# Patient Record
Sex: MALE | Race: BLACK OR AFRICAN AMERICAN | NOT HISPANIC OR LATINO | Employment: OTHER
[De-identification: names, ages, dates, MRNs, and addresses within clinical notes are randomized per-mention and may not be internally consistent; named-entity substitution may affect disease eponyms.]

---

## 2017-01-10 ENCOUNTER — RECORDS - HEALTHEAST (OUTPATIENT)
Dept: ADMINISTRATIVE | Facility: OTHER | Age: 52
End: 2017-01-10

## 2018-07-24 ENCOUNTER — HOSPITAL ENCOUNTER (EMERGENCY)
Facility: CLINIC | Age: 53
Discharge: PSYCHIATRIC OR SUBSTANCE USE FACILITY | End: 2018-07-25
Attending: EMERGENCY MEDICINE | Admitting: EMERGENCY MEDICINE
Payer: COMMERCIAL

## 2018-07-24 DIAGNOSIS — R73.9 HYPERGLYCEMIA: ICD-10-CM

## 2018-07-24 DIAGNOSIS — F20.0 CHRONIC PARANOID SCHIZOPHRENIA (H): ICD-10-CM

## 2018-07-24 DIAGNOSIS — F29 PSYCHOSIS, UNSPECIFIED PSYCHOSIS TYPE (H): ICD-10-CM

## 2018-07-24 LAB
ALBUMIN UR-MCNC: NEGATIVE MG/DL
AMPHETAMINES UR QL SCN: NEGATIVE
ANION GAP SERPL CALCULATED.3IONS-SCNC: 6 MMOL/L (ref 3–14)
APPEARANCE UR: CLEAR
BARBITURATES UR QL: NEGATIVE
BENZODIAZ UR QL: NEGATIVE
BILIRUB UR QL STRIP: NEGATIVE
BUN SERPL-MCNC: 13 MG/DL (ref 7–30)
CALCIUM SERPL-MCNC: 8.5 MG/DL (ref 8.5–10.1)
CANNABINOIDS UR QL SCN: NEGATIVE
CHLORIDE SERPL-SCNC: 102 MMOL/L (ref 94–109)
CO2 SERPL-SCNC: 29 MMOL/L (ref 20–32)
COCAINE UR QL: NEGATIVE
COLOR UR AUTO: ABNORMAL
CREAT SERPL-MCNC: 0.76 MG/DL (ref 0.66–1.25)
GFR SERPL CREATININE-BSD FRML MDRD: >90 ML/MIN/1.7M2
GLUCOSE BLDC GLUCOMTR-MCNC: 268 MG/DL (ref 70–99)
GLUCOSE BLDC GLUCOMTR-MCNC: 273 MG/DL (ref 70–99)
GLUCOSE BLDC GLUCOMTR-MCNC: 481 MG/DL (ref 70–99)
GLUCOSE SERPL-MCNC: 323 MG/DL (ref 70–99)
GLUCOSE UR STRIP-MCNC: >499 MG/DL
HGB UR QL STRIP: NEGATIVE
KETONES BLD-SCNC: 0.1 MMOL/L (ref 0–0.6)
KETONES UR STRIP-MCNC: NEGATIVE MG/DL
LEUKOCYTE ESTERASE UR QL STRIP: NEGATIVE
NITRATE UR QL: NEGATIVE
OPIATES UR QL SCN: NEGATIVE
PCP UR QL SCN: NEGATIVE
PH UR STRIP: 5 PH (ref 5–7)
POTASSIUM SERPL-SCNC: 4.1 MMOL/L (ref 3.4–5.3)
SODIUM SERPL-SCNC: 137 MMOL/L (ref 133–144)
SOURCE: ABNORMAL
SP GR UR STRIP: 1.02 (ref 1–1.03)
UROBILINOGEN UR STRIP-MCNC: 0 MG/DL (ref 0–2)

## 2018-07-24 PROCEDURE — 25000128 H RX IP 250 OP 636: Performed by: EMERGENCY MEDICINE

## 2018-07-24 PROCEDURE — 81003 URINALYSIS AUTO W/O SCOPE: CPT | Performed by: EMERGENCY MEDICINE

## 2018-07-24 PROCEDURE — 25000132 ZZH RX MED GY IP 250 OP 250 PS 637: Performed by: EMERGENCY MEDICINE

## 2018-07-24 PROCEDURE — 82010 KETONE BODYS QUAN: CPT | Performed by: EMERGENCY MEDICINE

## 2018-07-24 PROCEDURE — 00000146 ZZHCL STATISTIC GLUCOSE BY METER IP

## 2018-07-24 PROCEDURE — 90791 PSYCH DIAGNOSTIC EVALUATION: CPT

## 2018-07-24 PROCEDURE — 80048 BASIC METABOLIC PNL TOTAL CA: CPT | Performed by: EMERGENCY MEDICINE

## 2018-07-24 PROCEDURE — 96360 HYDRATION IV INFUSION INIT: CPT

## 2018-07-24 PROCEDURE — 93005 ELECTROCARDIOGRAM TRACING: CPT

## 2018-07-24 PROCEDURE — 80307 DRUG TEST PRSMV CHEM ANLYZR: CPT | Performed by: EMERGENCY MEDICINE

## 2018-07-24 PROCEDURE — 99285 EMERGENCY DEPT VISIT HI MDM: CPT | Mod: 25

## 2018-07-24 PROCEDURE — 96361 HYDRATE IV INFUSION ADD-ON: CPT

## 2018-07-24 RX ORDER — GLIPIZIDE 10 MG/1
10 TABLET ORAL ONCE
Status: COMPLETED | OUTPATIENT
Start: 2018-07-24 | End: 2018-07-24

## 2018-07-24 RX ORDER — OLANZAPINE 5 MG/1
10 TABLET, ORALLY DISINTEGRATING ORAL ONCE
Status: COMPLETED | OUTPATIENT
Start: 2018-07-24 | End: 2018-07-24

## 2018-07-24 RX ORDER — SODIUM CHLORIDE 9 MG/ML
1000 INJECTION, SOLUTION INTRAVENOUS CONTINUOUS
Status: DISCONTINUED | OUTPATIENT
Start: 2018-07-24 | End: 2018-07-25 | Stop reason: HOSPADM

## 2018-07-24 RX ADMIN — SODIUM CHLORIDE, PRESERVATIVE FREE 1000 ML: 5 INJECTION INTRAVENOUS at 19:47

## 2018-07-24 RX ADMIN — SODIUM CHLORIDE, PRESERVATIVE FREE 1000 ML: 5 INJECTION INTRAVENOUS at 18:45

## 2018-07-24 RX ADMIN — METFORMIN HYDROCHLORIDE 1000 MG: 500 TABLET ORAL at 21:26

## 2018-07-24 RX ADMIN — GLIPIZIDE 10 MG: 10 TABLET ORAL at 21:26

## 2018-07-24 RX ADMIN — OLANZAPINE 10 MG: 5 TABLET, ORALLY DISINTEGRATING ORAL at 16:38

## 2018-07-24 NOTE — ED NOTES
"Patient has been cooperative with this RN to this point. Patient was agreeable to having IV placed and having IVF. This RN attempted to place IV and patient became extremely agitated, stating that he was going to strike this RN and calling this RN a \"bitch.\". Security at bedside during attempt. This RN removed IV catheter and left room.    "

## 2018-07-24 NOTE — ED NOTES
RN entered room to discuss need for blood work to provide better care. Pt absolutely refused, began yelling profanities at RN and security.

## 2018-07-24 NOTE — ED NOTES
"New RN introduced self to pt and informed pt he would be transferring rooms. Pt agreeable. Pt speaking random thoughts about \"his Zoroastrian\" and \"$1,500 a month\". Security present, pt transferred rooms without incident. RN will attempt IV and blood work.   "

## 2018-07-24 NOTE — ED PROVIDER NOTES
"  History     Chief Complaint:  Altered mental status    HPI   Benja Duong is a 53 year old male with a history of chronic homelessness, paranoid schizophrenia, chemical dependency and abuse (cocaine, cannabinoids, methamphetamines), latent TB, and diabetes who presents to the emergency department today for evaluation for a mental health concerns and manic behavior. The patient reports he was recently at [facility] and [facility] [place] unknown reasons but was admitted to both at some point. The patient is unsure what is wrong with him and continues to swear on my assessment. He continues to tell stories about various people he wants to fight and he is \"medicated on thorazine\". He notes he might not be taking his thorazine. He has been fighting people for 3-4 days. He claims that he is from [place] and visiting a friend here. He also is telling stories about detention and the money he has to pay for parole. History is very difficulty to gather and remains very agitated. He admits to crack, alcohol, and methamphetamine use, but not for the last four days.     Per chart review, the patient was recently seen at [facility] on 7/11 for similar mental health concerns. At that time, he had similar nonsensical comments and did eventually admit to cocaine use. he was not holdable at that time and was discharged in the morning.     Allergies:  Oxycodone-Acetaminophen  Alprazolam     Medications:    OLANzapine (ZYPREXA) 10 mg/2 mL injection    aluminum-magnesium hydroxide-simethicone (MAALOX ADVANCED)    chlorproMAZINE (THORAZINE) 100 mg tablet    cholecalciferol (VITAMIN D) 1,000 unit tablet    glipiZIDE (GLUCOTROL) 10 mg tablet    metFORMIN (GLUCOPHAGE) 1,000 mg tablet    metoprolol tartrate (LOPRESSOR) 25 mg tablet    Non-compliant    Past Medical History:    TB lung, latent  Diabetes  Paranoid schizophrenia  Cocaine abuse  Drug-induced mood disorder  Cannabis " abuse  Hyperlipidemia  GERD  Hypertension    Past Surgical History:    I & D PERITONSILLAR ABSCESS  TONSILLECTOMY, PRIMARY OR SECONDARY; AGE 11 OR OVER  HX WISDOM TEETH EXTRACTION     Family History:    Unknown. Patient is unable to report.     Social History:  The patient was alone.  Smoking Status: Unknown  Smokeless Tobacco: Unknown  Alcohol Use: Unknown    Review of Systems   Unable to perform ROS: Mental status change     Physical Exam     Patient Vitals for the past 24 hrs:   BP Pulse Resp SpO2   07/24/18 1725 - - - 98 %   07/24/18 1724 141/84 - - 98 %   07/24/18 1552 (!) 164/101 115 20 98 %         Physical Exam  General: pacing around the room  Head: No obvious trauma to head.  Ears, Nose, Throat:  External ears normal.  Nose normal.    Eyes:  Conjunctivae clear.  Pupils are equal, round, and reactive.   Neck: Normal range of motion.  Neck supple.   CV: Regular rate and rhythm.  No murmurs.      Respiratory: Effort normal and breath sounds normal.  No wheezing or crackles.   Gastrointestinal: Soft.  No distension. There is no tenderness.    Neuro: Alert. Moving all extremities appropriately.  Normal speech.  Normal gait.    Skin: Skin is warm and dry.  No rash noted.   Psych: Erratic and aggressive mood.  Patient is rambling.  He appears to be responding to external stimuli in the emergency room.  It appears that he is having both auditory and visual hallucinations.  Disorganized thought process.  Aggressive stance at times.    Emergency Department Course   Laboratory:  Laboratory findings were communicated with the patient who voiced understanding of the findings.  Drug abuse screen 77 urine: Negative  Glucose by meter: 481 (H)  BMP: glc 323  Ketone Beta-Hydroxybutyrate Quantitative: 0.1 WNL    Interventions:  1638 Zyprexa 10 mg PO    Medications   0.9% sodium chloride BOLUS (not administered)     Followed by   sodium chloride 0.9% infusion (not administered)   OLANZapine zydis (zyPREXA) ODT tab 10 mg (10  "mg Oral Given 18 1638)        Emergency Department Course:  Nursing notes and vitals reviewed.  1539: I performed an exam of the patient as documented above.   1600: I spoke with Juana regarding patient's presentation, findings, and plan of care.    Findings and plan explained to the Patient.   Patient signed out to my partner pending lab assessment and transfer.  Impression & Plan    Medical Decision Makin-year-old male with history of psychosis, chronic schizophrenia, homelessness, drug abuse who presents with altered mental status.  Patient mildly tachycardic initially but otherwise unremarkable.  Patient is rambling and erratic.  He is requesting his Thorazine.  He has been committed previously to [facility].  He reports that he is noncompliant with his medication.  Intravenous chart he has also had a history of extensive drug abuse.  He tends to be psychotic and clear over time.  He reports that he has not used drugs in the last 4 days as he has \"lost his friends\".  His urine drug screen is negative.  At this point most concerned for decompensated mental health.  Deck  saw and evaluated patient and felt the patient would require admission.  He is speaking about suicidal ideation.  He is speaking about making a mental health parker out of human flesh.  He does not appear to be able to care for himself or others.  It is difficult to ascertain how long he has been off his meds for today suspect for quite some time.  Patient will require psychiatric admission based on initial assessment.  At this point less suspicious for drug-induced and more suspicious that this is decompensated mental health.  Patient voluntarily took 10 of oral Zyprexa.  He was sleeping comfortably after this point.    Patient is a diabetic per chart review.  He is supposed to be in metformin and glipizide.  His glucose was elevated at 481.  Based on hyperglycemia did obtain a BMP and ketones to evaluate " for possibility of DKA.  IV fluids were initiated to drive down sugar.  Suspect the patient is noncompliant with his medications.  BMP shows mildly elevated sugar at 323 but no evidence of acidosis with a normal bicarb and anion gap.  Ketones are negative.  There is no suggestion of DKA.  His only isolated hyperglycemia.  Continue IV fluids and will recheck after fluids.  Patient is currently medically clear.  Plan for psychiatric admission.  Dec was made aware and will call central intake.  Patient improved after 10 of oral Zyprexa.  He is sleeping comfortably.  No further behavioral outbursts.  Patient signed out in stable but improved condition.  Signed out to my partner pending psychiatric placement.    Diagnosis:    ICD-10-CM    1. Hyperglycemia R73.9    2. Psychosis, unspecified psychosis type F29    3. Chronic paranoid schizophrenia (H) F20.0        Disposition:  Signed out pending psychiatric bed placement, anticipate transfer to psychiatric admission    Scribe Disclosure:  Chicho MCCAULEY, am serving as a scribe at 4:10 PM on 7/24/2018 to document services personally performed by Sherita Wilson MD based on my observations and the provider's statements to me.     7/24/2018   [facility] EMERGENCY DEPARTMENT       Sherita Wilson MD  07/24/18 1849       Sherita Wilson MD  07/24/18 1922       Sherita Wilson MD  07/24/18 1922

## 2018-07-25 ENCOUNTER — HOSPITAL ENCOUNTER (INPATIENT)
Facility: CLINIC | Age: 53
LOS: 2 days | Discharge: LEFT BEFORE DISCHARGE | DRG: 885 | End: 2018-07-27
Attending: PSYCHIATRY & NEUROLOGY | Admitting: PSYCHIATRY & NEUROLOGY
Payer: COMMERCIAL

## 2018-07-25 VITALS
OXYGEN SATURATION: 99 % | RESPIRATION RATE: 20 BRPM | TEMPERATURE: 97.2 F | HEART RATE: 84 BPM | SYSTOLIC BLOOD PRESSURE: 169 MMHG | DIASTOLIC BLOOD PRESSURE: 92 MMHG

## 2018-07-25 PROBLEM — F29 PSYCHOSIS (H): Status: ACTIVE | Noted: 2018-07-25

## 2018-07-25 LAB — GLUCOSE BLDC GLUCOMTR-MCNC: 181 MG/DL (ref 70–99)

## 2018-07-25 PROCEDURE — 00000146 ZZHCL STATISTIC GLUCOSE BY METER IP

## 2018-07-25 PROCEDURE — 25000132 ZZH RX MED GY IP 250 OP 250 PS 637: Performed by: EMERGENCY MEDICINE

## 2018-07-25 PROCEDURE — 25000132 ZZH RX MED GY IP 250 OP 250 PS 637: Performed by: CLINICAL NURSE SPECIALIST

## 2018-07-25 PROCEDURE — 12400007 ZZH R&B MH INTERMEDIATE UMMC

## 2018-07-25 RX ORDER — OLANZAPINE 5 MG/1
5 TABLET, ORALLY DISINTEGRATING ORAL ONCE
Status: COMPLETED | OUTPATIENT
Start: 2018-07-25 | End: 2018-07-25

## 2018-07-25 RX ORDER — BISACODYL 10 MG
10 SUPPOSITORY, RECTAL RECTAL DAILY PRN
Status: DISCONTINUED | OUTPATIENT
Start: 2018-07-25 | End: 2018-07-27 | Stop reason: HOSPADM

## 2018-07-25 RX ORDER — OLANZAPINE 5 MG/1
5-10 TABLET ORAL 3 TIMES DAILY PRN
Status: DISCONTINUED | OUTPATIENT
Start: 2018-07-25 | End: 2018-07-27 | Stop reason: HOSPADM

## 2018-07-25 RX ORDER — OLANZAPINE 10 MG/1
10 TABLET, ORALLY DISINTEGRATING ORAL ONCE
Status: COMPLETED | OUTPATIENT
Start: 2018-07-25 | End: 2018-07-25

## 2018-07-25 RX ORDER — TRAZODONE HYDROCHLORIDE 50 MG/1
50 TABLET, FILM COATED ORAL
Status: DISCONTINUED | OUTPATIENT
Start: 2018-07-25 | End: 2018-07-27 | Stop reason: HOSPADM

## 2018-07-25 RX ORDER — HYDRALAZINE HYDROCHLORIDE 10 MG/1
10 TABLET, FILM COATED ORAL 4 TIMES DAILY
Status: DISCONTINUED | OUTPATIENT
Start: 2018-07-25 | End: 2018-07-25 | Stop reason: HOSPADM

## 2018-07-25 RX ORDER — OLANZAPINE 10 MG/2ML
5-10 INJECTION, POWDER, FOR SOLUTION INTRAMUSCULAR 3 TIMES DAILY PRN
Status: DISCONTINUED | OUTPATIENT
Start: 2018-07-25 | End: 2018-07-27 | Stop reason: HOSPADM

## 2018-07-25 RX ORDER — ALUMINA, MAGNESIA, AND SIMETHICONE 2400; 2400; 240 MG/30ML; MG/30ML; MG/30ML
30 SUSPENSION ORAL EVERY 4 HOURS PRN
Status: DISCONTINUED | OUTPATIENT
Start: 2018-07-25 | End: 2018-07-27 | Stop reason: HOSPADM

## 2018-07-25 RX ORDER — HYDROXYZINE HYDROCHLORIDE 25 MG/1
25-50 TABLET, FILM COATED ORAL EVERY 4 HOURS PRN
Status: DISCONTINUED | OUTPATIENT
Start: 2018-07-25 | End: 2018-07-27 | Stop reason: HOSPADM

## 2018-07-25 RX ADMIN — HYDRALAZINE HYDROCHLORIDE 10 MG: 10 TABLET, FILM COATED ORAL at 15:17

## 2018-07-25 RX ADMIN — OLANZAPINE 5 MG: 5 TABLET, ORALLY DISINTEGRATING ORAL at 14:04

## 2018-07-25 RX ADMIN — OLANZAPINE 10 MG: 10 TABLET, ORALLY DISINTEGRATING ORAL at 21:45

## 2018-07-25 RX ADMIN — METOPROLOL TARTRATE 12.5 MG: 25 TABLET ORAL at 12:39

## 2018-07-25 ASSESSMENT — ACTIVITIES OF DAILY LIVING (ADL)
DRESS: INDEPENDENT
ORAL_HYGIENE: INDEPENDENT
LAUNDRY: WITH SUPERVISION
GROOMING: INDEPENDENT

## 2018-07-25 NOTE — ED NOTES
Clinical provided to  Central Intake, patient placed on a wait list, with beds anticipated being available sometime tomorrow. JERICA

## 2018-07-25 NOTE — IP AVS SNAPSHOT
MRN:[id number]                      After Visit Summary   7/25/2018    Benja Duong    MRN: [id number]           Thank you!     Thank you for choosing [facility] for your care. Our goal is always to provide you with excellent care.        Patient Information     Date Of Birth          1965        Designated Caregiver       Most Recent Value    Caregiver    Will someone help with your care after discharge? yes    Name of designated caregiver Uncle Crawford    Phone number of caregiver [phone] (Office )    Caregiver address pt unsure      About your hospital stay     You were admitted on:  July 25, 2018 You last received care in the:  [facility] 32NR    You were discharged on:  July 27, 2018       Who to Call     For medical emergencies, please call 911.  For non-urgent questions about your medical care, please call your primary care provider or clinic, None          Attending Provider     Provider Specialty    Paul Andrews MD Psychiatry       Primary Care Provider Fax #    Physician No Ref-Primary [phone]      Further instructions from your care team        Behavioral Discharge Planning and Instructions      Summary:  You were admitted on 7/25/2018  due to Psychotic Symptomology.  You were treated by Dr. Paul Andrews MD and discharged on 07/27/18 from [facility]       Summary:  While you were hospitalized you met with a  to discuss discharge planning.  You signed a release in order to be referred for case management services.  You decided to discharge yourself against medical advice (AMA).    Therefore, no follow up appointments have been made for you.       Principal Diagnosis: Schizophrenia, disorganized    ACT TEAM  You are encouraged to follow up with any one of the following organizations in order to obtain an ACT Team to help you manage your chronic physical and mental health conditions:    Mental Health Resources [phone]  [employer].  "[phone]  [facility] [phone]  [facility] Care [phone]  [facility] [phone]    ACTIVE WARRANT  You are advised that you have an open warrant in [facility] that should be addressed immediately.  You can contact the [facility]  At [phone]    Major Treatments, Procedures and Findings:  You were provided with: a psychiatric assessment, assessed for medical stability, medication evaluation and/or management, group therapy, milieu management and medical interventions    Symptoms to Report: feeling more aggressive, increased confusion, losing more sleep, mood getting worse or thoughts of suicide    Early warning signs can include: increased depression or anxiety sleep disturbances increased thoughts or behaviors of suicide or self-harm  increased unusual thinking, such as paranoia or hearing voices    Safety and Wellness:  Take all medicines as directed.  Make no changes unless your doctor suggests them.      Follow treatment recommendations.  Refrain from alcohol and non-prescribed drugs.  If there is a concern for safety, call 911.    Resources:   Crisis Intervention: 888.238.3749 or 127-502-5576 (TTY: 958.898.5977).  Call anytime for help.  National [place] on Mental Illness (www.[place].maki.org): 953.389.9971 or 879-819-4945.  Alcoholics Anonymous (www.alcoholics-anonymous.org): Check your phone book for your local chapter.  Suicide Awareness Voices of Education (SAVE) (www.save.org): 111-033-GWPT (5014)  National Suicide Prevention Line (www.mentalhealthmn.org): 359-663-AJJI (4910)  Mental Health Consumer/Survivor Network of [place] (www.mhcsn.net): 153.469.8562 or 010-661-9031  Mental Health Association of [place] (www.mentalhealth.org): 833.918.6341 or 537-001-8801  Self- Management and Recovery Training., SMART-- Toll free: 633.399.8600  www.[employer]  [facility] Crisis (COPE) Response - Adult 124 822-1420  Text 4 Life: txt \"LIFE\" to 31910 for " "immediate support and crisis intervention  Crisis text line: Text \"[place]\" to 515949. Free, confidential, .  Crisis Intervention: 243.177.7269 or 324-120-7765. Call anytime for help.     The treatment team has appreciated the opportunity to work with you.     Benja,  please take care and make your recovery a daily recovery.   If you have any questions or concerns our unit number is [phone]          Pending Results     No orders found from 2018 to 2018.            Statement of Approval     Ordered          18 1504  I have reviewed and agree with all the recommendations and orders detailed in this document.  EFFECTIVE NOW     Approved and electronically signed by:  Paul Andrews MD             Admission Information     Date & Time Provider Department Dept. Phone    2018 Paul Andrews MD  32NR [phone]      Your Vitals Were     Blood Pressure Pulse Temperature Respirations Height Weight    158/88 84 97.8  F (36.6  C) (Oral) 17 1.702 m (5' 7\") 65.6 kg (144 lb 9.6 oz)    BMI (Body Mass Index)                   22.65 kg/m2           MyChart Information     [employer] lets you send messages to your doctor, view your test results, renew your prescriptions, schedule appointments and more. To sign up, go to [email or web address] . Click on \"Log in\" on the left side of the screen, which will take you to the Welcome page. Then click on \"Sign up Now\" on the right side of the page.     You will be asked to enter the access code listed below, as well as some personal information. Please follow the directions to create your username and password.     Your access code is: [email or web address]  Expires: 10/23/2018  2:06 AM     Your access code will  in 90 days. If you need help or a new code, please call your [facility] clinic or [phone].        Care EveryWhere ID     This is your Care EveryWhere ID. This could be used by other organizations to access your [facility] medical " records  [id number]        Equal Access to Services     MIRA TRAVIS : Hadii sam alicia rubybailey Soaixaali, waaxda luqadaha, qaybta kayulianaromeo rocha, rajani shankslatishabrenda desai. So [facility] [phone].    ATENCIÓN: Si habla español, tiene a robertson disposición servicios gratuitos de asistencia lingüística. Llame al [phone].    We comply with applicable federal civil rights laws and [place] laws. We do not discriminate on the basis of race, color, national origin, age, disability, sex, sexual orientation, or gender identity.               Review of your medicines      START taking        Dose / Directions    haloperidol 5 MG tablet   Commonly known as:  HALDOL        Dose:  5 mg   Take 1 tablet (5 mg) by mouth 2 times daily   Quantity:  60 tablet   Refills:  1       hydrALAZINE 25 MG tablet   Commonly known as:  APRESOLINE        Dose:  12.5 mg   Take 0.5 tablets (12.5 mg) by mouth every 6 hours as needed (HTN; SBP > 160)   Quantity:  30 tablet   Refills:  1       lisinopril 5 MG tablet   Commonly known as:  PRINIVIL/ZESTRIL        Dose:  5 mg   Start taking on:  7/28/2018   Take 1 tablet (5 mg) by mouth daily   Quantity:  30 tablet   Refills:  1         CONTINUE these medicines which have NOT CHANGED        Dose / Directions    * chlorproMAZINE 100 MG tablet   Commonly known as:  THORAZINE        Dose:  100 mg   Take 100 mg by mouth every morning And take 200 mg at bedtime.   Refills:  0       * chlorproMAZINE 50 MG tablet   Commonly known as:  THORAZINE        Dose:  50 mg   Take 50 mg by mouth 2 times daily as needed for other (psychosis)   Refills:  0       cholecalciferol 1000 units Tabs   Commonly known as:  VITAMIN D-1000 MAX ST        Dose:  1000 Units   Take 1,000 Units by mouth daily   Quantity:  30 tablet   Refills:  1       metFORMIN 1000 MG tablet   Commonly known as:  GLUCOPHAGE        Dose:  1000 mg   Take 1 tablet (1,000 mg) by mouth 2 times daily (with meals)   Quantity:  60 tablet    Refills:  1       * Notice:  This list has 2 medication(s) that are the same as other medications prescribed for you. Read the directions carefully, and ask your doctor or other care provider to review them with you.      STOP taking     glipiZIDE 10 MG tablet   Commonly known as:  GLUCOTROL           metoprolol tartrate 25 MG tablet   Commonly known as:  LOPRESSOR                Where to get your medicines      These medications were sent to [facility] [place], [place] - [address]  [address] [facility] [address], [facility] [address]     Phone:  [phone]     cholecalciferol 1000 units Tabs    haloperidol 5 MG tablet    hydrALAZINE 25 MG tablet    lisinopril 5 MG tablet    metFORMIN 1000 MG tablet                Protect others around you: Learn how to safely use, store and throw away your medicines at www.disposemymeds.org.             Medication List: This is a list of all your medications and when to take them. Check marks below indicate your daily home schedule. Keep this list as a reference.      Medications           Morning Afternoon Evening Bedtime As Needed    * chlorproMAZINE 100 MG tablet   Commonly known as:  THORAZINE   Take 100 mg by mouth every morning And take 200 mg at bedtime.                                * chlorproMAZINE 50 MG tablet   Commonly known as:  THORAZINE   Take 50 mg by mouth 2 times daily as needed for other (psychosis)                                cholecalciferol 1000 units Tabs   Commonly known as:  VITAMIN D-1000 MAX ST   Take 1,000 Units by mouth daily                                haloperidol 5 MG tablet   Commonly known as:  HALDOL   Take 1 tablet (5 mg) by mouth 2 times daily   Last time this was given:  5 mg on 7/27/2018  8:55 AM                                hydrALAZINE 25 MG tablet   Commonly known as:  APRESOLINE   Take 0.5 tablets (12.5 mg) by mouth every 6 hours as needed (HTN; SBP > 160)                                lisinopril 5 MG tablet    Commonly known as:  PRINIVIL/ZESTRIL   Take 1 tablet (5 mg) by mouth daily   Start taking on:  7/28/2018   Last time this was given:  5 mg on 7/27/2018  8:55 AM                                metFORMIN 1000 MG tablet   Commonly known as:  GLUCOPHAGE   Take 1 tablet (1,000 mg) by mouth 2 times daily (with meals)   Last time this was given:  1,000 mg on 7/27/2018  8:55 AM                                * Notice:  This list has 2 medication(s) that are the same as other medications prescribed for you. Read the directions carefully, and ask your doctor or other care provider to review them with you.

## 2018-07-25 NOTE — ED PROVIDER NOTES
Evaluated patient. Resting comfortable.  Wants something to eat.   Had mild increase in agitation and was given oral Zyprexa, tolerated well and calm at this time.  Agree with still needing admission to inpatient psychiatry.       Elias Goldman MD  07/25/18 0579

## 2018-07-25 NOTE — ED NOTES
Attempted to call nurse to nurse report to [facility] and was told they are unable to take report at this time and will call us back.

## 2018-07-25 NOTE — ED NOTES
Woke pt up for Vital signs and blood sugar. Pt still with fleeting thoughts, unable to hold steady conversation d/t changing topics rapidly. Cooperative at this time. Security continuing to monitor.

## 2018-07-25 NOTE — IP AVS SNAPSHOT
[address]    [address]    [facility] [address]    Phone:  [phone]                                       After Visit Summary   7/25/2018    Benja Duong    MRN: [id number]           After Visit Summary Signature Page     I have received my discharge instructions, and my questions have been answered. I have discussed any challenges I see with this plan with the nurse or doctor.    ..........................................................................................................................................  Patient/Patient Representative Signature      ..........................................................................................................................................  Patient Representative Print Name and Relationship to Patient    ..................................................               ................................................  Date                                            Time    ..........................................................................................................................................  Reviewed by Signature/Title    ...................................................              ..............................................  Date                                                            Time

## 2018-07-26 LAB
BASOPHILS # BLD AUTO: 0 10E9/L (ref 0–0.2)
BASOPHILS NFR BLD AUTO: 0.4 %
CHOLEST SERPL-MCNC: 157 MG/DL
DIFFERENTIAL METHOD BLD: NORMAL
EOSINOPHIL # BLD AUTO: 0.2 10E9/L (ref 0–0.7)
EOSINOPHIL NFR BLD AUTO: 3.1 %
ERYTHROCYTE [DISTWIDTH] IN BLOOD BY AUTOMATED COUNT: 12 % (ref 10–15)
GLUCOSE BLDC GLUCOMTR-MCNC: 343 MG/DL (ref 70–99)
HBA1C MFR BLD: 10.9 % (ref 0–5.6)
HCT VFR BLD AUTO: 42.4 % (ref 40–53)
HDLC SERPL-MCNC: 36 MG/DL
HGB BLD-MCNC: 13.9 G/DL (ref 13.3–17.7)
IMM GRANULOCYTES # BLD: 0 10E9/L (ref 0–0.4)
IMM GRANULOCYTES NFR BLD: 0.3 %
LDLC SERPL CALC-MCNC: 60 MG/DL
LYMPHOCYTES # BLD AUTO: 2.7 10E9/L (ref 0.8–5.3)
LYMPHOCYTES NFR BLD AUTO: 34.4 %
MCH RBC QN AUTO: 29.1 PG (ref 26.5–33)
MCHC RBC AUTO-ENTMCNC: 32.8 G/DL (ref 31.5–36.5)
MCV RBC AUTO: 89 FL (ref 78–100)
MONOCYTES # BLD AUTO: 0.3 10E9/L (ref 0–1.3)
MONOCYTES NFR BLD AUTO: 4.2 %
NEUTROPHILS # BLD AUTO: 4.5 10E9/L (ref 1.6–8.3)
NEUTROPHILS NFR BLD AUTO: 57.6 %
NONHDLC SERPL-MCNC: 121 MG/DL
NRBC # BLD AUTO: 0 10*3/UL
NRBC BLD AUTO-RTO: 0 /100
PLATELET # BLD AUTO: 211 10E9/L (ref 150–450)
RBC # BLD AUTO: 4.77 10E12/L (ref 4.4–5.9)
TRIGL SERPL-MCNC: 305 MG/DL
TSH SERPL DL<=0.005 MIU/L-ACNC: 0.6 MU/L (ref 0.4–4)
WBC # BLD AUTO: 7.8 10E9/L (ref 4–11)

## 2018-07-26 PROCEDURE — 83036 HEMOGLOBIN GLYCOSYLATED A1C: CPT | Performed by: CLINICAL NURSE SPECIALIST

## 2018-07-26 PROCEDURE — 25000132 ZZH RX MED GY IP 250 OP 250 PS 637: Performed by: CLINICAL NURSE SPECIALIST

## 2018-07-26 PROCEDURE — 80061 LIPID PANEL: CPT | Performed by: CLINICAL NURSE SPECIALIST

## 2018-07-26 PROCEDURE — G0177 OPPS/PHP; TRAIN & EDUC SERV: HCPCS

## 2018-07-26 PROCEDURE — 25000132 ZZH RX MED GY IP 250 OP 250 PS 637: Performed by: NURSE PRACTITIONER

## 2018-07-26 PROCEDURE — 12400007 ZZH R&B MH INTERMEDIATE UMMC

## 2018-07-26 PROCEDURE — 99207 ZZC CONSULT E&M CHANGED TO SUBSEQUENT LEVEL: CPT | Performed by: NURSE PRACTITIONER

## 2018-07-26 PROCEDURE — 99221 1ST HOSP IP/OBS SF/LOW 40: CPT | Mod: AI | Performed by: PSYCHIATRY & NEUROLOGY

## 2018-07-26 PROCEDURE — 99232 SBSQ HOSP IP/OBS MODERATE 35: CPT | Performed by: NURSE PRACTITIONER

## 2018-07-26 PROCEDURE — 85025 COMPLETE CBC W/AUTO DIFF WBC: CPT | Performed by: CLINICAL NURSE SPECIALIST

## 2018-07-26 PROCEDURE — 84443 ASSAY THYROID STIM HORMONE: CPT | Performed by: CLINICAL NURSE SPECIALIST

## 2018-07-26 PROCEDURE — 36415 COLL VENOUS BLD VENIPUNCTURE: CPT | Performed by: CLINICAL NURSE SPECIALIST

## 2018-07-26 PROCEDURE — 00000146 ZZHCL STATISTIC GLUCOSE BY METER IP

## 2018-07-26 PROCEDURE — 25000132 ZZH RX MED GY IP 250 OP 250 PS 637: Performed by: PSYCHIATRY & NEUROLOGY

## 2018-07-26 RX ORDER — HALOPERIDOL 5 MG/1
5 TABLET ORAL 2 TIMES DAILY
Status: DISCONTINUED | OUTPATIENT
Start: 2018-07-26 | End: 2018-07-27 | Stop reason: HOSPADM

## 2018-07-26 RX ORDER — NICOTINE POLACRILEX 4 MG
15-30 LOZENGE BUCCAL
Status: DISCONTINUED | OUTPATIENT
Start: 2018-07-26 | End: 2018-07-27 | Stop reason: HOSPADM

## 2018-07-26 RX ORDER — GLIPIZIDE 10 MG/1
15 TABLET ORAL
Status: ON HOLD | COMMUNITY
Start: 2018-07-11 | End: 2018-07-27

## 2018-07-26 RX ORDER — LISINOPRIL 2.5 MG/1
5 TABLET ORAL DAILY
Status: DISCONTINUED | OUTPATIENT
Start: 2018-07-27 | End: 2018-07-27 | Stop reason: HOSPADM

## 2018-07-26 RX ORDER — CHLORPROMAZINE HYDROCHLORIDE 100 MG/1
100 TABLET, FILM COATED ORAL EVERY MORNING
Status: ON HOLD | COMMUNITY
Start: 2018-07-11 | End: 2020-07-21

## 2018-07-26 RX ORDER — METOPROLOL TARTRATE 25 MG/1
12.5 TABLET, FILM COATED ORAL 2 TIMES DAILY
Status: ON HOLD | COMMUNITY
Start: 2018-07-11 | End: 2018-07-27

## 2018-07-26 RX ORDER — DEXTROSE MONOHYDRATE 25 G/50ML
25-50 INJECTION, SOLUTION INTRAVENOUS
Status: DISCONTINUED | OUTPATIENT
Start: 2018-07-26 | End: 2018-07-27 | Stop reason: HOSPADM

## 2018-07-26 RX ORDER — CHLORPROMAZINE HYDROCHLORIDE 50 MG/1
50 TABLET, FILM COATED ORAL 2 TIMES DAILY PRN
Status: ON HOLD | COMMUNITY
Start: 2018-07-11 | End: 2020-07-21

## 2018-07-26 RX ADMIN — NICOTINE POLACRILEX 4 MG: 2 GUM, CHEWING ORAL at 18:53

## 2018-07-26 RX ADMIN — HALOPERIDOL 5 MG: 5 TABLET ORAL at 22:07

## 2018-07-26 RX ADMIN — ALUMINUM HYDROXIDE, MAGNESIUM HYDROXIDE, AND DIMETHICONE 30 ML: 400; 400; 40 SUSPENSION ORAL at 09:42

## 2018-07-26 RX ADMIN — METFORMIN HYDROCHLORIDE 1000 MG: 500 TABLET, FILM COATED ORAL at 18:53

## 2018-07-26 RX ADMIN — ALUMINUM HYDROXIDE, MAGNESIUM HYDROXIDE, AND DIMETHICONE 30 ML: 400; 400; 40 SUSPENSION ORAL at 17:16

## 2018-07-26 ASSESSMENT — ACTIVITIES OF DAILY LIVING (ADL)
DRESS: INDEPENDENT
DRESS: INDEPENDENT
ORAL_HYGIENE: INDEPENDENT
HYGIENE/GROOMING: INDEPENDENT
GROOMING: INDEPENDENT
LAUNDRY: UNABLE TO COMPLETE
ORAL_HYGIENE: INDEPENDENT

## 2018-07-26 NOTE — PROGRESS NOTES
07/25/18 1916   Patient Belongings   Did you bring any home meds/supplements to the hospital?  No   Patient Belongings cell phone/electronics;clothing;shoes;watch;wallet;keys   Disposition of Belongings Locker;Sent to security per site process   Belongings Search Yes   Clothing Search Yes   Second Staff Elias Salmeron   In locker 6:  Phone , phone with cracked screen, misc. Cards, ID card, clothing, shoes, punctured CO2 cannister, two lighters, leather jacket, car key fob without key, watch, hat, sunglasses, toiletries, two keys, wallet  Security:  [place] (8982), [place] (5236), EBT (2844), 3 MASTERCARDS (5738), (6633), (0820)  A               Admission:  I am responsible for any personal items that are not sent to the safe or pharmacy.  [facility] is not responsible for loss, theft or damage of any property in my possession.    Signature:  _________________________________ Date: _______  Time: _____                                              Staff Signature:  ____________________________ Date: ________  Time: _____      2nd Staff person, if patient is unable/unwilling to sign:    Signature: ________________________________ Date: ________  Time: _____     Discharge:  [facility] has returned all of my personal belongings:    Signature: _________________________________ Date: ________  Time: _____                                          Staff Signature:  ____________________________ Date: ________  Time: _____

## 2018-07-26 NOTE — H&P
"Admitted:     07/25/2018      The patient was seen for 70 minutes on 07/26/2018.  Greater than 50% of time was spent on counseling and coordination of care, and presence of support in community.      CHIEF COMPLAINT AND REASON FOR ADMISSION:  The patient is a 53-year-old -American male who apparently is homeless and presented to the emergency room stating that \"I need Thorazine.\"  The patient presented as highly disheveled in self-care, mildly paranoid and unable to provide clarity into his current state or history.  Reported having thoughts of suicide and psychosis.  He also reports that he has not had any medication for 2 years and requested voluntary admission to inpatient mental health.  Reported that he suffers from constant delusions both auditory and visual hallucinations, felt unsafe.  The patient was extremely loose, very difficult to understand.  [facility]  he said that he \" needed to get some skin meat (implying from human or animal)  in order to build a pink parker where people could come to join him to [facility] and [facility] for mental health care.  This would also let me get rid of myself, so I can hear violins play all day long inside a paddy wagon.\" During visit with myself patient stated that today he was x-rayed by some people but he still needed some vilma \" x-ray my vision.\"  He would not explain why he needed his vision to be x-rayed.  Made a number of statements showing his fears of disorganization.  He correctly said that he was [place], but followed that he was in [place], [place] but correctly today's date.  He said that he suffered from paranoid schizophrenia from when I asked if he was homeless said that he was.  When I asked him if he had a  he told me that he would get one, but it would cause him $1500 and because he did not have money at this point in time,  this is why he did not have a .  Reported " abuse of cannabis, crack cocaine, combined with self-reported history above.  Said that he was taking Thorazine.  When asked about the exact dose,  said that he was taking 100 mg every other day.      PAST PSYCHIATRIC HISTORY:  As above, patient reported being previously prescribed Thorazine with last dose given 2 years ago.  However, in other reports found that he visited the emergency room recently and got Thorazine there. Of note, the patient admitted hearing voices with themes of suicide and daily thoughts of suicide.  He was quite restless during the interview.  I was paged and had to make a quick phone call.  He was unable to stay in the room, while I was doing all the calls, stood up and left and came back then stood up and left again, was constantly talking, appearing to respond to internal stimuli.  He apparent apparently also suffers from diabetes mellitus.  Blood sugar on 07/24 was 323.  Interestingly, his urine drug screen from 7/24 emergency room was negative for all screened substances.      PAST MEDICAL HISTORY:  The patient again does not seem to be a reliable historian to provide coherent symptoms and coherent medical history, but does appear to be suffering from diabetes mellitus.  REPORTED BEING ALLERGIC TO ALPRAZOLAM AND PERCOCET.   For medications, he reports taking chlorpromazine 100 mg every other day.  Review of his medications list from emergency room also showed that he was prescribed Zyprexa 10 mg/2  mL injection, cholecalciferol, vitamin D 1000 unit tablet, glipizide 10 mg, metformin 1000 mg, Lopressor 25 mg.  History of poor compliance with the above-mentioned medications,  In addition, past medical history also shows the patient suffered from tuberculosis of lung and latent hyperlipidemia, GERD, hypertension.      PAST SURGICAL HISTORY:  Significant for a peritonsillar abscess, tonsillectomy and history of wisdom teeth extraction.      FAMILY AND SOCIAL HISTORY:  The patient is unable  to report.      SOCIAL HISTORY:  Says that he is alone.      For physical examination and 12-point review of systems please refer to Dr. Sherita Wilson note from 07/24/2018.  I reviewed his note and agree with it.   VITAL SIGNS:  Temperature 97.8, blood pressure 143/97, heart rate 98.      MENTAL STATUS:  The patient is -American male looking slightly above his stated age of 53.  He has jeans says that he put on his hospital pants and wearing hospital sweatshirt.  Speech is fast, pressured at times slurred and difficult to understand.  He exhibits above-mentioned psychomotor agitation, voices delusional ideas, confused, needs frequent redirections, talks to self and apparently exhibiting auditory, visual hallucinations.  Contracts for safety in the hospital, but at the same time already asks for discharge.  He agrees to stay in the hospital voluntarily though.  His ability to focus, concentrate are clearly impaired.  He is oriented to self and time but not to place.  Insight and judgment both poor.      IMPRESSION:  Historical diagnosis of chronic paranoid schizophrenia, acute exacerbation.  Apparently, stimulant use disorder, unclear if it is active.      PLAN:  The patient was admitted on a voluntary basis; however, given the fact of his severe psychosis and degree of his disorganization, I feel it is appropriate to consider 72-hour hold and commitment if he decides to leave before significant improvement in his symptoms is achieved.  His agreement was started on Haldol 5 mg 2 times a day.  We will also use neuroleptics p.r.n. for severe agitation.  He will meet with clinical treatment coordinator and will be referred for case management.  It could very well be used, the patient's symptoms are quite chronic and he might need supportive housing such as group home or at least IR for mental health stabilization.  He will be seen by Internal Medicine to address his diabetic problems.         EDELMIRA  MD EVELYN             D: 2018   T: 2018   MT: EDENILSON      Name:     JEANNE TOBIAS   MRN:      [id number]        Account:      [id number]   :      1965        Admitted:     2018                   Document: [id number]

## 2018-07-26 NOTE — CONSULTS
"Brief Internal Medicine Note, 7/26/18:    IM consulted for hx of Type 2 DM.     Benja Duong is a 53 year old male with past medical history significant for HTN, Type 2 DM, latent TB, polysubstance abuse, schizophrenia, and disorganized behaviors admitted to station 32 for narayan.  Of note, he presented to the ED at [facility] ([facility] in early July and mid June with similar symptoms of delusions and disorganized behavior.  Benja at first appear to answer questions appropriately for me, but then answers became nonsensical.  He denies having any history of diabetes, taking any diabetes medication, or ever having used insulin.  When asked about history of HTN, he reports \"I take risperidal\".  He is in general a poor historian due to his current decompensated psychiatric illness.    # Hx Type 2 DM - Ha1c 10.9% this admission.  Per chart review, pt was maintained on Metformin 1000mg BID and Glipizide 15mg with breakfast and 10mg before dinner.    - Will start Metformin 1000mg BID   - Will hold off on resuming Glipizide for now due to risk for hypoglycemia and uncertain access to food (pt is chronically homeless)  [facility]   - Hypoglycemia protocol   - Monitor BG ac/hs     # HTN - Appears to have been on Metoprolol 25mg PO BID at one point but denies taking it at present.  May also have been on Lisinopril 5mg PO daily.  Renal fxn wnl.    - Will start Lisinopril 5mg PO daily w/ hold parameters in am   - Hydralazine PRN for SBP > 160   - Monitor BPs  - Will defer Metoprolol d/t hx of cocaine abuse     Physical Exam:   General: Alert and oriented x 3. Well nourished, well developed.  No acute distress.    HEENT: Normocephalic, atraumatic. Anicteric sclera. Mucous membranes moist.   CV: RRR. S1, S2. No murmurs appreciated.   Respiratory: Effort normal on room air. Lungs CTAB with no wheezing, rales, or rhonchi.   GI: Abdomen soft and non distended, bowel sounds present x all 4 quadrants. No " tenderness, rebound, or guarding.   Neuro: No focal deficits. Follows commands.  Strength 5/5 in upper and lower extremities.   MSKL: No joint swelling or tenderness. Moves all extremities.   Extremities: No gross deformities. No peripheral edema. Intact bilateral pedal pulses.   Skin: Grossly warm, dry, and intact. No jaundice. No rashes.     Medicine will follow along for DM management and monitoring BPs.  Thank you for this consult.       Milagro Luu, [facility]  Hospitalist Service   Pager: [phone]

## 2018-07-26 NOTE — PROGRESS NOTES
Pt reports mood as 'fair'. Pt did not attend groups today, is withdrawn from peers but visible in the milieu. Pt is confused, disorientated, and doesn't track during conversations. Denies SI/SIB.     07/26/18 1300   Behavioral Health   Hallucinations auditory   Thinking confused;distractable;delusional   Orientation person: oriented;place: oriented;date, disoriented;time, disoriented   Memory confabulation   Insight poor   Judgement impaired   Eye Contact at examiner   Affect blunted, flat   Mood mood is calm   Physical Appearance/Attire disheveled   Hygiene neglected grooming - unclean body, hair, teeth   Suicidality other (see comments)  (denies)   1. Wish to be Dead No   2. Non-Specific Active Suicidal Thoughts  No   Self Injury other (see comment)  (denies)   Activity withdrawn   Speech tangential;rambling   Psychomotor / Gait balanced;steady   Psycho Education   Type of Intervention 1:1 intervention   Response participates, initiates socially appropriate   Hours 0.5   Treatment Detail check in   Activities of Daily Living   Hygiene/Grooming independent   Oral Hygiene independent   Dress independent   Laundry unable to complete   Room Organization independent

## 2018-07-26 NOTE — PROGRESS NOTES
"Initial Psychosocial Assessment    I have reviewed the chart, met with the patient, and developed Care Plan.  Information for assessment was obtained from: Patient and Medical Chart    Pt participates in assessment but does not appear to be a reliable historian.  Pt reports he sought hospitalization for comfort.  Reports he has been staying at homeless shelters and that housing is a concern for him.  He deflects other psychosocial history questions with nonsensical answers.    Presenting Problem:  Admitted voluntarily on 7/25/18 to [facility] for psychotic symptoms (delusions, AH/VH) and suicidal ideation in the context of not taking medications for 2 years    Patient voluntarily admits for sx relief stating, \"I need Thorazine\"- he visited the ED at [facility] earlier in the month and was administered this medication, notes indicate suspected ingestion of some kind of stimulant precipitated ED visit and was a contributing factor in presentation    History of Mental Health and Chemical Dependency:  Dx hx of paranoid schizophrenia and polysubstance use.  Multiple past ED visits and hospitalizations at [facility] for both acute and medical sx ([facility], [facility] and this facility).   Reports ongoing sx of delusions and deficits of self care.      Substance use hx includes cannibis, crack, cocaine, meth, ETOH.  Utox at admit negative.    Family Description (Constellation, Family Psychiatric History):  Reports growing up in [place] and moved here in 1986.  Reports having about 14 siblings.  Raised by mom and dad.  Reports they are still living and remain living in [place].  Mom and dad are still together. Reports he is still  (she lives in nursing in [place]).  Reports he has 4 children.  All are big.  Declines to discuss hx, preferring to discuss discharge planning     Significant Life Events (Illness, Abuse, Trauma, Death):  Hx of  Latent TB, Type II Diabetes, hypertension.      Living Situation:  Patient is homeless " "(Chronic)-[facility]    Educational Background:  Graduated from high school-     Occupational History:  Works when he wants to    Financial Status:  Income: Social security  Has a rep payee named Jelly  Insurance: [facility]    Legal Issues:  Admitted voluntarily    Ethnic/Cultural Issues:  53 year old Black or -American male, single    Spiritual Orientation:  Not designated     Service History:  \"[employer]    Social Functioning (organization, interests):  Low functioning    Current Treatment Providers are:  None    Social Service Assessment/Plan:  Patient will have psychiatric assessment and medication management by the psychiatrist. Medications will be reviewed and adjusted per MD as indicated. The treatment team will continue to assess and stabilize the patient's mental health symptoms with the use of medications and therapeutic programming. Hospital staff will provide a safe environment and a therapeutic milieu. Staff will continue to assess patient as needed. Patient will participate in unit groups and activities. Patient will receive individual and group support on the unit.     CTC will do individual inpatient treatment planning and after care planning. CTC will discuss options for increasing community supports with the patient. CTC will coordinate with outpatient providers and will place referrals to ensure appropriate follow up care is in place.        "

## 2018-07-26 NOTE — PLAN OF CARE
Problem: Patient Care Overview  Goal: Team Discussion  Team Plan:   BEHAVIORAL TEAM DISCUSSION    Continued Stay Criteria/Rationale: Patient admitted voluntarily due to Psychosis    Plan: Psychiatric Assessment. Medication Management. Therapeutic Mileu. Individual and group support.  Participants: Paul Andrews MD, Antonia Lugo Rn and Germaine SUMMERS  Summary/Recommendation: The plan is to assess the patient for mental health and medication needs.  The patient will be prescribed medications to treat the identified symptoms.  Upon discharge the patient will be referred to services as appropriate based on the assessment.  Medical/Physical: Per internal med consult  Progress: Initial assessment  Precautions:   Behavioral Orders   Procedures     Assault precautions     Code 1 - Restrict to Unit     Routine Programming     As clinically indicated     Status 15     Every 15 minutes.

## 2018-07-26 NOTE — PROGRESS NOTES
Pt was given Maalox *1 this shift, for c/o stomach discomfort. He did not have any scheduled meds; no other prns requested.

## 2018-07-26 NOTE — PROGRESS NOTES
"Admission:     Admitted to [facility] voluntarily for increasing psychotic symptoms including hallucinations, delusions, and paranoia. Reported to have been off medications for 2 years. Pt denies hx diabetes, records indicate type 2. Medical consult placed. Pt denies hx of CD treatment or substance abuse. Chart indicates hx of substance abuse. Pt does then say he smokes crack, but refuses to provide details because \"I'm no snitch. I know next you start asking for names\". (See chart review for further details and hx)    Presents as paranoid. Appears responding to internal stimuli and endorses auditory hallucinations. Does cooperate completing admission, but is a poor historian. Oriented to room and unit. Encouraged to notify staff of needs, questions, and concerns. Pt verbalizes understanding.   "

## 2018-07-26 NOTE — PLAN OF CARE
Problem: Patient Care Overview  Goal: Individualization & Mutuality  Personal Plan of Care    Reasons you are in the Hospital  [facility]  [facility].  3.  4.    Goals for Discharge   1.  Want everything that he brought in to be returned to him  2.  3.

## 2018-07-26 NOTE — PROGRESS NOTES
RE: Dispo/referral for Case Mgmt  Writer met with Pt who signed VERO for [facility] Diversion and Recovery Team (DART) Program.  Writer left message for Racheal Umaña [phone] (DART Supervisor) to determine fax number and to make referral.  The DART Team has intensive case management, referral for substance use disorder tx Housing support, nursing support    Update: Racheal called back and said that she would send referral forms via email.  Writer faxed VERO and is awaiting referral forms

## 2018-07-27 VITALS
DIASTOLIC BLOOD PRESSURE: 88 MMHG | HEART RATE: 84 BPM | HEIGHT: 67 IN | WEIGHT: 144.6 LBS | TEMPERATURE: 97.8 F | RESPIRATION RATE: 17 BRPM | SYSTOLIC BLOOD PRESSURE: 158 MMHG | BODY MASS INDEX: 22.7 KG/M2

## 2018-07-27 LAB
GLUCOSE BLDC GLUCOMTR-MCNC: 232 MG/DL (ref 70–99)
GLUCOSE BLDC GLUCOMTR-MCNC: 236 MG/DL (ref 70–99)
GLUCOSE BLDC GLUCOMTR-MCNC: 262 MG/DL (ref 70–99)

## 2018-07-27 PROCEDURE — 99238 HOSP IP/OBS DSCHRG MGMT 30/<: CPT | Performed by: PSYCHIATRY & NEUROLOGY

## 2018-07-27 PROCEDURE — 25000132 ZZH RX MED GY IP 250 OP 250 PS 637: Performed by: PSYCHIATRY & NEUROLOGY

## 2018-07-27 PROCEDURE — 25000132 ZZH RX MED GY IP 250 OP 250 PS 637: Performed by: CLINICAL NURSE SPECIALIST

## 2018-07-27 PROCEDURE — 00000146 ZZHCL STATISTIC GLUCOSE BY METER IP

## 2018-07-27 PROCEDURE — 25000132 ZZH RX MED GY IP 250 OP 250 PS 637: Performed by: NURSE PRACTITIONER

## 2018-07-27 PROCEDURE — 25000131 ZZH RX MED GY IP 250 OP 636 PS 637: Performed by: NURSE PRACTITIONER

## 2018-07-27 RX ORDER — LISINOPRIL 5 MG/1
5 TABLET ORAL DAILY
Qty: 30 TABLET | Refills: 1 | Status: ON HOLD | OUTPATIENT
Start: 2018-07-28 | End: 2020-07-21

## 2018-07-27 RX ORDER — HYDRALAZINE HYDROCHLORIDE 25 MG/1
12.5 TABLET, FILM COATED ORAL EVERY 6 HOURS PRN
Qty: 30 TABLET | Refills: 1 | Status: ON HOLD | OUTPATIENT
Start: 2018-07-27 | End: 2020-10-04

## 2018-07-27 RX ORDER — HALOPERIDOL 5 MG/1
5 TABLET ORAL 2 TIMES DAILY
Qty: 60 TABLET | Refills: 1 | Status: ON HOLD | OUTPATIENT
Start: 2018-07-27 | End: 2020-07-21

## 2018-07-27 RX ADMIN — LISINOPRIL 5 MG: 2.5 TABLET ORAL at 08:55

## 2018-07-27 RX ADMIN — NICOTINE POLACRILEX 4 MG: 2 GUM, CHEWING ORAL at 10:47

## 2018-07-27 RX ADMIN — HALOPERIDOL 5 MG: 5 TABLET ORAL at 08:55

## 2018-07-27 RX ADMIN — NICOTINE POLACRILEX 4 MG: 2 GUM, CHEWING ORAL at 01:09

## 2018-07-27 RX ADMIN — INSULIN ASPART 3 UNITS: 100 INJECTION, SOLUTION INTRAVENOUS; SUBCUTANEOUS at 12:42

## 2018-07-27 RX ADMIN — INSULIN ASPART 2 UNITS: 100 INJECTION, SOLUTION INTRAVENOUS; SUBCUTANEOUS at 08:56

## 2018-07-27 RX ADMIN — METFORMIN HYDROCHLORIDE 1000 MG: 500 TABLET, FILM COATED ORAL at 08:55

## 2018-07-27 ASSESSMENT — ACTIVITIES OF DAILY LIVING (ADL)
LAUNDRY: UNABLE TO COMPLETE
ORAL_HYGIENE: INDEPENDENT
HYGIENE/GROOMING: INDEPENDENT
ORAL_HYGIENE: INDEPENDENT
GROOMING: INDEPENDENT
DRESS: INDEPENDENT
DRESS: INDEPENDENT

## 2018-07-27 NOTE — DISCHARGE INSTRUCTIONS
Behavioral Discharge Planning and Instructions      Summary:  You were admitted on 7/25/2018  due to Psychotic Symptomology.  You were treated by Dr. Paul Andrews MD and discharged on 07/27/18 from [facility] to Home       Summary:  While you were hospitalized you met with a  to discuss discharge planning.  You signed a release in order to be referred for case management services.  You decided to discharge yourself against medical advice (AMA).    Therefore, no follow up appointments have been made for you.       Principal Diagnosis: Schizophrenia, disorganized    ACT TEAM  You are encouraged to follow up with any one of the following organizations in order to obtain an ACT Team to help you manage your chronic physical and mental health conditions:    [facility] [phone]  [employer]. [phone]  [facility] [phone]  [facility] [phone]  [facility] [phone]    ACTIVE WARRANT  You are advised that you have an open warrant in [facility] that should be addressed immediately.  You can contact the [facility]  At [phone]    Major Treatments, Procedures and Findings:  You were provided with: a psychiatric assessment, assessed for medical stability, medication evaluation and/or management, group therapy, milieu management and medical interventions    Symptoms to Report: feeling more aggressive, increased confusion, losing more sleep, mood getting worse or thoughts of suicide    Early warning signs can include: increased depression or anxiety sleep disturbances increased thoughts or behaviors of suicide or self-harm  increased unusual thinking, such as paranoia or hearing voices    Safety and Wellness:  Take all medicines as directed.  Make no changes unless your doctor suggests them.      Follow treatment recommendations.  Refrain from alcohol and non-prescribed drugs.  If there is a concern for safety, call  "911.    Resources:   Crisis Intervention: 660.193.8067 or 907-735-8643 (TTY: 775.193.3304).  Call anytime for help.  National [place] on Mental Illness (www.[place].maki.org): 306.236.5158 or 979-377-0814.  Alcoholics Anonymous (www.alcoholics-anonymous.org): Check your phone book for your local chapter.  Suicide Awareness Voices of Education (SAVE) (www.save.org): 950-863-IRUP (1740)  National Suicide Prevention Line (www.mentalhealthmn.org): 618-733-AQNB (1337)  Mental Health Consumer/Survivor Network of [place] (www.mhcsn.net): 734.707.6893 or 558-580-5571  Mental Health Association of [place] (www.mentalhealth.org): 945.877.5497 or 513-833-3507  Self- Management and Recovery Training., [employer]-- Toll free: 878.361.9681  www.PunchTab.Inhance Media  [facility] Crisis (COPE) Response - Adult 375 020-4881  Text 4 Life: txt \"LIFE\" to 46560 for immediate support and crisis intervention  Crisis text line: Text \"[place]\" to 388047. Free, confidential, 24/7.  Crisis Intervention: 451.464.6115 or 810-561-0611. Call anytime for help.     The treatment team has appreciated the opportunity to work with you.     Benja,  please take care and make your recovery a daily recovery.   If you have any questions or concerns our unit number is [phone]        "

## 2018-07-27 NOTE — PLAN OF CARE
Problem: Social/Occupational/Functional Impairment (Psychotic Signs/Symptoms) (Adult)  Goal: Improved Social/Occupational/Functional Skills (Psychotic Signs/Symptoms)  Attended 1 or 2 OT groups offered. Initiated self awareness/social skills group. Required step by step verbal directions with reorientation. When responding to questions answers were disorganized and tangential. Needed reorientation each turn. Disheveled appearance with poor hygiene.  Will be given a self assessment form. OT staff will explain there value of OT, including them in their tx plan and offer options for meeting their needs and identifying goals.

## 2018-07-27 NOTE — PLAN OF CARE
Problem: Cognitive Impairment (Psychotic Signs/Symptoms) (Adult)  Goal: Improved Thought Clarity/Organization (Psychotic Signs/Symptoms)  Patient is requesting to discharge from the unit despite encouragement to stay from MD and other staff. Pt signed 12 intent to leave. MD was notified and AMA discharge order is in place. Pt will discharge after receiving medications from pharmacy.

## 2018-07-27 NOTE — PROGRESS NOTES
Re: Dispo/Case Management Referral  Documents from [facility] Program for referral not received.    Writer spoke with [facility] re: [facility] representatives and left messages at [phone] and [phone] re: referral.  In addition, writer sent email to [facility] referral coordinator William Blas ([email or web address].), Oneida Umaña ([email or web address].) and Saeid Cates ([email or web address] in an effort to obtain referral documents.      Update 3:00pm  Writer spent extensive amount of time on the phone with Jaclyn Quiñones [phone]/Fax [phone]  Patient has a hx of commitment with [place] which  2015 as well as a current warrant in [facility].  During the conversation patient indicated that he wished to sign a 12 hour intent to leave.  [facility] will old off on the mental health referral unless we hear more from them.      Writer will list ACT Teams in AVS

## 2018-07-27 NOTE — PROGRESS NOTES
"Re: writer met with patient at his request.  He is requesting to leave.  Writer paged attending who met with the pt.  He now wants to stay.      Pt has repeatedly asked for and been granted audience today with this writer (3x).  On each occurrence, his speech is not linear and does not make sense (\"A mastercard, work, people and uncles, etc.\") .  Writer has told him that he should make himself as comfortable as he can and to attend groups when he is awake so he can talk with peers and other staff.      Writer reported that I or another  will meet with him on Monday due to time constraints and to allow for attending to other cases.    "

## 2018-07-27 NOTE — PLAN OF CARE
Problem: Cognitive Impairment (Psychotic Signs/Symptoms) (Adult)  Goal: Improved Thought Clarity/Organization (Psychotic Signs/Symptoms)  Outcome: No Change  Patient had a brief episode of agitation early in the shift when told staff could not comply with his request to get everything he had in security sent back to the unit so he could be sure all of it was safe.  Writer then gave him copies of his valuables list and the valuables envelope completed by staff, and patient calmed quickly.  Patient was observed talking to himself frequently and appeared to be responding to internal stimuli, although he denied that and all other physical and mental health problems.  Associations continue to be loose.  Patient was aware his 1800 BG of 343 was elevated.  No aggressive behavior noted and no behavioral redirection required this evening.

## 2018-07-27 NOTE — PLAN OF CARE
Problem: Cognitive Impairment (Psychotic Signs/Symptoms) (Adult)  Goal: Improved Thought Clarity/Organization (Psychotic Signs/Symptoms)  Outcome: Adequate for Discharge Date Met: 07/27/18  Patient requested discharge this afternoon and was discharged to home at 1725 with supply of take home medications and all belongings he had brought with him on admission.  He denied suicidal ideation, auditory hallucinations and all other physical and mental illness.  He states he can safely manage his diabetes and medication regimen outside the hospital.  Discharge instructions were reviewed with patient and he indicated all questions had been answered to his satisfaction.

## 2018-07-27 NOTE — PROGRESS NOTES
Pt spent most of the shift sitting in the lounge. Pt did not attend any groups. Pt reports his mood as 'negotiable', denies SI/SIB. When asked if he was having any hallucinations, pt responded 'I have it all', although pt does not appear to be responding to internal stimuli. Rambling and tangential at times.       07/27/18 1025   Behavioral Health   Hallucinations auditory   Thinking poor concentration;distractable   Orientation person: oriented;place: oriented   Memory baseline memory   Insight poor   Judgement impaired   Eye Contact at examiner   Affect full range affect   Mood mood is calm   Physical Appearance/Attire disheveled   Hygiene neglected grooming - unclean body, hair, teeth   Suicidality other (see comments)  (denies)   1. Wish to be Dead No   2. Non-Specific Active Suicidal Thoughts  No   Self Injury other (see comment)  (denies)   Activity withdrawn   Speech tangential;rambling   Psychomotor / Gait balanced;steady   Psycho Education   Type of Intervention 1:1 intervention   Response participates with encouragement   Hours 0.5   Treatment Detail check in   Activities of Daily Living   Hygiene/Grooming independent   Oral Hygiene independent   Dress independent   Laundry unable to complete   Room Organization independent

## 2018-07-28 NOTE — DISCHARGE SUMMARY
"Admit Date:     07/25/2018   Discharge Date:     07/27/2018      The patient was hospitalized under the care of Dr. Andrews between 07/25/2018 and discharged on 7/27/2018.  The patient is being discharged against medical advice.      CHIEF COMPLAINT AND REASON FOR ADMISSION:  The patient is a 53-year-old -American male, homeless, presented to the emergency room stating that \"I need Thorazine.\"  The patient presented as highly disheveled, paranoid, unable to provide clarity into his current state or history.  He reported having thoughts of suicide, psychosis.  He reports that he has not had any medications for 2 years and requested voluntary admission to inpatient mental health.  He reported that he suffers from constant delusions, both auditory and visual hallucinations, and felt unsafe.  He was making very bizarre statements, stating, for example, that he was x-rayed by some people, but still needed some vilma \"to x-ray my vision.\"  He would not explain why he needed his vision to be x-rayed.  He also made statements that he \"needed to get some skin meat (implying from human or animal) in order to build a pink parker where people could come to join him to [facility] for mental health care.\"  The patient implied that he was at [place], but believed that he was in [place], [place], not [facility].  Apparently, the patient recently went to the emergency room and was given some supply of Thorazine.  When I asked him about exact dose, he said that he was taking 100 mg every other day.  The patient admitted hearing voices with themes of suicide, daily thoughts of suicide, was quite restless and made delusional disorganized statements.  His blood sugar on 07/24/2018 was 323.  Urine drug screen from emergency room on 07/24/2018 was negative for all screened substances.      DISCHARGE DIAGNOSES:  Schizophrenia, disorganized versus chronic paranoid, and history of stimulant " use disorder, unclear if it is active.      LABORATORY WORK:  Hemoglobin A1c was elevated at 10.9.  Fasting lipid panel showed decreased high density cholesterol 36, triglycerides 305.  TSH was within normal limits.  The patient's blood glucose was fluctuating between the highest at 343 and lowest 232.  CBC with differential was within normal limits.      CONSULTATIONS:  The patient was seen by Internal Medicine for help with management of his diabetes.  The patient made quite contradictory statements about his diabetes and diabetic treatment.  He denied having any history of diabetes, taking diabetes medications or ever having used insulin.  In general, appeared to be according to Internal Medicine a poor historian, and was recommended to start metformin 1000 mg twice a day and hold off on resuming glipizide due to risk for hyperglycemia, do hypoglycemia protocol, start lisinopril for hypertension and use hydralazine p.r.n. for systolic blood pressure over 160, and defer metoprolol due to history of cocaine abuse.  I appreciate Internal Medicine's help with this patient.        HOSPITAL COURSE:  The patient, as mentioned before, presented as very disorganized, loose, making delusional incoherent statements.  He agreed to stay in the hospital because he would like to be started on Haldol.  However, on the next day after meeting with him, he asked to be discharged.  He could not explain what was the reason for his request.  I met with him and reminded him that he agreed to stay in the hospital because of hearing voices and also passive suicidal thoughts and also because we promised to help him in finding for him a  and potentially could get him into some temporary housing.  The patient agreed to stay in the hospital; however, only a couple of hours after meeting with him, I was informed by RN that the patient again demanded to be discharged.  He denied presence of suicidal thoughts at the moment of  discharge.  He denied auditory hallucinations.  Personally, I do not see that he is holdable and I will discharge him against medical advice.  This facility's clinical treatment coordinator tried to communicate with [facility] regarding [facility] program representatives for getting patient a ; however, during this period of time, the patient indicated that he still wished to sign 12-hour intent to leave required to clinical treatment coordinator as an outpatient, [facility] from durable hold off on the mental health referral unless we hear more from them.  The patient again on the day of discharge denied presence of suicidal or homicidal thoughts and appeared to be slightly more organized in his presentation and making slightly more sense while talking about his plans to leave.  No followup appointments were scheduled for him.  He was discharged and given the following medications:  Haloperidol 5 mg 2 times a day, hydralazine 12.5 mg every 6 hours if needed for systolic blood pressure higher than 160, lisinopril 5 mg daily, chlorpromazine 100 mg every morning and 200 mg at bedtime, chlorpromazine 50 mg 2 times a day as needed for psychosis, cholecalciferol 1000 units daily, metformin 1000 mg 2 times a day.         EDELMIRA RIVAS MD             D: 2018   T: 2018   MT: JOYCE      Name:     JEANNE TOBIAS   MRN:      [id number]        Account:        [id number]   :      1965           Admit Date:     2018                                  Discharge Date: 2018      Document: [id number]

## 2018-11-29 ENCOUNTER — HOSPITAL ENCOUNTER (EMERGENCY)
Facility: CLINIC | Age: 53
Discharge: HOME | End: 2018-11-29
Attending: EMERGENCY MEDICINE | Admitting: EMERGENCY MEDICINE
Payer: COMMERCIAL

## 2018-11-29 VITALS
TEMPERATURE: 98.2 F | DIASTOLIC BLOOD PRESSURE: 114 MMHG | BODY MASS INDEX: 29.29 KG/M2 | SYSTOLIC BLOOD PRESSURE: 163 MMHG | OXYGEN SATURATION: 100 % | RESPIRATION RATE: 16 BRPM | WEIGHT: 187 LBS

## 2018-11-29 DIAGNOSIS — R73.9 HYPERGLYCEMIA: ICD-10-CM

## 2018-11-29 LAB
ANION GAP SERPL CALCULATED.3IONS-SCNC: 8 MMOL/L (ref 3–14)
BUN SERPL-MCNC: 6 MG/DL (ref 7–30)
CALCIUM SERPL-MCNC: 8.4 MG/DL (ref 8.5–10.1)
CHLORIDE SERPL-SCNC: 102 MMOL/L (ref 94–109)
CO2 SERPL-SCNC: 28 MMOL/L (ref 20–32)
CREAT SERPL-MCNC: 0.5 MG/DL (ref 0.66–1.25)
GFR SERPL CREATININE-BSD FRML MDRD: >90 ML/MIN/1.7M2
GLUCOSE BLDC GLUCOMTR-MCNC: 330 MG/DL (ref 70–99)
GLUCOSE SERPL-MCNC: 385 MG/DL (ref 70–99)
POTASSIUM SERPL-SCNC: 3.7 MMOL/L (ref 3.4–5.3)
SODIUM SERPL-SCNC: 138 MMOL/L (ref 133–144)

## 2018-11-29 PROCEDURE — 80048 BASIC METABOLIC PNL TOTAL CA: CPT | Performed by: EMERGENCY MEDICINE

## 2018-11-29 PROCEDURE — 00000146 ZZHCL STATISTIC GLUCOSE BY METER IP

## 2018-11-29 PROCEDURE — 99283 EMERGENCY DEPT VISIT LOW MDM: CPT | Mod: 25

## 2018-11-29 PROCEDURE — 36415 COLL VENOUS BLD VENIPUNCTURE: CPT | Performed by: EMERGENCY MEDICINE

## 2018-11-29 PROCEDURE — 96360 HYDRATION IV INFUSION INIT: CPT

## 2018-11-29 PROCEDURE — 25000128 H RX IP 250 OP 636: Performed by: EMERGENCY MEDICINE

## 2018-11-29 RX ADMIN — SODIUM CHLORIDE 1000 ML: 9 INJECTION, SOLUTION INTRAVENOUS at 17:45

## 2018-11-29 ASSESSMENT — ENCOUNTER SYMPTOMS
MYALGIAS: 1
ARTHRALGIAS: 1
BACK PAIN: 1

## 2018-11-29 NOTE — ED AVS SNAPSHOT
[facility] Emergency Department    [address]    [facility] [address]    Phone:  [phone]    Fax:  [phone]                                       Benja Duong   MRN: [id number]    Department:  [facility] Emergency Department   Date of Visit:  11/29/2018           After Visit Summary Signature Page     I have received my discharge instructions, and my questions have been answered. I have discussed any challenges I see with this plan with the nurse or doctor.    ..........................................................................................................................................  Patient/Patient Representative Signature      ..........................................................................................................................................  Patient Representative Print Name and Relationship to Patient    ..................................................               ................................................  Date                                   Time    ..........................................................................................................................................  Reviewed by Signature/Title    ...................................................              ..............................................  Date                                               Time          22EPIC Rev 08/18

## 2018-11-29 NOTE — ED AVS SNAPSHOT
[facility] Emergency Department    [address]    [facility] [address]    Phone:  [phone]    Fax:  [phone]                                       Benja Duong   MRN: [id number]    Department:  [facility] Emergency Department   Date of Visit:  11/29/2018           Patient Information     Date Of Birth          1965        Your diagnoses for this visit were:     Hyperglycemia        You were seen by Reagan Carter MD.      Follow-up Information     Follow up with [facility] In 3 days.    Specialties:  Sports Medicine, Pain Management, Obstetrics & Gynecology, Pediatrics, Internal Medicine, Nephrology    Contact information:    [address] [place] [address]  [facility] [address] [phone]        Discharge Instructions         High Blood Sugar (Hyperglycemia)     When you have hyperglycemia, drink plenty of water or other sugar-free, caffeine-free liquids.   Too much glucose (sugar) in your blood is called hyperglycemia or high blood sugar. High blood sugar can lead to a dangerous condition called ketoacidosis. In severe cases, it can lead to dehydration and coma.  Possible causes of hyperglycemia    Inadequate treatment plan for diabetes     Being sick    Being under stress    Taking certain medicines, such as steroids    Eating too much food, especially carbohydrates    Being less active than usual    Not taking enough diabetes medicine  Symptoms of hyperglycemia  Hyperglycemia may not cause symptoms. If you do have symptoms, they may include:    Thirst    Frequent need to urinate    Feeling tired    Nausea and vomiting    Itchy, dry skin    Blurry vision    Fast breathing and breath that smells fruity     Weakness    Dizziness    Wounds or skin infections that don t heal    Unexplained weight loss if hyperglycemia lasts for more than a few days   What you should do  Make sure you do the following:    Check your  blood sugar.    Drink plenty of sugar-free, caffeine-free liquids such as water. Don t drink fruit juice.    Check your blood sugar again every 4 hours. If you take insulin or diabetes medicines, follow your sick-day plan for taking medicine. Call your healthcare provider if you are not able to eat.    Check your blood or urine for ketones as directed.    Call your healthcare provider if your blood sugar and ketones do not return to your target range.  Preventing high blood sugar  To help keep your blood sugar from getting too high:    Control stress.    When you're ill, follow your sick-day plan.     Follow your meal plan. Eat only the amount of food on your meal plan    Follow your exercise plan.    Take your insulin or diabetes medicines as directed by your healthcare team. Also test your blood sugar as directed. If the plan is not working for you, discuss it with your healthcare provider.  Other things to do    Carry a medical ID card, a compact USB drive, or wear a medical alert bracelet or necklace. It should say that you have diabetes. It should also say what to do in case you pass out or go into a coma.    Make sure family, friends, and coworkers know the signs of high blood sugar. Tell them what to do if your blood sugar gets very high and you can t help yourself.    Talk to your healthcare team about other things you can do to prevent high blood sugar.   Special note: Drink plenty of sugar-free and caffeine-free liquids when you feel symptoms of hyperglycemia. Call your healthcare provider if you keep having episodes of hyperglycemia.   Date Last Reviewed: 5/1/2016 2000-2018 [employer] [address] [place] [address], [place] [address]. All rights reserved. This information is not intended as a substitute for professional medical care. Always follow your healthcare professional's instructions.          24 Hour Appointment Hotline       To make an appointment at any [facility], call  [phone] ([phone]). If you don't have a family doctor or clinic, we will help you find one. [facility] are conveniently located to serve the needs of you and your family.             Review of your medicines      Our records show that you are taking the medicines listed below. If these are incorrect, please call your family doctor or clinic.        Dose / Directions Last dose taken    chlorproMAZINE 50 MG tablet   Commonly known as:  THORAZINE   Dose:  50 mg        Take 50 mg by mouth 2 times daily as needed for other (psychosis)   Refills:  0        cholecalciferol 1000 units Tabs   Commonly known as:  VITAMIN D-1000 MAX ST   Dose:  1000 Units   Quantity:  30 tablet        Take 1,000 Units by mouth daily   Refills:  1        haloperidol 5 MG tablet   Commonly known as:  HALDOL   Dose:  5 mg   Quantity:  60 tablet        Take 1 tablet (5 mg) by mouth 2 times daily   Refills:  1        hydrALAZINE 25 MG tablet   Commonly known as:  APRESOLINE   Dose:  12.5 mg   Quantity:  30 tablet        Take 0.5 tablets (12.5 mg) by mouth every 6 hours as needed (HTN; SBP > 160)   Refills:  1        lisinopril 5 MG tablet   Commonly known as:  PRINIVIL/ZESTRIL   Dose:  5 mg   Quantity:  30 tablet        Take 1 tablet (5 mg) by mouth daily   Refills:  1        metFORMIN 1000 MG tablet   Commonly known as:  GLUCOPHAGE   Dose:  1000 mg   Quantity:  60 tablet        Take 1 tablet (1,000 mg) by mouth 2 times daily (with meals)   Refills:  1                Procedures and tests performed during your visit     Basic metabolic panel (BMP)    Glucose by meter      Orders Needing Specimen Collection     None      Pending Results     No orders found from 11/27/2018 to 11/30/2018.            Pending Culture Results     No orders found from 11/27/2018 to 11/30/2018.            Pending Results Instructions     If you had any lab results that were not finalized at the time of your Discharge, you can call the ED Lab Result RN at  [phone]. You will be contacted by this team for any positive Lab results or changes in treatment. The nurses are available 7 days a week from 10A to 6:30P.  You can leave a message 24 hours per day and they will return your call.        Test Results From Your Hospital Stay        11/29/2018  6:48 PM      Component Results     Component Value Ref Range & Units Status    Sodium 138 133 - 144 mmol/L Final    Potassium 3.7 3.4 - 5.3 mmol/L Final    Chloride 102 94 - 109 mmol/L Final    Carbon Dioxide 28 20 - 32 mmol/L Final    Anion Gap 8 3 - 14 mmol/L Final    Glucose 385 (H) 70 - 99 mg/dL Final    Urea Nitrogen 6 (L) 7 - 30 mg/dL Final    Creatinine 0.50 (L) 0.66 - 1.25 mg/dL Final    GFR Estimate >90 >60 mL/min/1.7m2 Final    Non  GFR Calc    GFR Estimate If Black >90 >60 mL/min/1.7m2 Final    African American GFR Calc    Calcium 8.4 (L) 8.5 - 10.1 mg/dL Final         11/29/2018  8:04 PM      Component Results     Component Value Ref Range & Units Status    Glucose 330 (H) 70 - 99 mg/dL Final                Clinical Quality Measure: Blood Pressure Screening     Your blood pressure was checked while you were in the emergency department today. The last reading we obtained was  BP: (!) 163/114 . Please read the guidelines below about what these numbers mean and what you should do about them.  If your systolic blood pressure (the top number) is less than 120 and your diastolic blood pressure (the bottom number) is less than 80, then your blood pressure is normal. There is nothing more that you need to do about it.  If your systolic blood pressure (the top number) is 120-139 or your diastolic blood pressure (the bottom number) is 80-89, your blood pressure may be higher than it should be. You should have your blood pressure rechecked within a year by a primary care provider.  If your systolic blood pressure (the top number) is 140 or greater or your diastolic blood pressure (the bottom number) is 90  "or greater, you may have high blood pressure. High blood pressure is treatable, but if left untreated over time it can put you at risk for heart attack, stroke, or kidney failure. You should have your blood pressure rechecked by a primary care provider within the next 4 weeks.  If your provider in the emergency department today gave you specific instructions to follow-up with your doctor or provider even sooner than that, you should follow that instruction and not wait for up to 4 weeks for your follow-up visit.        Thank you for choosing [facility]       Thank you for choosing [facility] for your care. Our goal is always to provide you with excellent care. Hearing back from our patients is one way we can continue to improve our services. Please take a few minutes to complete the written survey that you may receive in the mail after you visit with us. Thank you!        [facility] Information     [employer] lets you send messages to your doctor, view your test results, renew your prescriptions, schedule appointments and more. To sign up, go to [email or web address] . Click on \"Log in\" on the left side of the screen, which will take you to the Welcome page. Then click on \"Sign up Now\" on the right side of the page.     You will be asked to enter the access code listed below, as well as some personal information. Please follow the directions to create your username and password.     Your access code is: [email or web address]  Expires: 2019  8:47 PM     Your access code will  in 90 days. If you need help or a new code, please call your [facility] clinic or [phone].        Care EveryWhere ID     This is your Care EveryWhere ID. This could be used by other organizations to access your [facility] medical records  [id number]        Equal Access to Services     MIRA ROBLES : hernandez Cuenca qaybta kaalmada adeegyada, waxay idiin hayaan adeeg kharash la'aan ah. So samanta " [phone].    ATENCIÓN: Si habla español, tiene a robertson disposición servicios gratuitos de asistencia lingüística. Llame al [phone].    We comply with applicable federal civil rights laws and [place] laws. We do not discriminate on the basis of race, color, national origin, age, disability, sex, sexual orientation, or gender identity.            After Visit Summary       This is your record. Keep this with you and show to your community pharmacist(s) and doctor(s) at your next visit.

## 2018-11-29 NOTE — ED NOTES
Bed: ED11  Expected date: 11/29/18  Expected time: 4:29 PM  Means of arrival: Ambulance  Comments:  ISAAC
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
## 2018-11-30 NOTE — DISCHARGE INSTRUCTIONS
High Blood Sugar (Hyperglycemia)     When you have hyperglycemia, drink plenty of water or other sugar-free, caffeine-free liquids.   Too much glucose (sugar) in your blood is called hyperglycemia or high blood sugar. High blood sugar can lead to a dangerous condition called ketoacidosis. In severe cases, it can lead to dehydration and coma.  Possible causes of hyperglycemia    Inadequate treatment plan for diabetes     Being sick    Being under stress    Taking certain medicines, such as steroids    Eating too much food, especially carbohydrates    Being less active than usual    Not taking enough diabetes medicine  Symptoms of hyperglycemia  Hyperglycemia may not cause symptoms. If you do have symptoms, they may include:    Thirst    Frequent need to urinate    Feeling tired    Nausea and vomiting    Itchy, dry skin    Blurry vision    Fast breathing and breath that smells fruity     Weakness    Dizziness    Wounds or skin infections that don t heal    Unexplained weight loss if hyperglycemia lasts for more than a few days   What you should do  Make sure you do the following:    Check your blood sugar.    Drink plenty of sugar-free, caffeine-free liquids such as water. Don t drink fruit juice.    Check your blood sugar again every 4 hours. If you take insulin or diabetes medicines, follow your sick-day plan for taking medicine. Call your healthcare provider if you are not able to eat.    Check your blood or urine for ketones as directed.    Call your healthcare provider if your blood sugar and ketones do not return to your target range.  Preventing high blood sugar  To help keep your blood sugar from getting too high:    Control stress.    When you're ill, follow your sick-day plan.     Follow your meal plan. Eat only the amount of food on your meal plan    Follow your exercise plan.    Take your insulin or diabetes medicines as directed by your healthcare team. Also test your blood sugar as directed. If the  plan is not working for you, discuss it with your healthcare provider.  Other things to do    Carry a medical ID card, a compact USB drive, or wear a medical alert bracelet or necklace. It should say that you have diabetes. It should also say what to do in case you pass out or go into a coma.    Make sure family, friends, and coworkers know the signs of high blood sugar. Tell them what to do if your blood sugar gets very high and you can t help yourself.    Talk to your healthcare team about other things you can do to prevent high blood sugar.   Special note: Drink plenty of sugar-free and caffeine-free liquids when you feel symptoms of hyperglycemia. Call your healthcare provider if you keep having episodes of hyperglycemia.   Date Last Reviewed: 5/1/2016 2000-2018 [employer]. [address] [place] [address], [place] [address]. All rights reserved. This information is not intended as a substitute for professional medical care. Always follow your healthcare professional's instructions.

## 2018-11-30 NOTE — ED NOTES
Pt resting Comfortably on bed.  Alert & Oriented to person,day,date. However, pt will verbally ramble on in nonsensical manner.   Pt C/O hurting all over. No specific complaints.  See follow assessment.

## 2018-11-30 NOTE — ED NOTES
"Got BGC on pt and he asked me for food, told pt I would ask the DR.   stated no food yet, when I told pt he became angry and started cussing at me telling me I am \"Fucked up\" and \"You don't know what the fuck you are talking about.\"    "

## 2019-02-03 ENCOUNTER — RECORDS - HEALTHEAST (OUTPATIENT)
Dept: ADMINISTRATIVE | Facility: OTHER | Age: 54
End: 2019-02-03

## 2019-03-28 ENCOUNTER — HOSPITAL ENCOUNTER (EMERGENCY)
Facility: CLINIC | Age: 54
Discharge: HOME | End: 2019-03-29
Attending: EMERGENCY MEDICINE | Admitting: EMERGENCY MEDICINE
Payer: COMMERCIAL

## 2019-03-28 VITALS
HEART RATE: 105 BPM | OXYGEN SATURATION: 100 % | SYSTOLIC BLOOD PRESSURE: 162 MMHG | DIASTOLIC BLOOD PRESSURE: 113 MMHG | TEMPERATURE: 97.3 F | RESPIRATION RATE: 18 BRPM

## 2019-03-28 DIAGNOSIS — R10.9 ACUTE ABDOMINAL PAIN: ICD-10-CM

## 2019-03-28 DIAGNOSIS — R73.9 HYPERGLYCEMIA: ICD-10-CM

## 2019-03-28 PROCEDURE — 96375 TX/PRO/DX INJ NEW DRUG ADDON: CPT

## 2019-03-28 PROCEDURE — 96374 THER/PROPH/DIAG INJ IV PUSH: CPT

## 2019-03-28 PROCEDURE — 99284 EMERGENCY DEPT VISIT MOD MDM: CPT | Mod: 25

## 2019-03-29 LAB
ALBUMIN SERPL-MCNC: 3.4 G/DL (ref 3.4–5)
ALBUMIN UR-MCNC: NEGATIVE MG/DL
ALP SERPL-CCNC: 102 U/L (ref 40–150)
ALT SERPL W P-5'-P-CCNC: 20 U/L (ref 0–70)
ANION GAP SERPL CALCULATED.3IONS-SCNC: 4 MMOL/L (ref 3–14)
APPEARANCE UR: CLEAR
AST SERPL W P-5'-P-CCNC: 10 U/L (ref 0–45)
BASOPHILS # BLD AUTO: 0 10E9/L (ref 0–0.2)
BASOPHILS NFR BLD AUTO: 0.4 %
BILIRUB SERPL-MCNC: 0.4 MG/DL (ref 0.2–1.3)
BILIRUB UR QL STRIP: NEGATIVE
BUN SERPL-MCNC: 10 MG/DL (ref 7–30)
CALCIUM SERPL-MCNC: 9.4 MG/DL (ref 8.5–10.1)
CHLORIDE SERPL-SCNC: 95 MMOL/L (ref 94–109)
CO2 SERPL-SCNC: 29 MMOL/L (ref 20–32)
COLOR UR AUTO: ABNORMAL
CREAT SERPL-MCNC: 0.62 MG/DL (ref 0.66–1.25)
DIFFERENTIAL METHOD BLD: ABNORMAL
EOSINOPHIL # BLD AUTO: 0.1 10E9/L (ref 0–0.7)
EOSINOPHIL NFR BLD AUTO: 0.7 %
ERYTHROCYTE [DISTWIDTH] IN BLOOD BY AUTOMATED COUNT: 11.8 % (ref 10–15)
GFR SERPL CREATININE-BSD FRML MDRD: >90 ML/MIN/{1.73_M2}
GLUCOSE SERPL-MCNC: 481 MG/DL (ref 70–99)
GLUCOSE UR STRIP-MCNC: >1000 MG/DL
HCT VFR BLD AUTO: 38.6 % (ref 40–53)
HGB BLD-MCNC: 13 G/DL (ref 13.3–17.7)
HGB UR QL STRIP: NEGATIVE
IMM GRANULOCYTES # BLD: 0 10E9/L (ref 0–0.4)
IMM GRANULOCYTES NFR BLD: 0.3 %
KETONES UR STRIP-MCNC: NEGATIVE MG/DL
LEUKOCYTE ESTERASE UR QL STRIP: NEGATIVE
LIPASE SERPL-CCNC: 806 U/L (ref 73–393)
LYMPHOCYTES # BLD AUTO: 2.3 10E9/L (ref 0.8–5.3)
LYMPHOCYTES NFR BLD AUTO: 20.6 %
MCH RBC QN AUTO: 29 PG (ref 26.5–33)
MCHC RBC AUTO-ENTMCNC: 33.7 G/DL (ref 31.5–36.5)
MCV RBC AUTO: 86 FL (ref 78–100)
MONOCYTES # BLD AUTO: 0.7 10E9/L (ref 0–1.3)
MONOCYTES NFR BLD AUTO: 6.1 %
NEUTROPHILS # BLD AUTO: 8 10E9/L (ref 1.6–8.3)
NEUTROPHILS NFR BLD AUTO: 71.9 %
NITRATE UR QL: NEGATIVE
NRBC # BLD AUTO: 0 10*3/UL
NRBC BLD AUTO-RTO: 0 /100
PH UR STRIP: 6.5 PH (ref 5–7)
PLATELET # BLD AUTO: 231 10E9/L (ref 150–450)
POTASSIUM SERPL-SCNC: 3.9 MMOL/L (ref 3.4–5.3)
PROT SERPL-MCNC: 7.3 G/DL (ref 6.8–8.8)
RBC # BLD AUTO: 4.49 10E12/L (ref 4.4–5.9)
RBC #/AREA URNS AUTO: 9 /HPF (ref 0–2)
SODIUM SERPL-SCNC: 128 MMOL/L (ref 133–144)
SOURCE: ABNORMAL
SP GR UR STRIP: 1.02 (ref 1–1.03)
SPERM #/AREA URNS HPF: PRESENT /HPF
UROBILINOGEN UR STRIP-MCNC: NORMAL MG/DL (ref 0–2)
WBC # BLD AUTO: 11.1 10E9/L (ref 4–11)
WBC #/AREA URNS AUTO: 3 /HPF (ref 0–5)

## 2019-03-29 PROCEDURE — 80053 COMPREHEN METABOLIC PANEL: CPT | Performed by: EMERGENCY MEDICINE

## 2019-03-29 PROCEDURE — 83690 ASSAY OF LIPASE: CPT | Performed by: EMERGENCY MEDICINE

## 2019-03-29 PROCEDURE — 81001 URINALYSIS AUTO W/SCOPE: CPT | Performed by: EMERGENCY MEDICINE

## 2019-03-29 PROCEDURE — 25000125 ZZHC RX 250

## 2019-03-29 PROCEDURE — 85025 COMPLETE CBC W/AUTO DIFF WBC: CPT | Performed by: EMERGENCY MEDICINE

## 2019-03-29 PROCEDURE — 25000128 H RX IP 250 OP 636: Performed by: EMERGENCY MEDICINE

## 2019-03-29 RX ORDER — SODIUM CHLORIDE 9 MG/ML
1000 INJECTION, SOLUTION INTRAVENOUS CONTINUOUS
Status: DISCONTINUED | OUTPATIENT
Start: 2019-03-29 | End: 2019-03-29 | Stop reason: HOSPADM

## 2019-03-29 RX ORDER — ONDANSETRON 2 MG/ML
INJECTION INTRAMUSCULAR; INTRAVENOUS
Status: DISCONTINUED
Start: 2019-03-29 | End: 2019-03-29 | Stop reason: HOSPADM

## 2019-03-29 RX ORDER — ONDANSETRON 2 MG/ML
4 INJECTION INTRAMUSCULAR; INTRAVENOUS EVERY 30 MIN PRN
Status: DISCONTINUED | OUTPATIENT
Start: 2019-03-29 | End: 2019-03-29 | Stop reason: HOSPADM

## 2019-03-29 RX ADMIN — SODIUM CHLORIDE 1000 ML: 9 INJECTION, SOLUTION INTRAVENOUS at 01:09

## 2019-03-29 RX ADMIN — FAMOTIDINE 20 MG: 10 INJECTION, SOLUTION INTRAVENOUS at 01:10

## 2019-03-29 RX ADMIN — ONDANSETRON 4 MG: 2 INJECTION INTRAMUSCULAR; INTRAVENOUS at 01:09

## 2019-03-29 ASSESSMENT — ENCOUNTER SYMPTOMS
CONSTIPATION: 0
ABDOMINAL PAIN: 1
VOMITING: 1
DYSURIA: 0
DIARRHEA: 0
NAUSEA: 1
FEVER: 0

## 2019-03-29 NOTE — ED NOTES
"This technician attempted to get a blood sugar on the Patient per RN request. This technician entered the Patients room and set the supplies on the cart against the wall. This technician asked the Patient if he would like to move up in the bed, as the Patients feet were hanging off of the bed. The Patient did not respond, so this technician asked again in case the Patient had not heard her. This technician informed the Patient that she was going to get a blood sugar, which involved a finger stick. The Patient then stated, \"you all do not give a fuck about us. Why are you back there giving me a bad impression of what you are doing. You nurses making up whatever stories you want.\" This technician informed the Patient that she was not trying upset the Patient, she just wanted to check the blood sugar. The Patient began to raise his voice to the technician, stating \"just do the damn thing you came in here to do.\" This technician began to move towards the door, as the Patient became more aggressive. The Patient yelled expletives to this technician, and then began to get off of the bed and come towards the technician. At this point, this technician exited the room and shut the door. RN notified.   "

## 2019-03-29 NOTE — ED NOTES
"Writer went into patients room to perform an ekg and patient stated \" Man get the fuck away from me\" Writer stated the dr ordered this teast. Patient then responded \" Man if there aint no money involved get the fuck out of here\" writer asked if he was refusing the treatment and patient responded \" yes I am get the fuck outta here . I just want to lay here without you bothering the fuck out of here\" writer then said if he was refusing cares he was free to leave anytime. Patient then stated \" fuck you man if you aint giving me any money I'm outta here\" .. rn was notified and security was asked to escort the patient out.   "

## 2019-03-29 NOTE — ED NOTES
Pt returned from US.  Pt was rude to staff, yelling, swearing at tech.  Tech felt unsafe doing US despite security present.  Pt return pt to room, yelling at staff in ed, refuses to let staff help.  Pt want to leave.  Refuses care.  When taking iv out, pt yelling don't touch me.  Get me out of here.  Iv removed and security escorted pt out.

## 2019-03-29 NOTE — ED PROVIDER NOTES
History     Chief Complaint:  Abdominal Pain    History limited by: mental health disorder.      Benja Duong is a 53 year old male with a history of diabetes, psychosis, HTN who presents to the emergency department for evaluation of abdominal pain. Of note, the patient is frequently seen in the ED, last seen on 3/5. The patient reports he has experienced 4 days of diffuse, intermittent abdominal pain. He states he developed vomiting today, prompting him to present to the ED. The patient denies any dysuria, diarrhea, constipation, fever, or chest pain. He denies any current abdominal pain.    Allergies:  Alprazolam  Oxycodone-Acetaminophen     Medications:    Thorazine  Haldol  Apresoline  Lisinopril  Metformin     Past Medical History:    Psychosis  Depression  Diabetes  HTN    Past Surgical History:    The patient does not have any pertinent past surgical history.    Family History:    No past pertinent family history.    Social History:  Presents alone.  Positive for alcohol use.   Positive for cocaine use.  Marital Status:  Single [1]     ROS limited by mental health disorder.  Review of Systems   Constitutional: Negative for fever.   Cardiovascular: Negative for chest pain.   Gastrointestinal: Positive for abdominal pain, nausea and vomiting. Negative for constipation and diarrhea.   Genitourinary: Negative for dysuria.   All other systems reviewed and are negative.        Physical Exam     Patient Vitals for the past 24 hrs:   BP Temp Temp src Pulse Heart Rate Resp SpO2   03/28/19 2351 (!) 162/113 97.3  F (36.3  C) Oral 105 105 18 100 %     Physical Exam  Constitutional:  Oriented to person, place, and time. No apparent distress.  HENT:   Head:    Normocephalic.   Mouth/Throat:   Oropharynx is clear and moist.   Eyes:    EOM are normal. Pupils are equal, round, and reactive to light.   Neck:    Neck supple.   Cardiovascular:  Normal rate, regular rhythm and normal heart sounds.      Exam reveals no  gallop and no friction rub.       No murmur heard.  Pulmonary/Chest:  Effort normal and breath sounds normal.      No respiratory distress. No wheezes. No rales.      No reproducible chest wall pain.  Abdominal:   Soft. No distension. No tenderness. No rebound and no guarding.   Musculoskeletal:  Normal range of motion.   Neurological:   Alert and oriented to person, place, and time.           Moves all 4 extremities spontaneously    Psych:   Tangential, schizophrenia at baseline, nondecompensated.  Skin:    No rash noted. No pallor.     Emergency Department Course     Laboratory:  UA with micro: Glucose >1000, RBC 9, Sperm Present, o/w negative    CBC: WBC: 11.1 (H), HGB: 13.0 (L), PLT: 231  CMP: Glucose 481 (H), Na 128 (L), Creatinine 0.62 (H), o/w WNL     Lipase: 806 (H)    Interventions:  0109 NS 1L IV BOLUS   Zofran 4 mg IV  0110 Pepcid 20 mg IV    Emergency Department Course:  Nursing notes and vitals reviewed. 0049 I performed an exam of the patient as documented above.     IV inserted. Medicine administered as documented above. Blood drawn. This was sent to the lab for further testing, results above.    0125 The patient at this time is refusing medical care and is requesting to leave the ED.    0130 Patient eloped from the ED.    Impression & Plan      Medical Decision Making:  Benja Duong is a 53 year old male with a history of schizophrenia that came in complaining of intermittent abdominal pain, ongoing for the past 4 days, currently resolved. He currently denies any abdominal pain. He had an episode of vomiting prior to arrival. On exam, he has an otherwise benign abdomen. Differential includes gastritis, pancreatitis, biliary colic, gastroenteritis, or other causes. With an otherwise benign abdomen and resolved symptoms, I would highly doubt surgical process, and I do not feel that CT imaging was indicated. Initial plan was blood work, EKGs, and screening for potential cardiac disease and US of  the gallbladder. Labs were initially started, patient refuses EKG, refused US, was verbally abusive to the the staff here, and eventually refused all care. While patient does have baseline schizophrenia, he denied any visual or auditory hallucinations, denies suicidal ideation, and after his initial screening, I did not feel that he was in any immediate harm, nor did I have any appropriate reason to place him on a hold. Therefore, he was allowed to abscond prior to completing his workup. He was invited to return at any point. I note that, within his laboratory work, his lipase is elevated, which might be pointing towards possible mild pancreatitis, as well as elevated glucose with no signs of DKA.    Diagnosis:    ICD-10-CM   1. Acute abdominal pain R10.9   2. Hyperglycemia R73.9       Disposition:  Patient eloped from the ED.    ILeander, am serving as a scribe on 3/29/2019 at 12:14 AM to personally document services performed by Tahir Vazquez MD based on my observations and the provider's statements to me.     Leander Barrientos  3/28/2019   [facility] EMERGENCY DEPARTMENT       Tahir Vazquez MD  03/29/19 0316

## 2020-07-21 ENCOUNTER — APPOINTMENT (OUTPATIENT)
Dept: CT IMAGING | Facility: CLINIC | Age: 55
DRG: 641 | End: 2020-07-21
Attending: EMERGENCY MEDICINE
Payer: COMMERCIAL

## 2020-07-21 ENCOUNTER — HOSPITAL ENCOUNTER (INPATIENT)
Facility: CLINIC | Age: 55
LOS: 2 days | Discharge: HOME | DRG: 641 | End: 2020-07-23
Attending: EMERGENCY MEDICINE | Admitting: INTERNAL MEDICINE
Payer: COMMERCIAL

## 2020-07-21 ENCOUNTER — APPOINTMENT (OUTPATIENT)
Dept: GENERAL RADIOLOGY | Facility: CLINIC | Age: 55
DRG: 641 | End: 2020-07-21
Attending: EMERGENCY MEDICINE
Payer: COMMERCIAL

## 2020-07-21 DIAGNOSIS — R10.9 RIGHT SIDED ABDOMINAL PAIN: ICD-10-CM

## 2020-07-21 DIAGNOSIS — R07.9 CHEST PAIN, UNSPECIFIED TYPE: ICD-10-CM

## 2020-07-21 DIAGNOSIS — R06.00 DYSPNEA, UNSPECIFIED TYPE: ICD-10-CM

## 2020-07-21 DIAGNOSIS — R33.9 URINARY RETENTION WITH INCOMPLETE BLADDER EMPTYING: ICD-10-CM

## 2020-07-21 DIAGNOSIS — R11.2 NON-INTRACTABLE VOMITING WITH NAUSEA, UNSPECIFIED VOMITING TYPE: ICD-10-CM

## 2020-07-21 DIAGNOSIS — E87.1 HYPONATREMIA: ICD-10-CM

## 2020-07-21 LAB
ALBUMIN SERPL-MCNC: 4 G/DL (ref 3.4–5)
ALBUMIN UR-MCNC: 10 MG/DL
ALP SERPL-CCNC: 69 U/L (ref 40–150)
ALT SERPL W P-5'-P-CCNC: 21 U/L (ref 0–70)
ANION GAP SERPL CALCULATED.3IONS-SCNC: 7 MMOL/L (ref 3–14)
APPEARANCE UR: CLEAR
AST SERPL W P-5'-P-CCNC: 14 U/L (ref 0–45)
BASOPHILS # BLD AUTO: 0 10E9/L (ref 0–0.2)
BASOPHILS NFR BLD AUTO: 0.2 %
BILIRUB SERPL-MCNC: 0.5 MG/DL (ref 0.2–1.3)
BILIRUB UR QL STRIP: NEGATIVE
BUN SERPL-MCNC: 13 MG/DL (ref 7–30)
CALCIUM SERPL-MCNC: 9.6 MG/DL (ref 8.5–10.1)
CHLORIDE SERPL-SCNC: 79 MMOL/L (ref 94–109)
CO2 SERPL-SCNC: 32 MMOL/L (ref 20–32)
COLOR UR AUTO: ABNORMAL
CREAT SERPL-MCNC: 0.76 MG/DL (ref 0.66–1.25)
D DIMER PPP FEU-MCNC: 1 UG/ML FEU (ref 0–0.5)
DIFFERENTIAL METHOD BLD: ABNORMAL
EOSINOPHIL # BLD AUTO: 0.1 10E9/L (ref 0–0.7)
EOSINOPHIL NFR BLD AUTO: 0.4 %
ERYTHROCYTE [DISTWIDTH] IN BLOOD BY AUTOMATED COUNT: 11.7 % (ref 10–15)
GFR SERPL CREATININE-BSD FRML MDRD: >90 ML/MIN/{1.73_M2}
GLUCOSE BLDC GLUCOMTR-MCNC: 173 MG/DL (ref 70–99)
GLUCOSE SERPL-MCNC: 252 MG/DL (ref 70–99)
GLUCOSE UR STRIP-MCNC: >1000 MG/DL
HBA1C MFR BLD: 6.5 % (ref 0–5.6)
HCT VFR BLD AUTO: 35.8 % (ref 40–53)
HGB BLD-MCNC: 12.4 G/DL (ref 13.3–17.7)
HGB UR QL STRIP: NEGATIVE
IMM GRANULOCYTES # BLD: 0.1 10E9/L (ref 0–0.4)
IMM GRANULOCYTES NFR BLD: 0.4 %
KETONES UR STRIP-MCNC: ABNORMAL MG/DL
LABORATORY COMMENT REPORT: NORMAL
LACTATE BLD-SCNC: 1 MMOL/L (ref 0.7–2)
LACTATE BLD-SCNC: 2 MMOL/L (ref 0.7–2)
LEUKOCYTE ESTERASE UR QL STRIP: NEGATIVE
LIPASE SERPL-CCNC: 66 U/L (ref 73–393)
LYMPHOCYTES # BLD AUTO: 2.8 10E9/L (ref 0.8–5.3)
LYMPHOCYTES NFR BLD AUTO: 16.4 %
MCH RBC QN AUTO: 29.8 PG (ref 26.5–33)
MCHC RBC AUTO-ENTMCNC: 34.6 G/DL (ref 31.5–36.5)
MCV RBC AUTO: 86 FL (ref 78–100)
MONOCYTES # BLD AUTO: 1.3 10E9/L (ref 0–1.3)
MONOCYTES NFR BLD AUTO: 8 %
MUCOUS THREADS #/AREA URNS LPF: PRESENT /LPF
NEUTROPHILS # BLD AUTO: 12.5 10E9/L (ref 1.6–8.3)
NEUTROPHILS NFR BLD AUTO: 74.6 %
NITRATE UR QL: NEGATIVE
NRBC # BLD AUTO: 0 10*3/UL
NRBC BLD AUTO-RTO: 0 /100
NT-PROBNP SERPL-MCNC: 39 PG/ML (ref 0–900)
PH UR STRIP: 6.5 PH (ref 5–7)
PLATELET # BLD AUTO: 300 10E9/L (ref 150–450)
POTASSIUM SERPL-SCNC: 3.7 MMOL/L (ref 3.4–5.3)
PROT SERPL-MCNC: 7.7 G/DL (ref 6.8–8.8)
RBC # BLD AUTO: 4.16 10E12/L (ref 4.4–5.9)
RBC #/AREA URNS AUTO: 1 /HPF (ref 0–2)
SARS-COV-2 RNA SPEC QL NAA+PROBE: NEGATIVE
SARS-COV-2 RNA SPEC QL NAA+PROBE: NORMAL
SODIUM SERPL-SCNC: 118 MMOL/L (ref 133–144)
SODIUM SERPL-SCNC: 124 MMOL/L (ref 133–144)
SOURCE: ABNORMAL
SP GR UR STRIP: 1.02 (ref 1–1.03)
SPECIMEN SOURCE: NORMAL
SPECIMEN SOURCE: NORMAL
SQUAMOUS #/AREA URNS AUTO: <1 /HPF (ref 0–1)
TROPONIN I SERPL-MCNC: <0.015 UG/L (ref 0–0.04)
TROPONIN I SERPL-MCNC: <0.015 UG/L (ref 0–0.04)
TSH SERPL DL<=0.005 MIU/L-ACNC: 1.04 MU/L (ref 0.4–4)
UROBILINOGEN UR STRIP-MCNC: NORMAL MG/DL (ref 0–2)
WBC # BLD AUTO: 16.7 10E9/L (ref 4–11)
WBC #/AREA URNS AUTO: <1 /HPF (ref 0–5)

## 2020-07-21 PROCEDURE — 71045 X-RAY EXAM CHEST 1 VIEW: CPT

## 2020-07-21 PROCEDURE — 83605 ASSAY OF LACTIC ACID: CPT | Performed by: EMERGENCY MEDICINE

## 2020-07-21 PROCEDURE — 12000000 ZZH R&B MED SURG/OB

## 2020-07-21 PROCEDURE — 85025 COMPLETE CBC W/AUTO DIFF WBC: CPT | Performed by: EMERGENCY MEDICINE

## 2020-07-21 PROCEDURE — 00000146 ZZHCL STATISTIC GLUCOSE BY METER IP

## 2020-07-21 PROCEDURE — 25000132 ZZH RX MED GY IP 250 OP 250 PS 637: Performed by: INTERNAL MEDICINE

## 2020-07-21 PROCEDURE — 71275 CT ANGIOGRAPHY CHEST: CPT

## 2020-07-21 PROCEDURE — 25000128 H RX IP 250 OP 636: Performed by: EMERGENCY MEDICINE

## 2020-07-21 PROCEDURE — 99285 EMERGENCY DEPT VISIT HI MDM: CPT | Mod: 25

## 2020-07-21 PROCEDURE — 96375 TX/PRO/DX INJ NEW DRUG ADDON: CPT

## 2020-07-21 PROCEDURE — 25800030 ZZH RX IP 258 OP 636: Performed by: INTERNAL MEDICINE

## 2020-07-21 PROCEDURE — 85379 FIBRIN DEGRADATION QUANT: CPT | Performed by: EMERGENCY MEDICINE

## 2020-07-21 PROCEDURE — U0003 INFECTIOUS AGENT DETECTION BY NUCLEIC ACID (DNA OR RNA); SEVERE ACUTE RESPIRATORY SYNDROME CORONAVIRUS 2 (SARS-COV-2) (CORONAVIRUS DISEASE [COVID-19]), AMPLIFIED PROBE TECHNIQUE, MAKING USE OF HIGH THROUGHPUT TECHNOLOGIES AS DESCRIBED BY CMS-2020-01-R: HCPCS | Performed by: EMERGENCY MEDICINE

## 2020-07-21 PROCEDURE — 84484 ASSAY OF TROPONIN QUANT: CPT | Performed by: INTERNAL MEDICINE

## 2020-07-21 PROCEDURE — 83605 ASSAY OF LACTIC ACID: CPT | Performed by: INTERNAL MEDICINE

## 2020-07-21 PROCEDURE — 96361 HYDRATE IV INFUSION ADD-ON: CPT

## 2020-07-21 PROCEDURE — 93005 ELECTROCARDIOGRAM TRACING: CPT

## 2020-07-21 PROCEDURE — 84443 ASSAY THYROID STIM HORMONE: CPT | Performed by: EMERGENCY MEDICINE

## 2020-07-21 PROCEDURE — 83690 ASSAY OF LIPASE: CPT | Performed by: EMERGENCY MEDICINE

## 2020-07-21 PROCEDURE — 83880 ASSAY OF NATRIURETIC PEPTIDE: CPT | Performed by: EMERGENCY MEDICINE

## 2020-07-21 PROCEDURE — C9803 HOPD COVID-19 SPEC COLLECT: HCPCS

## 2020-07-21 PROCEDURE — 84484 ASSAY OF TROPONIN QUANT: CPT | Performed by: EMERGENCY MEDICINE

## 2020-07-21 PROCEDURE — 80053 COMPREHEN METABOLIC PANEL: CPT | Performed by: EMERGENCY MEDICINE

## 2020-07-21 PROCEDURE — 99223 1ST HOSP IP/OBS HIGH 75: CPT | Mod: AI | Performed by: INTERNAL MEDICINE

## 2020-07-21 PROCEDURE — 84295 ASSAY OF SERUM SODIUM: CPT | Performed by: INTERNAL MEDICINE

## 2020-07-21 PROCEDURE — 36415 COLL VENOUS BLD VENIPUNCTURE: CPT | Performed by: INTERNAL MEDICINE

## 2020-07-21 PROCEDURE — 25800030 ZZH RX IP 258 OP 636: Performed by: EMERGENCY MEDICINE

## 2020-07-21 PROCEDURE — 96374 THER/PROPH/DIAG INJ IV PUSH: CPT

## 2020-07-21 PROCEDURE — 81001 URINALYSIS AUTO W/SCOPE: CPT | Performed by: EMERGENCY MEDICINE

## 2020-07-21 PROCEDURE — 83036 HEMOGLOBIN GLYCOSYLATED A1C: CPT | Performed by: INTERNAL MEDICINE

## 2020-07-21 RX ORDER — LIDOCAINE 40 MG/G
CREAM TOPICAL
Status: DISCONTINUED | OUTPATIENT
Start: 2020-07-21 | End: 2020-07-23 | Stop reason: HOSPADM

## 2020-07-21 RX ORDER — SODIUM CHLORIDE 9 MG/ML
INJECTION, SOLUTION INTRAVENOUS CONTINUOUS
Status: DISCONTINUED | OUTPATIENT
Start: 2020-07-21 | End: 2020-07-21

## 2020-07-21 RX ORDER — HYDROCHLOROTHIAZIDE 25 MG/1
25 TABLET ORAL DAILY
Status: ON HOLD | COMMUNITY
End: 2020-07-23

## 2020-07-21 RX ORDER — PANTOPRAZOLE SODIUM 40 MG/1
40 TABLET, DELAYED RELEASE ORAL
Status: DISCONTINUED | OUTPATIENT
Start: 2020-07-21 | End: 2020-07-23 | Stop reason: HOSPADM

## 2020-07-21 RX ORDER — DEXTROSE MONOHYDRATE 25 G/50ML
25-50 INJECTION, SOLUTION INTRAVENOUS
Status: DISCONTINUED | OUTPATIENT
Start: 2020-07-21 | End: 2020-07-23 | Stop reason: HOSPADM

## 2020-07-21 RX ORDER — PANTOPRAZOLE SODIUM 40 MG/1
40 TABLET, DELAYED RELEASE ORAL DAILY
Status: ON HOLD | COMMUNITY
End: 2020-07-23

## 2020-07-21 RX ORDER — DIPHENHYDRAMINE HCL 50 MG
50 CAPSULE ORAL AT BEDTIME
COMMUNITY

## 2020-07-21 RX ORDER — NALOXONE HYDROCHLORIDE 0.4 MG/ML
.1-.4 INJECTION, SOLUTION INTRAMUSCULAR; INTRAVENOUS; SUBCUTANEOUS
Status: DISCONTINUED | OUTPATIENT
Start: 2020-07-21 | End: 2020-07-23 | Stop reason: HOSPADM

## 2020-07-21 RX ORDER — ONDANSETRON 2 MG/ML
4 INJECTION INTRAMUSCULAR; INTRAVENOUS ONCE
Status: COMPLETED | OUTPATIENT
Start: 2020-07-21 | End: 2020-07-21

## 2020-07-21 RX ORDER — ACETAMINOPHEN 325 MG/1
650 TABLET ORAL EVERY 4 HOURS PRN
Status: DISCONTINUED | OUTPATIENT
Start: 2020-07-21 | End: 2020-07-23 | Stop reason: HOSPADM

## 2020-07-21 RX ORDER — NICOTINE POLACRILEX 4 MG
15-30 LOZENGE BUCCAL
Status: DISCONTINUED | OUTPATIENT
Start: 2020-07-21 | End: 2020-07-23 | Stop reason: HOSPADM

## 2020-07-21 RX ORDER — IOPAMIDOL 755 MG/ML
500 INJECTION, SOLUTION INTRAVASCULAR ONCE
Status: COMPLETED | OUTPATIENT
Start: 2020-07-21 | End: 2020-07-21

## 2020-07-21 RX ORDER — ACETAMINOPHEN 650 MG/1
650 SUPPOSITORY RECTAL EVERY 4 HOURS PRN
Status: DISCONTINUED | OUTPATIENT
Start: 2020-07-21 | End: 2020-07-23 | Stop reason: HOSPADM

## 2020-07-21 RX ORDER — MORPHINE SULFATE 4 MG/ML
4 INJECTION, SOLUTION INTRAMUSCULAR; INTRAVENOUS
Status: COMPLETED | OUTPATIENT
Start: 2020-07-21 | End: 2020-07-21

## 2020-07-21 RX ORDER — ATORVASTATIN CALCIUM 20 MG/1
20 TABLET, FILM COATED ORAL AT BEDTIME
COMMUNITY

## 2020-07-21 RX ORDER — RISPERIDONE 50 MG/2 ML
50 KIT INTRAMUSCULAR
COMMUNITY
End: 2024-03-17

## 2020-07-21 RX ORDER — LISINOPRIL 40 MG/1
40 TABLET ORAL DAILY
COMMUNITY

## 2020-07-21 RX ORDER — LIDOCAINE HYDROCHLORIDE 20 MG/ML
20 JELLY TOPICAL
Status: DISCONTINUED | OUTPATIENT
Start: 2020-07-21 | End: 2020-07-21

## 2020-07-21 RX ORDER — NITROGLYCERIN 0.4 MG/1
0.4 TABLET SUBLINGUAL EVERY 5 MIN PRN
Status: DISCONTINUED | OUTPATIENT
Start: 2020-07-21 | End: 2020-07-23 | Stop reason: HOSPADM

## 2020-07-21 RX ORDER — INSULIN GLARGINE 100 [IU]/ML
18 INJECTION, SOLUTION SUBCUTANEOUS AT BEDTIME
COMMUNITY
End: 2021-12-27

## 2020-07-21 RX ORDER — PROCHLORPERAZINE MALEATE 5 MG
10 TABLET ORAL EVERY 6 HOURS PRN
Status: DISCONTINUED | OUTPATIENT
Start: 2020-07-21 | End: 2020-07-23 | Stop reason: HOSPADM

## 2020-07-21 RX ORDER — PROCHLORPERAZINE 25 MG
25 SUPPOSITORY, RECTAL RECTAL EVERY 12 HOURS PRN
Status: DISCONTINUED | OUTPATIENT
Start: 2020-07-21 | End: 2020-07-23 | Stop reason: HOSPADM

## 2020-07-21 RX ADMIN — SODIUM CHLORIDE 75 ML: 9 INJECTION, SOLUTION INTRAVENOUS at 17:08

## 2020-07-21 RX ADMIN — SODIUM CHLORIDE 1000 ML: 9 INJECTION, SOLUTION INTRAVENOUS at 15:14

## 2020-07-21 RX ADMIN — MORPHINE SULFATE 4 MG: 4 INJECTION INTRAVENOUS at 15:17

## 2020-07-21 RX ADMIN — SODIUM CHLORIDE: 9 INJECTION, SOLUTION INTRAVENOUS at 21:55

## 2020-07-21 RX ADMIN — ONDANSETRON 4 MG: 2 INJECTION INTRAMUSCULAR; INTRAVENOUS at 15:17

## 2020-07-21 RX ADMIN — IOPAMIDOL 71 ML: 755 INJECTION, SOLUTION INTRAVENOUS at 17:08

## 2020-07-21 RX ADMIN — PANTOPRAZOLE SODIUM 40 MG: 40 TABLET, DELAYED RELEASE ORAL at 21:54

## 2020-07-21 ASSESSMENT — MIFFLIN-ST. JEOR
SCORE: 1480.82
SCORE: 1478.55
SCORE: 1423.67

## 2020-07-21 ASSESSMENT — ACTIVITIES OF DAILY LIVING (ADL)
RETIRED_EATING: 2-->ASSISTIVE PERSON
FALL_HISTORY_WITHIN_LAST_SIX_MONTHS: NO
COGNITION: 0 - NO COGNITION ISSUES REPORTED
SWALLOWING: 0-->SWALLOWS FOODS/LIQUIDS WITHOUT DIFFICULTY
TRANSFERRING: 2-->ASSISTIVE PERSON
RETIRED_COMMUNICATION: 0-->UNDERSTANDS/COMMUNICATES WITHOUT DIFFICULTY
DRESS: 2-->ASSISTIVE PERSON
TOILETING: 2-->ASSISTIVE PERSON
BATHING: 2-->ASSISTIVE PERSON
AMBULATION: 2-->ASSISTIVE PERSON

## 2020-07-21 ASSESSMENT — ENCOUNTER SYMPTOMS
CONSTIPATION: 1
ABDOMINAL PAIN: 1
SHORTNESS OF BREATH: 0
SORE THROAT: 0
VOMITING: 1
NAUSEA: 1
COUGH: 1
FEVER: 0
HEADACHES: 1
CHILLS: 0

## 2020-07-21 NOTE — ED TRIAGE NOTES
"Pt has abdominal pain and \"hernia infection\" for past 2 days.  He has nausea and vomiting, no diarrhea.  He is also having cough with shortness of breath.  "

## 2020-07-21 NOTE — ED NOTES
Dr. Salas notified of post void residual 569ml. Order for mckeon catheter received. MD spoke with pt and pt wanting to try to void again. Will re-assess after pt attempt.

## 2020-07-21 NOTE — ED NOTES
MD updated that pt was able to void 300 ml more, but has 327ml left in bladder. MD order to continue with mckeon catheter. Pt updated on plan and agreeable.

## 2020-07-21 NOTE — ED NOTES
This RN called Sedrick RN at pt's group home, who stated that approx 1-2 months ago pt complained of severe sudden onset of SOB. Pt also had diarrhea, a cough and was vomiting at that time. For 2 days, pt has not been eating well and is nauseated. Sedrick reported that pt had a BM this morning that wasn't hard. Sedrick also stated that pt is inconsistent with his story so it is difficult getting a good idea of what is going on.

## 2020-07-21 NOTE — ED PROVIDER NOTES
"  History     Chief Complaint:  Abdominal pain, vomiting, chest pain    HPI   Benja Duong is a 55 year old male with a history of depression, psychosis, type 2 diabetes, hypertension, schizophrenia, and hyperlipidemia, among other, who presents with multiple concerns including abdominal pain associated nausea and vomiting for 4 days as well as chest pain for the last 2 days.  He notes \"my hernia is making me vomit\".  He notes his \"hernia\" is more painful than normal and localizes it to near the umbilicus.  He notes he has not had a bowel movement in 4 days.  He denies fever or chills.  He notes he is also had a mild headache and feels short of breath.  He has a cough but that has been intermittent for 1 month, worse over the past 4 days.  He denies hemoptysis.  He notes he has been urinating less than normal but urinated 2 hours ago and it was normal.  He notes that he was tested for COVID 2 months ago at [facility] and was negative.  He notes contacts with people with COVID but that was when he was tested at [place].  He denies sore throat or congestion.  He currently denies shortness of breath.    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 No pertinent family history.     Social History:  The patient was not accompanied to the ED.  Smoking Status: Current every day smoker (0.5 packs/day)  Smokeless Tobacco: Never used  Alcohol Use: Yes  Drug Use: Yes ('crack' cocaine)  Marital Status:  Single    Review of Systems   Constitutional: Negative for chills and fever.   HENT: Negative for congestion and sore throat.    Respiratory: Positive for cough. Negative for shortness of breath.    Cardiovascular: Positive for chest pain.   Gastrointestinal: Positive for abdominal pain, constipation, nausea and vomiting.   Genitourinary: Positive for decreased urine volume.   Neurological: Positive for headaches.   All other systems reviewed and are negative.    Physical Exam     Patient Vitals for the past 24 hrs:   BP Temp Temp src Pulse Heart Rate Resp SpO2 Height Weight   07/21/20 1545 (!) 161/98 -- -- 115 116 11 100 % -- --   07/21/20 1530 (!) 151/91 -- -- 121 123 (!) 0 100 % -- --   07/21/20 1515 (!) 168/99 -- -- 116 118 -- 100 % -- --   07/21/20 1500 (!) 164/98 -- -- 121 -- -- -- -- --   07/21/20 1245 111/77 98.2  F (36.8  C) Temporal -- 128 18 99 % 1.702 m (5' 7\") 63.5 kg (140 lb)     Physical Exam  General: Adult male sitting upright  Eyes:  Conjunctive within normal limits. No scleral icterus.  ENT: Moist mucous membranes, oropharynx clear. Missing dentition, poor remaining dentition.  CV: Normal S1S2, no murmur, rub or gallop. Tachycardic, regular.  Resp: Clear to auscultation bilaterally, no wheezes, rales or rhonchi. Normal respiratory effort.  GI: Abdomen is soft and nondistended. Tender in the right abdomen, most prominent in the RLQ. No palpable masses. No rebound or guarding.  MSK: No edema. Nontender. Normal active " range of motion.  Skin: Warm and dry. No rashes or lesions or ecchymoses on visible skin.  Neuro: Alert and oriented. Responds appropriately to all questions and commands. No focal findings appreciated. Normal muscle tone.  Psych: Blunted affect.    Emergency Department Course   ECG (14:54:05):  Rate 123 bpm. CT interval 134. QRS duration 94. QT/QTc 320/458. P-R-T axes 58 38 41. Sinus tachycardia, Otherwise normal ECG.  Interpreted by Nadege Salas MD.    Imaging:  Radiology findings were communicated with the patient who voiced understanding of the findings.    XR Chest Port 1 View  IMPRESSION: Heart size is normal. No pleural effusion, pneumothorax,  or abnormal area of consolidation.  As read by Radiology.    CT Chest (PE) Abdomen Pelvis w Contrast  IMPRESSION:  1.  No pulmonary embolism.  2.  There is what appears to be moderate wall thickening involving the esophagus most suggestive of inflammatory esophagitis.  3.  11 x 7 mm indeterminate pulmonary nodule right upper lobe. This may be inflammatory and suggest a short-term follow-up exam in 3 months to reevaluate.  4.   Cholelithiasis versus slight biliary sludge. No active inflammation.  5.  Large capacity bladder raises question of bladder outlet obstruction. Consider determining post void residual.  As read by Radiology.    Laboratory:  Laboratory findings were communicated with the patient who voiced understanding of the findings.    Lipase: 66(L)    CBC: WBC 16.7(H), HGB 12.4(L), o/w WNL ()    CMP: Sodium 118(LL), Chloride 79(L), Glucose 252(H), o/w WNL (Creatinine 0.76)    Lactic acid 1458: 2.0    Troponin I 1458: <0.015    Nt probnp inpatient: 39    D dimer: 1.0(H)    UA with micro: Glucose >1000, Ketones Urine trace, Protein Albumin 10, Mucous Urine present, o/w negative    Symptomatic Covid-19 PCR: Pending     Interventions:  1514 NS 1L IV Bolus  1517 Zofran 4 mg IV  1517 Morphine 4 mg IV  1708 NS 1L IV Bolus    Emergency Department  Course:  Past medical records, nursing notes, and vitals reviewed.  1433: I performed an exam of the patient and obtained history, as documented above.     EKG was obtained and reviewed in the emergency department.    IV inserted and blood drawn.    Urinalysis and culture obtained and sent for evaluation.    The patient was sent for a chest XR and CT while in the emergency department, results above.     1810: I rechecked the patient. I reviewed the results with the Patient and answered all related questions prior to admission.     Findings and plan explained to the Patient who consents to admission. Discussed the patient with Dr. Huber, who will admit the patient to a med/surg bed for further monitoring, evaluation, and treatment.  Impression & Plan   Medical Decision Making:  Benja Duong is a 56 y/o male with multiple medical problems who presents emergency department with concerns for nausea, vomiting and abdominal pain.  He is also noting chest pain and shortness of breath.  Multiple etiologies were considered including small bowel obstruction, appendicitis, cholecystitis/biliary colic, pyelonephritis.  Less pain shortness of breath could have many etiologies including pulmonary embolism, pneumothorax, pleural effusion, CHF, ACS, COVID-19/pneumonia/infection.  After thorough evaluation here in the emergency department the patient is noted to be hyponatremic likely secondary to his GI symptoms.  There is no other worrisome acute pathology is found after comprehensive evaluation.  The cause of his chest pain is not clear but could be gastrointestinal in etiology with an inflamed esophagus found on CT scan.  He is also noted to have a large bladder on CT scan.  He was unable to fully empty even after 2 attempts but was refusing a Carranza catheter.  Bladder outlet obstruction may be contributing to his symptoms.  He has no evidence of acute UTI. Incidental findings found on CT scan were discussed with the  patient and will need further follow-up with primary care doctor.   Discussed the plan for admission to control symptoms and continue with IV fluid resuscitation.  He felt comfortable this plan.  All questions answered prior to admission    Covid-19  Benja Duong was evaluated during a global COVID-19 pandemic, which necessitated consideration that the patient might be at risk for infection with the SARS-CoV-2 virus that causes COVID-19.   Applicable protocols for evaluation were followed during the patient's care.   COVID-19 was considered as part of the patient's evaluation. The plan for testing is: a test was obtained during this visit. A test 2 months ago was negative.    Diagnosis:    ICD-10-CM   1. Hyponatremia  E87.1   2. Non-intractable vomiting with nausea, unspecified vomiting type  R11.2   3. Right sided abdominal pain  R10.9   4. Chest pain, unspecified type  R07.9   5. Dyspnea, unspecified type  R06.00     Disposition:  Admitted to med/surg.    Maikel Lagunas  7/21/2020   [facility] EMERGENCY DEPARTMENT    Scribe Disclosure:  I, Maikel Lagunas, am serving as a scribe at 2:37 PM on 7/21/2020 to document services personally performed by Nadege Salas MD based on my observations and the provider's statements to me.          Nadege Salas MD  07/21/20 3077

## 2020-07-21 NOTE — ED NOTES
"[facility]  ED Nurse Handoff Report    Benja Duong is a 55 year old male   ED Chief complaint: Nausea & Vomiting; Headache; Shortness of Breath; and Abdominal Pain  . ED Diagnosis:   Final diagnoses:   Hyponatremia   Non-intractable vomiting with nausea, unspecified vomiting type   Right sided abdominal pain   Chest pain, unspecified type   Dyspnea, unspecified type   Urinary retention with incomplete bladder emptying     Allergies:   Allergies   Allergen Reactions     Alprazolam      Other reaction(s): *Unknown States \"I break out\"     Oxycodone-Acetaminophen      Other reaction(s): *Unknown, States \"I break out\"       Code Status: Full Code  Activity level - Baseline/Home:  Assist X 1. Activity Level - Current:   Assist X 1. Lift room needed: No. Bariatric: No   Needed: No   Isolation: Yes. Infection: COVID pending.     Vital Signs:   Vitals:    07/21/20 1600 07/21/20 1615 07/21/20 1630 07/21/20 1645   BP: (!) 171/100 (!) 182/104 (!) 185/113 (!) 178/109   Pulse: 115 115 116 116   Resp: 10 13 12    Temp:       TempSrc:       SpO2: 100% 100% 99% 100%   Weight:       Height:           Cardiac Rhythm:  ,      Pain level: 0-10 Pain Scale: 7  Patient confused: No. Patient Falls Risk: Yes.   Elimination Status: Has voided   Patient Report - Initial Complaint: Abdominal and chest pain. Focused Assessment: A&O x4, ABCs intact. Pt is blind. Pt originally complained of abdominal pain, but then stated that he has bad chest pain for the past month, intermittently. Pt story is very inconsistent. Pt states he hasn't been eating well for the past 2 days, and has been nauseous. I spoke with the RN from patient's group home who confirmed that patient story is inconsistent. However, group home RN Sedrick stated that a month ago pt had sudden onset of SOB with nausea and diarrhea, was tested for covid which was negative, but has had intermittent problems since then. Pt was stating that it's his hernia that " is causing problems, but ER MD is unable to find a hernia. Bowel sounds x4 quadrants, last BM this AM per group home RN. Pt is assist of 1 with ambulation.   Tests Performed: lab, imaging. Abnormal Results:   Labs Ordered and Resulted from Time of ED Arrival Up to the Time of Departure from the ED   CBC WITH PLATELETS DIFFERENTIAL - Abnormal; Notable for the following components:       Result Value    WBC 16.7 (*)     RBC Count 4.16 (*)     Hemoglobin 12.4 (*)     Hematocrit 35.8 (*)     Absolute Neutrophil 12.5 (*)     All other components within normal limits   COMPREHENSIVE METABOLIC PANEL - Abnormal; Notable for the following components:    Sodium 118 (*)     Chloride 79 (*)     Glucose 252 (*)     All other components within normal limits   LIPASE - Abnormal; Notable for the following components:    Lipase 66 (*)     All other components within normal limits   ROUTINE UA WITH MICROSCOPIC - Abnormal; Notable for the following components:    Glucose Urine >1000 (*)     Ketones Urine Trace (*)     Protein Albumin Urine 10 (*)     Mucous Urine Present (*)     All other components within normal limits   D DIMER QUANTITATIVE - Abnormal; Notable for the following components:    D Dimer 1.0 (*)     All other components within normal limits   LACTIC ACID WHOLE BLOOD   COVID-19 VIRUS (CORONAVIRUS) BY PCR   NT PROBNP INPATIENT   TROPONIN I   PERIPHERAL IV CATHETER   CARDIAC CONTINUOUS MONITORING   BLADDER SCAN   IP ACUTE INDWELLING URINARY CATHETER (BLACKMON)     CT Chest (PE) Abdomen Pelvis w Contrast   Final Result   IMPRESSION:   1.  No pulmonary embolism.   2.  There is what appears to be moderate wall thickening involving the esophagus most suggestive of inflammatory esophagitis.   3.  11 x 7 mm indeterminate pulmonary nodule right upper lobe. This may be inflammatory and suggest a short-term follow-up exam in 3 months to reevaluate.   4.   Cholelithiasis versus slight biliary sludge. No active inflammation.   5.  Large  capacity bladder raises question of bladder outlet obstruction. Consider determining post void residual.      XR Chest Port 1 View   Final Result   IMPRESSION: Heart size is normal. No pleural effusion, pneumothorax,   or abnormal area of consolidation.      KEVIN ZAMORA MD        .   Treatments provided: See MAR  Family Comments: None  OBS brochure/video discussed/provided to patient:  N/A  ED Medications:   Medications   0.9% sodium chloride BOLUS (1,000 mLs Intravenous New Bag 7/21/20 1514)     Followed by   sodium chloride 0.9% infusion (has no administration in time range)   lidocaine (XYLOCAINE) 2 % external gel 20 mL (has no administration in time range)   ondansetron (ZOFRAN) injection 4 mg (4 mg Intravenous Given 7/21/20 1517)   morphine (PF) injection 4 mg (4 mg Intravenous Given 7/21/20 1517)   0.9% sodium chloride BOLUS (75 mLs Intravenous New Bag 7/21/20 1708)   iopamidol (ISOVUE-370) solution 500 mL (71 mLs Intravenous Given 7/21/20 1708)     Drips infusing:  No  For the majority of the shift, the patient's behavior Green. Interventions performed were None.    Sepsis treatment initiated: No       ED Nurse Name/Phone Number: Manjit Gray RN,   6:28 PM  RECEIVING UNIT ED HANDOFF REVIEW    Above ED Nurse Handoff Report was reviewed: Yes  Reviewed by: Kristan Edmond RN on July 21, 2020 at 8:47 PM

## 2020-07-22 VITALS
WEIGHT: 152.6 LBS | OXYGEN SATURATION: 99 % | TEMPERATURE: 98 F | SYSTOLIC BLOOD PRESSURE: 117 MMHG | DIASTOLIC BLOOD PRESSURE: 55 MMHG | HEART RATE: 121 BPM | HEIGHT: 67 IN | RESPIRATION RATE: 16 BRPM | BODY MASS INDEX: 23.95 KG/M2

## 2020-07-22 LAB
ANION GAP SERPL CALCULATED.3IONS-SCNC: 8 MMOL/L (ref 3–14)
BUN SERPL-MCNC: 9 MG/DL (ref 7–30)
CALCIUM SERPL-MCNC: 8.9 MG/DL (ref 8.5–10.1)
CHLORIDE SERPL-SCNC: 88 MMOL/L (ref 94–109)
CO2 SERPL-SCNC: 29 MMOL/L (ref 20–32)
CREAT SERPL-MCNC: 0.63 MG/DL (ref 0.66–1.25)
ERYTHROCYTE [DISTWIDTH] IN BLOOD BY AUTOMATED COUNT: 11.6 % (ref 10–15)
GFR SERPL CREATININE-BSD FRML MDRD: >90 ML/MIN/{1.73_M2}
GLUCOSE BLDC GLUCOMTR-MCNC: 173 MG/DL (ref 70–99)
GLUCOSE BLDC GLUCOMTR-MCNC: 177 MG/DL (ref 70–99)
GLUCOSE BLDC GLUCOMTR-MCNC: 178 MG/DL (ref 70–99)
GLUCOSE BLDC GLUCOMTR-MCNC: 186 MG/DL (ref 70–99)
GLUCOSE BLDC GLUCOMTR-MCNC: 187 MG/DL (ref 70–99)
GLUCOSE SERPL-MCNC: 183 MG/DL (ref 70–99)
HCT VFR BLD AUTO: 31.3 % (ref 40–53)
HGB BLD-MCNC: 10.8 G/DL (ref 13.3–17.7)
INTERPRETATION ECG - MUSE: NORMAL
MAGNESIUM SERPL-MCNC: 1.8 MG/DL (ref 1.6–2.3)
MCH RBC QN AUTO: 29.6 PG (ref 26.5–33)
MCHC RBC AUTO-ENTMCNC: 34.5 G/DL (ref 31.5–36.5)
MCV RBC AUTO: 86 FL (ref 78–100)
PLATELET # BLD AUTO: 259 10E9/L (ref 150–450)
POTASSIUM SERPL-SCNC: 3.3 MMOL/L (ref 3.4–5.3)
POTASSIUM SERPL-SCNC: 3.3 MMOL/L (ref 3.4–5.3)
RBC # BLD AUTO: 3.65 10E12/L (ref 4.4–5.9)
SODIUM SERPL-SCNC: 125 MMOL/L (ref 133–144)
SODIUM SERPL-SCNC: 126 MMOL/L (ref 133–144)
TROPONIN I SERPL-MCNC: <0.015 UG/L (ref 0–0.04)
WBC # BLD AUTO: 13.9 10E9/L (ref 4–11)

## 2020-07-22 PROCEDURE — 84132 ASSAY OF SERUM POTASSIUM: CPT | Performed by: INTERNAL MEDICINE

## 2020-07-22 PROCEDURE — 80048 BASIC METABOLIC PNL TOTAL CA: CPT | Performed by: INTERNAL MEDICINE

## 2020-07-22 PROCEDURE — 99233 SBSQ HOSP IP/OBS HIGH 50: CPT | Performed by: INTERNAL MEDICINE

## 2020-07-22 PROCEDURE — 12000000 ZZH R&B MED SURG/OB

## 2020-07-22 PROCEDURE — 25000131 ZZH RX MED GY IP 250 OP 636 PS 637: Performed by: INTERNAL MEDICINE

## 2020-07-22 PROCEDURE — 00000146 ZZHCL STATISTIC GLUCOSE BY METER IP

## 2020-07-22 PROCEDURE — 25000132 ZZH RX MED GY IP 250 OP 250 PS 637: Performed by: INTERNAL MEDICINE

## 2020-07-22 PROCEDURE — 84295 ASSAY OF SERUM SODIUM: CPT | Performed by: INTERNAL MEDICINE

## 2020-07-22 PROCEDURE — 83735 ASSAY OF MAGNESIUM: CPT | Performed by: INTERNAL MEDICINE

## 2020-07-22 PROCEDURE — 36415 COLL VENOUS BLD VENIPUNCTURE: CPT | Performed by: INTERNAL MEDICINE

## 2020-07-22 PROCEDURE — 84484 ASSAY OF TROPONIN QUANT: CPT | Performed by: INTERNAL MEDICINE

## 2020-07-22 PROCEDURE — 85027 COMPLETE CBC AUTOMATED: CPT | Performed by: INTERNAL MEDICINE

## 2020-07-22 RX ORDER — POTASSIUM CHLORIDE 1.5 G/1.58G
20-40 POWDER, FOR SOLUTION ORAL
Status: DISCONTINUED | OUTPATIENT
Start: 2020-07-22 | End: 2020-07-23 | Stop reason: HOSPADM

## 2020-07-22 RX ORDER — BISACODYL 5 MG
5 TABLET, DELAYED RELEASE (ENTERIC COATED) ORAL DAILY PRN
Status: DISCONTINUED | OUTPATIENT
Start: 2020-07-22 | End: 2020-07-23 | Stop reason: HOSPADM

## 2020-07-22 RX ORDER — POTASSIUM CHLORIDE 1500 MG/1
20-40 TABLET, EXTENDED RELEASE ORAL
Status: DISCONTINUED | OUTPATIENT
Start: 2020-07-22 | End: 2020-07-23 | Stop reason: HOSPADM

## 2020-07-22 RX ORDER — POTASSIUM CHLORIDE 29.8 MG/ML
20 INJECTION INTRAVENOUS
Status: DISCONTINUED | OUTPATIENT
Start: 2020-07-22 | End: 2020-07-23 | Stop reason: HOSPADM

## 2020-07-22 RX ORDER — POTASSIUM CL/LIDO/0.9 % NACL 10MEQ/0.1L
10 INTRAVENOUS SOLUTION, PIGGYBACK (ML) INTRAVENOUS
Status: DISCONTINUED | OUTPATIENT
Start: 2020-07-22 | End: 2020-07-23 | Stop reason: HOSPADM

## 2020-07-22 RX ORDER — POTASSIUM CHLORIDE 7.45 MG/ML
10 INJECTION INTRAVENOUS
Status: DISCONTINUED | OUTPATIENT
Start: 2020-07-22 | End: 2020-07-23 | Stop reason: HOSPADM

## 2020-07-22 RX ORDER — MAGNESIUM SULFATE HEPTAHYDRATE 40 MG/ML
4 INJECTION, SOLUTION INTRAVENOUS EVERY 4 HOURS PRN
Status: DISCONTINUED | OUTPATIENT
Start: 2020-07-22 | End: 2020-07-23 | Stop reason: HOSPADM

## 2020-07-22 RX ORDER — ATORVASTATIN CALCIUM 20 MG/1
20 TABLET, FILM COATED ORAL AT BEDTIME
Status: DISCONTINUED | OUTPATIENT
Start: 2020-07-22 | End: 2020-07-23 | Stop reason: HOSPADM

## 2020-07-22 RX ORDER — AMOXICILLIN 250 MG
1 CAPSULE ORAL 2 TIMES DAILY
Status: DISCONTINUED | OUTPATIENT
Start: 2020-07-22 | End: 2020-07-23 | Stop reason: HOSPADM

## 2020-07-22 RX ADMIN — ATORVASTATIN CALCIUM 20 MG: 20 TABLET, FILM COATED ORAL at 22:05

## 2020-07-22 RX ADMIN — PANTOPRAZOLE SODIUM 40 MG: 40 TABLET, DELAYED RELEASE ORAL at 09:34

## 2020-07-22 RX ADMIN — POTASSIUM CHLORIDE 40 MEQ: 1.5 POWDER, FOR SOLUTION ORAL at 11:34

## 2020-07-22 RX ADMIN — POTASSIUM CHLORIDE 20 MEQ: 1.5 POWDER, FOR SOLUTION ORAL at 09:33

## 2020-07-22 RX ADMIN — PANTOPRAZOLE SODIUM 40 MG: 40 TABLET, DELAYED RELEASE ORAL at 15:59

## 2020-07-22 RX ADMIN — INSULIN GLARGINE 15 UNITS: 100 INJECTION, SOLUTION SUBCUTANEOUS at 22:06

## 2020-07-22 RX ADMIN — DOCUSATE SODIUM AND SENNOSIDES 1 TABLET: 8.6; 5 TABLET, FILM COATED ORAL at 22:18

## 2020-07-22 ASSESSMENT — ACTIVITIES OF DAILY LIVING (ADL)
ADLS_ACUITY_SCORE: 26
ADLS_ACUITY_SCORE: 27
ADLS_ACUITY_SCORE: 26

## 2020-07-22 NOTE — H&P
[facility]    History and Physical - Hospitalist Service       Date of Admission:  7/21/2020    Assessment & Plan   Benja Duong is a 55 year old male admitted on 7/21/2020. He has a past medical history significant for schizophrenia, diabetes mellitus type 2, depression, hypertension, hyperlipidemia.  He presented to emergency room with nausea and vomiting.  Found to have hyponatremia.    1.  Hyponatremia.  Sodium 118.  Suspect that this is acute due to his recent nausea and vomiting.  His medications also may be contributing.  Start continuous IV fluids.  Monitor sodium level every 4 hours.  Goal sodium correction of 6 mEq per 24-hour period.  Check TSH.    2.  Nausea and vomiting.  He is a poor medical historian.  Does sound like he does have some chronic nausea.  Acute worsening over the past few days.  CT scan of the chest is suggestive of esophagitis.  Start pantoprazole 40 mg twice a day.  Clear liquid diet only initially.  Continuous IV fluids.  Antiemetics as needed.    3.  Diabetes mellitus.  Hold metformin initially.  Start NovoLog sliding scale.  Continuous IV fluids.  Hypertension.    4.  Hold lisinopril initially.  Likely able to restart soon.    5.  Schizophrenia.  Likely restart home medications once verified by pharmacy.    6.  Tobacco use disorder.  I did advise smoking cessation.  Start nicotine gum as needed.    7.  Mild tachycardia.  Suspect dehydration.  Monitor on telemetry.  Gentle IV fluids.  Check TSH.  Check serial troponins.    8.  Mild chest discomfort.  Suspect that this is radiation from his abdominal issues and possible esophagitis.  Monitor on telemetry.  Check serial troponins.  Hold off on aspirin initially unless the troponin becomes abnormal.     Diet:   Clear liquid diet.  DVT Prophylaxis: Pneumatic Compression Devices  Carranza Catheter: in place, indication:    Code Status:   Full code.         Disposition Plan   Expected discharge: 2 - 3 daysDave  KENDRA Huber DO  [facility]    ______________________________________________________________________    Chief Complaint   Nausea and vomiting.    History is obtained from the patient.  He is noted to be a quite poor medical historian.    History of Present Illness   Benja Duong is a 55 year old male who has a past medical history significant for schizophrenia, diabetes mellitus type 2, depression, hypertension, hyperlipidemia.  He presented to emergency room with nausea and vomiting.  He is a poor medical historian.  It does sound like he has chronic nausea.  Does have occasional vomiting.  He has been vomiting more frequently than usual for the past few days.  He cannot remember having any blood in the vomit.  Does not remember having fevers or chills.  Does have an occasional cough.  No shortness of breath.  Is having some stomach discomfort with this.  Discomfort does radiate up into his chest.  Does state that he usually has some abdominal discomfort due to a previous hernia.  Abdominal/chest discomfort is not significantly different than what he will frequently have.  Is feeling better after medications given in the emergency room.  No other acute complaints.    Review of Systems    The 10 point Review of Systems is negative other than noted in the HPI     Past Medical History    I have reviewed this patient's medical history and updated it with pertinent information if needed.   Past Medical History:   Diagnosis Date     Depression      Diabetes (H)      HTN (hypertension)        Past Surgical History   I have reviewed this patient's surgical history and updated it with pertinent information if needed.  No past surgical history on file.    Social History   I have reviewed this patient's social history and updated it with pertinent information if needed.  Smokes half a pack of cigarettes a day.    Does not drink alcohol.    Family History     He cannot remember any family members in the  "immediate family having any coronary artery disease or other serious medical problems.    Prior to Admission Medications   Prior to Admission Medications   Prescriptions Last Dose Informant Patient Reported? Taking?   chlorproMAZINE (THORAZINE) 100 MG tablet   Yes No   Sig: Take 100 mg by mouth every morning And take 200 mg at bedtime.   chlorproMAZINE (THORAZINE) 50 MG tablet   Yes No   Sig: Take 50 mg by mouth 2 times daily as needed for other (psychosis)    cholecalciferol (VITAMIN D-1000 MAX ST) 1000 units TABS   No No   Sig: Take 1,000 Units by mouth daily   haloperidol (HALDOL) 5 MG tablet   No No   Sig: Take 1 tablet (5 mg) by mouth 2 times daily   hydrALAZINE (APRESOLINE) 25 MG tablet   No No   Sig: Take 0.5 tablets (12.5 mg) by mouth every 6 hours as needed (HTN; SBP > 160)   lisinopril (PRINIVIL/ZESTRIL) 5 MG tablet   No No   Sig: Take 1 tablet (5 mg) by mouth daily   metFORMIN (GLUCOPHAGE) 1000 MG tablet   No No   Sig: Take 1 tablet (1,000 mg) by mouth 2 times daily (with meals)      Facility-Administered Medications: None     Allergies   Allergies   Allergen Reactions     Alprazolam      Other reaction(s): *Unknown States \"I break out\"     Oxycodone-Acetaminophen      Other reaction(s): *Unknown, States \"I break out\"       Physical Exam   Vital Signs: Temp: 98.2  F (36.8  C) Temp src: Temporal BP: 131/80 Pulse: 121 Heart Rate: 114 Resp: 12 SpO2: 100 % O2 Device: None (Room air)    Weight: 152 lbs 1.6 oz    Gen:  NAD, A&Ox2 to person and place.  Trouble with time.  Poor medical historian.  Eyes: Pupils round.  Left pupil appears slightly larger than right.  Sclera anicteric.  OP:  MMM, no lesions.  Neck:  Supple.  CV:  Regular, mildly tachycardic, no murmurs.  Lung:  CTA b/l, normal effort.  Ab:  +BS, soft.  Skin:  Warm, dry to touch.  No rash.  Ext:  No pitting edema LE b/l.      Data   Data reviewed today: I reviewed all medications, new labs and imaging results over the last 24 hours. I personally " reviewed the EKG tracing showing Sinus tachycardia.    Recent Labs   Lab 07/21/20  1458   WBC 16.7*   HGB 12.4*   MCV 86      *   POTASSIUM 3.7   CHLORIDE 79*   CO2 32   BUN 13   CR 0.76   ANIONGAP 7   MITCHEL 9.6   *   ALBUMIN 4.0   PROTTOTAL 7.7   BILITOTAL 0.5   ALKPHOS 69   ALT 21   AST 14   LIPASE 66*   TROPI <0.015

## 2020-07-22 NOTE — PLAN OF CARE
Assumed care of patient at 9pm. Pt is A&O to self and situation, ST on tele- HR in the one teens to twenties. Pt is legally blind and requires assistance with ADL's. LS clear/dim bilaterally, abdomen is soft and tender to touch, active BS auscultated. Carranza in place with good U.O. . IV fluids infusing. Will continue to monitor and encourage fluids.

## 2020-07-22 NOTE — PHARMACY-ADMISSION MEDICATION HISTORY
Admission medication history interview status for this patient is complete. See [facility] admission navigator for allergy information, prior to admission medications and immunization status.     Medication history interview done via telephone during Covid-19 pandemic, indicate source(s): Caregiver Clinton @ [phone]  Medication history resources (including written lists, pill bottles, clinic record):None    Changes made to PTA medication list:  Added: Benadryl, lipitor, Protonix, hydrochlorothiazide, Bydureon, Risperdal Injection, Gentle Tears, Lantus  Deleted: haldol  Changed: lisinopril    Actions taken by pharmacist (provider contacted, etc):None     Additional medication history information:None    Medication reconciliation/reorder completed by provider prior to medication history?  n   (Y/N)           Prior to Admission medications    Medication Sig Last Dose Taking? Auth Provider   Artificial Tear Solution (TEARS NATURALE OP) 1 drop by Both Eyelids route 4 times daily Gentle Tears  Yes Unknown, Entered By History   atorvastatin (LIPITOR) 20 MG tablet Take 20 mg by mouth At Bedtime 7/20/2020 at Unknown time Yes Unknown, Entered By History   cholecalciferol (VITAMIN D-1000 MAX ST) 1000 units TABS Take 1,000 Units by mouth daily 7/20/2020 at Unknown time Yes Paul Andrews MD   diphenhydrAMINE (BENADRYL) 50 MG capsule Take 50 mg by mouth At Bedtime 7/20/2020 at Unknown time Yes Unknown, Entered By History   exenatide ER (BYDUREON) 2 MG recon vial kit susp for weekly inj Inject 2 mg Subcutaneous once a week Past Week at Tue Yes Unknown, Entered By History   hydrALAZINE (APRESOLINE) 25 MG tablet Take 0.5 tablets (12.5 mg) by mouth every 6 hours as needed (HTN; SBP > 160)  Yes Paul Andrews MD   hydrochlorothiazide (HYDRODIURIL) 25 MG tablet Take 25 mg by mouth daily 7/20/2020 at Unknown time Yes Unknown, Entered By History   insulin glargine (LANTUS VIAL) 100 UNIT/ML vial Inject 18 Units Subcutaneous  At Bedtime 7/20/2020 at Unknown time Yes Unknown, Entered By History   lisinopril (PRINIVIL/ZESTRIL) 5 MG tablet Take 1 tablet (5 mg) by mouth daily 7/20/2020 at Unknown time Yes Paul Andrews MD   metFORMIN (GLUCOPHAGE) 1000 MG tablet Take 1 tablet (1,000 mg) by mouth 2 times daily (with meals) 7/20/2020 at Unknown time Yes Paul Andrews MD   pantoprazole (PROTONIX) 40 MG EC tablet Take 40 mg by mouth daily 7/20/2020 at Unknown time Yes Unknown, Entered By History   risperiDONE microspheres ER (RISPERDAL CONSTA) 50 MG injection Inject 50 mg into the muscle every 14 days 7/8/2020 Yes Unknown, Entered By History

## 2020-07-22 NOTE — PLAN OF CARE
Patient alert and oriented x4. POD#3 Incision CDI. Advanced to and tolerating regular diet. Denies pain and nausea. Up walking the halls. Plan is to discharge this afternoon.

## 2020-07-22 NOTE — PLAN OF CARE
Patient sleepy for most of shift, disoriented to time, answering writer in short yes/no mumbles, talking clearly on the phone. Potassium 3.3, replaced, recheck this afternoon. Blood sugar checks 177 and 187, no coverage given due to patient refusing to eat. Continuing PO Protonix. Denies pain. Na 125. 2000ml fluids restriction, advanced to regular diet. Last BM 7/17, patient refused bowel meds. Carranza in place for retention. Discharge pending.

## 2020-07-22 NOTE — PROVIDER NOTIFICATION
MD notified of patient refusing potassium replacement. MD order  to recheck potassium level on 7/22/20 AM.

## 2020-07-22 NOTE — PROGRESS NOTES
[facility]    Medicine Progress Note - Hospitalist Service       Date of Admission:  7/21/2020  Assessment & Plan   Benja Duong is a 55 year old male admitted on 7/21/2020. He has a past medical history significant for schizophrenia, diabetes mellitus type 2, depression, hypertension, hyperlipidemia.  He presented to emergency room with nausea and vomiting.  Found to have hyponatremia.    1.  Hyponatremia.  Sodium 118.  Suspect that this is acute due to his recent nausea and vomiting.  His medications also may be contributing.    Monitor sodium level every 4 hours.       2.  Nausea and vomiting.  He is a poor medical historian.  Does sound like he does have some chronic nausea.  Acute worsening over the past few days.  CT scan of the chest is suggestive of esophagitis.  Start pantoprazole 40 mg twice a day.  ADAT.    Antiemetics as needed.    3.  Diabetes mellitus.  Hold metformin initially.  Start NovoLog sliding scale.      4.  HTN. Hold lisinopril initially.  Likely able to restart soon.    5.  Schizophrenia.  On risperal IM.    6.  Tobacco use disorder.  I did advise smoking cessation.  Start nicotine gum as needed.    7.  Mild tachycardia.  Suspect dehydration.  Monitor on telemetry.      Incidental findings.  - CT C/A/P showed moderate wall thickening of esophagus suggestive of inflammatory esophagitis and 11X7 mm indeterminate pulmonary nodule RUL for which f/u is recommended.     Diet: Combination Diet Clear Liquid    DVT Prophylaxis: Pneumatic Compression Devices  Carranza Catheter: in place, indication: Retention  Code Status: Full Code           Disposition Plan   Expected discharge: tomorrow, recommended to prior living arrangement once adequate pain management/ tolerating PO medications and sodium improved..  Entered: Maria C Candelaria MD 07/22/2020, 9:20 AM       The patient's care was discussed with the Patient.    Maria C Candelaria MD  Hospitalist Service  [facility]  Hospital    ______________________________________________________________________    Interval History     No fevers/chills/abdominal pain. No emesis.    Data reviewed today: I reviewed all medications, new labs and imaging results over the last 24 hours. I personally reviewed no images or EKG's today.    Physical Exam   Vital Signs: Temp: 99.1  F (37.3  C) Temp src: Oral BP: 128/72 Pulse: 121 Heart Rate: 122 Resp: 18 SpO2: 99 % O2 Device: None (Room air)    Weight: 152 lbs 9.6 oz    Gen - AAO x 3, appears chronically ill in NAD.  Lungs - CTA B.  Heart - tachycardic, S1+S2 nml, no m/g/r.  Abd - soft, NT, ND, + BS.  Ext - no edema.    Data   Recent Labs   Lab 07/22/20  0706 07/22/20  0219 07/21/20  2246 07/21/20  1458   WBC 13.9*  --   --  16.7*   HGB 10.8*  --   --  12.4*   MCV 86  --   --  86     --   --  300   * 126* 124* 118*   POTASSIUM 3.3*  --   --  3.7   CHLORIDE 88*  --   --  79*   CO2 29  --   --  32   BUN 9  --   --  13   CR 0.63*  --   --  0.76   ANIONGAP 8  --   --  7   MITCHEL 8.9  --   --  9.6   *  --   --  252*   ALBUMIN  --   --   --  4.0   PROTTOTAL  --   --   --  7.7   BILITOTAL  --   --   --  0.5   ALKPHOS  --   --   --  69   ALT  --   --   --  21   AST  --   --   --  14   LIPASE  --   --   --  66*   TROPI  --  <0.015 <0.015 <0.015     Recent Results (from the past 24 hour(s))   XR Chest Port 1 View    Narrative    CHEST ONE VIEW PORTABLE   7/21/2020 3:49 PM     HISTORY: Cough    COMPARISON: None.      Impression    IMPRESSION: Heart size is normal. No pleural effusion, pneumothorax,  or abnormal area of consolidation.    KEVIN ZAMORA MD   CT Chest (PE) Abdomen Pelvis w Contrast    Narrative    EXAM: CT CHEST PE ABDOMEN PELVIS W CONTRAST  LOCATION: [facility]  DATE/TIME: 7/21/2020 4:55 PM    INDICATION: Abdominal pain with nausea and vomiting. Cough and chest pain. Shortness of breath.  COMPARISON: None.  TECHNIQUE: CT chest pulmonary angiogram and routine CT  abdomen pelvis with IV contrast. Arterial phase through the chest and venous phase through the abdomen and pelvis. Multiplanar reformats and MIP reconstructions were performed. Dose reduction   techniques were used.   CONTRAST: 71mL Isovue-370    FINDINGS:  ANGIOGRAM CHEST: Pulmonary arteries are normal caliber and negative for pulmonary emboli. Thoracic aorta is negative for dissection. No CT evidence of right heart strain.     LUNGS AND PLEURA: Dependent atelectasis. 11 x 7 mm nodule right upper lobe with slightly indistinct margins may be an inflammatory nodule though solid nodule and neoplasm can't be excluded. Lungs otherwise clear.  MEDIASTINUM/AXILLAE: There is prominent diffuse esophageal wall thickening from the aortic arch to the GE junction favored to be inflammatory. There is a slightly infiltrative appearance of the mediastinal fat but no focal lymph nodes evident. Air-fluid   level within the esophagus may relate to reflux.    HEPATOBILIARY: Slight layering sludge or tiny gallstones within gallbladder. No inflammatory changes. Liver and bile ducts normal.  PANCREAS: Normal.  SPLEEN: Normal.  ADRENAL GLANDS: Normal.  KIDNEYS/BLADDER: Small benign renal cysts. No stones or hydronephrosis.    BOWEL: No obstruction or inflammatory change. Appendix normal.  Tiny fat-containing ventral wall hernias at the umbilicus and just above the umbilicus.  LYMPH NODES: Normal.  PELVIC ORGANS: Distended bladder with the estimated volume of 650 mL suggests bladder outlet obstruction or neurogenic bladder. There is no bladder wall thickening. Minimal prostatic enlargement.    MUSCULOSKELETAL: Presumed small bone island left iliac. No fractures.      Impression    IMPRESSION:  1.  No pulmonary embolism.  2.  There is what appears to be moderate wall thickening involving the esophagus most suggestive of inflammatory esophagitis.  3.  11 x 7 mm indeterminate pulmonary nodule right upper lobe. This may be inflammatory and  suggest a short-term follow-up exam in 3 months to reevaluate.  4.   Cholelithiasis versus slight biliary sludge. No active inflammation.  5.  Large capacity bladder raises question of bladder outlet obstruction. Consider determining post void residual.     Medications       atorvastatin  20 mg Oral At Bedtime     insulin aspart  1-7 Units Subcutaneous TID AC     insulin aspart  1-5 Units Subcutaneous At Bedtime     insulin glargine  15 Units Subcutaneous At Bedtime     pantoprazole  40 mg Oral BID AC     sodium chloride (PF)  3 mL Intracatheter Q8H     sodium chloride (PF)  3 mL Intracatheter Q8H

## 2020-07-22 NOTE — PROGRESS NOTES
Per chart review, CM verified that patient lives at [facility]. Contacted  ([phone]) spoke with Sedrick RN. She verified that patient receives medication administration, requires encouragement & queing for hygiene cares. There is no restriction to return time but staff will need to contact  to verify they can transport patient back home. He can return on weekend if needed.     Updated Admitting to correct address and telephone number for . Patient needs to contact Admitting to complete admission process. Provided Admitting number to bedside RN to have patient call them with staff assistance.     CM will continue to follow patient until discharge for any additional needs.     Lauren Pack RN, BSN, CPHN, CM  Inpatient Care Coordination  [facility]  [phone]

## 2020-07-22 NOTE — PLAN OF CARE
End of Shift Summary  For vital signs and complete assessments, please see documentation flowsheets.     Pertinent assessments: Patient disoriented to time and place, patient is legally blind. Tachycardia.  Denies SOB, pain and nausea.  Carranza in place for retention, good output.  Sodium recheck was 124, fluids were ordered to be stopped.  Patient was placed on clear liquid diet due to nausea, has asked many times why he can not eat food, but when he attempted to eat on evening shift he     Major Shift Events:  Na 124, fluids ordered to be saline locked.  Next Na level was 126  Treatment Plan: serial Na+ checks, trop and tele monitoring     Discharge Readiness: medically active  Expected Discharge Date: Med active  Discharge Disposition: Group home  Barriers/Criteria for discharge Labs, tele monitoring, IV fluids.

## 2020-07-22 NOTE — PLAN OF CARE
Admitting provider Dr. Huber paged: Patient's COVID came back negative while waiting for bed. Just want to confirm he's OK to come off precautions or do you plan to re-swab?    Received a call from Dr. Huber. Patient can be off isolation.

## 2020-07-23 LAB
ALBUMIN SERPL-MCNC: 3.1 G/DL (ref 3.4–5)
ALP SERPL-CCNC: 60 U/L (ref 40–150)
ALT SERPL W P-5'-P-CCNC: 17 U/L (ref 0–70)
ANION GAP SERPL CALCULATED.3IONS-SCNC: 5 MMOL/L (ref 3–14)
AST SERPL W P-5'-P-CCNC: 9 U/L (ref 0–45)
BASOPHILS # BLD AUTO: 0.1 10E9/L (ref 0–0.2)
BASOPHILS NFR BLD AUTO: 0.6 %
BILIRUB SERPL-MCNC: 0.5 MG/DL (ref 0.2–1.3)
BUN SERPL-MCNC: 9 MG/DL (ref 7–30)
CALCIUM SERPL-MCNC: 8.7 MG/DL (ref 8.5–10.1)
CHLORIDE SERPL-SCNC: 88 MMOL/L (ref 94–109)
CO2 SERPL-SCNC: 33 MMOL/L (ref 20–32)
CREAT SERPL-MCNC: 0.63 MG/DL (ref 0.66–1.25)
DIFFERENTIAL METHOD BLD: ABNORMAL
EOSINOPHIL # BLD AUTO: 0.1 10E9/L (ref 0–0.7)
EOSINOPHIL NFR BLD AUTO: 0.9 %
ERYTHROCYTE [DISTWIDTH] IN BLOOD BY AUTOMATED COUNT: 11.4 % (ref 10–15)
GFR SERPL CREATININE-BSD FRML MDRD: >90 ML/MIN/{1.73_M2}
GLUCOSE BLDC GLUCOMTR-MCNC: 209 MG/DL (ref 70–99)
GLUCOSE BLDC GLUCOMTR-MCNC: 243 MG/DL (ref 70–99)
GLUCOSE SERPL-MCNC: 181 MG/DL (ref 70–99)
HCT VFR BLD AUTO: 31.4 % (ref 40–53)
HGB BLD-MCNC: 10.6 G/DL (ref 13.3–17.7)
IMM GRANULOCYTES # BLD: 0.1 10E9/L (ref 0–0.4)
IMM GRANULOCYTES NFR BLD: 0.4 %
LYMPHOCYTES # BLD AUTO: 2.8 10E9/L (ref 0.8–5.3)
LYMPHOCYTES NFR BLD AUTO: 22.1 %
MCH RBC QN AUTO: 29.5 PG (ref 26.5–33)
MCHC RBC AUTO-ENTMCNC: 33.8 G/DL (ref 31.5–36.5)
MCV RBC AUTO: 88 FL (ref 78–100)
MONOCYTES # BLD AUTO: 1 10E9/L (ref 0–1.3)
MONOCYTES NFR BLD AUTO: 7.9 %
NEUTROPHILS # BLD AUTO: 8.7 10E9/L (ref 1.6–8.3)
NEUTROPHILS NFR BLD AUTO: 68.1 %
NRBC # BLD AUTO: 0 10*3/UL
NRBC BLD AUTO-RTO: 0 /100
PLATELET # BLD AUTO: 283 10E9/L (ref 150–450)
POTASSIUM SERPL-SCNC: 3.2 MMOL/L (ref 3.4–5.3)
PROT SERPL-MCNC: 6.2 G/DL (ref 6.8–8.8)
RBC # BLD AUTO: 3.59 10E12/L (ref 4.4–5.9)
SODIUM SERPL-SCNC: 126 MMOL/L (ref 133–144)
WBC # BLD AUTO: 12.7 10E9/L (ref 4–11)

## 2020-07-23 PROCEDURE — 00000146 ZZHCL STATISTIC GLUCOSE BY METER IP

## 2020-07-23 PROCEDURE — 80053 COMPREHEN METABOLIC PANEL: CPT | Performed by: INTERNAL MEDICINE

## 2020-07-23 PROCEDURE — 99239 HOSP IP/OBS DSCHRG MGMT >30: CPT | Performed by: HOSPITALIST

## 2020-07-23 PROCEDURE — 36415 COLL VENOUS BLD VENIPUNCTURE: CPT | Performed by: INTERNAL MEDICINE

## 2020-07-23 PROCEDURE — 85025 COMPLETE CBC W/AUTO DIFF WBC: CPT | Performed by: INTERNAL MEDICINE

## 2020-07-23 PROCEDURE — 84295 ASSAY OF SERUM SODIUM: CPT | Performed by: INTERNAL MEDICINE

## 2020-07-23 RX ORDER — HYDROCHLOROTHIAZIDE 25 MG/1
25 TABLET ORAL DAILY
COMMUNITY
Start: 2020-08-01 | End: 2022-04-20

## 2020-07-23 RX ORDER — PANTOPRAZOLE SODIUM 40 MG/1
40 TABLET, DELAYED RELEASE ORAL 2 TIMES DAILY
COMMUNITY
Start: 2020-07-23 | End: 2024-03-17

## 2020-07-23 ASSESSMENT — ACTIVITIES OF DAILY LIVING (ADL)
ADLS_ACUITY_SCORE: 27

## 2020-07-23 NOTE — PLAN OF CARE
Patient discharged back to group [facility] this morning. Group home staff providing transport. Writer discussed discharge instructions with patient. He will schedule a follow up appointment with his PCP next week. He will discuss GI consult at that time. Blood sugar check this morning 209, refusing coverage. Potassium 3.2, still okay to discharge, and follow up with PCP, had been refusing potassium replacement. Patient did not have any further questions or concerns at time of discharge. AVS packet was printed and sent with patient. He did not have any new discharge medications that needed to be sent to his pharmacy.

## 2020-07-23 NOTE — PROGRESS NOTES
Discharge Planner   Discharge Plans in progress: Yes. Patient discharging to [facility]. Called and spoke with CARLOS Dubose RN, ph# [phone], regarding discharge plan for today. Facility agreeable to discharge plan. They would like medication changes faxed to [facility] Ph# [phone].     A Group home staff will be here to transport patient around 10:30 am.   Nursing team updated.     Barriers to discharge plan: anticipate none  Follow up plan:CTS will continue to follow patient for discharge.        Entered by: Lori Nevarez 07/23/2020 9:44 AM       Lori Nevarez RN BSN PHN CCM   Inpatient Care Coordination   [facility]   [phone]

## 2020-07-23 NOTE — PLAN OF CARE
End of Shift Summary  For vital signs and complete assessments, please see documentation flowsheets.     Pertinent assessments: Patient alert and oriented, VSS, tachy, sats RA.poor appetite, mckeon in place.  Major Shift Events:  Na 126, K+ 3.3 pt refused to be replaced.    Treatment Plan: serial Na+ checks, trop and tele monitoring     Discharge Readiness: medically active  Expected Discharge Date: 2-3 days  Discharge Disposition: Group home  Barriers/Criteria for discharge Labs

## 2020-07-23 NOTE — PROGRESS NOTES
End of Shift Summary  For vital signs and complete assessments, please see documentation flowsheets.     Pertinent assessments: A&O, tachy, on RA, denies any pain. Pt triggered sepsis, refused to have labs drawn. Pt also removed tele, ST . Pt also requested that mckeon be removed. Paged MD to get order to remove, mckeon now removed, 500mL output. Pt requesting to leave back to group home early this morning. Charge RN will page MD early this morning so pt can discharge.     Major Shift Events: pt requesting to leave and have mckeon removed  Treatment Plan: serial Na+ checks

## 2020-07-23 NOTE — DISCHARGE SUMMARY
[facility]    Discharge Summary  Hospitalist    Date of Admission:  7/21/2020  Date of Discharge:  7/23/2020  Provider:  Benton Whitman MD  Date of Service (when I last saw the patient): 07/23/20    Discharge Diagnoses   1.  Severe hyponatremia likely secondary to nausea and vomiting.  He is also on chronic diuretic therapy.  2.  Nausea and vomiting.  Unclear cause.  3.  Presumptive esophagitis and possible odynophagia.  4.  Mild chest discomfort likely secondary to #3.  5.  Type 2 diabetes mellitus.  6.  Essential hypertension.  7.  Schizophrenia.  8.  Active smoker.    Other medical issues:  Past Medical History:   Diagnosis Date     Depression      Diabetes (H)      HTN (hypertension)        History of Present Illness   Benja Duong is an 55 year old male who presented with nausea and vomiting.  Please see the admission history and physical for full details.    Hospital Course   Benja Duong is a 55 year old male admitted on 7/21/2020. He has a past medical history significant for schizophrenia, diabetes mellitus type 2, depression, hypertension, hyperlipidemia.  He presented to emergency room with nausea and vomiting.  Found to have hyponatremia.    Hospital course has been stable.  After admission his sodium has been slowly corrected with NS, at the moment of this dictation his sodium is 126, still abnormal but significantly improved with respect to admission level of 118. hydrochlorothiazide on hold and BP is normal. A unifying cause behind his symptoms is not totally clear, however he is complaining of difficulty-painful swallowing, and there is documented evidence of esophagitis on his CT of admission.  He is on exenatide weekly, and among side effects nausea, vomiting and dyspepsia are well documented. He will benefit from GI consult and possible esophagoscopy but he is refusing it and he is adamantly asking to go back to his group home. He is   His A1c is 6.5 and despite of his  Abbiejaimiefy I would recommend to hold on Exenatide until he sees his PCP and referral to GI is done. Hold on hydrochlorothiazide until his oral intake is normal and sodium is normalized. He has negative serial troponin, normal TSH and lipase.   His renal function is normal; and leukocytosis on admission is resolving. Afebrile here, no evidence of active infection.     1.  Severe hyponatremia likely secondary to nausea and vomiting.  He is also on chronic diuretic therapy.  2.  Nausea and vomiting.  Unclear cause.  3.  Presumptive esophagitis and possible odynophagia.  4.  Mild chest discomfort likely secondary to #3.  5.  Type 2 diabetes mellitus.  6.  Essential hypertension.  7.  Schizophrenia.  8.  Active smoker.    # Discharge Pain Plan:    - Patient currently has NO PAIN and is not being prescribed pain medications on discharge.      Significant Results and Procedures   See below     Pending Results      Unresulted Labs Ordered in the Past 30 Days of this Admission     No orders found from 6/21/2020 to 7/22/2020.          Code Status   Full Code       Primary Care Physician   Physician No Ref-Primary    GEN:  Alert, oriented x 3, appears comfortable, NAD.  HEENT:  Normocephalic/atraumatic, no scleral icterus, no nasal discharge, mouth moist.  CV:  Regular rate and rhythm, no murmur or JVD.  S1 + S2 noted, no S3 or S4.  LUNGS:  Clear to auscultation bilaterally without rales/rhonchi/wheezing/retractions.  Symmetric chest rise on inhalation noted.  ABD:  Active bowel sounds, soft, non-tender/non-distended.  No rebound/guarding/rigidity.  EXT:  No edema or cyanosis.  No joint synovitis noted.  SKIN:  Dry to touch, no exanthems noted in the visualized areas.     Discharge Disposition   Discharged to home    Consultations This Hospital Stay   None    Time Spent on this Encounter   I, Benton Whitman MD, personally saw the patient today and spent greater than 30 minutes discharging this patient.     Discharge Orders  "  No discharge procedures on file.  Discharge Medications   Current Discharge Medication List      CONTINUE these medications which have CHANGED    Details   exenatide ER (BYDUREON) 2 MG recon vial kit susp for weekly inj Inject 2 mg Subcutaneous once a week DO not administer until he is seen by his PCP.  Qty:        hydrochlorothiazide (HYDRODIURIL) 25 MG tablet Take 1 tablet (25 mg) by mouth daily DO not resume until Na is back to normal or PCP orders  Qty:        pantoprazole (PROTONIX) 40 MG EC tablet Take 1 tablet (40 mg) by mouth 2 times daily         CONTINUE these medications which have NOT CHANGED    Details   Artificial Tear Solution (TEARS NATURALE OP) 1 drop by Both Eyelids route 4 times daily Gentle Tears      atorvastatin (LIPITOR) 20 MG tablet Take 20 mg by mouth At Bedtime      cholecalciferol (VITAMIN D-1000 MAX ST) 1000 units TABS Take 1,000 Units by mouth daily  Qty: 30 tablet, Refills: 1      diphenhydrAMINE (BENADRYL) 50 MG capsule Take 50 mg by mouth At Bedtime      hydrALAZINE (APRESOLINE) 25 MG tablet Take 0.5 tablets (12.5 mg) by mouth every 6 hours as needed (HTN; SBP > 160)  Qty: 30 tablet, Refills: 1      insulin glargine (LANTUS VIAL) 100 UNIT/ML vial Inject 18 Units Subcutaneous At Bedtime      lisinopril (ZESTRIL) 40 MG tablet Take 40 mg by mouth daily      metFORMIN (GLUCOPHAGE) 1000 MG tablet Take 1 tablet (1,000 mg) by mouth 2 times daily (with meals)  Qty: 60 tablet, Refills: 1      risperiDONE microspheres ER (RISPERDAL CONSTA) 50 MG injection Inject 50 mg into the muscle every 14 days           Allergies   Allergies   Allergen Reactions     Alprazolam      Other reaction(s): *Unknown States \"I break out\"     Oxycodone-Acetaminophen      Other reaction(s): *Unknown, States \"I break out\"     Data   Most Recent 3 CBC's:  Recent Labs   Lab Test 07/23/20  0647 07/22/20  0706 07/21/20  1458   WBC 12.7* 13.9* 16.7*   HGB 10.6* 10.8* 12.4*   MCV 88 86 86    259 300      Most " Recent 3 BMP's:  Recent Labs   Lab Test 07/23/20  0647 07/22/20  2159 07/22/20  1800 07/22/20  1413  07/22/20  0706  07/21/20  1458   * 126* 126* 126*   < > 125*   < > 118*   POTASSIUM 3.2*  --   --  3.3*  --  3.3*  --  3.7   CHLORIDE 88*  --   --   --   --  88*  --  79*   CO2 33*  --   --   --   --  29  --  32   BUN 9  --   --   --   --  9  --  13   CR 0.63*  --   --   --   --  0.63*  --  0.76   ANIONGAP 5  --   --   --   --  8  --  7   MITCHEL 8.7  --   --   --   --  8.9  --  9.6   *  --   --   --   --  183*  --  252*    < > = values in this interval not displayed.     Most Recent 2 LFT's:  Recent Labs   Lab Test 07/23/20  0647 07/21/20  1458   AST 9 14   ALT 17 21   ALKPHOS 60 69   BILITOTAL 0.5 0.5     Most Recent INR's and Anticoagulation Dosing History:  Anticoagulation Dose History     There is no flowsheet data to display.        Most Recent 3 Troponin's:  Recent Labs   Lab Test 07/22/20  0219 07/21/20  2246 07/21/20  1458   TROPI <0.015 <0.015 <0.015     Most Recent Cholesterol Panel:  Recent Labs   Lab Test 07/26/18  0751   CHOL 157   LDL 60   HDL 36*   TRIG 305*     Most Recent 6 Bacteria Isolates From Any Culture (See EPIC Reports for Culture Details):No lab results found.  Most Recent TSH, T4 and A1c Labs:  Recent Labs   Lab Test 07/21/20  2246 07/21/20  1458   TSH  --  1.04   A1C 6.5*  --      Results for orders placed or performed during the hospital encounter of 07/21/20   XR Chest Port 1 View    Narrative    CHEST ONE VIEW PORTABLE   7/21/2020 3:49 PM     HISTORY: Cough    COMPARISON: None.      Impression    IMPRESSION: Heart size is normal. No pleural effusion, pneumothorax,  or abnormal area of consolidation.    KEVIN ZAMORA MD   CT Chest (PE) Abdomen Pelvis w Contrast    Narrative    EXAM: CT CHEST PE ABDOMEN PELVIS W CONTRAST  LOCATION: [facility]  DATE/TIME: 7/21/2020 4:55 PM    INDICATION: Abdominal pain with nausea and vomiting. Cough and chest pain. Shortness of  breath.  COMPARISON: None.  TECHNIQUE: CT chest pulmonary angiogram and routine CT abdomen pelvis with IV contrast. Arterial phase through the chest and venous phase through the abdomen and pelvis. Multiplanar reformats and MIP reconstructions were performed. Dose reduction   techniques were used.   CONTRAST: 71mL [id number]    FINDINGS:  ANGIOGRAM CHEST: Pulmonary arteries are normal caliber and negative for pulmonary emboli. Thoracic aorta is negative for dissection. No CT evidence of right heart strain.     LUNGS AND PLEURA: Dependent atelectasis. 11 x 7 mm nodule right upper lobe with slightly indistinct margins may be an inflammatory nodule though solid nodule and neoplasm can't be excluded. Lungs otherwise clear.  MEDIASTINUM/AXILLAE: There is prominent diffuse esophageal wall thickening from the aortic arch to the GE junction favored to be inflammatory. There is a slightly infiltrative appearance of the mediastinal fat but no focal lymph nodes evident. Air-fluid   level within the esophagus may relate to reflux.    HEPATOBILIARY: Slight layering sludge or tiny gallstones within gallbladder. No inflammatory changes. Liver and bile ducts normal.  PANCREAS: Normal.  SPLEEN: Normal.  ADRENAL GLANDS: Normal.  KIDNEYS/BLADDER: Small benign renal cysts. No stones or hydronephrosis.    BOWEL: No obstruction or inflammatory change. Appendix normal.  Tiny fat-containing ventral wall hernias at the umbilicus and just above the umbilicus.  LYMPH NODES: Normal.  PELVIC ORGANS: Distended bladder with the estimated volume of 650 mL suggests bladder outlet obstruction or neurogenic bladder. There is no bladder wall thickening. Minimal prostatic enlargement.    MUSCULOSKELETAL: Presumed small bone island left iliac. No fractures.      Impression    IMPRESSION:  1.  No pulmonary embolism.  2.  There is what appears to be moderate wall thickening involving the esophagus most suggestive of inflammatory esophagitis.  3.  11 x 7  mm indeterminate pulmonary nodule right upper lobe. This may be inflammatory and suggest a short-term follow-up exam in 3 months to reevaluate.  4.   Cholelithiasis versus slight biliary sludge. No active inflammation.  5.  Large capacity bladder raises question of bladder outlet obstruction. Consider determining post void residual.     Disclaimer: This note consists of symbols derived from keyboarding, dictation and/or voice recognition software. As a result, there may be errors in the script that have gone undetected. Please consider this when interpreting information found in this chart.

## 2020-07-23 NOTE — DISCHARGE INSTRUCTIONS
Your hospital follow up appointment has been schedule for you with Dr. Elena at [facility] for 07/31/2020 at 10:05 am. Please bring your hospital discharge instructions, a photo ID, insurance information and any new medications with you to your appointment. Please call the clinic at  if you need to reschedule.

## 2020-07-23 NOTE — PROVIDER NOTIFICATION
Julia COVARRUBIAS in regards to Patient has a mckeon in and wants it out. There is no order for him to have one. Will discharge back to group home today. Wondering if we can take mckeon out?

## 2020-08-06 ENCOUNTER — HOSPITAL ENCOUNTER (OUTPATIENT)
Facility: CLINIC | Age: 55
Setting detail: OBSERVATION
Discharge: NURSING, REHAB OR LONG-TERM CARE | End: 2020-08-07
Attending: EMERGENCY MEDICINE | Admitting: INTERNAL MEDICINE
Payer: COMMERCIAL

## 2020-08-06 DIAGNOSIS — R31.9 URINARY TRACT INFECTION WITH HEMATURIA, SITE UNSPECIFIED: ICD-10-CM

## 2020-08-06 DIAGNOSIS — R19.5 GUAIAC POSITIVE STOOLS: ICD-10-CM

## 2020-08-06 DIAGNOSIS — D64.9 ANEMIA, UNSPECIFIED TYPE: ICD-10-CM

## 2020-08-06 DIAGNOSIS — D50.9 IRON DEFICIENCY ANEMIA, UNSPECIFIED IRON DEFICIENCY ANEMIA TYPE: Primary | ICD-10-CM

## 2020-08-06 DIAGNOSIS — N39.0 URINARY TRACT INFECTION WITH HEMATURIA, SITE UNSPECIFIED: ICD-10-CM

## 2020-08-06 LAB
ABO + RH BLD: NORMAL
ABO + RH BLD: NORMAL
ALBUMIN UR-MCNC: 30 MG/DL
ANION GAP SERPL CALCULATED.3IONS-SCNC: 8 MMOL/L (ref 3–14)
APPEARANCE UR: ABNORMAL
BACTERIA #/AREA URNS HPF: ABNORMAL /HPF
BASOPHILS # BLD AUTO: 0 10E9/L (ref 0–0.2)
BASOPHILS NFR BLD AUTO: 0.3 %
BILIRUB UR QL STRIP: NEGATIVE
BLD GP AB SCN SERPL QL: NORMAL
BLOOD BANK CMNT PATIENT-IMP: NORMAL
BUN SERPL-MCNC: 10 MG/DL (ref 7–30)
CALCIUM SERPL-MCNC: 8.8 MG/DL (ref 8.5–10.1)
CHLORIDE SERPL-SCNC: 95 MMOL/L (ref 94–109)
CO2 SERPL-SCNC: 29 MMOL/L (ref 20–32)
COLOR UR AUTO: YELLOW
CREAT BLD-MCNC: 0.8 MG/DL (ref 0.66–1.25)
CREAT SERPL-MCNC: 0.73 MG/DL (ref 0.66–1.25)
DIFFERENTIAL METHOD BLD: ABNORMAL
EOSINOPHIL # BLD AUTO: 0.2 10E9/L (ref 0–0.7)
EOSINOPHIL NFR BLD AUTO: 1.6 %
ERYTHROCYTE [DISTWIDTH] IN BLOOD BY AUTOMATED COUNT: 11.6 % (ref 10–15)
FOLATE SERPL-MCNC: 18.2 NG/ML
GFR SERPL CREATININE-BSD FRML MDRD: >90 ML/MIN/{1.73_M2}
GFR SERPL CREATININE-BSD FRML MDRD: >90 ML/MIN/{1.73_M2}
GLUCOSE BLDC GLUCOMTR-MCNC: 125 MG/DL (ref 70–99)
GLUCOSE BLDC GLUCOMTR-MCNC: 156 MG/DL (ref 70–99)
GLUCOSE SERPL-MCNC: 133 MG/DL (ref 70–99)
GLUCOSE UR STRIP-MCNC: NEGATIVE MG/DL
HCT VFR BLD AUTO: 24.7 % (ref 40–53)
HEMOCCULT STL QL: POSITIVE
HGB BLD-MCNC: 8.1 G/DL (ref 13.3–17.7)
HGB UR QL STRIP: ABNORMAL
IMM GRANULOCYTES # BLD: 0.1 10E9/L (ref 0–0.4)
IMM GRANULOCYTES NFR BLD: 0.6 %
IRON SATN MFR SERPL: 15 % (ref 15–46)
IRON SERPL-MCNC: 28 UG/DL (ref 35–180)
KETONES UR STRIP-MCNC: NEGATIVE MG/DL
LEUKOCYTE ESTERASE UR QL STRIP: ABNORMAL
LYMPHOCYTES # BLD AUTO: 2.9 10E9/L (ref 0.8–5.3)
LYMPHOCYTES NFR BLD AUTO: 27.5 %
MCH RBC QN AUTO: 29.3 PG (ref 26.5–33)
MCHC RBC AUTO-ENTMCNC: 32.8 G/DL (ref 31.5–36.5)
MCV RBC AUTO: 90 FL (ref 78–100)
MONOCYTES # BLD AUTO: 0.8 10E9/L (ref 0–1.3)
MONOCYTES NFR BLD AUTO: 8.1 %
NEUTROPHILS # BLD AUTO: 6.4 10E9/L (ref 1.6–8.3)
NEUTROPHILS NFR BLD AUTO: 61.9 %
NITRATE UR QL: NEGATIVE
NRBC # BLD AUTO: 0 10*3/UL
NRBC BLD AUTO-RTO: 0 /100
PH UR STRIP: 6 PH (ref 5–7)
PLATELET # BLD AUTO: 407 10E9/L (ref 150–450)
POTASSIUM SERPL-SCNC: 3.3 MMOL/L (ref 3.4–5.3)
RBC # BLD AUTO: 2.76 10E12/L (ref 4.4–5.9)
RBC #/AREA URNS AUTO: 10 /HPF (ref 0–2)
RETICS # AUTO: 24.6 10E9/L (ref 25–95)
RETICS/RBC NFR AUTO: 0.9 % (ref 0.5–2)
SODIUM SERPL-SCNC: 132 MMOL/L (ref 133–144)
SOURCE: ABNORMAL
SP GR UR STRIP: 1.01 (ref 1–1.03)
SPECIMEN EXP DATE BLD: NORMAL
SQUAMOUS #/AREA URNS AUTO: 1 /HPF (ref 0–1)
TIBC SERPL-MCNC: 189 UG/DL (ref 240–430)
UROBILINOGEN UR STRIP-MCNC: NORMAL MG/DL (ref 0–2)
VIT B12 SERPL-MCNC: 576 PG/ML (ref 193–986)
WBC # BLD AUTO: 10.4 10E9/L (ref 4–11)
WBC #/AREA URNS AUTO: >182 /HPF (ref 0–5)
WBC CLUMPS #/AREA URNS HPF: PRESENT /HPF

## 2020-08-06 PROCEDURE — 93005 ELECTROCARDIOGRAM TRACING: CPT

## 2020-08-06 PROCEDURE — 86900 BLOOD TYPING SEROLOGIC ABO: CPT | Performed by: EMERGENCY MEDICINE

## 2020-08-06 PROCEDURE — C9803 HOPD COVID-19 SPEC COLLECT: HCPCS

## 2020-08-06 PROCEDURE — 99220 ZZC INITIAL OBSERVATION CARE,LEVL III: CPT | Performed by: PHYSICIAN ASSISTANT

## 2020-08-06 PROCEDURE — 87086 URINE CULTURE/COLONY COUNT: CPT | Performed by: EMERGENCY MEDICINE

## 2020-08-06 PROCEDURE — 85025 COMPLETE CBC W/AUTO DIFF WBC: CPT | Performed by: EMERGENCY MEDICINE

## 2020-08-06 PROCEDURE — 25000132 ZZH RX MED GY IP 250 OP 250 PS 637: Performed by: PHYSICIAN ASSISTANT

## 2020-08-06 PROCEDURE — 96374 THER/PROPH/DIAG INJ IV PUSH: CPT

## 2020-08-06 PROCEDURE — 83550 IRON BINDING TEST: CPT | Performed by: PHYSICIAN ASSISTANT

## 2020-08-06 PROCEDURE — 25000128 H RX IP 250 OP 636: Performed by: EMERGENCY MEDICINE

## 2020-08-06 PROCEDURE — 86901 BLOOD TYPING SEROLOGIC RH(D): CPT | Performed by: EMERGENCY MEDICINE

## 2020-08-06 PROCEDURE — 82565 ASSAY OF CREATININE: CPT

## 2020-08-06 PROCEDURE — G0378 HOSPITAL OBSERVATION PER HR: HCPCS

## 2020-08-06 PROCEDURE — 81001 URINALYSIS AUTO W/SCOPE: CPT | Performed by: EMERGENCY MEDICINE

## 2020-08-06 PROCEDURE — 80048 BASIC METABOLIC PNL TOTAL CA: CPT | Performed by: EMERGENCY MEDICINE

## 2020-08-06 PROCEDURE — 00000146 ZZHCL STATISTIC GLUCOSE BY METER IP

## 2020-08-06 PROCEDURE — 25800030 ZZH RX IP 258 OP 636: Performed by: PHYSICIAN ASSISTANT

## 2020-08-06 PROCEDURE — 87088 URINE BACTERIA CULTURE: CPT | Performed by: EMERGENCY MEDICINE

## 2020-08-06 PROCEDURE — 82607 VITAMIN B-12: CPT | Performed by: PHYSICIAN ASSISTANT

## 2020-08-06 PROCEDURE — 25000132 ZZH RX MED GY IP 250 OP 250 PS 637: Performed by: EMERGENCY MEDICINE

## 2020-08-06 PROCEDURE — U0003 INFECTIOUS AGENT DETECTION BY NUCLEIC ACID (DNA OR RNA); SEVERE ACUTE RESPIRATORY SYNDROME CORONAVIRUS 2 (SARS-COV-2) (CORONAVIRUS DISEASE [COVID-19]), AMPLIFIED PROBE TECHNIQUE, MAKING USE OF HIGH THROUGHPUT TECHNOLOGIES AS DESCRIBED BY CMS-2020-01-R: HCPCS | Performed by: EMERGENCY MEDICINE

## 2020-08-06 PROCEDURE — 86850 RBC ANTIBODY SCREEN: CPT | Performed by: EMERGENCY MEDICINE

## 2020-08-06 PROCEDURE — 82272 OCCULT BLD FECES 1-3 TESTS: CPT | Performed by: EMERGENCY MEDICINE

## 2020-08-06 PROCEDURE — 82746 ASSAY OF FOLIC ACID SERUM: CPT | Performed by: PHYSICIAN ASSISTANT

## 2020-08-06 PROCEDURE — C9113 INJ PANTOPRAZOLE SODIUM, VIA: HCPCS | Performed by: EMERGENCY MEDICINE

## 2020-08-06 PROCEDURE — 82746 ASSAY OF FOLIC ACID SERUM: CPT | Performed by: EMERGENCY MEDICINE

## 2020-08-06 PROCEDURE — 83540 ASSAY OF IRON: CPT | Performed by: PHYSICIAN ASSISTANT

## 2020-08-06 PROCEDURE — 85045 AUTOMATED RETICULOCYTE COUNT: CPT | Performed by: EMERGENCY MEDICINE

## 2020-08-06 PROCEDURE — 87186 SC STD MICRODIL/AGAR DIL: CPT | Performed by: EMERGENCY MEDICINE

## 2020-08-06 PROCEDURE — 99285 EMERGENCY DEPT VISIT HI MDM: CPT | Mod: 25

## 2020-08-06 RX ORDER — ACETAMINOPHEN 325 MG/1
650 TABLET ORAL EVERY 4 HOURS PRN
Status: DISCONTINUED | OUTPATIENT
Start: 2020-08-06 | End: 2020-08-07 | Stop reason: HOSPADM

## 2020-08-06 RX ORDER — NICOTINE 21 MG/24HR
1 PATCH, TRANSDERMAL 24 HOURS TRANSDERMAL DAILY
Status: DISCONTINUED | OUTPATIENT
Start: 2020-08-06 | End: 2020-08-07 | Stop reason: HOSPADM

## 2020-08-06 RX ORDER — AMOXICILLIN 250 MG
1 CAPSULE ORAL 2 TIMES DAILY PRN
Status: DISCONTINUED | OUTPATIENT
Start: 2020-08-06 | End: 2020-08-07 | Stop reason: HOSPADM

## 2020-08-06 RX ORDER — SODIUM CHLORIDE, SODIUM LACTATE, POTASSIUM CHLORIDE, CALCIUM CHLORIDE 600; 310; 30; 20 MG/100ML; MG/100ML; MG/100ML; MG/100ML
INJECTION, SOLUTION INTRAVENOUS CONTINUOUS
Status: DISCONTINUED | OUTPATIENT
Start: 2020-08-06 | End: 2020-08-07 | Stop reason: HOSPADM

## 2020-08-06 RX ORDER — POTASSIUM CHLORIDE 1500 MG/1
40 TABLET, EXTENDED RELEASE ORAL ONCE
Status: COMPLETED | OUTPATIENT
Start: 2020-08-06 | End: 2020-08-06

## 2020-08-06 RX ORDER — CEPHALEXIN 500 MG/1
500 CAPSULE ORAL EVERY 6 HOURS SCHEDULED
Status: DISCONTINUED | OUTPATIENT
Start: 2020-08-06 | End: 2020-08-07 | Stop reason: HOSPADM

## 2020-08-06 RX ORDER — ACETAMINOPHEN 650 MG/1
650 SUPPOSITORY RECTAL EVERY 4 HOURS PRN
Status: DISCONTINUED | OUTPATIENT
Start: 2020-08-06 | End: 2020-08-07 | Stop reason: HOSPADM

## 2020-08-06 RX ORDER — CEPHALEXIN 500 MG/1
500 CAPSULE ORAL ONCE
Status: COMPLETED | OUTPATIENT
Start: 2020-08-06 | End: 2020-08-06

## 2020-08-06 RX ORDER — CLONIDINE HYDROCHLORIDE 0.1 MG/1
0.1 TABLET ORAL EVERY 4 HOURS PRN
Status: DISCONTINUED | OUTPATIENT
Start: 2020-08-06 | End: 2020-08-07 | Stop reason: HOSPADM

## 2020-08-06 RX ORDER — NALOXONE HYDROCHLORIDE 0.4 MG/ML
.1-.4 INJECTION, SOLUTION INTRAMUSCULAR; INTRAVENOUS; SUBCUTANEOUS
Status: DISCONTINUED | OUTPATIENT
Start: 2020-08-06 | End: 2020-08-07 | Stop reason: HOSPADM

## 2020-08-06 RX ORDER — ONDANSETRON 4 MG/1
4 TABLET, ORALLY DISINTEGRATING ORAL EVERY 6 HOURS PRN
Status: DISCONTINUED | OUTPATIENT
Start: 2020-08-06 | End: 2020-08-07 | Stop reason: HOSPADM

## 2020-08-06 RX ORDER — AMOXICILLIN 250 MG
2 CAPSULE ORAL 2 TIMES DAILY PRN
Status: DISCONTINUED | OUTPATIENT
Start: 2020-08-06 | End: 2020-08-07 | Stop reason: HOSPADM

## 2020-08-06 RX ORDER — LIDOCAINE 40 MG/G
CREAM TOPICAL
Status: DISCONTINUED | OUTPATIENT
Start: 2020-08-06 | End: 2020-08-07

## 2020-08-06 RX ORDER — ONDANSETRON 2 MG/ML
4 INJECTION INTRAMUSCULAR; INTRAVENOUS EVERY 6 HOURS PRN
Status: DISCONTINUED | OUTPATIENT
Start: 2020-08-06 | End: 2020-08-07 | Stop reason: HOSPADM

## 2020-08-06 RX ADMIN — CEPHALEXIN 500 MG: 500 CAPSULE ORAL at 18:23

## 2020-08-06 RX ADMIN — SODIUM CHLORIDE, POTASSIUM CHLORIDE, SODIUM LACTATE AND CALCIUM CHLORIDE: 600; 310; 30; 20 INJECTION, SOLUTION INTRAVENOUS at 18:32

## 2020-08-06 RX ADMIN — CEPHALEXIN 500 MG: 500 CAPSULE ORAL at 14:52

## 2020-08-06 RX ADMIN — PANTOPRAZOLE SODIUM 40 MG: 40 INJECTION, POWDER, FOR SOLUTION INTRAVENOUS at 14:53

## 2020-08-06 RX ADMIN — NICOTINE 1 PATCH: 14 PATCH, EXTENDED RELEASE TRANSDERMAL at 18:23

## 2020-08-06 RX ADMIN — POTASSIUM CHLORIDE 40 MEQ: 1500 TABLET, EXTENDED RELEASE ORAL at 14:52

## 2020-08-06 ASSESSMENT — ENCOUNTER SYMPTOMS
HEMATURIA: 0
DYSURIA: 0
COUGH: 0
DIFFICULTY URINATING: 0
FREQUENCY: 0

## 2020-08-06 ASSESSMENT — MIFFLIN-ST. JEOR: SCORE: 1454.63

## 2020-08-06 NOTE — ED NOTES
".  [facility]  ED Nurse Handoff Report    Benja Duong is a 55 year old male   ED Chief complaint: Abnormal Labs  . ED Diagnosis:   Final diagnoses:   Urinary tract infection with hematuria, site unspecified   Guaiac positive stools   Anemia, unspecified type     Allergies:   Allergies   Allergen Reactions     Alprazolam      Other reaction(s): *Unknown States \"I break out\"     Oxycodone-Acetaminophen      Other reaction(s): *Unknown, States \"I break out\"       Code Status: Full Code  Activity level - Baseline/Home:  Independent. Activity Level - Current:   Stand by Assist. Lift room needed: No. Bariatric: No   Needed: No   Isolation: No. Infection: Not Applicable.     Vital Signs:   Vitals:    08/06/20 1345 08/06/20 1400 08/06/20 1401 08/06/20 1402   BP: (!) 114/94 132/79     Pulse: 105 108     Resp:       Temp:       SpO2:  98% 98% 97%       Cardiac Rhythm:  ,      Pain level: 0-10 Pain Scale: 0  Patient confused: No. Patient Falls Risk: Yes.   Elimination Status: Has voided   Patient Report - Initial Complaint: 55 year old male with a history of diabetes, hypertension, schizophrenia, substance abuse, and sepsis who presents with group home nurse for low hemoglobin levels and evaluation of kidney functions per PCP. Patient presents with .  Patient denies any fever, cough, difficulty breathing, chest pain, black/bloody stools or other symptoms.  Nurse denies recent falls or medication changes. e. Focused Assessment:   Cardiac - Cardiac WDL: WDL  LA        12:20 Gastrointestinal Gastrointestinal - GI WDL: WDL (no complaints) LA     12:20 Respiratory Respiratory - Respiratory WDL: WDL (resp even and equal; no distress noted. Satting upper 90% on RA)          Tests Performed: labs, UA. Abnormal Results: .  Labs Ordered and Resulted from Time of ED Arrival Up to the Time of Departure from the ED   CBC WITH PLATELETS DIFFERENTIAL - Abnormal; Notable for the following " components:       Result Value    RBC Count 2.76 (*)     Hemoglobin 8.1 (*)     Hematocrit 24.7 (*)     All other components within normal limits   BASIC METABOLIC PANEL - Abnormal; Notable for the following components:    Sodium 132 (*)     Potassium 3.3 (*)     Glucose 133 (*)     All other components within normal limits   ROUTINE UA WITH MICROSCOPIC - Abnormal; Notable for the following components:    Blood Urine Small (*)     Protein Albumin Urine 30 (*)     Leukocyte Esterase Urine Large (*)     WBC Urine >182 (*)     RBC Urine 10 (*)     WBC Clumps Present (*)     Bacteria Urine Moderate (*)     All other components within normal limits   OCCULT BLOOD STOOL - Abnormal; Notable for the following components:    Occult Blood Positive (*)     All other components within normal limits   CREATININE POCT   COVID-19 VIRUS (CORONAVIRUS) BY PCR   ISTAT CREATININE NURSING POCT   ABO/RH TYPE AND SCREEN   URINE CULTURE AEROBIC BACTERIAL      Treatments provided: see ED meds below  Family Comments: NA  OBS brochure/video discussed/provided to patient:  Yes  ED Medications:   Medications   potassium chloride ER (KLOR-CON M) CR tablet 40 mEq (40 mEq Oral Given 8/6/20 1452)   cephALEXin (KEFLEX) capsule 500 mg (500 mg Oral Given 8/6/20 1452)   pantoprazole (PROTONIX) 40 mg IV push injection (40 mg Intravenous Given 8/6/20 1453)     Drips infusing:  No  For the majority of the shift, the patient's behavior Green. Interventions performed were NA.    Sepsis treatment initiated: No       ED Nurse Name/Phone Number: Antonia Stringer RN,   3:16 PM    RECEIVING UNIT ED HANDOFF REVIEW    Above ED Nurse Handoff Report was reviewed: Yes  Reviewed by: Meg Curtis RN on August 6, 2020 at 4:23 PM

## 2020-08-06 NOTE — H&P
History and Physical     Benja Duong MRN# [id number]   YOB: 1965 Age: 55 year old      Date of Admission:  8/6/2020    Primary care provider: Cleopatra Andujar          Assessment and Plan:   Benja Duong is a 55 year old male with a PMH significant for recent admission for hyponatremia related to intractable nausea/vomiting, schizophrenia and depression living in a group home, polysubstance abuse in remission, type 2 diabetes, hypertension, hyperlipidemia and GERD who presents with abnormal labs.    Patient was discussed with Dr. Rojas, who was provider in ED. Chart review of ED work up was reviewed as well as chart review of Care Everywhere, previous visits and admissions.     MED REC NOT COMPLETE    #Urinary tract infection  Patient has no urinary complaints but urine shows a large amount of white blood cells greater than 182, 10 red blood cells and a moderate amount of bacteria.  His WBC is normal and he is afebrile but he is tachycardic up to 110.  On review of care everywhere I cannot find any other urine cultures to compare with.  Patient was given Keflex in the emergency room which we will continue for now.  -Follow culture  -Lactated Ringer's at 100 mL/h  -Continue Keflex 500 mg 4 times daily    #Acute on chronic anemia  Patient has complaints of intermittent lightheadedness and dizziness with standing but otherwise denies symptomatic anemia.  His hemoglobin in July was 10.6 and today is 8.1.  His BUN is 10 which goes against upper GI source however he does smoke and has a positive guaiac.  He is blind and does not know if he has melena or hematochezia.  At discharge in July there was concern for possible esophagitis and he was started on Protonix.  -Monitor stool for blood  -Orthostatic blood pressure  -Add iron levels, B12, folate and reticulocyte count  -Patient is agreeable to getting blood if needed  -Recheck hemoglobin in a.m.  -Continue Protonix 40 mg twice  daily  -We will need outpatient follow-up with colonoscopy and EGD    #Type 2 diabetes  Glucose on admission is 133 A1c in July of 6.5.  -BID/HS glucose checks  -Continue Lantus 18 units at bedtime  -Continue metformin    #Hypertension  -Plan to resume hydrochlorothiazide and hydralazine on parameters    #Tobacco use disorder  -Nicotine patch ordered    #Schizophrenia living in group home  -Sees risperidone injection every 14 days    #HLP  -Continue atorvastatin    Social: Lives in [facility].  Cell [phone], office [phone]  Code: Discussed with patient or POA and they have chosen full code  VTE prophylaxis: PCD's  Disposition: Observation                    Chief Complaint:   Abnormal labs         History of Present Illness:   History is limited as patient does not not have any interest in talking with me.  Group home staff is left and we are unable to get a hold of them.    Benja Duong is a 55 year old male who presents with his group home staff with abnormal labs.  Apparently the group home was directed by his primary care doctor to bring him here for low hemoglobin and evaluation of kidney function.  The patient himself feels fine and denies any current symptoms.  Specifically he denies cough, shortness of breath, fever, chills, extremity pain, abdominal pain, back pain, diarrhea, nausea, vomiting, constipation and urinary symptoms.  He is blind so cannot see if he has blood in his stool or urine.  He denies any upper abdominal symptoms and denies current use of illegal drugs and alcohol.  He does continue to smoke.  He does sometimes feel lightheaded or dizzy with standing but this resolves with sitting.  He denies increased fatigue and shortness of breath with exertion.             Past Medical History:     Past Medical History:   Diagnosis Date     Depression      Diabetes (H)      HTN (hypertension)                Past Surgical History:   Tonsillectomy  [place] teeth removal             "Social History:     Social History     Socioeconomic History     Marital status: Single     Spouse name: Not on file     Number of children: Not on file     Years of education: Not on file     Highest education level: Not on file   Occupational History     Not on file   Social Needs     Financial resource strain: Not on file     Food insecurity     Worry: Not on file     Inability: Not on file     Transportation needs     Medical: Not on file     Non-medical: Not on file   Tobacco Use     Smoking status: Unknown If Ever Smoked   Substance and Sexual Activity     Alcohol use: Yes     Comment: \"few beers\"     Drug use: Yes     Types: \"Crack\" cocaine     Sexual activity: Not on file   Lifestyle     Physical activity     Days per week: Not on file     Minutes per session: Not on file     Stress: Not on file   Relationships     Social connections     Talks on phone: Not on file     Gets together: Not on file     Attends Baptism service: Not on file     Active member of club or organization: Not on file     Attends meetings of clubs or organizations: Not on file     Relationship status: Not on file     Intimate partner violence     Fear of current or ex partner: Not on file     Emotionally abused: Not on file     Physically abused: Not on file     Forced sexual activity: Not on file   Other Topics Concern     Not on file   Social History Narrative     Not on file               Family History:   Family history reviewed and is non contributory         Allergies:      Allergies   Allergen Reactions     Alprazolam      Other reaction(s): *Unknown States \"I break out\"     Oxycodone-Acetaminophen      Other reaction(s): *Unknown, States \"I break out\"               Medications:     Prior to Admission medications    Medication Sig Last Dose Taking? Auth Provider   Artificial Tear Solution (TEARS NATURALE OP) 1 drop by Both Eyelids route 4 times daily Gentle Tears   Unknown, Entered By History   atorvastatin (LIPITOR) 20 MG " tablet Take 20 mg by mouth At Bedtime   Unknown, Entered By History   cholecalciferol (VITAMIN D-1000 MAX ST) 1000 units TABS Take 1,000 Units by mouth daily   Paul Andrews MD   diphenhydrAMINE (BENADRYL) 50 MG capsule Take 50 mg by mouth At Bedtime   Unknown, Entered By History   exenatide ER (BYDUREON) 2 MG recon vial kit susp for weekly inj Inject 2 mg Subcutaneous once a week DO not administer until he is seen by his PCP.   Benton Whitman MD   hydrALAZINE (APRESOLINE) 25 MG tablet Take 0.5 tablets (12.5 mg) by mouth every 6 hours as needed (HTN; SBP > 160)   Paul Andrews MD   hydrochlorothiazide (HYDRODIURIL) 25 MG tablet Take 1 tablet (25 mg) by mouth daily DO not resume until Na is back to normal or PCP orders   Benton Whitman MD   insulin glargine (LANTUS VIAL) 100 UNIT/ML vial Inject 18 Units Subcutaneous At Bedtime   Unknown, Entered By History   lisinopril (ZESTRIL) 40 MG tablet Take 40 mg by mouth daily   Unknown, Entered By History   metFORMIN (GLUCOPHAGE) 1000 MG tablet Take 1 tablet (1,000 mg) by mouth 2 times daily (with meals)   Paul Andrews MD   pantoprazole (PROTONIX) 40 MG EC tablet Take 1 tablet (40 mg) by mouth 2 times daily   Benton Whimtan MD   risperiDONE microspheres ER (RISPERDAL CONSTA) 50 MG injection Inject 50 mg into the muscle every 14 days   Unknown, Entered By History              Review of Systems:   A Comprehensive greater than 10 system review of systems was carried out.  Pertinent positives and negatives are noted above.  Otherwise negative for contributory information.            Physical Exam:   Blood pressure 132/79, pulse 108, temperature 98.2  F (36.8  C), resp. rate 20, SpO2 97 %.  Exam:  GENERAL:  Comfortable.  PSYCH: pleasant, oriented, No acute distress.  HEENT:  PERRLA. Normal conjunctiva, normal hearing, nasal mucosa and Oropharynx are normal.  NECK:  Supple, no neck vein distention, adenopathy or bruits, normal thyroid.  HEART:   Normal S1, S2 with no murmur, no pericardial rub, gallops or S3 or S4.  LUNGS:  Clear to auscultation, normal Respiratory effort. No wheezing, rales or ronchi.  ABDOMEN:  Soft, no hepatosplenomegaly, normal bowel sounds. Non-tender, non distended.   EXTREMITIES:  No pedal edema, +2 pulses bilateral and equal.  SKIN:  Dry to touch, No rash, wound or ulcerations.  NEUROLOGIC:  CN 2-12 grossly intact,  sensation is intact with no focal deficits.               Data:     Recent Labs   Lab 08/06/20  1251   WBC 10.4   HGB 8.1*   HCT 24.7*   MCV 90        Recent Labs   Lab 08/06/20  1254 08/06/20  1251   NA  --  132*   POTASSIUM  --  3.3*   CHLORIDE  --  95   CO2  --  29   ANIONGAP  --  8   GLC  --  133*   BUN  --  10   CR  --  0.73   GFRESTIMATED >90 >90   GFRESTBLACK >90 >90   MITCHEL  --  8.8     Recent Labs   Lab 08/06/20  1219   COLOR Yellow   APPEARANCE Cloudy   URINEGLC Negative   URINEBILI Negative   URINEKETONE Negative   SG 1.008   UBLD Small*   URINEPH 6.0   PROTEIN 30*   NITRITE Negative   LEUKEST Large*   RBCU 10*   WBCU >182*         No results found for this or any previous visit (from the past 24 hour(s)).      Marine Bustamante PA-C

## 2020-08-06 NOTE — ED TRIAGE NOTES
Patient brought in by group home staff for abnormal lab results. Staff member does not know what lab is abnormal. ABC intact alert and no distress.

## 2020-08-06 NOTE — PLAN OF CARE
ROOM # 222    Living Situation (if not independent, order SW consult):  Facility name:  : [facility]     Activity level at baseline: Ind  Activity level on admit: SBA      Patient registered to observation; given Patient Bill of Rights; given the opportunity to ask questions about observation status and their plan of care.  Patient has been oriented to the observation room, bathroom and call light is in place.    Discussed discharge goals and expectations with patient/family.

## 2020-08-06 NOTE — ED PROVIDER NOTES
History     Chief Complaint:    Abnormal Labs      The history is provided by the patient and a caregiver.      Benja Duong is a 55 year old male with a history of diabetes, hypertension, schizophrenia, substance abuse, and sepsis who presents with group home nurse for low hemoglobin levels and evaluation of kidney functions per PCP. Patient presents with .  Patient denies any fever, cough, difficulty breathing, chest pain, black/bloody stools or other symptoms.  Nurse denies recent falls or medication changes.     Allergies:  Alprazolam  Oxycodone-Acetaminophen    Medications:    Bydureon  Hydrodiuril  Protonix  Lipitor  Benadryl  Apresoline  Zestril  Glucophage  Risperdal    Past Medical History:    Depression  Diabetes  Hypertension  Psychosis  Hyponatremia  Schizophrenia  Substance abuse  Sepsis    Past Surgical History:    Tonsillectomy  Vitrectomy and Lensectomy    Family History:    No past pertinent family history.    Social History:  The patient was accompanied to the ED by nurse.  Smoking Status: Never Smoker  Smokeless Tobacco: Never Used  Alcohol Use: Positive  Drug Use: Positive   Type: Cocaine    Marital Status:  Single     Review of Systems   Respiratory: Negative for cough.    Genitourinary: Negative for difficulty urinating, dysuria, frequency, hematuria and urgency.   All other systems reviewed and are negative.      Physical Exam     Patient Vitals for the past 24 hrs:   BP Temp Pulse Resp SpO2   08/06/20 1402 -- -- -- -- 97 %   08/06/20 1401 -- -- -- -- 98 %   08/06/20 1400 132/79 -- 108 -- 98 %   08/06/20 1345 (!) 114/94 -- 105 -- --   08/06/20 1153 105/62 98.2  F (36.8  C) 117 20 98 %       Physical Exam  Nursing note and vitals reviewed.  Constitutional: Well nourished. Resting comfortably.   Eyes: Conjunctiva normal.    ENT: Nose normal. Mucous membranes pink and moist.    Neck: Normal range of motion.  CVS: Sinus tachycardia.  Normal heart sounds.  No  murmur.  Pulmonary: Lungs clear to auscultation bilaterally. No wheezes/rales/rhonchi.  GI: Abdomen soft. Nontender, nondistended. No rigidity or guarding.  TRINITY without gross blood/melena. Chaperone RN Antonia TREVINO: No calf tenderness or swelling.  Neuro: Alert. Follows simple commands.  Skin: Skin is warm and dry. No rash noted.   Psychiatric: Blunt affect    Emergency Department Course     ECG:  Indication: Abnormal Labs  Time: 1208  Vent. Rate 114 bpm. IN interval 128. QRS duration 94. QT/QTc 338/465. P-R-T axis 58 51 66. Sinus tachycardia. Otherwise normal ECG Read time: 1208     Laboratory:  Laboratory findings were communicated with the patient and Admitting MD who voiced understanding of the findings.    CBC: WBC: 10.4, HGB: 8.1 (L), PLT: 407    BMP: Glucose 133 (H), Sodium: 132 (L), Potassium: 3.3 (L), o/w WNL (Creatinine: 0.73)    Creatinine POCT: 0.8, GFR: >90    ABO/Rh type and screen: AB, RH: Positive, Antibody: Negative    UA with Microscopic: Blood: Small, Protein Albumin: 30 (A), Leukocyte Esterase: Large (A), WBC: >182 (H), RBC: 10 (H), WBC Clumps: Present (A), Bacteria: Moderate (A) o/w Negative    Stool Occult blood: Positive (A)    Urine Culture Aerobic Bacterial: Pending    COVID-19 Virus (Coronavirus), PCR NP Swab: Pending      Interventions:  1452 Klor-Con 40 mEq Po  1452 Keflex 500 mg PO  1453 Protonix 40 mg IV    Emergency Department Course:  Past medical records, nursing notes, and vitals reviewed.    1203 I performed an exam of the patient as documented above.     EKG obtained in the ED, see results above.     IV was inserted and blood was drawn for laboratory testing, results above.    The patient provided a urine and stool sample here in the emergency department. This was sent for laboratory testing, findings above.     1439 I tried to get a hold of nurse at patient's Group Home x3     1440 I rechecked the patient and discussed the results of his workup thus far.     1456 I consulted with  "Simi Bustamante for Dr. Valdes, Hospitalist, regarding the patient's history and presentation here in the emergency department.    Findings and plan explained to the Patient who consents to admission. Discussed the patient with Dr. Valdes, who will admit the patient to a Observation bed for further monitoring, evaluation, and treatment.    I personally reviewed the laboratory results with the Patient and answered all related questions prior to admission.     Impression & Plan     Covid-19  Benja Duong was evaluated during a global COVID-19 pandemic, which necessitated consideration that the patient might be at risk for infection with the SARS-CoV-2 virus that causes COVID-19.   Applicable protocols for evaluation were followed during the patient's care.   COVID-19 was considered as part of the patient's evaluation. The plan for testing is:  a test was obtained during this visit.    Medical Decision Making:  Patient is a 55-year-old male presenting for reported lab abnormalities.  He is without complaint at bedside though tends to be \"quite\" per care provider.  He has low tachycardia on arrival with labile blood pressure though this improved without intervention.  UA does suggest concerns for UTI today.  Formal culture sent.  He is not septic appearing, clinically doubt pyelonephritis.  He received oral antibiotics during his time in the ED.  Regarding his lab abnormalities, he is noted to be acutely anemic today though no indication for emergent blood transfusion.  Guaiac positive stools.  He was given a dose of empiric Protonix.  Review of records shows recent hospitalizations for hyponatremia as well as esophagitis.  The patient is without complaint of abdominal pain, vomiting and states \"I'm blind so I can't tell if I have blood in my stools.\"  This is likely a chronic process.  I did attempt to contact patient's group home multiple times without success.  It is reasonable to monitor patient in observation for " worsening abdominal pain and repeat H/H as well as continue antibiotics for management of UTI.  He was accepted by hospitalist for admission.    Diagnosis:    ICD-10-CM    1. Urinary tract infection with hematuria, site unspecified  N39.0     R31.9    2. Guaiac positive stools  R19.5    3. Anemia, unspecified type  D64.9        Disposition:  Admitted to Dr. Valdes.    Scribe Disclosure:  I, Rocky Khan, am serving as a scribe at 11:57 AM on 8/6/2020 to document services personally performed by Teagan Rojas DO based on my observations and the provider's statements to me.        Teagan Rojas DO  08/06/20 1544

## 2020-08-07 VITALS
OXYGEN SATURATION: 99 % | HEART RATE: 102 BPM | SYSTOLIC BLOOD PRESSURE: 134 MMHG | TEMPERATURE: 97.4 F | BODY MASS INDEX: 23.04 KG/M2 | HEIGHT: 67 IN | WEIGHT: 146.8 LBS | DIASTOLIC BLOOD PRESSURE: 74 MMHG | RESPIRATION RATE: 18 BRPM

## 2020-08-07 LAB
ANION GAP SERPL CALCULATED.3IONS-SCNC: 3 MMOL/L (ref 3–14)
BASOPHILS # BLD AUTO: 0 10E9/L (ref 0–0.2)
BASOPHILS NFR BLD AUTO: 0.4 %
BUN SERPL-MCNC: 8 MG/DL (ref 7–30)
CALCIUM SERPL-MCNC: 8.9 MG/DL (ref 8.5–10.1)
CHLORIDE SERPL-SCNC: 97 MMOL/L (ref 94–109)
CO2 SERPL-SCNC: 32 MMOL/L (ref 20–32)
CREAT SERPL-MCNC: 0.76 MG/DL (ref 0.66–1.25)
DIFFERENTIAL METHOD BLD: ABNORMAL
EOSINOPHIL # BLD AUTO: 0.2 10E9/L (ref 0–0.7)
EOSINOPHIL NFR BLD AUTO: 1.6 %
ERYTHROCYTE [DISTWIDTH] IN BLOOD BY AUTOMATED COUNT: 11.6 % (ref 10–15)
GFR SERPL CREATININE-BSD FRML MDRD: >90 ML/MIN/{1.73_M2}
GLUCOSE SERPL-MCNC: 170 MG/DL (ref 70–99)
HCT VFR BLD AUTO: 24.9 % (ref 40–53)
HGB BLD-MCNC: 8.4 G/DL (ref 13.3–17.7)
IMM GRANULOCYTES # BLD: 0 10E9/L (ref 0–0.4)
IMM GRANULOCYTES NFR BLD: 0.4 %
INTERPRETATION ECG - MUSE: NORMAL
LYMPHOCYTES # BLD AUTO: 2.6 10E9/L (ref 0.8–5.3)
LYMPHOCYTES NFR BLD AUTO: 25.9 %
MCH RBC QN AUTO: 30.2 PG (ref 26.5–33)
MCHC RBC AUTO-ENTMCNC: 33.7 G/DL (ref 31.5–36.5)
MCV RBC AUTO: 90 FL (ref 78–100)
MONOCYTES # BLD AUTO: 0.9 10E9/L (ref 0–1.3)
MONOCYTES NFR BLD AUTO: 8.7 %
NEUTROPHILS # BLD AUTO: 6.4 10E9/L (ref 1.6–8.3)
NEUTROPHILS NFR BLD AUTO: 63 %
NRBC # BLD AUTO: 0 10*3/UL
NRBC BLD AUTO-RTO: 0 /100
PLATELET # BLD AUTO: 461 10E9/L (ref 150–450)
POTASSIUM SERPL-SCNC: 4.1 MMOL/L (ref 3.4–5.3)
RBC # BLD AUTO: 2.78 10E12/L (ref 4.4–5.9)
SARS-COV-2 RNA SPEC QL NAA+PROBE: NOT DETECTED
SODIUM SERPL-SCNC: 132 MMOL/L (ref 133–144)
SPECIMEN SOURCE: NORMAL
WBC # BLD AUTO: 10.1 10E9/L (ref 4–11)

## 2020-08-07 PROCEDURE — G0378 HOSPITAL OBSERVATION PER HR: HCPCS

## 2020-08-07 PROCEDURE — 25800030 ZZH RX IP 258 OP 636: Performed by: PHYSICIAN ASSISTANT

## 2020-08-07 PROCEDURE — 25000132 ZZH RX MED GY IP 250 OP 250 PS 637: Performed by: INTERNAL MEDICINE

## 2020-08-07 PROCEDURE — 99217 ZZC OBSERVATION CARE DISCHARGE: CPT | Performed by: PHYSICIAN ASSISTANT

## 2020-08-07 PROCEDURE — 36415 COLL VENOUS BLD VENIPUNCTURE: CPT | Performed by: PHYSICIAN ASSISTANT

## 2020-08-07 PROCEDURE — 25000132 ZZH RX MED GY IP 250 OP 250 PS 637: Performed by: PHYSICIAN ASSISTANT

## 2020-08-07 PROCEDURE — 85025 COMPLETE CBC W/AUTO DIFF WBC: CPT | Performed by: PHYSICIAN ASSISTANT

## 2020-08-07 PROCEDURE — 80048 BASIC METABOLIC PNL TOTAL CA: CPT | Performed by: PHYSICIAN ASSISTANT

## 2020-08-07 RX ORDER — FERROUS SULFATE 325(65) MG
325 TABLET ORAL DAILY
Qty: 30 TABLET | Refills: 1 | Status: SHIPPED | OUTPATIENT
Start: 2020-08-08 | End: 2024-03-17

## 2020-08-07 RX ORDER — CEPHALEXIN 500 MG/1
500 CAPSULE ORAL 3 TIMES DAILY
Qty: 21 CAPSULE | Refills: 0 | Status: SHIPPED | OUTPATIENT
Start: 2020-08-07 | End: 2020-08-14

## 2020-08-07 RX ORDER — FERROUS SULFATE 325(65) MG
325 TABLET ORAL DAILY
Status: DISCONTINUED | OUTPATIENT
Start: 2020-08-07 | End: 2020-08-07 | Stop reason: HOSPADM

## 2020-08-07 RX ADMIN — FERROUS SULFATE TAB 325 MG (65 MG ELEMENTAL FE) 325 MG: 325 (65 FE) TAB at 09:27

## 2020-08-07 RX ADMIN — CEPHALEXIN 500 MG: 500 CAPSULE ORAL at 00:03

## 2020-08-07 RX ADMIN — ACETAMINOPHEN 650 MG: 325 TABLET, FILM COATED ORAL at 04:51

## 2020-08-07 RX ADMIN — CLONIDINE HYDROCHLORIDE 0.1 MG: 0.1 TABLET ORAL at 00:03

## 2020-08-07 RX ADMIN — SODIUM CHLORIDE, POTASSIUM CHLORIDE, SODIUM LACTATE AND CALCIUM CHLORIDE: 600; 310; 30; 20 INJECTION, SOLUTION INTRAVENOUS at 04:11

## 2020-08-07 RX ADMIN — CEPHALEXIN 500 MG: 500 CAPSULE ORAL at 06:03

## 2020-08-07 RX ADMIN — CEPHALEXIN 500 MG: 500 CAPSULE ORAL at 11:40

## 2020-08-07 NOTE — PLAN OF CARE
PRIMARY DIAGNOSIS: Abnormal Labs/ UTI   OUTPATIENT/OBSERVATION GOALS TO BE MET BEFORE DISCHARGE:  1. Pain Status: Pain free.     2. Return to near baseline physical activity: Yes     3. Cleared for discharge by consultants (if involved): No     Discharge Planner Nurse   Safe discharge environment identified: Yes  Barriers to discharge: Yes       Entered by: Meg Curtis 08/06/2020 7:30 PM     Please review provider order for any additional goals.   Nurse to notify provider when observation goals have been met and patient is ready for discharge.     VSS, on RA, afebrile. Up SBA. A&O x4. More cooperative this evening. Mod carb diet, fair appetite. Nicotine patch in place on left upper arm. Plan- IV fluids, recheck labs in am, oral keflex for uti, care coordinator consult, social work following for discharge planning.

## 2020-08-07 NOTE — PLAN OF CARE
PRIMARY DIAGNOSIS: Abnormal Labs/ UTI   OUTPATIENT/OBSERVATION GOALS TO BE MET BEFORE DISCHARGE:  1. Pain Status: Pain free.     2. Return to near baseline physical activity: Yes     3. Cleared for discharge by consultants (if involved): No     Discharge Planner Nurse   Safe discharge environment identified: Yes  Barriers to discharge: Yes     Please review provider order for any additional goals.   Nurse to notify provider when observation goals have been met and patient is ready for discharge.     Patient alert and oriented, on RA. Receiving Keflex po for UTI. Patient room smelled of smoke at shift change, cigarettes in pt lockbox in room. Pateient SBP in 200s, provider notified, PRN Clonidine given. Will continue to monitor. CC consulted, SW following.

## 2020-08-07 NOTE — PLAN OF CARE
PRIMARY DIAGNOSIS: Anemia    OUTPATIENT/OBSERVATION GOALS TO BE MET BEFORE DISCHARGE  1. Orthostatic performed: No    2. Stable Hgb Yes.   Recent Labs   Lab Test 08/07/20  0531 08/06/20  1251 07/23/20  0647   HGB 8.4* 8.1* 10.6*       3. Resolved or declined bleeding episodes: Yes Last episode: Unknown    4. Appropriate testing complete: Yes    5. Cleared for discharge by consultants (if involved): N/A    6. Safe discharge environment identified: Yes    Discharge Planner Nurse   Safe discharge environment identified: Yes  Barriers to discharge: No       Entered by: Sri Szymanski 08/07/2020 10:39 AM     Please review provider order for any additional goals.   Nurse to notify provider when observation goals have been met and patient is ready for discharge.    VSS except htn. C/O right back pain, declined intervention. Tolerated diet. Voiding. Plan for discharge today.

## 2020-08-07 NOTE — DISCHARGE SUMMARY
[facility]  Discharge Summary        Benja Duong MRN# [id number]   YOB: 1965 Age: 55 year old     Date of Admission:  8/6/2020  Date of Discharge:  8/7/2020  Admitting Physician:  Gillian Valdes MD  Discharge Physician: Maile Bragg PA-C  Discharging Service: Hospitalist     Primary Provider: Cleopatra Andujar  Primary Care Physician Phone Number: None         Discharge Diagnoses/Problem Oriented Hospital Course (Providers):    Benja Duong was admitted on 8/6/2020 by Gillian Valdes MD and I would refer you to their history and physical.  The following problems were addressed during his hospitalization:    1. UTI  2. Hyponatremia--improved  3. Iron deficiency anemia--iron started.   4. Positive occult but no wolf bleeding--C-scope and GI appt scheduled   4. Know hx of esophagitis -- already on PPI BID         Code Status:      Full Code        Brief Hospital Stay Summary Sent Home With Patient in AVS:        Reason for your hospital stay      You were admitted for abnormal labs (low Na, K and abnormal UA). You   electrolytes were replaced and is now normal. You were found to have a   likely UTI and have been started on oral abx. You will continue for 7   days. We will call you if needed to make sure your abx is sensitive to the   bacteria. In addition, your Hemoglobin is drifting lower with positive   occult without wolf bleeding. You were found to have iron deficiency and   have been started on iron and you will be scheduled for outpt colonoscopy   with [facility]. Make appt with PCP in 1 week foor repeat BMP and Hgb.         Benja Duong is a 55 year old male who presents with his group home staff with abnormal labs.  Apparently the group home was directed by his primary care doctor to bring him here for low hemoglobin and evaluation of kidney function.  The patient himself feels fine and denies any current symptoms.  Specifically he denies cough, shortness of  breath, fever, chills, extremity pain, abdominal pain, back pain, diarrhea, nausea, vomiting, constipation and urinary symptoms.  He is blind so cannot see if he has blood in his stool or urine.  He denies any upper abdominal symptoms and denies current use of illegal drugs and alcohol.  He does continue to smoke.  He does sometimes feel lightheaded or dizzy with standing but this resolves with sitting.  He denies increased fatigue and shortness of breath with exertion.     PT was admitted to the OBS unit, his hgb was followed and remains stable. But you were iron deficient and started on iron. You will need an outpt C-scope. IN addition,  He was treated with UTI and will be on total of 7 days. Follow up [facility] PCP in 1 week.       Important Results:      Recent Labs   Lab 08/07/20  0531 08/06/20  1251   WBC 10.1 10.4   HGB 8.4* 8.1*   HCT 24.9* 24.7*   MCV 90 90   * 407     Recent Labs   Lab 08/07/20  0531 08/06/20  1254 08/06/20  1251   *  --  132*   POTASSIUM 4.1  --  3.3*   CHLORIDE 97  --  95   CO2 32  --  29   ANIONGAP 3  --  8   *  --  133*   BUN 8  --  10   CR 0.76  --  0.73   GFRESTIMATED >90 >90 >90   GFRESTBLACK >90 >90 >90   MITCHEL 8.9  --  8.8     Recent Labs   Lab 08/06/20  1219   CULT PENDING              Pending Results:        Unresulted Labs Ordered in the Past 30 Days of this Admission     Date and Time Order Name Status Description    8/6/2020 1442 Asymptomatic COVID-19 Virus (Coronavirus) by PCR In process     8/6/2020 1308 Urine Culture Aerobic Bacterial Preliminary             Discharge Instructions and Follow-Up:      Follow-up Appointments     Follow-up and recommended labs and tests       Follow up with primary care provider, Cleopatra Andujar, within 7 days   for hospital follow- up.  The following labs/tests are recommended: CBC,   BMP in 1 week.               Discharge Disposition:      Discharged to home         Discharge Medications:        Current Discharge Medication  List      START taking these medications    Details   cephALEXin (KEFLEX) 500 MG capsule Take 1 capsule (500 mg) by mouth 3 times daily for 7 days  Qty: 21 capsule, Refills: 0    Associated Diagnoses: Urinary tract infection with hematuria, site unspecified      ferrous sulfate (FEROSUL) 325 (65 Fe) MG tablet Take 1 tablet (325 mg) by mouth daily  Qty: 30 tablet, Refills: 1    Associated Diagnoses: Iron deficiency anemia, unspecified iron deficiency anemia type         CONTINUE these medications which have NOT CHANGED    Details   Artificial Tear Solution (TEARS NATURALE OP) 1 drop by Both Eyelids route 4 times daily Gentle Tears      atorvastatin (LIPITOR) 20 MG tablet Take 20 mg by mouth At Bedtime      cholecalciferol (VITAMIN D-1000 MAX ST) 1000 units TABS Take 1,000 Units by mouth daily  Qty: 30 tablet, Refills: 1      diphenhydrAMINE (BENADRYL) 50 MG capsule Take 50 mg by mouth At Bedtime      exenatide ER (BYDUREON) 2 MG recon vial kit susp for weekly inj Inject 2 mg Subcutaneous once a week DO not administer until he is seen by his PCP.  Qty:        hydrALAZINE (APRESOLINE) 25 MG tablet Take 0.5 tablets (12.5 mg) by mouth every 6 hours as needed (HTN; SBP > 160)  Qty: 30 tablet, Refills: 1      hydrochlorothiazide (HYDRODIURIL) 25 MG tablet Take 1 tablet (25 mg) by mouth daily DO not resume until Na is back to normal or PCP orders  Qty:        insulin glargine (LANTUS VIAL) 100 UNIT/ML vial Inject 18 Units Subcutaneous At Bedtime      lisinopril (ZESTRIL) 40 MG tablet Take 40 mg by mouth daily      metFORMIN (GLUCOPHAGE) 1000 MG tablet Take 1 tablet (1,000 mg) by mouth 2 times daily (with meals)  Qty: 60 tablet, Refills: 1      pantoprazole (PROTONIX) 40 MG EC tablet Take 1 tablet (40 mg) by mouth 2 times daily      risperiDONE microspheres ER (RISPERDAL CONSTA) 50 MG injection Inject 50 mg into the muscle every 14 days               Allergies:         Allergies   Allergen Reactions     Alprazolam      Other  "reaction(s): *Unknown States \"I break out\"     Oxycodone-Acetaminophen      Other reaction(s): *Unknown, States \"I break out\"           Consultations This Hospital Stay:      No consultations were requested during this admission         Condition and Physical on Discharge:      Discharge condition: Stable   Vitals: Blood pressure (!) 171/86, pulse 102, temperature 97.2  F (36.2  C), temperature source Oral, resp. rate 18, height 1.702 m (5' 7.01\"), weight 66.6 kg (146 lb 12.8 oz), SpO2 98 %.  146 lbs 12.8 oz      GENERAL:  Comfortable.  PSYCH: pleasant, oriented, No acute distress.  HEENT:  PERRLA. Normal conjunctiva, normal hearing, nasal mucosa and Oropharynx are normal.  NECK:  Supple, no neck vein distention, adenopathy or bruits, normal thyroid.  HEART:  Normal S1, S2 with no murmur, no pericardial rub, gallops or S3 or S4.  LUNGS:  Clear to auscultation, normal Respiratory effort. No wheezing, rales or ronchi.  ABDOMEN:  Soft, no hepatosplenomegaly, normal bowel sounds. Non-tender, non distended.   EXTREMITIES:  No pedal edema, +2 pulses bilateral and equal.  SKIN:  Dry to touch, No rash, wound or ulcerations.  NEUROLOGIC:  CN 2-12 intact, BL 5/5 symmetric upper and lower extremity strength, sensation is intact with no focal deficits.         Discharge Time:      <30 mins         Image Results From This Hospital Stay (For Non-EPIC Providers):        Results for orders placed or performed during the hospital encounter of 07/21/20   XR Chest Port 1 View    Narrative    CHEST ONE VIEW PORTABLE   7/21/2020 3:49 PM     HISTORY: Cough    COMPARISON: None.      Impression    IMPRESSION: Heart size is normal. No pleural effusion, pneumothorax,  or abnormal area of consolidation.    KEVIN ZAMORA MD   CT Chest (PE) Abdomen Pelvis w Contrast    Narrative    EXAM: CT CHEST PE ABDOMEN PELVIS W CONTRAST  LOCATION: [facility]  DATE/TIME: 7/21/2020 4:55 PM    INDICATION: Abdominal pain with nausea and " vomiting. Cough and chest pain. Shortness of breath.  COMPARISON: None.  TECHNIQUE: CT chest pulmonary angiogram and routine CT abdomen pelvis with IV contrast. Arterial phase through the chest and venous phase through the abdomen and pelvis. Multiplanar reformats and MIP reconstructions were performed. Dose reduction   techniques were used.   CONTRAST: 71mL Isovue-370    FINDINGS:  ANGIOGRAM CHEST: Pulmonary arteries are normal caliber and negative for pulmonary emboli. Thoracic aorta is negative for dissection. No CT evidence of right heart strain.     LUNGS AND PLEURA: Dependent atelectasis. 11 x 7 mm nodule right upper lobe with slightly indistinct margins may be an inflammatory nodule though solid nodule and neoplasm can't be excluded. Lungs otherwise clear.  MEDIASTINUM/AXILLAE: There is prominent diffuse esophageal wall thickening from the aortic arch to the GE junction favored to be inflammatory. There is a slightly infiltrative appearance of the mediastinal fat but no focal lymph nodes evident. Air-fluid   level within the esophagus may relate to reflux.    HEPATOBILIARY: Slight layering sludge or tiny gallstones within gallbladder. No inflammatory changes. Liver and bile ducts normal.  PANCREAS: Normal.  SPLEEN: Normal.  ADRENAL GLANDS: Normal.  KIDNEYS/BLADDER: Small benign renal cysts. No stones or hydronephrosis.    BOWEL: No obstruction or inflammatory change. Appendix normal.  Tiny fat-containing ventral wall hernias at the umbilicus and just above the umbilicus.  LYMPH NODES: Normal.  PELVIC ORGANS: Distended bladder with the estimated volume of 650 mL suggests bladder outlet obstruction or neurogenic bladder. There is no bladder wall thickening. Minimal prostatic enlargement.    MUSCULOSKELETAL: Presumed small bone island left iliac. No fractures.      Impression    IMPRESSION:  1.  No pulmonary embolism.  2.  There is what appears to be moderate wall thickening involving the esophagus most  suggestive of inflammatory esophagitis.  3.  11 x 7 mm indeterminate pulmonary nodule right upper lobe. This may be inflammatory and suggest a short-term follow-up exam in 3 months to reevaluate.  4.   Cholelithiasis versus slight biliary sludge. No active inflammation.  5.  Large capacity bladder raises question of bladder outlet obstruction. Consider determining post void residual.

## 2020-08-07 NOTE — DISCHARGE INSTRUCTIONS
We have scheduled you a Colonoscopy on August 21st at 1:15pm.     This will be completed at:   [facility]  [address]  [phone]Clinic) / [address] (Endoscopy Center)  [place] [id number]    Please call them at [phone] if you are unable to make this appointment.

## 2020-08-07 NOTE — PLAN OF CARE
PRIMARY DIAGNOSIS: Abnormal Labs/ UTI   OUTPATIENT/OBSERVATION GOALS TO BE MET BEFORE DISCHARGE:  1. Pain Status: Pain free.    2. Return to near baseline physical activity: Yes    3. Cleared for discharge by consultants (if involved): No    Discharge Planner Nurse   Safe discharge environment identified: Yes  Barriers to discharge: Yes       Entered by: Meg Curtis 08/06/2020 7:30 PM     Please review provider order for any additional goals.   Nurse to notify provider when observation goals have been met and patient is ready for discharge.    VSS, on RA, afebrile. Up SBA. A&O x4. Agitated, uncooperative with admission questions and plan of care. Refused orthostatic blood pressures- PA notified. Plan- IV fluids, recheck labs in am, oral keflex for uti, care coordinator consult, social work following for discharge planning.     PA aware of potassium level of 3.3 and does not need pts potassium replaced at this time.

## 2020-08-07 NOTE — PROGRESS NOTES
Patient's After Visit Summary was reviewed with patient and/or group home.   Patient verbalized understanding of After Visit Summary, recommended follow up and was given an opportunity to ask questions.   Discharge medications sent home with patient/family: YES, keflex and iron  Discharged with group home staff.

## 2020-08-07 NOTE — PLAN OF CARE
PRIMARY DIAGNOSIS: Abnormal Labs/ UTI   OUTPATIENT/OBSERVATION GOALS TO BE MET BEFORE DISCHARGE:  1. Pain Status: Pain free.     2. Return to near baseline physical activity: Yes     3. Cleared for discharge by consultants (if involved): No     Discharge Planner Nurse   Safe discharge environment identified: Yes  Barriers to discharge: Yes     Please review provider order for any additional goals.   Nurse to notify provider when observation goals have been met and patient is ready for discharge.     Patient alert and oriented, on RA. Receiving Keflex po for UTI. Patient cigarettes in pt lockbox in room with lighter. Received po Tylenol for 8/10 headache. Will continue to monitor. CC consulted, SW following.

## 2020-08-07 NOTE — PROGRESS NOTES
When rounding on pt with next shift nurse, patients room smelt like smoke. Patient gave writer his pack of cigarettes earlier in the shift. Without knowing, pt still had two cigarettes in his pant pocket. Patients pockets checked and no other cigarettes found, his pack of cigarettes are now in locked cabinet in patients room.     Nicotine patch on left arm removed.

## 2020-08-08 LAB
BACTERIA SPEC CULT: ABNORMAL
BACTERIA SPEC CULT: ABNORMAL
Lab: ABNORMAL
SPECIMEN SOURCE: ABNORMAL

## 2020-10-01 ENCOUNTER — HOSPITAL ENCOUNTER (INPATIENT)
Facility: CLINIC | Age: 55
LOS: 3 days | Discharge: NURSING, REHAB OR LONG-TERM CARE | DRG: 872 | End: 2020-10-04
Attending: EMERGENCY MEDICINE | Admitting: HOSPITALIST
Payer: COMMERCIAL

## 2020-10-01 ENCOUNTER — APPOINTMENT (OUTPATIENT)
Dept: GENERAL RADIOLOGY | Facility: CLINIC | Age: 55
DRG: 872 | End: 2020-10-01
Attending: EMERGENCY MEDICINE
Payer: COMMERCIAL

## 2020-10-01 DIAGNOSIS — R78.81 BACTEREMIA: Primary | ICD-10-CM

## 2020-10-01 DIAGNOSIS — A41.9 SEPSIS, DUE TO UNSPECIFIED ORGANISM, UNSPECIFIED WHETHER ACUTE ORGAN DYSFUNCTION PRESENT (H): ICD-10-CM

## 2020-10-01 DIAGNOSIS — R73.9 HYPERGLYCEMIA: ICD-10-CM

## 2020-10-01 DIAGNOSIS — N39.0 URINARY TRACT INFECTION WITHOUT HEMATURIA, SITE UNSPECIFIED: ICD-10-CM

## 2020-10-01 DIAGNOSIS — I10 ESSENTIAL HYPERTENSION: ICD-10-CM

## 2020-10-01 LAB
ALBUMIN UR-MCNC: NEGATIVE MG/DL
ANION GAP SERPL CALCULATED.3IONS-SCNC: 7 MMOL/L (ref 3–14)
APPEARANCE UR: CLEAR
BACTERIA #/AREA URNS HPF: ABNORMAL /HPF
BASOPHILS # BLD AUTO: 0 10E9/L (ref 0–0.2)
BASOPHILS NFR BLD AUTO: 0.2 %
BILIRUB UR QL STRIP: NEGATIVE
BUN SERPL-MCNC: 12 MG/DL (ref 7–30)
CALCIUM SERPL-MCNC: 10 MG/DL (ref 8.5–10.1)
CHLORIDE SERPL-SCNC: 91 MMOL/L (ref 94–109)
CO2 SERPL-SCNC: 29 MMOL/L (ref 20–32)
COLOR UR AUTO: ABNORMAL
CREAT SERPL-MCNC: 0.98 MG/DL (ref 0.66–1.25)
CRP SERPL-MCNC: 280 MG/L (ref 0–8)
DIFFERENTIAL METHOD BLD: ABNORMAL
EOSINOPHIL # BLD AUTO: 0 10E9/L (ref 0–0.7)
EOSINOPHIL NFR BLD AUTO: 0.1 %
ERYTHROCYTE [DISTWIDTH] IN BLOOD BY AUTOMATED COUNT: 12.2 % (ref 10–15)
ERYTHROCYTE [SEDIMENTATION RATE] IN BLOOD BY WESTERGREN METHOD: 114 MM/H (ref 0–20)
GFR SERPL CREATININE-BSD FRML MDRD: 86 ML/MIN/{1.73_M2}
GLUCOSE BLDC GLUCOMTR-MCNC: 490 MG/DL (ref 70–99)
GLUCOSE BLDC GLUCOMTR-MCNC: >600 MG/DL (ref 70–99)
GLUCOSE SERPL-MCNC: 671 MG/DL (ref 70–99)
GLUCOSE UR STRIP-MCNC: >1000 MG/DL
HCT VFR BLD AUTO: 35.6 % (ref 40–53)
HGB BLD-MCNC: 11.5 G/DL (ref 13.3–17.7)
HGB UR QL STRIP: ABNORMAL
IMM GRANULOCYTES # BLD: 0.1 10E9/L (ref 0–0.4)
IMM GRANULOCYTES NFR BLD: 0.5 %
KETONES UR STRIP-MCNC: NEGATIVE MG/DL
LACTATE BLD-SCNC: 2 MMOL/L (ref 0.7–2)
LEUKOCYTE ESTERASE UR QL STRIP: ABNORMAL
LYMPHOCYTES # BLD AUTO: 1.6 10E9/L (ref 0.8–5.3)
LYMPHOCYTES NFR BLD AUTO: 10.5 %
MCH RBC QN AUTO: 29.1 PG (ref 26.5–33)
MCHC RBC AUTO-ENTMCNC: 32.3 G/DL (ref 31.5–36.5)
MCV RBC AUTO: 90 FL (ref 78–100)
MONOCYTES # BLD AUTO: 1.1 10E9/L (ref 0–1.3)
MONOCYTES NFR BLD AUTO: 7.3 %
NEUTROPHILS # BLD AUTO: 12.2 10E9/L (ref 1.6–8.3)
NEUTROPHILS NFR BLD AUTO: 81.4 %
NITRATE UR QL: NEGATIVE
NRBC # BLD AUTO: 0 10*3/UL
NRBC BLD AUTO-RTO: 0 /100
PH UR STRIP: 6 PH (ref 5–7)
PLATELET # BLD AUTO: 286 10E9/L (ref 150–450)
POTASSIUM SERPL-SCNC: 4.2 MMOL/L (ref 3.4–5.3)
RBC # BLD AUTO: 3.95 10E12/L (ref 4.4–5.9)
RBC #/AREA URNS AUTO: 4 /HPF (ref 0–2)
SODIUM SERPL-SCNC: 127 MMOL/L (ref 133–144)
SOURCE: ABNORMAL
SP GR UR STRIP: 1.02 (ref 1–1.03)
SQUAMOUS #/AREA URNS AUTO: <1 /HPF (ref 0–1)
TROPONIN I SERPL-MCNC: <0.015 UG/L (ref 0–0.04)
TSH SERPL DL<=0.005 MIU/L-ACNC: 0.92 MU/L (ref 0.4–4)
UROBILINOGEN UR STRIP-MCNC: NORMAL MG/DL (ref 0–2)
WBC # BLD AUTO: 15 10E9/L (ref 4–11)
WBC #/AREA URNS AUTO: 39 /HPF (ref 0–5)

## 2020-10-01 PROCEDURE — 93005 ELECTROCARDIOGRAM TRACING: CPT

## 2020-10-01 PROCEDURE — 258N000003 HC RX IP 258 OP 636: Performed by: PHYSICIAN ASSISTANT

## 2020-10-01 PROCEDURE — 99285 EMERGENCY DEPT VISIT HI MDM: CPT | Mod: 25

## 2020-10-01 PROCEDURE — 84145 PROCALCITONIN (PCT): CPT | Performed by: HOSPITALIST

## 2020-10-01 PROCEDURE — 83605 ASSAY OF LACTIC ACID: CPT | Performed by: EMERGENCY MEDICINE

## 2020-10-01 PROCEDURE — 96361 HYDRATE IV INFUSION ADD-ON: CPT

## 2020-10-01 PROCEDURE — 71045 X-RAY EXAM CHEST 1 VIEW: CPT

## 2020-10-01 PROCEDURE — 87800 DETECT AGNT MULT DNA DIREC: CPT | Performed by: EMERGENCY MEDICINE

## 2020-10-01 PROCEDURE — 81001 URINALYSIS AUTO W/SCOPE: CPT | Performed by: EMERGENCY MEDICINE

## 2020-10-01 PROCEDURE — 250N000012 HC RX MED GY IP 250 OP 636 PS 637: Performed by: PHYSICIAN ASSISTANT

## 2020-10-01 PROCEDURE — 120N000001 HC R&B MED SURG/OB

## 2020-10-01 PROCEDURE — 250N000013 HC RX MED GY IP 250 OP 250 PS 637: Performed by: PHYSICIAN ASSISTANT

## 2020-10-01 PROCEDURE — 36415 COLL VENOUS BLD VENIPUNCTURE: CPT

## 2020-10-01 PROCEDURE — 86140 C-REACTIVE PROTEIN: CPT | Performed by: EMERGENCY MEDICINE

## 2020-10-01 PROCEDURE — 84484 ASSAY OF TROPONIN QUANT: CPT | Performed by: EMERGENCY MEDICINE

## 2020-10-01 PROCEDURE — 87077 CULTURE AEROBIC IDENTIFY: CPT | Performed by: EMERGENCY MEDICINE

## 2020-10-01 PROCEDURE — 99223 1ST HOSP IP/OBS HIGH 75: CPT | Mod: AI | Performed by: PHYSICIAN ASSISTANT

## 2020-10-01 PROCEDURE — 87040 BLOOD CULTURE FOR BACTERIA: CPT | Performed by: EMERGENCY MEDICINE

## 2020-10-01 PROCEDURE — 87086 URINE CULTURE/COLONY COUNT: CPT | Performed by: EMERGENCY MEDICINE

## 2020-10-01 PROCEDURE — 85652 RBC SED RATE AUTOMATED: CPT | Performed by: EMERGENCY MEDICINE

## 2020-10-01 PROCEDURE — 258N000003 HC RX IP 258 OP 636: Performed by: EMERGENCY MEDICINE

## 2020-10-01 PROCEDURE — U0003 INFECTIOUS AGENT DETECTION BY NUCLEIC ACID (DNA OR RNA); SEVERE ACUTE RESPIRATORY SYNDROME CORONAVIRUS 2 (SARS-COV-2) (CORONAVIRUS DISEASE [COVID-19]), AMPLIFIED PROBE TECHNIQUE, MAKING USE OF HIGH THROUGHPUT TECHNOLOGIES AS DESCRIBED BY CMS-2020-01-R: HCPCS | Performed by: EMERGENCY MEDICINE

## 2020-10-01 PROCEDURE — 85025 COMPLETE CBC W/AUTO DIFF WBC: CPT | Performed by: EMERGENCY MEDICINE

## 2020-10-01 PROCEDURE — 96375 TX/PRO/DX INJ NEW DRUG ADDON: CPT

## 2020-10-01 PROCEDURE — 250N000011 HC RX IP 250 OP 636: Performed by: EMERGENCY MEDICINE

## 2020-10-01 PROCEDURE — 999N001017 HC STATISTIC GLUCOSE BY METER IP

## 2020-10-01 PROCEDURE — 80048 BASIC METABOLIC PNL TOTAL CA: CPT | Performed by: EMERGENCY MEDICINE

## 2020-10-01 PROCEDURE — C9803 HOPD COVID-19 SPEC COLLECT: HCPCS

## 2020-10-01 PROCEDURE — 84443 ASSAY THYROID STIM HORMONE: CPT | Performed by: EMERGENCY MEDICINE

## 2020-10-01 PROCEDURE — 96365 THER/PROPH/DIAG IV INF INIT: CPT

## 2020-10-01 PROCEDURE — 87186 SC STD MICRODIL/AGAR DIL: CPT | Performed by: EMERGENCY MEDICINE

## 2020-10-01 PROCEDURE — 96372 THER/PROPH/DIAG INJ SC/IM: CPT | Performed by: PHYSICIAN ASSISTANT

## 2020-10-01 PROCEDURE — 87088 URINE BACTERIA CULTURE: CPT | Performed by: EMERGENCY MEDICINE

## 2020-10-01 RX ORDER — ONDANSETRON 2 MG/ML
4 INJECTION INTRAMUSCULAR; INTRAVENOUS EVERY 6 HOURS PRN
Status: DISCONTINUED | OUTPATIENT
Start: 2020-10-01 | End: 2020-10-04 | Stop reason: HOSPADM

## 2020-10-01 RX ORDER — AMOXICILLIN 250 MG
2 CAPSULE ORAL 2 TIMES DAILY PRN
Status: DISCONTINUED | OUTPATIENT
Start: 2020-10-01 | End: 2020-10-04 | Stop reason: HOSPADM

## 2020-10-01 RX ORDER — LISINOPRIL 40 MG/1
40 TABLET ORAL DAILY
Status: DISCONTINUED | OUTPATIENT
Start: 2020-10-02 | End: 2020-10-04 | Stop reason: HOSPADM

## 2020-10-01 RX ORDER — NICOTINE POLACRILEX 4 MG
15-30 LOZENGE BUCCAL
Status: DISCONTINUED | OUTPATIENT
Start: 2020-10-01 | End: 2020-10-04 | Stop reason: HOSPADM

## 2020-10-01 RX ORDER — SODIUM CHLORIDE 9 MG/ML
INJECTION, SOLUTION INTRAVENOUS CONTINUOUS
Status: DISCONTINUED | OUTPATIENT
Start: 2020-10-01 | End: 2020-10-01

## 2020-10-01 RX ORDER — POLYETHYLENE GLYCOL 3350 17 G/17G
17 POWDER, FOR SOLUTION ORAL DAILY PRN
Status: DISCONTINUED | OUTPATIENT
Start: 2020-10-01 | End: 2020-10-04 | Stop reason: HOSPADM

## 2020-10-01 RX ORDER — SODIUM CHLORIDE 9 MG/ML
INJECTION, SOLUTION INTRAVENOUS CONTINUOUS
Status: DISCONTINUED | OUTPATIENT
Start: 2020-10-01 | End: 2020-10-04 | Stop reason: HOSPADM

## 2020-10-01 RX ORDER — AMOXICILLIN 250 MG
1 CAPSULE ORAL 2 TIMES DAILY PRN
Status: DISCONTINUED | OUTPATIENT
Start: 2020-10-01 | End: 2020-10-04 | Stop reason: HOSPADM

## 2020-10-01 RX ORDER — FERROUS SULFATE 325(65) MG
325 TABLET ORAL DAILY
Status: DISCONTINUED | OUTPATIENT
Start: 2020-10-02 | End: 2020-10-04 | Stop reason: HOSPADM

## 2020-10-01 RX ORDER — LABETALOL 20 MG/4 ML (5 MG/ML) INTRAVENOUS SYRINGE
20 ONCE
Status: COMPLETED | OUTPATIENT
Start: 2020-10-01 | End: 2020-10-01

## 2020-10-01 RX ORDER — CEFTRIAXONE 2 G/1
2 INJECTION, POWDER, FOR SOLUTION INTRAMUSCULAR; INTRAVENOUS EVERY 24 HOURS
Status: DISCONTINUED | OUTPATIENT
Start: 2020-10-02 | End: 2020-10-04 | Stop reason: HOSPADM

## 2020-10-01 RX ORDER — CEFTRIAXONE 2 G/1
2 INJECTION, POWDER, FOR SOLUTION INTRAMUSCULAR; INTRAVENOUS ONCE
Status: COMPLETED | OUTPATIENT
Start: 2020-10-01 | End: 2020-10-01

## 2020-10-01 RX ORDER — ONDANSETRON 4 MG/1
4 TABLET, ORALLY DISINTEGRATING ORAL EVERY 6 HOURS PRN
Status: DISCONTINUED | OUTPATIENT
Start: 2020-10-01 | End: 2020-10-04 | Stop reason: HOSPADM

## 2020-10-01 RX ORDER — ATORVASTATIN CALCIUM 20 MG/1
20 TABLET, FILM COATED ORAL AT BEDTIME
Status: DISCONTINUED | OUTPATIENT
Start: 2020-10-01 | End: 2020-10-04 | Stop reason: HOSPADM

## 2020-10-01 RX ORDER — DEXTROSE MONOHYDRATE 25 G/50ML
25-50 INJECTION, SOLUTION INTRAVENOUS
Status: DISCONTINUED | OUTPATIENT
Start: 2020-10-01 | End: 2020-10-04 | Stop reason: HOSPADM

## 2020-10-01 RX ORDER — PANTOPRAZOLE SODIUM 40 MG/1
40 TABLET, DELAYED RELEASE ORAL 2 TIMES DAILY
Status: DISCONTINUED | OUTPATIENT
Start: 2020-10-01 | End: 2020-10-04 | Stop reason: HOSPADM

## 2020-10-01 RX ORDER — ACETAMINOPHEN 325 MG/1
650 TABLET ORAL EVERY 4 HOURS PRN
Status: DISCONTINUED | OUTPATIENT
Start: 2020-10-01 | End: 2020-10-04 | Stop reason: HOSPADM

## 2020-10-01 RX ORDER — DIPHENHYDRAMINE HCL 25 MG
50 CAPSULE ORAL
Status: DISCONTINUED | OUTPATIENT
Start: 2020-10-01 | End: 2020-10-04 | Stop reason: HOSPADM

## 2020-10-01 RX ORDER — NALOXONE HYDROCHLORIDE 0.4 MG/ML
.1-.4 INJECTION, SOLUTION INTRAMUSCULAR; INTRAVENOUS; SUBCUTANEOUS
Status: DISCONTINUED | OUTPATIENT
Start: 2020-10-01 | End: 2020-10-04 | Stop reason: HOSPADM

## 2020-10-01 RX ORDER — LIDOCAINE 40 MG/G
CREAM TOPICAL
Status: DISCONTINUED | OUTPATIENT
Start: 2020-10-01 | End: 2020-10-04 | Stop reason: HOSPADM

## 2020-10-01 RX ADMIN — PANTOPRAZOLE SODIUM 40 MG: 40 TABLET, DELAYED RELEASE ORAL at 23:59

## 2020-10-01 RX ADMIN — INSULIN GLARGINE 18 UNITS: 100 INJECTION, SOLUTION SUBCUTANEOUS at 23:59

## 2020-10-01 RX ADMIN — SODIUM CHLORIDE, POTASSIUM CHLORIDE, SODIUM LACTATE AND CALCIUM CHLORIDE 1000 ML: 600; 310; 30; 20 INJECTION, SOLUTION INTRAVENOUS at 20:18

## 2020-10-01 RX ADMIN — SODIUM CHLORIDE, POTASSIUM CHLORIDE, SODIUM LACTATE AND CALCIUM CHLORIDE 1000 ML: 600; 310; 30; 20 INJECTION, SOLUTION INTRAVENOUS at 21:23

## 2020-10-01 RX ADMIN — SODIUM CHLORIDE: 9 INJECTION, SOLUTION INTRAVENOUS at 23:59

## 2020-10-01 RX ADMIN — LABETALOL 20 MG/4 ML (5 MG/ML) INTRAVENOUS SYRINGE 20 MG: at 20:18

## 2020-10-01 RX ADMIN — SODIUM CHLORIDE 1000 ML: 9 INJECTION, SOLUTION INTRAVENOUS at 18:35

## 2020-10-01 RX ADMIN — CEFTRIAXONE 2 G: 2 INJECTION, POWDER, FOR SOLUTION INTRAMUSCULAR; INTRAVENOUS at 21:20

## 2020-10-01 RX ADMIN — ATORVASTATIN CALCIUM 20 MG: 20 TABLET, FILM COATED ORAL at 23:59

## 2020-10-01 ASSESSMENT — MIFFLIN-ST. JEOR: SCORE: 1563.14

## 2020-10-01 NOTE — ED PROVIDER NOTES
"  History     Chief Complaint:  Hyperglycemia       HPI   Benja Duong is a 55-year-old male with past medical history significant for psychosis, anemia who presents to the emergency department from a group home after noting that his blood glucose levels for the last 3 to 4 days have been elevated at home.  Patient reportedly has had glucose levels greater than the 400s at times reading \"HI\" on the glucometer.  Patient reports that he has been increasingly thirsty and has been urinating more as well.  He denies chest pain, shortness of breath, abdominal pain, nausea, vomiting, headaches, fevers, chills, new cough.  He reports that he has a chronic cough that is had for months to years stating that he continues to smoke.     Patient denies alcohol, illicit drugs.  He reports approximately 0.5 packs/day of cigarettes.       Allergies:  Alprazolam  Oxycodone-Acetaminophen     Medications:    lipitor   bydureon   Apresoline  Hydrochlorothiazide   lantus  Lisinopril   metformin   protonix  Risperidone     Past Medical History:    Depression   Diabetes (H)   HTN (hypertension)   Schizophrenia   Latent TB  Cocaine abuse   Sepsis   Dry gangrene   Psychosis     Past Surgical History:    Tonsillectomy   Vitrectomy     Family History:    Family history reviewed. No pertinent family history.       Social History:  Smoking Status: current  Alcohol Use: Yes  Drug Use: yes, crack cocaine  PCP: Cleopatra Andujar     Review of Systems  Full ROS completed and negative other than pertinent positives and negatives noted in HPI    Physical Exam     Patient Vitals for the past 24 hrs:   BP Temp Temp src Pulse Resp SpO2 Height Weight   10/01/20 2030 (!) 193/96 -- -- 121 14 92 % -- --   10/01/20 2020 -- -- -- 133 27 (!) 86 % -- --   10/01/20 2015 (!) 212/119 -- -- 134 -- -- -- --   10/01/20 2000 (!) 183/90 -- -- 135 -- 92 % -- --   10/01/20 1945 (!) 191/99 -- -- 147 -- 97 % -- --   10/01/20 1930 (!) 160/115 -- -- 141 -- 94 % -- -- " "  10/01/20 1915 (!) 188/134 -- -- 137 -- 94 % -- --   10/01/20 1900 (!) 195/97 -- -- 140 -- 96 % -- --   10/01/20 1845 (!) 204/121 -- -- 141 -- 93 % -- --   10/01/20 1746 (!) 168/91 98.6  F (37  C) Oral 134 20 96 % 1.715 m (5' 7.5\") 76.7 kg (169 lb)        Physical Exam  Constitutional: Well developed, nontox appearance  Head: Atraumatic.   Mouth/Throat: Oropharynx is clear and tacky  Neck:  no stridor  Eyes: no scleral icterus  Cardiovascular: Regular tachycardia, 2+ bilat radial pulses  Pulmonary/Chest: nml resp effort  Abdominal: ND, soft, NT, no rebound or guarding   Ext: Warm, well perfused, no edema  Neurological: A&O, symmetric facies, moves ext x4  Skin: Skin is warm and dry.   Psychiatric: Behavior is normal. Thought content normal.   Nursing note and vitals reviewed.    Emergency Department Course   ECG:  ECG taken at 1945  Sinus tachycardia  Otherwise normal ECG  Vent. rate 139 BPM  VA interval 114 ms  QRS duration 88 ms  QT/QTc 290/441 ms  P-R-T axes 53 51 51    Imaging:  Radiology findings were communicated with the patient who voiced understanding of the findings.    XR Chest Port 1 View  Final Result  IMPRESSION: Negative chest.  Reading per radiology     Laboratory:  Laboratory findings were communicated with the patient who voiced understanding of the findings.    Blood Culture x2: Pending     Lactic Acid whole blood (2022): 2.0      Glucose by meter (2003): 490 (H)    Glucose by meter (1741): >600 (HH)     Asymptomatic COVID-19 Virus (Coronavirus) by PCR Nasopharyngeal swab: pending       UA with micro: Urine GLC >1000 (A) Urine Blood small (A) Leukocyte esterase urine moderate (A) Bacteria few (A) WBC/HPF 39 (H) RBC/HPF 4 (H)  o/w wnl/negative       CBC:  WBC 15.0 (H), HGB 11.5 (L), , o/w WNL     BMP: Glucose 671 (HH),  (L), chloride 91 (L), o/w WNL (Creatinine: 0.98)     TSH with free T4 reflex: 0.92    Troponin(1835):  <0.015       Interventions:  Medications   0.9% sodium chloride " BOLUS (0 mLs Intravenous Stopped 10/1/20 1954)     Followed by   sodium chloride 0.9% infusion (has no administration in time range)   lactated ringers BOLUS 1,000 mL (1,000 mLs Intravenous New Bag 10/1/20 2018)   cefTRIAXone (ROCEPHIN) 2 g vial to attach to  ml bag for ADULTS or NS 50 ml bag for PEDS (has no administration in time range)   lactated ringers BOLUS 1,000 mL (has no administration in time range)   labetalol (NORMODYNE/TRANDATE) syringe 20 mg (20 mg Intravenous Given 10/1/20 2018)        Emergency Department Course:  Past medical records, nursing notes, and vitals reviewed.    1830 I performed an exam of the patient as documented above.    IV was inserted and blood was drawn for laboratory testing, results above.     The patient was sent for imaging while in the emergency department, results above.      The patient provided a urine sample here in the emergency department. This was sent for laboratory testing, findings above.      2054 Patient rechecked and updated.      2057 I spoke with Parker ACOSTA for Dr. Whitman of the Hospitalist service from [facility] regarding patient's presentation, findings, and plan of care.       Findings and plan explained to the Patient who consents to admission. Discussed the patient with Parker ACOSTA for Dr. Whitman, who will admit the patient to a inpatient medical bed for further monitoring, evaluation, and treatment.      Impression & Plan   Covid-19  Benja Duong was evaluated during a global COVID-19 pandemic, which necessitated consideration that the patient might be at risk for infection with the SARS-CoV-2 virus that causes COVID-19.   Applicable protocols for evaluation were followed during the patient's care.   COVID-19 was considered as part of the patient's evaluation. The plan for testing is:  a test was obtained during this visit.    Medical Decision Making:  Benja Duong is a 55 year old male presenting w/ hyperglycemia, tachycardia     DDx  includes hyperglycemia, DKA, electrolyte abnormality, dehydration, HH NS, medication noncompliance, essential hypertension, thyroid dysfunction.  EKG significant for sinus tachycardia.  Less likely PE, dissection, ACS given patient has no associated symptoms of shortness of breath, chest discomfort.  Labs significant for hyperglycemia, likely pseudohyponatremia, leukocytosis, UA consistent with infection, lactate within normal limits.  Imaging sig for no acute cardiopulmonary disease.  Interventions as noted above with improvement in vital signs.  Previous urine culture from August 2020 reviewed and ceftriaxone selected accordingly.  Given sepsis, UTI, hyperglycemia, tachycardia and elevated blood pressure, I think would be reasonable to for the patient to the hospital service for further evaluation and management.  Pt counseled on all results, disposition and diagnosis.  They are understanding and agreeable to plan. Patient admitted in guarded condition.     Critical care time: 35 minutes excluding procedures    Discharge Diagnosis:    ICD-10-CM    1. Sepsis, due to unspecified organism, unspecified whether acute organ dysfunction present (H)  A41.9 Troponin I     Blood culture     Blood culture   2. Urinary tract infection without hematuria, site unspecified  N39.0    3. Hyperglycemia  R73.9        Disposition:  Admitted.    Scribe Disclosure:  Corona MCCAULEY, am serving as a scribe at 6:30 PM on 10/1/2020 to document services personally performed by Mateus Koenig MD based on my observations and the provider's statements to me.      10/1/2020   Mateus Koenig MD Vaughn, Christopher E, MD  10/02/20 9536

## 2020-10-01 NOTE — ED TRIAGE NOTES
"Pt presents for evaluation of high blood sugar readings for the last few days. Pt has had BG readings in the 400s consistently during that time. Has a meter on, readings of \"high\". BG checked in triage, reading of \"high\". Pt c/o increased thirst and urination. Has been under increased stress lately. Pt resides in a group home, roommate and staff present. Pt is legally blind. Pt is a DM II. Pt is on insulin.   "

## 2020-10-02 LAB
ANION GAP SERPL CALCULATED.3IONS-SCNC: 6 MMOL/L (ref 3–14)
BASOPHILS # BLD AUTO: 0 10E9/L (ref 0–0.2)
BASOPHILS NFR BLD AUTO: 0.3 %
BUN SERPL-MCNC: 5 MG/DL (ref 7–30)
CALCIUM SERPL-MCNC: 9.1 MG/DL (ref 8.5–10.1)
CHLORIDE SERPL-SCNC: 98 MMOL/L (ref 94–109)
CO2 SERPL-SCNC: 28 MMOL/L (ref 20–32)
CREAT SERPL-MCNC: 0.81 MG/DL (ref 0.66–1.25)
DIFFERENTIAL METHOD BLD: ABNORMAL
EOSINOPHIL # BLD AUTO: 0 10E9/L (ref 0–0.7)
EOSINOPHIL NFR BLD AUTO: 0.1 %
ERYTHROCYTE [DISTWIDTH] IN BLOOD BY AUTOMATED COUNT: 12.2 % (ref 10–15)
GFR SERPL CREATININE-BSD FRML MDRD: >90 ML/MIN/{1.73_M2}
GLUCOSE BLDC GLUCOMTR-MCNC: 146 MG/DL (ref 70–99)
GLUCOSE BLDC GLUCOMTR-MCNC: 150 MG/DL (ref 70–99)
GLUCOSE BLDC GLUCOMTR-MCNC: 219 MG/DL (ref 70–99)
GLUCOSE BLDC GLUCOMTR-MCNC: 227 MG/DL (ref 70–99)
GLUCOSE BLDC GLUCOMTR-MCNC: 314 MG/DL (ref 70–99)
GLUCOSE BLDC GLUCOMTR-MCNC: 346 MG/DL (ref 70–99)
GLUCOSE BLDC GLUCOMTR-MCNC: 400 MG/DL (ref 70–99)
GLUCOSE SERPL-MCNC: 267 MG/DL (ref 70–99)
HCT VFR BLD AUTO: 28.1 % (ref 40–53)
HGB BLD-MCNC: 9.2 G/DL (ref 13.3–17.7)
IMM GRANULOCYTES # BLD: 0.1 10E9/L (ref 0–0.4)
IMM GRANULOCYTES NFR BLD: 0.7 %
INTERPRETATION ECG - MUSE: NORMAL
LABORATORY COMMENT REPORT: NORMAL
LYMPHOCYTES # BLD AUTO: 1.7 10E9/L (ref 0.8–5.3)
LYMPHOCYTES NFR BLD AUTO: 13.5 %
MCH RBC QN AUTO: 29.2 PG (ref 26.5–33)
MCHC RBC AUTO-ENTMCNC: 32.7 G/DL (ref 31.5–36.5)
MCV RBC AUTO: 89 FL (ref 78–100)
MONOCYTES # BLD AUTO: 1 10E9/L (ref 0–1.3)
MONOCYTES NFR BLD AUTO: 8.5 %
NEUTROPHILS # BLD AUTO: 9.4 10E9/L (ref 1.6–8.3)
NEUTROPHILS NFR BLD AUTO: 76.9 %
NRBC # BLD AUTO: 0 10*3/UL
NRBC BLD AUTO-RTO: 0 /100
PLATELET # BLD AUTO: 247 10E9/L (ref 150–450)
POTASSIUM SERPL-SCNC: 3.3 MMOL/L (ref 3.4–5.3)
POTASSIUM SERPL-SCNC: 3.9 MMOL/L (ref 3.4–5.3)
PROCALCITONIN SERPL-MCNC: 0.43 NG/ML
RBC # BLD AUTO: 3.15 10E12/L (ref 4.4–5.9)
SARS-COV-2 RNA SPEC QL NAA+PROBE: NEGATIVE
SARS-COV-2 RNA SPEC QL NAA+PROBE: NORMAL
SODIUM SERPL-SCNC: 132 MMOL/L (ref 133–144)
SPECIMEN SOURCE: NORMAL
SPECIMEN SOURCE: NORMAL
WBC # BLD AUTO: 12.3 10E9/L (ref 4–11)

## 2020-10-02 PROCEDURE — 250N000013 HC RX MED GY IP 250 OP 250 PS 637: Performed by: INTERNAL MEDICINE

## 2020-10-02 PROCEDURE — 250N000012 HC RX MED GY IP 250 OP 636 PS 637: Performed by: PHYSICIAN ASSISTANT

## 2020-10-02 PROCEDURE — 36415 COLL VENOUS BLD VENIPUNCTURE: CPT | Performed by: PHYSICIAN ASSISTANT

## 2020-10-02 PROCEDURE — 250N000013 HC RX MED GY IP 250 OP 250 PS 637: Performed by: PHYSICIAN ASSISTANT

## 2020-10-02 PROCEDURE — 250N000011 HC RX IP 250 OP 636: Performed by: PHYSICIAN ASSISTANT

## 2020-10-02 PROCEDURE — 36415 COLL VENOUS BLD VENIPUNCTURE: CPT | Performed by: HOSPITALIST

## 2020-10-02 PROCEDURE — 80048 BASIC METABOLIC PNL TOTAL CA: CPT | Performed by: PHYSICIAN ASSISTANT

## 2020-10-02 PROCEDURE — 99233 SBSQ HOSP IP/OBS HIGH 50: CPT | Performed by: INTERNAL MEDICINE

## 2020-10-02 PROCEDURE — 250N000011 HC RX IP 250 OP 636: Performed by: INTERNAL MEDICINE

## 2020-10-02 PROCEDURE — 999N000127 HC STATISTIC PERIPHERAL IV START W US GUIDANCE

## 2020-10-02 PROCEDURE — 120N000001 HC R&B MED SURG/OB

## 2020-10-02 PROCEDURE — 96372 THER/PROPH/DIAG INJ SC/IM: CPT | Performed by: PHYSICIAN ASSISTANT

## 2020-10-02 PROCEDURE — 999N001017 HC STATISTIC GLUCOSE BY METER IP

## 2020-10-02 PROCEDURE — 258N000003 HC RX IP 258 OP 636: Performed by: PHYSICIAN ASSISTANT

## 2020-10-02 PROCEDURE — 84132 ASSAY OF SERUM POTASSIUM: CPT | Performed by: HOSPITALIST

## 2020-10-02 PROCEDURE — 85025 COMPLETE CBC W/AUTO DIFF WBC: CPT | Performed by: PHYSICIAN ASSISTANT

## 2020-10-02 RX ORDER — POTASSIUM CHLORIDE 1.5 G/1.58G
20-40 POWDER, FOR SOLUTION ORAL
Status: DISCONTINUED | OUTPATIENT
Start: 2020-10-02 | End: 2020-10-04 | Stop reason: HOSPADM

## 2020-10-02 RX ORDER — POTASSIUM CHLORIDE 1500 MG/1
20-40 TABLET, EXTENDED RELEASE ORAL
Status: DISCONTINUED | OUTPATIENT
Start: 2020-10-02 | End: 2020-10-04 | Stop reason: HOSPADM

## 2020-10-02 RX ORDER — MAGNESIUM SULFATE HEPTAHYDRATE 40 MG/ML
4 INJECTION, SOLUTION INTRAVENOUS EVERY 4 HOURS PRN
Status: DISCONTINUED | OUTPATIENT
Start: 2020-10-02 | End: 2020-10-04 | Stop reason: HOSPADM

## 2020-10-02 RX ORDER — HYDRALAZINE HYDROCHLORIDE 25 MG/1
25 TABLET, FILM COATED ORAL EVERY 8 HOURS SCHEDULED
Status: DISCONTINUED | OUTPATIENT
Start: 2020-10-02 | End: 2020-10-04 | Stop reason: HOSPADM

## 2020-10-02 RX ORDER — POTASSIUM CHLORIDE 7.45 MG/ML
10 INJECTION INTRAVENOUS
Status: DISCONTINUED | OUTPATIENT
Start: 2020-10-02 | End: 2020-10-04 | Stop reason: HOSPADM

## 2020-10-02 RX ORDER — POTASSIUM CHLORIDE 29.8 MG/ML
20 INJECTION INTRAVENOUS
Status: DISCONTINUED | OUTPATIENT
Start: 2020-10-02 | End: 2020-10-04 | Stop reason: HOSPADM

## 2020-10-02 RX ORDER — POTASSIUM CL/LIDO/0.9 % NACL 10MEQ/0.1L
10 INTRAVENOUS SOLUTION, PIGGYBACK (ML) INTRAVENOUS
Status: DISCONTINUED | OUTPATIENT
Start: 2020-10-02 | End: 2020-10-04 | Stop reason: HOSPADM

## 2020-10-02 RX ADMIN — Medication 10 MEQ: at 13:44

## 2020-10-02 RX ADMIN — INSULIN ASPART 7 UNITS: 100 INJECTION, SOLUTION INTRAVENOUS; SUBCUTANEOUS at 00:32

## 2020-10-02 RX ADMIN — Medication 12.5 MG: at 00:01

## 2020-10-02 RX ADMIN — HYDRALAZINE HYDROCHLORIDE 25 MG: 25 TABLET, FILM COATED ORAL at 17:44

## 2020-10-02 RX ADMIN — ATORVASTATIN CALCIUM 20 MG: 20 TABLET, FILM COATED ORAL at 21:27

## 2020-10-02 RX ADMIN — SODIUM CHLORIDE: 9 INJECTION, SOLUTION INTRAVENOUS at 09:52

## 2020-10-02 RX ADMIN — PANTOPRAZOLE SODIUM 40 MG: 40 TABLET, DELAYED RELEASE ORAL at 20:22

## 2020-10-02 RX ADMIN — PANTOPRAZOLE SODIUM 40 MG: 40 TABLET, DELAYED RELEASE ORAL at 09:42

## 2020-10-02 RX ADMIN — HYDRALAZINE HYDROCHLORIDE 25 MG: 25 TABLET, FILM COATED ORAL at 09:42

## 2020-10-02 RX ADMIN — CEFTRIAXONE 2 G: 2 INJECTION, POWDER, FOR SOLUTION INTRAMUSCULAR; INTRAVENOUS at 20:22

## 2020-10-02 RX ADMIN — Medication 10 MEQ: at 12:38

## 2020-10-02 RX ADMIN — INSULIN GLARGINE 18 UNITS: 100 INJECTION, SOLUTION SUBCUTANEOUS at 21:27

## 2020-10-02 RX ADMIN — FERROUS SULFATE TAB 325 MG (65 MG ELEMENTAL FE) 325 MG: 325 (65 FE) TAB at 09:42

## 2020-10-02 RX ADMIN — INSULIN ASPART 5 UNITS: 100 INJECTION, SOLUTION INTRAVENOUS; SUBCUTANEOUS at 21:29

## 2020-10-02 RX ADMIN — ENOXAPARIN SODIUM 40 MG: 40 INJECTION SUBCUTANEOUS at 09:53

## 2020-10-02 RX ADMIN — Medication 10 MEQ: at 09:47

## 2020-10-02 RX ADMIN — Medication 10 MEQ: at 11:07

## 2020-10-02 RX ADMIN — LISINOPRIL 40 MG: 40 TABLET ORAL at 09:42

## 2020-10-02 RX ADMIN — HYDRALAZINE HYDROCHLORIDE 25 MG: 25 TABLET, FILM COATED ORAL at 21:27

## 2020-10-02 ASSESSMENT — ACTIVITIES OF DAILY LIVING (ADL)
ADLS_ACUITY_SCORE: 15
ADLS_ACUITY_SCORE: 15
ADLS_ACUITY_SCORE: 16
ADLS_ACUITY_SCORE: 16
ADLS_ACUITY_SCORE: 17
ADLS_ACUITY_SCORE: 15

## 2020-10-02 ASSESSMENT — MIFFLIN-ST. JEOR: SCORE: 1495.1

## 2020-10-02 NOTE — PROGRESS NOTES
Dr. Quintana  10/2/20  9408    FYI: gram neg rods cultured on left hand and right arm from 10/1 @ 7545.

## 2020-10-02 NOTE — PLAN OF CARE
Pt arrived from ER ~11pm. - treated with s/s & lantus. 0200 check- 346. Denies pain. Up with assist to BR. Legally blind. Soft touch call light, using appropriately. Dry skin on JYOTI legs. HTN resolved without intervention. IVF infusing. New IV started. Slept well between cares.

## 2020-10-02 NOTE — DISCHARGE INSTRUCTIONS
Your hospital follow up appointment has been scheduled for you with danny Puentes at [facility] for October 27, 2020 at 2:40. Please bring your  Mask,hospital discharge instructions, a photo ID, and insurance information with you to your appointment. Please call the clinic at [phone] if you need to reschedule.

## 2020-10-02 NOTE — ED NOTES
Spoke with nurse manager Sedrick [phone]    Pt's meds have not changed since last admit.     Pt is independent in ambulation in home. Needs some help walking when in hospital.

## 2020-10-02 NOTE — CONSULTS
"CM Initial Care Management Initial Consult    General Information:  Patient's communication style: spoken language (English or Bilingual)  Hearing Difficulty or Deaf: no Wear Glasses or Blind: yes  Cognitive/Neuro/Behavioral: .WDL except  Level of Consciousness: alert  Arousal Level: arouses to voice  Orientation: oriented x 4  Speech: clear  Mood/Behavior: hypoactive (quiet, withdrawn)  Advance Care Planning:    None listed     Living Environment:   People in home: facility resident  Current living Arrangements: group home          Family/Social Support:  Care provided by:  [facility] home staff  Provides care for: (group home staff)  Description of Support System:            Lifestyle & Psychosocial Needs:        Socioeconomic History     Marital status: Single     Spouse name: Not on file     Number of children: Not on file     Years of education: Not on file     Highest education level: Not on file     Tobacco Use     Smoking status: Unknown If Ever Smoked   Substance and Sexual Activity     Alcohol use: Yes     Comment: \"few beers\"     Drug use: Yes     Types: \"Crack\" cocaine       Functional Status:  Prior to admission patient needed assistance:      Patient is a resident at [facility].  CTS spoke with Sedrick at  who confirmed that the patient receives the following cares: support with ADL's and I ADL's.  They check blood sugars 3X/day as well as  medication administration.    Current Resources:   Skilled Home Care Services:  none  Community Resources:     Equipment currently used at home: none  Supplies currently used at home:  none    Employment:  Employment Status:   none    Financial/Environmental Concerns: on medical assistance    Values/Beliefs:  Spiritual, Cultural Beliefs, Nondenominational Practices, Values that affect care:     Were not discussed.  Patient was withdrawn and did not want to speak much            Additional Information:    In speaking with  nurse Sedrick, it appears " that patient was taken off his Bydureon subcutaneous medication for diabetes management  and it was not replaced.  Prior to this change, blood sugars were ranging in the 100-150 range.  Patient admitted due to elevated blood sugars in the 400's.  A1C 6.5    Patient providers:    Group Home; [facility] [phone]     [facility] [place] [phone] ( please fill andrei medication before discharge)    PCP is Marifer Puentes at [facility] [phone] fax [phone]. Appointment scheduled for October 27 at 2:40.  Information placed on AVS. When CTS schedule appointment, did ask them to place note about elevated blood sugars    Psychiatrist Cleopatra Andujar  [facility] [phone]  Group home will take patient back on weekend.  They can provide trasportation, but will need to coordinate.  They would prefer patient to take a cab, but the patient said he did not have funds to pay for a cab and that they should come pick him up.     Anali Zurita RN, BSN, PHN, CTS  Care Coordinator  [facility]  [phone]

## 2020-10-02 NOTE — PROGRESS NOTES
Page  7020  10/2/20      FOUZIA: Uzma called about left hand culture showed E-Coli with PCR test.

## 2020-10-02 NOTE — H&P
History and Physical     Benja Duong MRN# [id number]   YOB: 1965 Age: 55 year old      Date of Admission:  10/1/2020    Primary care provider: Cleopatra Andujar          Assessment and Plan:   Benja Duong is a 55 year old male with a PMH significant for DM, HTN, HLP, GERD, schizophrenia, and depression who presents with hyperglycemia.     1. Sepsis likely due to UTI - pt denies specific complaints, hx of UTI, UA is equivocal with moderate LE, negative nitrites and 39 WBCs. No other obvious source of infection, CXR clear, no obvious signs of cellulitis, abd is non tender, no respiratory sx. Blood and urine cultures are pending. Received 2g of IV Rocephin in the ED, continue for now. Received 1L NS bolus and 1L LR bolus x2, continue maintenance fluids with NS at 100 mL/hr.  2. Hyperglycemia - hx of type II DM, managed with metformin, Lantus at bedtime and Exenatide weekly. Pt lives in a group home, likely complaint with meds. Hyperglycemia likely due to infection. Resume home Lantus, hold metformin for now and cover with high intensity sliding scale insulin. If not improving with fluids and sliding scale, consider insulin gtt.   3. Hyponatremia - likely pseudohyponatremia due to hyperglycemia but per chart review, appears to have an element of chronic hyponatremia, likely related to antipsychotics. IVFs overnight and recheck in AM  4. HTN - hypertensive in ED, managed at home with Lisinopril and hydrochlorothiazide, and PRN hydralazine. Resume Lisinopril and PRN hydralazine, hold hydrochlorothiazide due to hyponatremia. Denies chest pain, SOB or headache.   5. HLP - resume statin  6. GERD - hx of iron deficiency anemia and positive occult stool, scheduled for colonoscopy and GI consult at discharge on 8/7 for further work up and started on PPI BID and iron, continue.   7. Schizophrenia - on Risperidone IM injections every 14 days    Prophylaxis - subcutaneous lovenox, hgb stable at  11.5, monitor for blood in stool given recent hx  Code status - Full Code  Dispo - admit to inpatient, anticipate at least 2 nights                Chief Complaint:   Hyperglycemia          History of Present Illness:   Benja Duong is a 55 year old male with a PMH significant for DM, HTN, HLP, GERD, schizophrenia, and depression who presents with hyperglycemia. The patient notes his blood sugars have been elevated for the past 4 days. He lives in a group home and the staff provides his medications and he denies missing any doses of his antidiabetic medications. He denies any specific complaints. He notes feeling thirsty and urinating more often but denies dysuria, changes to color or odor to urine and denies abdominal pain. He denies fever, chills, chest pain, SOB, nausea, vomiting or diarrhea. He notes mild cough that is unchanged from baseline. He does smoke 1/2 pack of cigarettes daily. He denies any rash or open sores.   In the ED, hypertensive, -200s. Tachycardic, -140s. EKG is sinus tachycardia. Respirations charted are variable, 10-28, not tachypneic on my exam. SpO2 initially within normal limits on room air, SpO2 charted to have dipped to 86% and was placed on 3L O2. Afebrile. BMP is significant for hyperglycemia, glucose 671 and Na is 127. Troponin is undetectable and TSH 0.92. CBC is significant for leukocytosis, WBC 15.0, Hgb stable at 11.5. he was given 1L NS bolus and glucose improved to 490. Lactic acid is 2.0. UA shows moderate LE, 39 WBCs, small blood and 4 RBCs. Urine culture pending. Blood cultures drawn. CXR is clear. He was given an additional 1L LR bolus x2, 2g of IV Rocephin and 20 mg IV labetalol. He will be admitted for further management and work up of sepsis and hyperglycemia.     Hx obtained by speaking with ED physician, chart review and pt interview.               Past Medical History:     Past Medical History:   Diagnosis Date     Depression      Diabetes (H)      HTN  "(hypertension)    GERD  Schizophrenia  HLP            Past Surgical History:   Tonsillectomy 2016            Social History:     Social History     Socioeconomic History     Marital status: Single     Spouse name: Not on file     Number of children: Not on file     Years of education: Not on file     Highest education level: Not on file   Occupational History     Not on file   Social Needs     Financial resource strain: Not on file     Food insecurity     Worry: Not on file     Inability: Not on file     Transportation needs     Medical: Not on file     Non-medical: Not on file   Tobacco Use     Smoking status: Unknown If Ever Smoked   Substance and Sexual Activity     Alcohol use: Yes     Comment: \"few beers\"     Drug use: Yes     Types: \"Crack\" cocaine     Sexual activity: Not on file   Lifestyle     Physical activity     Days per week: Not on file     Minutes per session: Not on file     Stress: Not on file   Relationships     Social connections     Talks on phone: Not on file     Gets together: Not on file     Attends [employer] service: Not on file     Active member of club or organization: Not on file     Attends meetings of clubs or organizations: Not on file     Relationship status: Not on file     Intimate partner violence     Fear of current or ex partner: Not on file     Emotionally abused: Not on file     Physically abused: Not on file     Forced sexual activity: Not on file   Other Topics Concern     Not on file   Social History Narrative     Not on file     Smokes 1/2 pack of cigarettes daily          Family History:   Reviewed and noncontributory         Allergies:      Allergies   Allergen Reactions     Alprazolam      Other reaction(s): *Unknown States \"I break out\"     Oxycodone-Acetaminophen      Other reaction(s): *Unknown, States \"I break out\"               Medications:     Prior to Admission medications    Medication Sig Last Dose Taking? Auth Provider   Artificial Tear Solution (TEARS NATURALE " OP) 1 drop by Both Eyelids route 4 times daily Gentle Tears   Unknown, Entered By History   atorvastatin (LIPITOR) 20 MG tablet Take 20 mg by mouth At Bedtime   Unknown, Entered By History   cholecalciferol (VITAMIN D-1000 MAX ST) 1000 units TABS Take 1,000 Units by mouth daily   Paul Andrews MD   diphenhydrAMINE (BENADRYL) 50 MG capsule Take 50 mg by mouth At Bedtime   Unknown, Entered By History   exenatide ER (BYDUREON) 2 MG recon vial kit susp for weekly inj Inject 2 mg Subcutaneous once a week DO not administer until he is seen by his PCP.   Benton Whitman MD   ferrous sulfate (FEROSUL) 325 (65 Fe) MG tablet Take 1 tablet (325 mg) by mouth daily   Maile Bragg PA-C   hydrALAZINE (APRESOLINE) 25 MG tablet Take 0.5 tablets (12.5 mg) by mouth every 6 hours as needed (HTN; SBP > 160)   Paul Andrews MD   hydrochlorothiazide (HYDRODIURIL) 25 MG tablet Take 1 tablet (25 mg) by mouth daily DO not resume until Na is back to normal or PCP orders   Benton Whitman MD   insulin glargine (LANTUS VIAL) 100 UNIT/ML vial Inject 18 Units Subcutaneous At Bedtime   Unknown, Entered By History   lisinopril (ZESTRIL) 40 MG tablet Take 40 mg by mouth daily   Unknown, Entered By History   metFORMIN (GLUCOPHAGE) 1000 MG tablet Take 1 tablet (1,000 mg) by mouth 2 times daily (with meals)   Paul Andrews MD   pantoprazole (PROTONIX) 40 MG EC tablet Take 1 tablet (40 mg) by mouth 2 times daily   Benton Whitman MD   risperiDONE microspheres ER (RISPERDAL CONSTA) 50 MG injection Inject 50 mg into the muscle every 14 days   Unknown, Entered By History              Review of Systems:   A Comprehensive greater than 10 system review of systems was carried out.  Pertinent positives and negatives are noted above.  Otherwise negative for contributory information.     Review Of Systems  Skin: negative  Eyes: negative  Ears/Nose/Throat: negative  Respiratory: No shortness of breath, dyspnea on exertion,  "cough, or hemoptysis  Cardiovascular: negative  Gastrointestinal: negative  Genitourinary: negative  Musculoskeletal: negative  Neurologic: negative  Psychiatric: negative  Hematologic/Lymphatic/Immunologic: negative  Endocrine: negative             Physical Exam:   Blood pressure (!) 193/96, pulse 121, temperature 98.6  F (37  C), temperature source Oral, resp. rate 14, height 1.715 m (5' 7.5\"), weight 76.7 kg (169 lb), SpO2 92 %.  Exam:  GENERAL:  Comfortable.  PSYCH: pleasant, oriented, No acute distress.  HEENT:  Atraumatic, normocephalic. Normal conjunctiva, normal hearing, and oropharynx is normal.  NECK:  Supple, no neck vein distention  HEART:  Tachycardic. Normal S1, S2 with no murmur, no pericardial rub, gallops or S3 or S4.  LUNGS:  Clear to auscultation, normal Respiratory effort. No wheezing, rales or ronchi.  GI:  Soft, normal bowel sounds. Non-tender, non distended.   EXTREMITIES:  No pedal edema, +2 pulses bilateral and equal. Skin is very dry  SKIN:  Dry to touch, No rash, wound or ulcerations.  NEUROLOGIC:  CN 2-12 intact, BL 5/5 symmetric upper and lower extremity strength, sensation is intact with no focal deficits.               Data:     Recent Labs   Lab 10/01/20  1835   WBC 15.0*   HGB 11.5*   HCT 35.6*   MCV 90        Recent Labs   Lab 10/01/20  1835   *   POTASSIUM 4.2   CHLORIDE 91*   CO2 29   ANIONGAP 7   *   BUN 12   CR 0.98   GFRESTIMATED 86   GFRESTBLACK >90   MITCHEL 10.0       Recent Labs   Lab 10/01/20  2003 10/01/20  1835 10/01/20  1741   GLC  --  671*  --    *  --  >600*     Recent Labs   Lab 10/01/20  1835   TROPI <0.015     Recent Labs   Lab 10/01/20  1835   TSH 0.92     Recent Labs   Lab 10/01/20  1945   COLOR Straw   APPEARANCE Clear   URINEGLC >1000*   URINEBILI Negative   URINEKETONE Negative   SG 1.017   UBLD Small*   URINEPH 6.0   PROTEIN Negative   NITRITE Negative   LEUKEST Moderate*   RBCU 4*   WBCU 39*       Recent Results (from the past 48 " hour(s))   XR Chest Port 1 View    Narrative    EXAM: XR CHEST PORT 1 VW  LOCATION: [facility]  DATE/TIME: 10/1/2020 8:28 PM    INDICATION: ; tachycardia, hyperglycemia;  COMPARISON: 07/21/2020      Impression    IMPRESSION: Negative chest.         Nunu Canales PA-C    This patient was seen and discussed with Dr. Whitman who agrees with the current plans as outlined above.        General

## 2020-10-02 NOTE — PLAN OF CARE
End of Shift Summary  For vital signs and complete assessments, please see documentation flowsheets.     Pertinent assessments: Oriented x4, flat affect. Tmax 100.2, declined tylenol. Patient refused to get out of bed.

## 2020-10-02 NOTE — PROVIDER NOTIFICATION
Pagebyron COVARRUBIAS to update on current glucose level of 400. Covered with lantus and s/s recheck scheduled for 0200.

## 2020-10-02 NOTE — PROGRESS NOTES
Pt upset about breakfast tray taking a long time, refused to eat when tray came up. Pt ordered lunch and lunch was delivered in a timely manner per patient request. Pt refused to eat food when it came to floor.

## 2020-10-02 NOTE — ED NOTES
"[facility]  ED Nurse Handoff Report      Patient is blind. Needs a soft touch.    Benja Duong is a 55 year old male   ED Chief complaint: Hyperglycemia  . ED Diagnosis:   Final diagnoses:   Sepsis, due to unspecified organism, unspecified whether acute organ dysfunction present (H)   Urinary tract infection without hematuria, site unspecified   Hyperglycemia     Allergies:   Allergies   Allergen Reactions     Alprazolam      Other reaction(s): *Unknown States \"I break out\"     Oxycodone-Acetaminophen      Other reaction(s): *Unknown, States \"I break out\"       Code Status: Full Code  Activity level - Baseline/Home:  independent group home states he moves well arond the houe. Activity Level - Current:   Stand by Assist. Lift room needed: No. Bariatric: No   Needed: No   Isolation: No. Infection: Not Applicable.     Vital Signs:   Vitals:    10/01/20 2100 10/01/20 2115 10/01/20 2130 10/01/20 2145   BP: (!) 191/105 (!) 202/102 (!) 183/121 (!) 205/105   Pulse: 126 120 128 122   Resp: 25   28   Temp:       TempSrc:       SpO2: 100% 100% 100% 98%   Weight:       Height:           Cardiac Rhythm:  ,   Cardiac  Cardiac Rhythm: Sinus tachycardia  Pain level: 0-10 Pain Scale: 0  Patient confused: No. Patient Falls Risk: Yes.   Elimination Status: Has voided and using urinal appropriately in bed   Patient Report - Initial Complaint: Pt presents for evaluation of high blood sugar readings for the last few days. Pt has had BG readings in the 400s consistently during that time. Has a meter on, readings of \"high\". BG checked in triage, reading of \"high\". Pt c/o increased thirst and urination. Has been under increased stress lately. Pt resides in a group home, roommate and staff present. Pt is legally blind. Pt is a DM II. Pt is on insulin.    . Focused Assessment: high blood sugars. Pt comfortable without compliants  Tests Performed:   XR Chest Port 1 View   Final Result   IMPRESSION: Negative chest. "        . Abnormal Results:   Labs Ordered and Resulted from Time of ED Arrival Up to the Time of Departure from the ED   GLUCOSE BY METER - Abnormal; Notable for the following components:       Result Value    Glucose >600 (*)     All other components within normal limits   CBC WITH PLATELETS DIFFERENTIAL - Abnormal; Notable for the following components:    WBC 15.0 (*)     RBC Count 3.95 (*)     Hemoglobin 11.5 (*)     Hematocrit 35.6 (*)     Absolute Neutrophil 12.2 (*)     All other components within normal limits   BASIC METABOLIC PANEL - Abnormal; Notable for the following components:    Sodium 127 (*)     Chloride 91 (*)     Glucose 671 (*)     All other components within normal limits   ROUTINE UA WITH MICROSCOPIC - Abnormal; Notable for the following components:    Glucose Urine >1000 (*)     Blood Urine Small (*)     Leukocyte Esterase Urine Moderate (*)     WBC Urine 39 (*)     RBC Urine 4 (*)     Bacteria Urine Few (*)     All other components within normal limits   GLUCOSE BY METER - Abnormal; Notable for the following components:    Glucose 490 (*)     All other components within normal limits   ERYTHROCYTE SEDIMENTATION RATE AUTO - Abnormal; Notable for the following components:    Sed Rate 114 (*)     All other components within normal limits   COVID-19 VIRUS (CORONAVIRUS) BY PCR   TSH WITH FREE T4 REFLEX   GLUCOSE MONITOR NURSING POCT   LACTIC ACID WHOLE BLOOD   TROPONIN I   PROCALCITONIN   CRP INFLAMMATION   URINE CULTURE AEROBIC BACTERIAL   BLOOD CULTURE   BLOOD CULTURE        Treatments provided: labs, IVF, rocephin  Family Comments: group home  Sedrick [phone]  OBS brochure/video discussed/provided to patient:  No. Patient is blind  ED Medications:   Medications   0.9% sodium chloride BOLUS (0 mLs Intravenous Stopped 10/1/20 1954)     Followed by   sodium chloride 0.9% infusion (has no administration in time range)   lactated ringers BOLUS 1,000 mL (1,000 mLs Intravenous New Bag 10/1/20 2123)    lactated ringers BOLUS 1,000 mL (0 mLs Intravenous Stopped 10/1/20 2150)   labetalol (NORMODYNE/TRANDATE) syringe 20 mg (20 mg Intravenous Given 10/1/20 2018)   cefTRIAXone (ROCEPHIN) 2 g vial to attach to  ml bag for ADULTS or NS 50 ml bag for PEDS (0 g Intravenous Stopped 10/1/20 2150)     Drips infusing:  Yes  For the majority of the shift, the patient's behavior Green. Interventions performed were none.    Sepsis treatment initiated:   Yes    Per the ED Provider, Time Zero for severe sepsis or septic shock is:  unsure    3 Hour Severe Sepsis Bundle Completion:  1. Initial Lactic Acid Result:   Recent Labs   Lab Test 10/01/20  2022 07/21/20  1458   LACT 2.0 2.0     2. Blood Cultures before Antibiotics: Yes  3. Broad Spectrum Antibiotics Administered:     Anti-infectives (From now, onward)    None        4.1000 ml of IV fluids have been given so far      6 Hour Severe Sepsis Bundle Completion:    1. Repeat Lactic Acid Level:   Last result   Lab Results   Component Value Date    LACT 2.0 10/01/2020     2. Patient currently on Vasopressors =  No     Patient tested for COVID 19 prior to admission: YES    ED Nurse Name/Phone Number: Mansi Magaña RN,   10:06 PM    RECEIVING UNIT ED HANDOFF REVIEW    Above ED Nurse Handoff Report was reviewed: Yes  Reviewed by: Gayathri Luna RN on October 1, 2020 at 10:20 PM

## 2020-10-02 NOTE — PROGRESS NOTES
[facility]  Hospitalist Progress Note  Hawk Bueno MD 10/02/20    Reason for Stay (Diagnosis): bacteremia         Assessment and Plan:      Summary of Stay: Benja Duong is a 55 year old male IDDM type II, HTN, HLD, GERD, schizophrenia, and MDD who was admitted on 10/1/2020 with hyperglycemia and sepsis.  No obvious infection symptoms.  Urinalysis shows moderate pyuria and leukocyte esterase.  Both sets of blood cultures now positive for GNR's.  Inflammatory markers significantly elevated.  Continuing on IV ceftriaxone while awaiting further culture data.  Adjusting insulin regimen for hyperglycemia.    Problem List/Assessment and Plan:   Sepsis, bacteremia, possible UTI: Vague historian, but if you ask him about cough or dysuria he said he has these sometimes.  Chest x-ray without infiltrate.  Urinalysis with moderate LE and moderate pyuria.  Significantly elevated  and .  Initial tachycardia and leukocytosis consistent with sepsis.  Procalcitonin 0.43.  -2/2 initial blood cultures positive for GNR's, awaiting sensitivities  -Urine culture obtained  -IV ceftriaxone 2 g every 24 hours    Hyperglycemia and IDDM type II: PTA on metformin 1000 mg twice daily, glargine 18 units at bedtime, and exenatide weekly.  -Metformin and exenatide held  -Continue glargine 18 units at bedtime  -Sliding-scale insulin    Hypokalemia: Potassium replacement protocol.    Hyponatremia: Mostly pseudohyponatremia secondary to hyperglycemia, although mild hyponatremia in the past as well.    HTN: PTA on lisinopril 40 mg daily, HCTZ 25 mg daily, and hydralazine 12.5 mg every 6 hours as needed for high blood pressure above 160.  Very hypertensive while here.    -Continue lisinopril 40 mg daily  -HCTZ initially held for hyperglycemia hypovolemia  -Started p.o. hydralazine 25 mg every 8 hours    Schizophrenia: Receives risperidone injections IM every 14 days.    HLD: Resume PTA atorvastatin 20 mg at  "bedtime.    GERD: Resume PTA Protonix 40 mg twice daily.      Chronic anemia: Hemoglobin near baseline 8-10.      Tobacco dependence: Smokes half pack per day.  Nicotine replacement therapy if requested.      DVT Prophylaxis: Enoxaparin (Lovenox) SQ  Code Status: Full Code  FEN: Regular diet, NS at 100 ml/hr  Discharge Dispo: Group home  Estimated Disch Date / # of Days until Disch: 2 days pending culture results and improvement in labs        Interval History (Subjective):      Assumed care today.  2 of 2 blood cultures positive for GNR's.  He says he feels fine.  Denies any nausea or vomiting.  Has occasional loose stools.  Occasionally has a cough and some dysuria although vague on when this happens.  He is wondering when he can go home.                  Physical Exam:      Last Vital Signs:  BP (!) 172/81 (BP Location: Right arm)   Pulse 123   Temp 99.6  F (37.6  C) (Oral)   Resp 20   Ht 1.715 m (5' 7.5\")   Wt 69.9 kg (154 lb)   SpO2 94%   BMI 23.76 kg/m        Intake/Output Summary (Last 24 hours) at 10/2/2020 1522  Last data filed at 10/2/2020 1345  Gross per 24 hour   Intake 1282 ml   Output 975 ml   Net 307 ml       Constitutional: Awake, NAD   Eyes: sclera white   HEENT: MMM  Respiratory:  lungs cta bilaterally, no crackles or wheeze  Cardiovascular: Regular tachycardia without murmur  GI: non-tender, not distended, bowel sounds present  Skin: no rash   Musculoskeletal/extremities:No edema  Neurologic: A&O  Psychiatric: calm, cooperative         Medications:      All current medications were reviewed with changes reflected in problem list.         Data:      All new lab and imaging data was reviewed.   Labs:  Recent Labs   Lab 10/01/20  2105 10/01/20  1945   CULT Cultured on the 1st day of incubation:  Gram negative rods  *  Critical Value/Significant Value, preliminary result only, called to and read back by  Mansi Bal RN @ [facility] 10.2.20 DOMI    (Note)  POSITIVE for E.COLI by [employer] multiplex " nucleic acid test. Final  identification and antimicrobial susceptibility testing will be  verified by standard methods. Verigene test will not distinguish  E.coli from Shigella species including S.dysenteriae, S.flexneri,  S.boydii, and S.sonnei. Specimens containing Shigella species or  E.coli will be reported as Positive for E.coli.    Specimen tested with Verigene multiplex, gram-negative blood culture  nucleic acid test for the following targets: Acinetobacter sp.,  Citrobacter sp., Enterobacter sp., Proteus sp., E. coli, K.  pneumoniae/oxytoca, P. aeruginosa, and the following resistance  markers: CTXM, KPC, NDM, VIM, IMP and OXA.      Critical Value/Significant Value called to and read back by JOSE ALBERTO  RHMS5  MANSI BAL 10.02.2020 AT 12.15PM,ZG      Cultured on the 1st day of incubation:  Gram negative rods  *  Critical Value/Significant Value, preliminary result only, called to and read back by  Mansi Bal RN @ [facility] 10.2.20 JE   >100,000 colonies/mL  Escherichia coli  Susceptibility testing in progress  *     Recent Labs   Lab 10/02/20  0729 10/01/20  1835   * 127*   POTASSIUM 3.3* 4.2   CHLORIDE 98 91*   CO2 28 29   ANIONGAP 6 7   * 671*   BUN 5* 12   CR 0.81 0.98   GFRESTIMATED >90 86   GFRESTBLACK >90 >90   MITCHEL 9.1 10.0     Recent Labs   Lab 10/02/20  0729 10/01/20  1835   WBC 12.3* 15.0*   HGB 9.2* 11.5*   HCT 28.1* 35.6*   MCV 89 90    286     Recent Labs   Lab 10/01/20  1835   *   .0*      Imaging:   None today      Hawk Bueno MD

## 2020-10-02 NOTE — PHARMACY-ADMISSION MEDICATION HISTORY
Admission medication history interview status for this patient is complete. See [facility] admission navigator for allergy information, prior to admission medications and immunization status.     Medication history interview done via telephone during Covid-19 pandemic, indicate source(s): Patient's Caregivers  Medication history resources (including written lists, pill bottles, clinic record): Spoke to staff at the patient's group home (Nurse Manager: [facility] [phone])  Pharmacy: N/A    Changes made to PTA medication list:  Added: None  Deleted: None  Changed: None    Actions taken by pharmacist (provider contacted, etc):None     Additional medication history information:None    Medication reconciliation/reorder completed by provider prior to medication history?  Yes   (Y/N)     For patients on insulin therapy:   Do you use sliding scale insulin based on blood sugars? No  What is your pre-meal insulin coverage? None    Reviewed medications with overnight staff at the patient's group home. Unable to ask more questions regarding the patient's insulin therapy.     Prior to Admission medications    Medication Sig Last Dose Taking? Auth Provider   Artificial Tear Solution (TEARS NATURALE OP) 1 drop by Both Eyelids route 4 times daily Gentle Tears 10/1/2020 at Unknown time Yes Unknown, Entered By History   atorvastatin (LIPITOR) 20 MG tablet Take 20 mg by mouth At Bedtime 9/30/2020 at Unknown time Yes Unknown, Entered By History   cholecalciferol (VITAMIN D-1000 MAX ST) 1000 units TABS Take 1,000 Units by mouth daily 10/1/2020 at Unknown time Yes Paul Andrews MD   diphenhydrAMINE (BENADRYL) 50 MG capsule Take 50 mg by mouth At Bedtime 9/30/2020 at Unknown time Yes Unknown, Entered By History   ferrous sulfate (FEROSUL) 325 (65 Fe) MG tablet Take 1 tablet (325 mg) by mouth daily 10/1/2020 at Unknown time Yes Maile Bragg PA-C   hydrALAZINE (APRESOLINE) 25 MG tablet Take 0.5 tablets (12.5 mg) by mouth every 6  hours as needed (HTN; SBP > 160) unknown at Unknown time Yes Paul Andrews MD   hydrochlorothiazide (HYDRODIURIL) 25 MG tablet Take 1 tablet (25 mg) by mouth daily DO not resume until Na is back to normal or PCP orders 10/1/2020 at Unknown time Yes Benton Whitman MD   insulin glargine (LANTUS VIAL) 100 UNIT/ML vial Inject 18 Units Subcutaneous At Bedtime 9/30/2020 at Unknown time Yes Unknown, Entered By History   lisinopril (ZESTRIL) 40 MG tablet Take 40 mg by mouth daily 10/1/2020 at Unknown time Yes Unknown, Entered By History   metFORMIN (GLUCOPHAGE) 1000 MG tablet Take 1 tablet (1,000 mg) by mouth 2 times daily (with meals) 10/1/2020 at x1 Yes Paul Andrews MD   pantoprazole (PROTONIX) 40 MG EC tablet Take 1 tablet (40 mg) by mouth 2 times daily 10/1/2020 at x1 Yes Benton Whitman MD   risperiDONE microspheres ER (RISPERDAL CONSTA) 50 MG injection Inject 50 mg into the muscle every 14 days Past Month at Unknown time Yes Unknown, Entered By History   exenatide ER (BYDUREON) 2 MG recon vial kit susp for weekly inj Inject 2 mg Subcutaneous once a week DO not administer until he is seen by his PCP. 10/29/2019  Benton Whitman MD

## 2020-10-02 NOTE — PLAN OF CARE
To Do:  End of Shift Summary  For vital signs and complete assessments, please see documentation flowsheets.     Pertinent assessments: Pt A&O with some confusion, is unkempt and unshaven. Pt is legally blind. Refused to get out of bed. Pt refused both breakfast and lunch, see other note. Pt attempted to eat lunch laying down, education about aspiration occurred. Potassium replaced. PIV infusing per orders. Pt accidentally pulled out IV per pt report. New IV established on right forearm. Pending cultures received and MD updated.     Major Shift Events: replaced K+, cultures came back, new IV established  Treatment Plan: Control RBG, treat UTI with rocephin  Bedside Nurse: Mansi MCKEON, RN

## 2020-10-03 LAB
ALBUMIN SERPL-MCNC: 2.3 G/DL (ref 3.4–5)
ALP SERPL-CCNC: 99 U/L (ref 40–150)
ALT SERPL W P-5'-P-CCNC: 13 U/L (ref 0–70)
ANION GAP SERPL CALCULATED.3IONS-SCNC: 7 MMOL/L (ref 3–14)
AST SERPL W P-5'-P-CCNC: 13 U/L (ref 0–45)
BACTERIA SPEC CULT: ABNORMAL
BILIRUB DIRECT SERPL-MCNC: 0.1 MG/DL (ref 0–0.2)
BILIRUB SERPL-MCNC: 0.3 MG/DL (ref 0.2–1.3)
BUN SERPL-MCNC: 4 MG/DL (ref 7–30)
CALCIUM SERPL-MCNC: 8.8 MG/DL (ref 8.5–10.1)
CHLORIDE SERPL-SCNC: 104 MMOL/L (ref 94–109)
CO2 SERPL-SCNC: 26 MMOL/L (ref 20–32)
CREAT SERPL-MCNC: 0.78 MG/DL (ref 0.66–1.25)
CRP SERPL-MCNC: 171 MG/L (ref 0–8)
ERYTHROCYTE [DISTWIDTH] IN BLOOD BY AUTOMATED COUNT: 12.5 % (ref 10–15)
ERYTHROCYTE [SEDIMENTATION RATE] IN BLOOD BY WESTERGREN METHOD: 114 MM/H (ref 0–20)
GFR SERPL CREATININE-BSD FRML MDRD: >90 ML/MIN/{1.73_M2}
GLUCOSE BLDC GLUCOMTR-MCNC: 146 MG/DL (ref 70–99)
GLUCOSE BLDC GLUCOMTR-MCNC: 151 MG/DL (ref 70–99)
GLUCOSE BLDC GLUCOMTR-MCNC: 175 MG/DL (ref 70–99)
GLUCOSE BLDC GLUCOMTR-MCNC: 217 MG/DL (ref 70–99)
GLUCOSE BLDC GLUCOMTR-MCNC: 89 MG/DL (ref 70–99)
GLUCOSE SERPL-MCNC: 92 MG/DL (ref 70–99)
HCT VFR BLD AUTO: 28 % (ref 40–53)
HGB BLD-MCNC: 9.2 G/DL (ref 13.3–17.7)
LACTATE BLD-SCNC: 0.6 MMOL/L (ref 0.7–2)
Lab: ABNORMAL
MAGNESIUM SERPL-MCNC: 1.4 MG/DL (ref 1.6–2.3)
MAGNESIUM SERPL-MCNC: 2.3 MG/DL (ref 1.6–2.3)
MCH RBC QN AUTO: 29.5 PG (ref 26.5–33)
MCHC RBC AUTO-ENTMCNC: 32.9 G/DL (ref 31.5–36.5)
MCV RBC AUTO: 90 FL (ref 78–100)
PLATELET # BLD AUTO: 256 10E9/L (ref 150–450)
POTASSIUM SERPL-SCNC: 3.2 MMOL/L (ref 3.4–5.3)
POTASSIUM SERPL-SCNC: 3.6 MMOL/L (ref 3.4–5.3)
PROT SERPL-MCNC: 6.2 G/DL (ref 6.8–8.8)
RBC # BLD AUTO: 3.12 10E12/L (ref 4.4–5.9)
SODIUM SERPL-SCNC: 137 MMOL/L (ref 133–144)
SPECIMEN SOURCE: ABNORMAL
WBC # BLD AUTO: 12.8 10E9/L (ref 4–11)

## 2020-10-03 PROCEDURE — 86140 C-REACTIVE PROTEIN: CPT | Performed by: INTERNAL MEDICINE

## 2020-10-03 PROCEDURE — 258N000003 HC RX IP 258 OP 636: Performed by: INTERNAL MEDICINE

## 2020-10-03 PROCEDURE — 36415 COLL VENOUS BLD VENIPUNCTURE: CPT | Performed by: INTERNAL MEDICINE

## 2020-10-03 PROCEDURE — 250N000013 HC RX MED GY IP 250 OP 250 PS 637: Performed by: INTERNAL MEDICINE

## 2020-10-03 PROCEDURE — 250N000013 HC RX MED GY IP 250 OP 250 PS 637: Performed by: PHYSICIAN ASSISTANT

## 2020-10-03 PROCEDURE — 84132 ASSAY OF SERUM POTASSIUM: CPT | Performed by: INTERNAL MEDICINE

## 2020-10-03 PROCEDURE — 258N000003 HC RX IP 258 OP 636: Performed by: PHYSICIAN ASSISTANT

## 2020-10-03 PROCEDURE — 250N000011 HC RX IP 250 OP 636: Performed by: INTERNAL MEDICINE

## 2020-10-03 PROCEDURE — 80048 BASIC METABOLIC PNL TOTAL CA: CPT | Performed by: INTERNAL MEDICINE

## 2020-10-03 PROCEDURE — 250N000011 HC RX IP 250 OP 636: Performed by: PHYSICIAN ASSISTANT

## 2020-10-03 PROCEDURE — 85027 COMPLETE CBC AUTOMATED: CPT | Performed by: INTERNAL MEDICINE

## 2020-10-03 PROCEDURE — 250N000012 HC RX MED GY IP 250 OP 636 PS 637: Performed by: PHYSICIAN ASSISTANT

## 2020-10-03 PROCEDURE — 120N000001 HC R&B MED SURG/OB

## 2020-10-03 PROCEDURE — 83735 ASSAY OF MAGNESIUM: CPT | Performed by: INTERNAL MEDICINE

## 2020-10-03 PROCEDURE — 999N001017 HC STATISTIC GLUCOSE BY METER IP

## 2020-10-03 PROCEDURE — 36415 COLL VENOUS BLD VENIPUNCTURE: CPT | Performed by: HOSPITALIST

## 2020-10-03 PROCEDURE — 83605 ASSAY OF LACTIC ACID: CPT | Performed by: HOSPITALIST

## 2020-10-03 PROCEDURE — 96372 THER/PROPH/DIAG INJ SC/IM: CPT | Performed by: PHYSICIAN ASSISTANT

## 2020-10-03 PROCEDURE — 85652 RBC SED RATE AUTOMATED: CPT | Performed by: INTERNAL MEDICINE

## 2020-10-03 PROCEDURE — 80076 HEPATIC FUNCTION PANEL: CPT | Performed by: INTERNAL MEDICINE

## 2020-10-03 PROCEDURE — 99233 SBSQ HOSP IP/OBS HIGH 50: CPT | Performed by: INTERNAL MEDICINE

## 2020-10-03 RX ADMIN — HYDRALAZINE HYDROCHLORIDE 25 MG: 25 TABLET, FILM COATED ORAL at 17:08

## 2020-10-03 RX ADMIN — SODIUM CHLORIDE: 9 INJECTION, SOLUTION INTRAVENOUS at 01:09

## 2020-10-03 RX ADMIN — SODIUM CHLORIDE 1000 ML: 9 INJECTION, SOLUTION INTRAVENOUS at 14:51

## 2020-10-03 RX ADMIN — HYDRALAZINE HYDROCHLORIDE 25 MG: 25 TABLET, FILM COATED ORAL at 22:16

## 2020-10-03 RX ADMIN — LISINOPRIL 40 MG: 40 TABLET ORAL at 08:22

## 2020-10-03 RX ADMIN — FERROUS SULFATE TAB 325 MG (65 MG ELEMENTAL FE) 325 MG: 325 (65 FE) TAB at 08:21

## 2020-10-03 RX ADMIN — HYDRALAZINE HYDROCHLORIDE 25 MG: 25 TABLET, FILM COATED ORAL at 06:31

## 2020-10-03 RX ADMIN — ACETAMINOPHEN 650 MG: 325 TABLET, FILM COATED ORAL at 22:16

## 2020-10-03 RX ADMIN — SODIUM CHLORIDE: 9 INJECTION, SOLUTION INTRAVENOUS at 11:03

## 2020-10-03 RX ADMIN — ATORVASTATIN CALCIUM 20 MG: 20 TABLET, FILM COATED ORAL at 22:16

## 2020-10-03 RX ADMIN — POTASSIUM CHLORIDE 20 MEQ: 1500 TABLET, EXTENDED RELEASE ORAL at 10:51

## 2020-10-03 RX ADMIN — MAGNESIUM SULFATE HEPTAHYDRATE 4 G: 40 INJECTION, SOLUTION INTRAVENOUS at 16:35

## 2020-10-03 RX ADMIN — PANTOPRAZOLE SODIUM 40 MG: 40 TABLET, DELAYED RELEASE ORAL at 08:22

## 2020-10-03 RX ADMIN — PANTOPRAZOLE SODIUM 40 MG: 40 TABLET, DELAYED RELEASE ORAL at 20:08

## 2020-10-03 RX ADMIN — INSULIN GLARGINE 18 UNITS: 100 INJECTION, SOLUTION SUBCUTANEOUS at 22:16

## 2020-10-03 RX ADMIN — ENOXAPARIN SODIUM 40 MG: 40 INJECTION SUBCUTANEOUS at 08:23

## 2020-10-03 RX ADMIN — CEFTRIAXONE 2 G: 2 INJECTION, POWDER, FOR SOLUTION INTRAMUSCULAR; INTRAVENOUS at 20:08

## 2020-10-03 RX ADMIN — POTASSIUM CHLORIDE 40 MEQ: 1500 TABLET, EXTENDED RELEASE ORAL at 08:22

## 2020-10-03 RX ADMIN — INSULIN ASPART 1 UNITS: 100 INJECTION, SOLUTION INTRAVENOUS; SUBCUTANEOUS at 22:18

## 2020-10-03 ASSESSMENT — ACTIVITIES OF DAILY LIVING (ADL)
ADLS_ACUITY_SCORE: 17

## 2020-10-03 ASSESSMENT — MIFFLIN-ST. JEOR: SCORE: 1559.96

## 2020-10-03 NOTE — PROGRESS NOTES
End of Shift Summary  For vital signs and complete assessments, please see documentation flowsheets.     Pertinent assessments: A&O x 4, Tmax 99.8, tachy, elevated BP's, on RA, denies any pain. Using urinal at bedside, voiding without difficulty. HS  scheduled NovoLog and Lantus given, 0200 .     Major Shift Events: none  Treatment Plan: Control RBG, IV Rocephin, scheduled hydralazine   Bedside Nurse: Jinny Wilson RN

## 2020-10-03 NOTE — PROGRESS NOTES
[facility]  Hospitalist Progress Note  Hawk Bueno MD 10/03/20    Reason for Stay (Diagnosis): bacteremia         Assessment and Plan:      Summary of Stay: Benja Duong is a 55 year old male IDDM type II, HTN, HLD, GERD, schizophrenia, and MDD who was admitted on 10/1/2020 with hyperglycemia and sepsis.  No obvious infection symptoms.  Urinalysis shows moderate pyuria and leukocyte esterase.  Both sets of blood cultures and urine culture positive for E. coli.  Inflammatory markers significantly elevated.  Continuing on IV ceftriaxone.  Slowly improving.    Problem List/Assessment and Plan:   Sepsis secondary to E. coli bacteremia and UTI: Vague historian, but if you ask him about cough or dysuria he said he has these sometimes.  Chest x-ray without infiltrate.  Urinalysis with moderate LE and moderate pyuria and urine cultures growing E. coli suspect this is the primary source.  Does have history of cholelithiasis, however denies any abdominal pain now to suspect cholecystitis.  Significantly elevated  and .  Initial tachycardia and leukocytosis consistent with sepsis.  Procalcitonin 0.43.  Leukocytosis improving.  Low-grade temperatures and tachycardia persisting.  CRP improved.  -2/2 initial blood cultures and urine culture positive for E. coli  -IV ceftriaxone 2 g every 24 hours CRP decreasing  -If vital signs not improving may need to consider abdominal imaging to make sure no sign of nephrolithiasis, pyelonephritis, cholecystitis    Hyperglycemia and IDDM type II: PTA on metformin 1000 mg twice daily, glargine 18 units at bedtime, and exenatide weekly.  -Metformin and exenatide held  -Continue glargine 18 units at bedtime  -Sliding-scale insulin    Hypokalemia: Potassium replacement protocol.    Hyponatremia: Mostly pseudohyponatremia secondary to hyperglycemia, although mild hyponatremia in the past as well.  Resolved with IV fluids    Sinus tachycardia: Rebeca mildly  "tachycardic which I suspect is secondary to sepsis although has received significant of IV fluids.  Give additional 1 L normal saline bolus this afternoon followed by continuous maintenance fluids.    HTN: PTA on lisinopril 40 mg daily, HCTZ 25 mg daily, and hydralazine 12.5 mg every 6 hours as needed for high blood pressure above 160.  Very hypertensive while here.    -Continue lisinopril 40 mg daily  -HCTZ initially held for hyperglycemia hypovolemia  -Started p.o. hydralazine 25 mg every 8 hours    Schizophrenia: Receives risperidone injections IM every 14 days.    HLD: Resume PTA atorvastatin 20 mg at bedtime.    GERD: Resume PTA Protonix 40 mg twice daily.      Chronic anemia: Hemoglobin near baseline 8-10.      Tobacco dependence: Smokes half pack per day.  Nicotine replacement therapy if requested, currently declines.      DVT Prophylaxis: Enoxaparin (Lovenox) SQ  Code Status: Full Code  FEN: Regular diet, NS at 100 ml/hr  Discharge Dispo: Group home  Estimated Disch Date / # of Days until Disch: 1-2 days pending temperature and tachycardia        Interval History (Subjective):      Patient continues to say he feels fine.  He was coughing during our encounter.  Denies any dysuria.  Remains mildly tachycardic.  Wondering when he can go home.                  Physical Exam:      Last Vital Signs:  BP (!) 157/78 (BP Location: Right arm)   Pulse 120   Temp 100  F (37.8  C) (Oral)   Resp 22   Ht 1.715 m (5' 7.5\")   Wt 76.3 kg (168 lb 4.8 oz)   SpO2 100%   BMI 25.97 kg/m        Intake/Output Summary (Last 24 hours) at 10/2/2020 1522  Last data filed at 10/2/2020 1345  Gross per 24 hour   Intake 1282 ml   Output 975 ml   Net 307 ml       Constitutional: Awake, NAD   Eyes: sclera white   HEENT: MMM  Respiratory:  lungs cta bilaterally, no crackles or wheeze  Cardiovascular: Regular tachycardia without murmur  GI: non-tender, not distended, bowel sounds present  Skin: no rash   Musculoskeletal/extremities:No " edema  Neurologic: A&O  Psychiatric: calm, cooperative         Medications:      All current medications were reviewed with changes reflected in problem list.         Data:      All new lab and imaging data was reviewed.   Labs:  Recent Labs   Lab 10/01/20  2105 10/01/20  1945   CULT Cultured on the 1st day of incubation:  Escherichia coli  Susceptibility testing done on previous specimen  *  Critical Value/Significant Value, preliminary result only, called to and read back by  Mansi Bal RN @ 1002 10.2.20 JE    Cultured on the 1st day of incubation:  Escherichia coli  *  Critical Value/Significant Value, preliminary result only, called to and read back by  Mansi Bal RN @ 1002 10.2.20 JE    (Note)  POSITIVE for E.COLI by Verigene multiplex nucleic acid test. Final  identification and antimicrobial susceptibility testing will be  verified by standard methods. Verigene test will not distinguish  E.coli from Shigella species including S.dysenteriae, S.flexneri,  S.boydii, and S.sonnei. Specimens containing Shigella species or  E.coli will be reported as Positive for E.coli.    Specimen tested with Verigene multiplex, gram-negative blood culture  nucleic acid test for the following targets: Acinetobacter sp.,  Citrobacter sp., Enterobacter sp., Proteus sp., E. coli, K.  pneumoniae/oxytoca, P. aeruginosa, and the following resistance  markers: CTXM, KPC, NDM, VIM, IMP and OXA.      Critical Value/Significant Value called to and read back by JOSE ALBERTO  RHMS5  MANSI BAL 10.02.2020 AT 12.15PM,ZG     >100,000 colonies/mL  Escherichia coli  *     Recent Labs   Lab 10/03/20  0707 10/02/20  1817 10/02/20  0729 10/01/20  1835     --  132* 127*   POTASSIUM 3.2* 3.9 3.3* 4.2   CHLORIDE 104  --  98 91*   CO2 26  --  28 29   ANIONGAP 7  --  6 7   GLC 92  --  267* 671*   BUN 4*  --  5* 12   CR 0.78  --  0.81 0.98   GFRESTIMATED >90  --  >90 86   GFRESTBLACK >90  --  >90 >90   MITCHEL 8.8  --  9.1 10.0     Recent Labs   Lab  10/03/20  0707 10/02/20  0729 10/01/20  1835   WBC 12.8* 12.3* 15.0*   HGB 9.2* 9.2* 11.5*   HCT 28.0* 28.1* 35.6*   MCV 90 89 90    247 286     Recent Labs   Lab 10/03/20  0707 10/01/20  1835   * 114*   .0* 280.0*      Imaging:   None today      Hawk Bueno MD

## 2020-10-04 ENCOUNTER — APPOINTMENT (OUTPATIENT)
Dept: CT IMAGING | Facility: CLINIC | Age: 55
DRG: 872 | End: 2020-10-04
Attending: INTERNAL MEDICINE
Payer: COMMERCIAL

## 2020-10-04 VITALS
WEIGHT: 162.2 LBS | HEIGHT: 68 IN | BODY MASS INDEX: 24.58 KG/M2 | SYSTOLIC BLOOD PRESSURE: 183 MMHG | RESPIRATION RATE: 22 BRPM | TEMPERATURE: 98.5 F | HEART RATE: 119 BPM | OXYGEN SATURATION: 94 % | DIASTOLIC BLOOD PRESSURE: 93 MMHG

## 2020-10-04 LAB
ANION GAP SERPL CALCULATED.3IONS-SCNC: 5 MMOL/L (ref 3–14)
BACTERIA SPEC CULT: ABNORMAL
BUN SERPL-MCNC: 3 MG/DL (ref 7–30)
CALCIUM SERPL-MCNC: 8.8 MG/DL (ref 8.5–10.1)
CHLORIDE SERPL-SCNC: 104 MMOL/L (ref 94–109)
CO2 SERPL-SCNC: 26 MMOL/L (ref 20–32)
CREAT SERPL-MCNC: 0.64 MG/DL (ref 0.66–1.25)
ERYTHROCYTE [DISTWIDTH] IN BLOOD BY AUTOMATED COUNT: 12.4 % (ref 10–15)
GFR SERPL CREATININE-BSD FRML MDRD: >90 ML/MIN/{1.73_M2}
GLUCOSE BLDC GLUCOMTR-MCNC: 162 MG/DL (ref 70–99)
GLUCOSE BLDC GLUCOMTR-MCNC: 278 MG/DL (ref 70–99)
GLUCOSE SERPL-MCNC: 186 MG/DL (ref 70–99)
HCT VFR BLD AUTO: 29.2 % (ref 40–53)
HGB BLD-MCNC: 9.3 G/DL (ref 13.3–17.7)
MAGNESIUM SERPL-MCNC: 1.7 MG/DL (ref 1.6–2.3)
MCH RBC QN AUTO: 28.5 PG (ref 26.5–33)
MCHC RBC AUTO-ENTMCNC: 31.8 G/DL (ref 31.5–36.5)
MCV RBC AUTO: 90 FL (ref 78–100)
PLATELET # BLD AUTO: 286 10E9/L (ref 150–450)
POTASSIUM SERPL-SCNC: 3.5 MMOL/L (ref 3.4–5.3)
RBC # BLD AUTO: 3.26 10E12/L (ref 4.4–5.9)
SODIUM SERPL-SCNC: 135 MMOL/L (ref 133–144)
SPECIMEN SOURCE: ABNORMAL
SPECIMEN SOURCE: ABNORMAL
WBC # BLD AUTO: 11.3 10E9/L (ref 4–11)

## 2020-10-04 PROCEDURE — 85049 AUTOMATED PLATELET COUNT: CPT | Performed by: INTERNAL MEDICINE

## 2020-10-04 PROCEDURE — 80048 BASIC METABOLIC PNL TOTAL CA: CPT | Performed by: INTERNAL MEDICINE

## 2020-10-04 PROCEDURE — 85027 COMPLETE CBC AUTOMATED: CPT | Performed by: INTERNAL MEDICINE

## 2020-10-04 PROCEDURE — 99239 HOSP IP/OBS DSCHRG MGMT >30: CPT | Performed by: INTERNAL MEDICINE

## 2020-10-04 PROCEDURE — 250N000011 HC RX IP 250 OP 636: Performed by: HOSPITALIST

## 2020-10-04 PROCEDURE — 83735 ASSAY OF MAGNESIUM: CPT | Performed by: INTERNAL MEDICINE

## 2020-10-04 PROCEDURE — 999N001017 HC STATISTIC GLUCOSE BY METER IP

## 2020-10-04 PROCEDURE — 258N000003 HC RX IP 258 OP 636: Performed by: PHYSICIAN ASSISTANT

## 2020-10-04 PROCEDURE — 96372 THER/PROPH/DIAG INJ SC/IM: CPT | Performed by: PHYSICIAN ASSISTANT

## 2020-10-04 PROCEDURE — 250N000013 HC RX MED GY IP 250 OP 250 PS 637: Performed by: PHYSICIAN ASSISTANT

## 2020-10-04 PROCEDURE — 250N000011 HC RX IP 250 OP 636: Performed by: PHYSICIAN ASSISTANT

## 2020-10-04 PROCEDURE — 74177 CT ABD & PELVIS W/CONTRAST: CPT

## 2020-10-04 PROCEDURE — 36415 COLL VENOUS BLD VENIPUNCTURE: CPT | Performed by: INTERNAL MEDICINE

## 2020-10-04 PROCEDURE — 250N000013 HC RX MED GY IP 250 OP 250 PS 637: Performed by: INTERNAL MEDICINE

## 2020-10-04 RX ORDER — HYDRALAZINE HYDROCHLORIDE 25 MG/1
12.5 TABLET, FILM COATED ORAL EVERY 6 HOURS PRN
COMMUNITY
End: 2020-10-04

## 2020-10-04 RX ORDER — ACETAMINOPHEN 325 MG/1
650 TABLET ORAL EVERY 4 HOURS PRN
COMMUNITY
End: 2022-04-19

## 2020-10-04 RX ORDER — IOPAMIDOL 755 MG/ML
81 INJECTION, SOLUTION INTRAVASCULAR ONCE
Status: COMPLETED | OUTPATIENT
Start: 2020-10-04 | End: 2020-10-04

## 2020-10-04 RX ORDER — IBUPROFEN 200 MG
200 TABLET ORAL EVERY 6 HOURS PRN
COMMUNITY
End: 2022-04-19

## 2020-10-04 RX ORDER — DIPHENHYDRAMINE HCL 50 MG
50 CAPSULE ORAL EVERY 4 HOURS PRN
COMMUNITY
End: 2022-04-19

## 2020-10-04 RX ORDER — HYDRALAZINE HYDROCHLORIDE 25 MG/1
25 TABLET, FILM COATED ORAL EVERY 6 HOURS PRN
Qty: 30 TABLET | Refills: 1
Start: 2020-10-04 | End: 2024-03-17

## 2020-10-04 RX ORDER — SULFAMETHOXAZOLE/TRIMETHOPRIM 800-160 MG
1 TABLET ORAL 2 TIMES DAILY
Qty: 14 TABLET | Refills: 0 | Status: SHIPPED | OUTPATIENT
Start: 2020-10-04 | End: 2020-10-11

## 2020-10-04 RX ORDER — ONDANSETRON 4 MG/1
4 TABLET, ORALLY DISINTEGRATING ORAL 3 TIMES DAILY PRN
Status: ON HOLD | COMMUNITY
End: 2022-01-15

## 2020-10-04 RX ADMIN — LISINOPRIL 40 MG: 40 TABLET ORAL at 08:27

## 2020-10-04 RX ADMIN — HYDRALAZINE HYDROCHLORIDE 25 MG: 25 TABLET, FILM COATED ORAL at 06:11

## 2020-10-04 RX ADMIN — FERROUS SULFATE TAB 325 MG (65 MG ELEMENTAL FE) 325 MG: 325 (65 FE) TAB at 08:27

## 2020-10-04 RX ADMIN — SODIUM CHLORIDE: 9 INJECTION, SOLUTION INTRAVENOUS at 00:11

## 2020-10-04 RX ADMIN — ENOXAPARIN SODIUM 40 MG: 40 INJECTION SUBCUTANEOUS at 08:28

## 2020-10-04 RX ADMIN — IOPAMIDOL 81 ML: 755 INJECTION, SOLUTION INTRAVENOUS at 09:07

## 2020-10-04 RX ADMIN — PANTOPRAZOLE SODIUM 40 MG: 40 TABLET, DELAYED RELEASE ORAL at 08:27

## 2020-10-04 ASSESSMENT — ACTIVITIES OF DAILY LIVING (ADL)
ADLS_ACUITY_SCORE: 17

## 2020-10-04 ASSESSMENT — MIFFLIN-ST. JEOR: SCORE: 1532.29

## 2020-10-04 NOTE — PHARMACY-ADMISSION MEDICATION HISTORY
- The following medication list includes PTA meds that were missed on admission (highlighted in BLUE)on 10/1/2020.    - On 10/4, I spoke with AC from group home at [phone], he provided info on prn orders for Glucose tabs, Ibuprofen, Zofran, Tylenol, Benadryl & Hydralazine.      Prior to Admission medications    Medication Sig Last Dose Taking? Auth Provider   acetaminophen (TYLENOL) 325 MG tablet Take 650 mg by mouth every 4 hours as needed for mild pain prn Yes Unknown, Entered By History   Artificial Tear Solution (TEARS NATURALE OP) 1 drop by Both Eyelids route 4 times daily Gentle Tears 10/1/2020 at Unknown time Yes Unknown, Entered By History   atorvastatin (LIPITOR) 20 MG tablet Take 20 mg by mouth At Bedtime 9/30/2020 at Unknown time Yes Unknown, Entered By History   cholecalciferol (VITAMIN D-1000 MAX ST) 1000 units TABS Take 1,000 Units by mouth daily 10/1/2020 at Unknown time Yes Paul Andrews MD   diphenhydrAMINE (BENADRYL) 50 MG capsule Take 50 mg by mouth every 4 hours as needed for itching or allergies prn Yes Unknown, Entered By History   diphenhydrAMINE (BENADRYL) 50 MG capsule Take 50 mg by mouth At Bedtime 9/30/2020 at Unknown time Yes Unknown, Entered By History   ferrous sulfate (FEROSUL) 325 (65 Fe) MG tablet Take 1 tablet (325 mg) by mouth daily 10/1/2020 at Unknown time Yes Maile Bragg PA-C   glucose (BD GLUCOSE) 4 g chewable tablet Take 4 tablets by mouth as needed for low blood sugar prn Yes Unknown, Entered By History   hydrALAZINE (APRESOLINE) 25 MG tablet Take 1 tablet (25 mg) by mouth every 6 hours as needed (HTN; SBP > 160)  Yes Hawk Bueno MD   hydrALAZINE (APRESOLINE) 25 MG tablet Take 12.5 mg by mouth every 6 hours as needed , For BP>160 prn Yes Unknown, Entered By History   hydrochlorothiazide (HYDRODIURIL) 25 MG tablet Take 1 tablet (25 mg) by mouth daily DO not resume until Na is back to normal or PCP orders 10/1/2020 at Unknown time Yes  Benton Whitman MD   ibuprofen (ADVIL/MOTRIN) 200 MG tablet Take 200 mg by mouth every 6 hours as needed for mild pain prn Yes Unknown, Entered By History   insulin glargine (LANTUS VIAL) 100 UNIT/ML vial Inject 18 Units Subcutaneous At Bedtime 9/30/2020 at Unknown time Yes Unknown, Entered By History   lisinopril (ZESTRIL) 40 MG tablet Take 40 mg by mouth daily 10/1/2020 at Unknown time Yes Unknown, Entered By History   metFORMIN (GLUCOPHAGE) 1000 MG tablet Take 1 tablet (1,000 mg) by mouth 2 times daily (with meals) 10/1/2020 at x1 Yes Paul Andrews MD   ondansetron (ZOFRAN-ODT) 4 MG ODT tab Take 4 mg by mouth 3 times daily as needed for nausea or vomiting prn Yes Unknown, Entered By History   pantoprazole (PROTONIX) 40 MG EC tablet Take 1 tablet (40 mg) by mouth 2 times daily 10/1/2020 at x1 Yes Benton Whitman MD   risperiDONE microspheres ER (RISPERDAL CONSTA) 50 MG injection Inject 50 mg into the muscle every 14 days Past Month at Unknown time Yes Unknown, Entered By History   sulfamethoxazole-trimethoprim (BACTRIM DS) 800-160 MG tablet Take 1 tablet by mouth 2 times daily for 7 days  Yes Hawk Bueno MD   exenatide ER (BYDUREON) 2 MG recon vial kit susp for weekly inj Inject 2 mg Subcutaneous once a week DO not administer until he is seen by his PCP. 10/29/2019  Benton Whitman MD

## 2020-10-04 NOTE — PROGRESS NOTES
Pt to D/C to group home/home.  Pt provided with d/c instructions, including new medications, when medications were last given, and when to take them again.  Pt also informed to f/u with primary on 10/27@1420 at [facility] clinic.  Pt verbalized understanding of all d/c and f/u instructions.  All questions were answered at this time.  Copy of paperwork sent with pt.  Medication (Bactrim) sent with pt.  Group home to provide transport.  All personal belongings sent with pt.     Flu Vaccine: Declined

## 2020-10-04 NOTE — PROGRESS NOTES
End of Shift Summary  For vital signs and complete assessments, please see documentation flowsheets.     Pertinent assessments: A&O x 4, max temp 100.8 Tylenol x 1 given, recheck 99.6, tachy, elevated BP's scheduled Hydralazine given, on RA, denies any pain. Sepsis triggered, Lactic 0.6.  Using urinal at bedside, voiding without difficulty. BM x 1 this shift. HS BG check 217 scheduled Lantus and 1 unit NovoLog given. 0200 .     Major Shift Events: replaced potassium and magnesium, Mg recheck 2.3, K recheck 3.6    Treatment Plan: Control BG, IV Rocephin, scheduled hydralazine   Bedside Nurse: Jinny Wilson RN

## 2020-10-04 NOTE — PLAN OF CARE
End of Shift Summary  For vital signs and complete assessments, please see documentation flowsheets.     Pertinent assessments: A&O x 4, Tmax 100- recheck down to 98.7 tachy, elevated BP's, on RA, denies any pain. Using urinal at bedside, voiding without difficulty. Breakfast BG 89, lunch - did not want  lunch and only ate 1 side of grapes- held novolog. Loose/water dark brown/green stool.     Major Shift Events: replaced potassium and magnesium     Treatment Plan: Control BG, IV Rocephin, scheduled hydralazine   Bedside Nurse: Jinny Dallas RN

## 2020-10-04 NOTE — PROGRESS NOTES
Discharge Planner   Discharge Plans in progress: yes    Barriers to discharge plan: PRN orders incomplete on discharge orders.  [facility] received a call from [facility] the nurse at [facility] stating the they were missing 6 PRN medications and that they were discontinued by there pharmacy. CTS spoke with pharmacist who completed another med rec with [facility] at [facility] (see pharmacy note). Dr. Stephens was paged and he graciously agreed to revise discharge orders to include the missing PRN medications.  New discharge orders were faxed to [facility] [phone] and [facility] [phone].  CTS left VM for [facility] [phone] notifying him that revised orders were faxed.     Follow up plan: please reconsult if issues arise.    Anali Zurita RN, BSN, PHN, CTS  Care Coordinator  [facility]  [phone]           Entered by: Anali Zurita 10/04/2020 2:31 PM

## 2020-10-04 NOTE — PROGRESS NOTES
Care Management Discharge Note    Discharge Planning:  Expected Discharge Date: 10/04/20  Concerns to be Addressed: medication/ride        Anticipated Discharge Disposition:  10/4/20  Anticipated Discharge Services:  10/4/20  Anticipated Discharge DME:  none    Patient/Family in Agreement with the Plan:  yes    Disposition Comments:    Patient resides at [facility].phone [phone] fax [phone]  CTS spoke with Avani and [facility]rick to coordinate ride- awaiting call back with ride time confirmation.   CTS faxed discharge orders to [facility] and [facility] fax [phone] phone [phone].  CTS spoke with bedside nurse to update on plan and confirm that new medications are being filled at the hosptial and will be send home with patient.    Anali Zurita RN, BSN, PHN, CTS  Care Coordinator  [facility]  [phone]        Addendum:   received call from [place] from [facility] who confirmed he will  patient at 1130 today. Staff notified

## 2020-10-04 NOTE — DISCHARGE SUMMARY
[facility]  Discharge Summary  Name: Benja Duong    MRN: [id number]  YOB: 1965    Age: 55 year old  Date of Discharge:  10/4/2020 11:23 AM  Date of Admission: 10/1/2020  Primary Care Provider: Cleopatra Andujar  Discharge Physician:  Hawk Bueno MD  Discharging Service:  Hospitalist      Discharge Diagnoses:  Sepsis secondary to E. coli bacteremia and pyelonephritis  Sinus tachycardia  Hyperglycemia  IDDM type II  Hypokalemia  Hyponatremia  Schizophrenia  HLD  GERD  Chronic anemia  Tobacco dependence     Hospital Course:  Summary of Stay: Benja Duong is a 55 year old male IDDM type II, HTN, HLD, GERD, schizophrenia, and MDD who was admitted on 10/1/2020 with hyperglycemia and sepsis.  No obvious infection symptoms.  Urinalysis shows moderate pyuria and leukocyte esterase.  Both sets of blood cultures and urine culture positive for E. coli.  Inflammatory markers significantly elevated.  He is feeling better after 3 days of IV ceftriaxone.  Still with tachycardia and low-grade fevers/leukocytosis so did obtain CT abdomen/pelvis with IV contrast that showed pyelonephritis/bladder wall thickening consistent with urinary source of infection.  He did have some trace pleural effusions, pelvic fluid, and gallbladder wall edema which is more likely from the anasarca as opposed to cholecystitis.  Preferred to have patient remain hospitalized for IV antibiotics until vital signs improving, however he was adamant about discharge back to group home.  Discharged home with additional 7 days of oral Bactrim to complete 10 days antibiotics.  Recommend he follow-up with primary care doctor early next week with a CBC and BMP and labs to make sure he continues to improve.  If developing right upper quadrant abdominal pain or not improving may need abdominal ultrasound to evaluate for cholecystitis and potential general surgery consultation.  Discussed with him returning for fevers, worsening  vital signs, or in general if he is feeling sick//right upper quadrant abdominal pain.     Problem List/Assessment and Plan:   Sepsis secondary to E. coli bacteremia and pyelonephritis: Vague historian, but if you ask him about cough or dysuria he said he has these sometimes.  Chest x-ray without infiltrate.  Urinalysis with moderate LE and moderate pyuria and urine cultures growing E. coli suspect this is the primary source.  Does have history of cholelithiasis, however denies any abdominal pain now to suspect cholecystitis.  Significantly elevated  and .  Initial tachycardia and leukocytosis consistent with sepsis.  Procalcitonin 0.43.  Leukocytosis improving although not normalized after 3 days of IV ceftriaxone.  Persistent low-grade temperatures and tachycardia so obtain CT abdomen/pelvis with IV contrast that did show signs of bilateral pyelonephritis without abscess and bladder wall thickening likely from cystitis.  Suspect urinary source and his low turnaround is due to significant pyelonephritis.  His CRP has improved and he feels fine and wants to discharge home.  -Received IV ceftriaxone here.  On discharge 7 additional days oral Bactrim, total 10-day antibiotics.  -Follow-up with primary care doctor in clinic with CBC and BMP in 3 to 5 days  -CT scan of the abdomen did show some gallbladder wall edema which I think is secondary to mild whole body anasarca, slight ascites, and trace pleural effusions from IV fluids here as opposed to cholecystitis.  No pain in this area.  Consideration for abdominal ultrasound and follow-up general surgery consultation if develops a right upper quadrant abdominal pain or is not improving with antibiotics alone.      Hyperglycemia and IDDM type II: PTA on metformin 1000 mg twice daily, glargine 18 units at bedtime, and exenatide weekly.  -Metformin and exenatide held while here, resume on discharge  -Continue glargine 18 units at bedtime     Hypokalemia:  "Potassium replaced while here.     Hyponatremia: Mostly pseudohyponatremia secondary to hyperglycemia, although mild hyponatremia in the past as well.  Resolved with IV fluids.     Sinus tachycardia: Rebeca mildly tachycardic which I suspect is secondary to sepsis from his pyelonephritis.  Blood pressure high while here.  Lactic acid not elevated.     HTN: PTA on lisinopril 40 mg daily, HCTZ 25 mg daily, and hydralazine 12.5 mg every 6 hours as needed for high blood pressure above 160.  Very hypertensive while here.    -Continue lisinopril 40 mg daily  -HCTZ held while here, okay to resume tomorrow  -He will continue with as needed oral hydralazine at home for elevated blood pressure, however increased to 25 mg dose     Schizophrenia: Receives risperidone injections IM every 14 days.     HLD: Resume PTA atorvastatin 20 mg at bedtime.     GERD: Resume PTA Protonix 40 mg twice daily.       Chronic anemia: Hemoglobin near baseline 8-10.       Tobacco dependence: Smokes half pack per day.  Declined N RT although very much wanted to return home to smoke.     Discharge Disposition:  Discharged to group home     Allergies:  Allergies   Allergen Reactions     Alprazolam      Other reaction(s): *Unknown States \"I break out\"     Oxycodone-Acetaminophen      Other reaction(s): *Unknown, States \"I break out\"        Discharge Medications:   Discharge Medication List as of 10/4/2020 10:51 AM      START taking these medications    Details   sulfamethoxazole-trimethoprim (BACTRIM DS) 800-160 MG tablet Take 1 tablet by mouth 2 times daily for 7 days, Disp-14 tablet, R-0, E-Prescribe         CONTINUE these medications which have CHANGED    Details   hydrALAZINE (APRESOLINE) 25 MG tablet Take 1 tablet (25 mg) by mouth every 6 hours as needed (HTN; SBP > 160), Disp-30 tablet, R-1, No Print Out         CONTINUE these medications which have NOT CHANGED    Details   Artificial Tear Solution (TEARS NATURALE OP) 1 drop by Both Eyelids route " "4 times daily Gentle Tears, Historical      atorvastatin (LIPITOR) 20 MG tablet Take 20 mg by mouth At Bedtime, Historical      cholecalciferol (VITAMIN D-1000 MAX ST) 1000 units TABS Take 1,000 Units by mouth daily, Disp-30 tablet, R-1, E-Prescribe      diphenhydrAMINE (BENADRYL) 50 MG capsule Take 50 mg by mouth At Bedtime, Historical      exenatide ER (BYDUREON) 2 MG recon vial kit susp for weekly inj Inject 2 mg Subcutaneous once a week DO not administer until he is seen by his PCP., Historical      ferrous sulfate (FEROSUL) 325 (65 Fe) MG tablet Take 1 tablet (325 mg) by mouth daily, Disp-30 tablet,R-1, E-Prescribe      hydrochlorothiazide (HYDRODIURIL) 25 MG tablet Take 1 tablet (25 mg) by mouth daily DO not resume until Na is back to normal or PCP orders, Historical      insulin glargine (LANTUS VIAL) 100 UNIT/ML vial Inject 18 Units Subcutaneous At Bedtime, Historical      lisinopril (ZESTRIL) 40 MG tablet Take 40 mg by mouth daily, Historical      metFORMIN (GLUCOPHAGE) 1000 MG tablet Take 1 tablet (1,000 mg) by mouth 2 times daily (with meals), Disp-60 tablet, R-1, E-Prescribe      pantoprazole (PROTONIX) 40 MG EC tablet Take 1 tablet (40 mg) by mouth 2 times daily, Historical      risperiDONE microspheres ER (RISPERDAL CONSTA) 50 MG injection Inject 50 mg into the muscle every 14 days, Historical              Condition on Discharge:  Discharge condition: Stable   Discharge vitals: Blood pressure (!) 183/93, pulse 119, temperature 98.5  F (36.9  C), temperature source Oral, resp. rate 22, height 1.715 m (5' 7.5\"), weight 73.6 kg (162 lb 3.2 oz), SpO2 94 %.   Code status on discharge: Full Code     History of Illness:  See detailed admission note for full details.    Physical Exam:  Blood pressure (!) 183/93, pulse 119, temperature 98.5  F (36.9  C), temperature source Oral, resp. rate 22, height 1.715 m (5' 7.5\"), weight 73.6 kg (162 lb 3.2 oz), SpO2 94 %.  Wt Readings from Last 1 Encounters:   10/04/20 " 73.6 kg (162 lb 3.2 oz)     Constitutional: Awake, NAD   Eyes: sclera white   HEENT: atraumatic, MMM  Respiratory: no respiratory distress, lungs cta bilaterally, no crackles or wheeze  Cardiovascular: Regular tachycardia without murmur  GI: non-tender to palpation, not distended, bowel sounds present  Skin: no rash    Musculoskeletal/extremities: Trace bilateral lower extremity edema  Neurologic: A&O, speech clear   Psychiatric: calm, cooperative     Procedures other than Imaging:  None     Imaging:  Results for orders placed or performed during the hospital encounter of 10/01/20   XR Chest Port 1 View    Narrative    EXAM: XR CHEST PORT 1 VW  LOCATION: [facility]  DATE/TIME: 10/1/2020 8:28 PM    INDICATION: ; tachycardia, hyperglycemia;  COMPARISON: 07/21/2020      Impression    IMPRESSION: Negative chest.   CT Abdomen Pelvis w Contrast    Narrative    CT ABDOMEN AND PELVIS WITH CONTRAST 10/4/2020 9:13 AM    CLINICAL HISTORY: Abdominal pain, acute, generalized. E coli  bacteremia. Persistent tachy/fevers.  Right lower quadrant and right  upper quadrant tender on exam.  Evaluate for any sign of cholecystitis  versus colitis/abscess versus pyelonephritis.    TECHNIQUE: CT scan of the abdomen and pelvis was performed following  injection of IV contrast. Multiplanar reformats were obtained. Dose  reduction techniques were used.    CONTRAST: 81mL [id number]-370    COMPARISON: CT chest, abdomen and pelvis 7/21/2020.    FINDINGS:   LOWER CHEST: Mild interstitial prominence at the lower lungs. Small  but new bilateral pleural fluid.    HEPATOBILIARY: New finding of edema of the gallbladder wall. Tiny  cholelithiasis suggested on series 3 image 73. No focal or acute  hepatic parenchymal abnormality is seen.    PANCREAS: No acute abnormality. No focal fluid.    SPLEEN: Normal.    ADRENAL GLANDS: Normal.    KIDNEYS/BLADDER: Bilateral renal edema, and heterogeneous perfusion of  the kidneys are newly identified.  There are a few renal cysts that do  not require specific follow-up. However, there are two newly  identified somewhat focal hypodensities within the periphery of the  left upper and mid kidney. One of these is approximately 1.4 x 1.0 cm,  series 3 image 77, and another small focal hypodensity is 1.3 x 1.0  cm, image 69. There is enhancement of the walls of the bilateral renal  collecting systems, and no gross hydronephrosis. There is diffuse wall  thickening of the urinary bladder. No bladder stones.    BOWEL: No obstruction. No convincing acute inflammation. There is a  ventral midline abdominal wall hernia just superior to the umbilical  region. Approximate size is 3.1 cm, similar to the prior exam, series  3 image 122. A portion of the adjacent colonic wall herniates into  this area, but there is no associated inflammation of the colonic wall  at this time.    PELVIC ORGANS: Small pelvic fluid.    ADDITIONAL FINDINGS: Edema throughout the fat planes of the abdomen  and pelvis suggesting anasarca. Small fluid at the retroperitoneum  adjacent to the duodenum.    MUSCULOSKELETAL: No acute abnormality.      Impression    IMPRESSION:   1.  Bilateral heterogeneous perfusion of the kidneys suggesting  pyelonephritis. Enhancement of the bilateral renal collecting system  walls also most consistent with an infectious etiology. There is wall  thickening of the urinary bladder that could also represent cystitis.  2.  There are two new but small hypodense regions within the periphery  of the left posterior kidney. These are indeterminant with small renal  abscess being a possibility at these locations.  3.  New finding of mild gallbladder wall edema. Cannot exclude  cholecystitis.  4.  Anasarca is newly seen. New but small bibasilar pleural fluid and  a mild degree of bibasilar interstitial edema.  5.  No substantial change of a ventral midline periumbilical region  hernia that partially contains the anterior wall of the  adjacent colon  without obstruction or convincing acute inflammation.  6.  Small fluid at the deep pelvis. Edema and small fluid at the  retroperitoneum.    ELIZABETH DE ANDA MD        Consultations:  No consultations were requested during this admission.       Recent Lab Results:  Recent Labs   Lab 10/04/20  0628 10/03/20  0707 10/02/20  0729   WBC 11.3* 12.8* 12.3*   HGB 9.3* 9.2* 9.2*   HCT 29.2* 28.0* 28.1*   MCV 90 90 89    256 247     Recent Labs   Lab 10/01/20  2105 10/01/20  1945   CULT Cultured on the 1st day of incubation:  Escherichia coli  Susceptibility testing done on previous specimen  *  Critical Value/Significant Value, preliminary result only, called to and read back by  Mansi Bal RN @ 1002 10.2.20 JE    Cultured on the 1st day of incubation:  Escherichia coli  *  Critical Value/Significant Value, preliminary result only, called to and read back by  Mansi Bal RN @ 1002 10.2.20 JE    (Note)  POSITIVE for E.COLI by Verigene multiplex nucleic acid test. Final  identification and antimicrobial susceptibility testing will be  verified by standard methods. Verigene test will not distinguish  E.coli from Shigella species including S.dysenteriae, S.flexneri,  S.boydii, and S.sonnei. Specimens containing Shigella species or  E.coli will be reported as Positive for E.coli.    Specimen tested with Verigene multiplex, gram-negative blood culture  nucleic acid test for the following targets: Acinetobacter sp.,  Citrobacter sp., Enterobacter sp., Proteus sp., E. coli, K.  pneumoniae/oxytoca, P. aeruginosa, and the following resistance  markers: CTXM, KPC, NDM, VIM, IMP and OXA.      Critical Value/Significant Value called to and read back by JOSE ALBERTO  [id number]  MANSI BAL 10.02.2020 AT 12.15PM,ZG     >100,000 colonies/mL  Escherichia coli  *     Recent Labs   Lab 10/04/20  0628 10/03/20  1926 10/03/20  1459 10/03/20  0707 10/02/20  0729 10/02/20  0729     --   --  137  --  132*   POTASSIUM 3.5  --  3.6  3.2*   < > 3.3*   CHLORIDE 104  --   --  104  --  98   CO2 26  --   --  26  --  28   ANIONGAP 5  --   --  7  --  6   *  --   --  92  --  267*   BUN 3*  --   --  4*  --  5*   CR 0.64*  --   --  0.78  --  0.81   GFRESTIMATED >90  --   --  >90  --  >90   GFRESTBLACK >90  --   --  >90  --  >90   MITCHEL 8.8  --   --  8.8  --  9.1   MAG 1.7 2.3  --  1.4*  --   --    PROTTOTAL  --   --  6.2*  --   --   --    ALBUMIN  --   --  2.3*  --   --   --    BILITOTAL  --   --  0.3  --   --   --    ALKPHOS  --   --  99  --   --   --    AST  --   --  13  --   --   --    ALT  --   --  13  --   --   --     < > = values in this interval not displayed.     Recent Labs   Lab 10/03/20  0707 10/01/20  1835   * 114*   .0* 280.0*     Recent Labs   Lab 10/01/20  2022   LACT 2.0     Recent Labs   Lab 10/01/20  1945   COLOR Straw   APPEARANCE Clear   URINEGLC >1000*   URINEBILI Negative   URINEKETONE Negative   SG 1.017   UBLD Small*   URINEPH 6.0   PROTEIN Negative   NITRITE Negative   LEUKEST Moderate*   RBCU 4*   WBCU 39*          Pending Results:    Unresulted Labs Ordered in the Past 30 Days of this Admission     No orders found from 9/1/2020 to 10/2/2020.         These results will be followed up by patient's primary care provider.    Discharge Instructions and Follow-Up:   Discharge Procedure Orders   Reason for your hospital stay   Order Comments: You were hospitalized for a bladder and kidney infection that spread to the bloodstream.  You were improving on antibiotics and will finish a additional week of oral Bactrim.  Please take the entire prescription.  If you are having worsening symptoms please return for evaluation.  It will be very important to follow-up with your primary care doctor in clinic early this week with a lab draw to make sure you continue to get better.  If not improving we may need to look at your gallbladder with an ultrasound as it looked a little bit abnormal on your CT scan.     Follow-up and  recommended labs and tests    Order Comments: Follow up with primary care provider, Cleopatra Andujar, within 3-5 days for hospital follow- up.  The following labs/tests are recommended: CBC and BMP.     Activity   Order Comments: Your activity upon discharge: activity as tolerated     Order Specific Question Answer Comments   Is discharge order? Yes      Full Code     Order Specific Question Answer Comments   Code status determined by: Discussion with patient/ legal decision maker      Diet   Order Comments: Follow this diet upon discharge: Orders Placed This Encounter      Combination Diet Regular Diet Adult     Order Specific Question Answer Comments   Is discharge order? Yes            I, Hawk Bueno MD, personally saw the patient today and spent greater than 30 minutes discharging this patient.    Hawk Bueno MD

## 2021-06-02 VITALS — HEIGHT: 67 IN | BODY MASS INDEX: 26.63 KG/M2

## 2021-06-02 VITALS — BODY MASS INDEX: 26.63 KG/M2 | WEIGHT: 170 LBS

## 2021-12-27 ENCOUNTER — APPOINTMENT (OUTPATIENT)
Dept: CT IMAGING | Facility: CLINIC | Age: 56
DRG: 683 | End: 2021-12-27
Attending: EMERGENCY MEDICINE
Payer: COMMERCIAL

## 2021-12-27 ENCOUNTER — APPOINTMENT (OUTPATIENT)
Dept: GENERAL RADIOLOGY | Facility: CLINIC | Age: 56
DRG: 683 | End: 2021-12-27
Attending: EMERGENCY MEDICINE
Payer: COMMERCIAL

## 2021-12-27 ENCOUNTER — HOSPITAL ENCOUNTER (INPATIENT)
Facility: CLINIC | Age: 56
LOS: 2 days | Discharge: NURSING, REHAB OR LONG-TERM CARE | DRG: 683 | End: 2021-12-29
Attending: EMERGENCY MEDICINE | Admitting: STUDENT IN AN ORGANIZED HEALTH CARE EDUCATION/TRAINING PROGRAM
Payer: COMMERCIAL

## 2021-12-27 DIAGNOSIS — N17.9 ACUTE KIDNEY INJURY (H): ICD-10-CM

## 2021-12-27 DIAGNOSIS — E87.1 HYPONATREMIA: ICD-10-CM

## 2021-12-27 DIAGNOSIS — A41.9 SEPSIS, DUE TO UNSPECIFIED ORGANISM, UNSPECIFIED WHETHER ACUTE ORGAN DYSFUNCTION PRESENT (H): Primary | ICD-10-CM

## 2021-12-27 DIAGNOSIS — R07.9 CHEST PAIN, UNSPECIFIED TYPE: ICD-10-CM

## 2021-12-27 DIAGNOSIS — K92.0 HEMATEMESIS WITH NAUSEA: ICD-10-CM

## 2021-12-27 DIAGNOSIS — R19.7 VOMITING AND DIARRHEA: ICD-10-CM

## 2021-12-27 DIAGNOSIS — K20.90 ESOPHAGITIS: ICD-10-CM

## 2021-12-27 DIAGNOSIS — R11.10 VOMITING AND DIARRHEA: ICD-10-CM

## 2021-12-27 LAB
ALBUMIN SERPL-MCNC: 3.8 G/DL (ref 3.4–5)
ALBUMIN UR-MCNC: 20 MG/DL
ALP SERPL-CCNC: 71 U/L (ref 40–150)
ALT SERPL W P-5'-P-CCNC: 20 U/L (ref 0–70)
ANION GAP SERPL CALCULATED.3IONS-SCNC: 12 MMOL/L (ref 3–14)
APPEARANCE UR: CLEAR
AST SERPL W P-5'-P-CCNC: 15 U/L (ref 0–45)
BASE EXCESS BLDV CALC-SCNC: 7.5 MMOL/L (ref -7.7–1.9)
BASOPHILS # BLD AUTO: 0 10E3/UL (ref 0–0.2)
BASOPHILS NFR BLD AUTO: 0 %
BILIRUB SERPL-MCNC: 0.3 MG/DL (ref 0.2–1.3)
BILIRUB UR QL STRIP: NEGATIVE
BUN SERPL-MCNC: 36 MG/DL (ref 7–30)
CALCIUM SERPL-MCNC: 9.5 MG/DL (ref 8.5–10.1)
CHLORIDE BLD-SCNC: 79 MMOL/L (ref 94–109)
CO2 SERPL-SCNC: 30 MMOL/L (ref 20–32)
COLOR UR AUTO: ABNORMAL
CREAT SERPL-MCNC: 1.03 MG/DL (ref 0.66–1.25)
CREAT SERPL-MCNC: 1.41 MG/DL (ref 0.66–1.25)
D DIMER PPP FEU-MCNC: 2.2 UG/ML FEU (ref 0–0.5)
EOSINOPHIL # BLD AUTO: 0 10E3/UL (ref 0–0.7)
EOSINOPHIL NFR BLD AUTO: 0 %
ERYTHROCYTE [DISTWIDTH] IN BLOOD BY AUTOMATED COUNT: 11.6 % (ref 10–15)
FLUAV RNA SPEC QL NAA+PROBE: NEGATIVE
FLUBV RNA RESP QL NAA+PROBE: NEGATIVE
GASTROCULT GAST QL: POSITIVE
GFR SERPL CREATININE-BSD FRML MDRD: 58 ML/MIN/1.73M2
GFR SERPL CREATININE-BSD FRML MDRD: 85 ML/MIN/1.73M2
GLUCOSE BLD-MCNC: 240 MG/DL (ref 70–99)
GLUCOSE BLDC GLUCOMTR-MCNC: 166 MG/DL (ref 70–99)
GLUCOSE BLDC GLUCOMTR-MCNC: 180 MG/DL (ref 70–99)
GLUCOSE UR STRIP-MCNC: >=1000 MG/DL
HBA1C MFR BLD: 6.9 % (ref 0–5.6)
HCO3 BLDV-SCNC: 34 MMOL/L (ref 21–28)
HCT VFR BLD AUTO: 33.1 % (ref 40–53)
HGB BLD-MCNC: 11.7 G/DL (ref 13.3–17.7)
HGB UR QL STRIP: NEGATIVE
HYALINE CASTS: 3 /LPF
IMM GRANULOCYTES # BLD: 0.1 10E3/UL
IMM GRANULOCYTES NFR BLD: 1 %
KETONES UR STRIP-MCNC: 20 MG/DL
LACTATE SERPL-SCNC: 2.3 MMOL/L (ref 0.7–2)
LACTATE SERPL-SCNC: 2.6 MMOL/L (ref 0.7–2)
LEUKOCYTE ESTERASE UR QL STRIP: NEGATIVE
LYMPHOCYTES # BLD AUTO: 1.4 10E3/UL (ref 0.8–5.3)
LYMPHOCYTES NFR BLD AUTO: 8 %
MAGNESIUM SERPL-MCNC: 1.5 MG/DL (ref 1.6–2.3)
MCH RBC QN AUTO: 30.9 PG (ref 26.5–33)
MCHC RBC AUTO-ENTMCNC: 35.3 G/DL (ref 31.5–36.5)
MCV RBC AUTO: 87 FL (ref 78–100)
MONOCYTES # BLD AUTO: 1.2 10E3/UL (ref 0–1.3)
MONOCYTES NFR BLD AUTO: 7 %
MUCOUS THREADS #/AREA URNS LPF: PRESENT /LPF
NEUTROPHILS # BLD AUTO: 14.3 10E3/UL (ref 1.6–8.3)
NEUTROPHILS NFR BLD AUTO: 84 %
NITRATE UR QL: NEGATIVE
NRBC # BLD AUTO: 0 10E3/UL
NRBC BLD AUTO-RTO: 0 /100
O2/TOTAL GAS SETTING VFR VENT: 0 %
OXYHGB MFR BLDV: 26 % (ref 70–75)
PCO2 BLDV: 56 MM HG (ref 40–50)
PH BLDV: 7.4 [PH] (ref 7.32–7.43)
PH GAST: ABNORMAL [PH]
PH UR STRIP: 7 [PH] (ref 5–7)
PLATELET # BLD AUTO: 295 10E3/UL (ref 150–450)
PO2 BLDV: 20 MM HG (ref 25–47)
POTASSIUM BLD-SCNC: 3.2 MMOL/L (ref 3.4–5.3)
POTASSIUM BLD-SCNC: 3.5 MMOL/L (ref 3.4–5.3)
PROT SERPL-MCNC: 7.4 G/DL (ref 6.8–8.8)
RBC # BLD AUTO: 3.79 10E6/UL (ref 4.4–5.9)
RBC URINE: 6 /HPF
SARS-COV-2 RNA RESP QL NAA+PROBE: NEGATIVE
SODIUM SERPL-SCNC: 121 MMOL/L (ref 133–144)
SODIUM SERPL-SCNC: 135 MMOL/L (ref 133–144)
SP GR UR STRIP: 1.02 (ref 1–1.03)
TROPONIN I SERPL HS-MCNC: 12 NG/L
UROBILINOGEN UR STRIP-MCNC: NORMAL MG/DL
WBC # BLD AUTO: 17.1 10E3/UL (ref 4–11)
WBC URINE: 1 /HPF

## 2021-12-27 PROCEDURE — 82040 ASSAY OF SERUM ALBUMIN: CPT | Performed by: EMERGENCY MEDICINE

## 2021-12-27 PROCEDURE — 250N000011 HC RX IP 250 OP 636: Performed by: INTERNAL MEDICINE

## 2021-12-27 PROCEDURE — 82565 ASSAY OF CREATININE: CPT | Performed by: STUDENT IN AN ORGANIZED HEALTH CARE EDUCATION/TRAINING PROGRAM

## 2021-12-27 PROCEDURE — 99285 EMERGENCY DEPT VISIT HI MDM: CPT | Mod: 25

## 2021-12-27 PROCEDURE — 36415 COLL VENOUS BLD VENIPUNCTURE: CPT | Performed by: EMERGENCY MEDICINE

## 2021-12-27 PROCEDURE — 250N000011 HC RX IP 250 OP 636: Performed by: EMERGENCY MEDICINE

## 2021-12-27 PROCEDURE — 80053 COMPREHEN METABOLIC PANEL: CPT | Performed by: EMERGENCY MEDICINE

## 2021-12-27 PROCEDURE — 74177 CT ABD & PELVIS W/CONTRAST: CPT

## 2021-12-27 PROCEDURE — 96361 HYDRATE IV INFUSION ADD-ON: CPT

## 2021-12-27 PROCEDURE — 83036 HEMOGLOBIN GLYCOSYLATED A1C: CPT | Performed by: STUDENT IN AN ORGANIZED HEALTH CARE EDUCATION/TRAINING PROGRAM

## 2021-12-27 PROCEDURE — 82271 OCCULT BLOOD OTHER SOURCES: CPT | Performed by: EMERGENCY MEDICINE

## 2021-12-27 PROCEDURE — 85379 FIBRIN DEGRADATION QUANT: CPT | Performed by: EMERGENCY MEDICINE

## 2021-12-27 PROCEDURE — 71045 X-RAY EXAM CHEST 1 VIEW: CPT

## 2021-12-27 PROCEDURE — 84132 ASSAY OF SERUM POTASSIUM: CPT | Performed by: STUDENT IN AN ORGANIZED HEALTH CARE EDUCATION/TRAINING PROGRAM

## 2021-12-27 PROCEDURE — 87040 BLOOD CULTURE FOR BACTERIA: CPT | Performed by: EMERGENCY MEDICINE

## 2021-12-27 PROCEDURE — C9113 INJ PANTOPRAZOLE SODIUM, VIA: HCPCS | Performed by: STUDENT IN AN ORGANIZED HEALTH CARE EDUCATION/TRAINING PROGRAM

## 2021-12-27 PROCEDURE — 250N000013 HC RX MED GY IP 250 OP 250 PS 637: Performed by: INTERNAL MEDICINE

## 2021-12-27 PROCEDURE — 83605 ASSAY OF LACTIC ACID: CPT | Performed by: STUDENT IN AN ORGANIZED HEALTH CARE EDUCATION/TRAINING PROGRAM

## 2021-12-27 PROCEDURE — 84484 ASSAY OF TROPONIN QUANT: CPT | Performed by: EMERGENCY MEDICINE

## 2021-12-27 PROCEDURE — 83605 ASSAY OF LACTIC ACID: CPT | Performed by: EMERGENCY MEDICINE

## 2021-12-27 PROCEDURE — C9803 HOPD COVID-19 SPEC COLLECT: HCPCS

## 2021-12-27 PROCEDURE — 99223 1ST HOSP IP/OBS HIGH 75: CPT | Mod: AI | Performed by: STUDENT IN AN ORGANIZED HEALTH CARE EDUCATION/TRAINING PROGRAM

## 2021-12-27 PROCEDURE — 120N000001 HC R&B MED SURG/OB

## 2021-12-27 PROCEDURE — 83735 ASSAY OF MAGNESIUM: CPT | Performed by: STUDENT IN AN ORGANIZED HEALTH CARE EDUCATION/TRAINING PROGRAM

## 2021-12-27 PROCEDURE — 93005 ELECTROCARDIOGRAM TRACING: CPT

## 2021-12-27 PROCEDURE — 85004 AUTOMATED DIFF WBC COUNT: CPT | Performed by: EMERGENCY MEDICINE

## 2021-12-27 PROCEDURE — 96365 THER/PROPH/DIAG IV INF INIT: CPT

## 2021-12-27 PROCEDURE — 250N000013 HC RX MED GY IP 250 OP 250 PS 637: Performed by: STUDENT IN AN ORGANIZED HEALTH CARE EDUCATION/TRAINING PROGRAM

## 2021-12-27 PROCEDURE — C9113 INJ PANTOPRAZOLE SODIUM, VIA: HCPCS | Performed by: EMERGENCY MEDICINE

## 2021-12-27 PROCEDURE — 87636 SARSCOV2 & INF A&B AMP PRB: CPT | Performed by: EMERGENCY MEDICINE

## 2021-12-27 PROCEDURE — 36415 COLL VENOUS BLD VENIPUNCTURE: CPT | Performed by: STUDENT IN AN ORGANIZED HEALTH CARE EDUCATION/TRAINING PROGRAM

## 2021-12-27 PROCEDURE — 96375 TX/PRO/DX INJ NEW DRUG ADDON: CPT

## 2021-12-27 PROCEDURE — 81001 URINALYSIS AUTO W/SCOPE: CPT | Performed by: EMERGENCY MEDICINE

## 2021-12-27 PROCEDURE — 87086 URINE CULTURE/COLONY COUNT: CPT | Performed by: EMERGENCY MEDICINE

## 2021-12-27 PROCEDURE — 250N000011 HC RX IP 250 OP 636: Performed by: STUDENT IN AN ORGANIZED HEALTH CARE EDUCATION/TRAINING PROGRAM

## 2021-12-27 PROCEDURE — 258N000003 HC RX IP 258 OP 636: Performed by: EMERGENCY MEDICINE

## 2021-12-27 PROCEDURE — 82805 BLOOD GASES W/O2 SATURATION: CPT | Performed by: EMERGENCY MEDICINE

## 2021-12-27 PROCEDURE — 84295 ASSAY OF SERUM SODIUM: CPT | Performed by: STUDENT IN AN ORGANIZED HEALTH CARE EDUCATION/TRAINING PROGRAM

## 2021-12-27 PROCEDURE — 258N000003 HC RX IP 258 OP 636: Performed by: STUDENT IN AN ORGANIZED HEALTH CARE EDUCATION/TRAINING PROGRAM

## 2021-12-27 RX ORDER — PROCHLORPERAZINE 25 MG
25 SUPPOSITORY, RECTAL RECTAL EVERY 12 HOURS PRN
Status: DISCONTINUED | OUTPATIENT
Start: 2021-12-27 | End: 2021-12-29 | Stop reason: HOSPADM

## 2021-12-27 RX ORDER — PROCHLORPERAZINE MALEATE 10 MG
10 TABLET ORAL EVERY 6 HOURS PRN
Status: DISCONTINUED | OUTPATIENT
Start: 2021-12-27 | End: 2021-12-29 | Stop reason: HOSPADM

## 2021-12-27 RX ORDER — SODIUM CHLORIDE 9 MG/ML
INJECTION, SOLUTION INTRAVENOUS ONCE
Status: COMPLETED | OUTPATIENT
Start: 2021-12-27 | End: 2021-12-27

## 2021-12-27 RX ORDER — IOPAMIDOL 755 MG/ML
58 INJECTION, SOLUTION INTRAVASCULAR ONCE
Status: COMPLETED | OUTPATIENT
Start: 2021-12-27 | End: 2021-12-27

## 2021-12-27 RX ORDER — CEFTRIAXONE 1 G/1
1 INJECTION, POWDER, FOR SOLUTION INTRAMUSCULAR; INTRAVENOUS EVERY 24 HOURS
Status: DISCONTINUED | OUTPATIENT
Start: 2021-12-27 | End: 2021-12-28

## 2021-12-27 RX ORDER — SODIUM CHLORIDE, SODIUM LACTATE, POTASSIUM CHLORIDE, CALCIUM CHLORIDE 600; 310; 30; 20 MG/100ML; MG/100ML; MG/100ML; MG/100ML
INJECTION, SOLUTION INTRAVENOUS CONTINUOUS
Status: DISCONTINUED | OUTPATIENT
Start: 2021-12-27 | End: 2021-12-28

## 2021-12-27 RX ORDER — CARBOXYMETHYLCELLULOSE SODIUM 10 MG/ML
1 GEL OPHTHALMIC 3 TIMES DAILY PRN
Status: DISCONTINUED | OUTPATIENT
Start: 2021-12-27 | End: 2021-12-29 | Stop reason: HOSPADM

## 2021-12-27 RX ORDER — POTASSIUM CHLORIDE 1500 MG/1
40 TABLET, EXTENDED RELEASE ORAL ONCE
Status: COMPLETED | OUTPATIENT
Start: 2021-12-27 | End: 2021-12-27

## 2021-12-27 RX ORDER — ONDANSETRON 2 MG/ML
8 INJECTION INTRAMUSCULAR; INTRAVENOUS ONCE
Status: COMPLETED | OUTPATIENT
Start: 2021-12-27 | End: 2021-12-27

## 2021-12-27 RX ORDER — MAGNESIUM SULFATE HEPTAHYDRATE 40 MG/ML
4 INJECTION, SOLUTION INTRAVENOUS ONCE
Status: COMPLETED | OUTPATIENT
Start: 2021-12-27 | End: 2021-12-27

## 2021-12-27 RX ORDER — ATORVASTATIN CALCIUM 20 MG/1
20 TABLET, FILM COATED ORAL AT BEDTIME
Status: DISCONTINUED | OUTPATIENT
Start: 2021-12-27 | End: 2021-12-29 | Stop reason: HOSPADM

## 2021-12-27 RX ORDER — ONDANSETRON 2 MG/ML
4 INJECTION INTRAMUSCULAR; INTRAVENOUS EVERY 6 HOURS PRN
Status: DISCONTINUED | OUTPATIENT
Start: 2021-12-27 | End: 2021-12-29 | Stop reason: HOSPADM

## 2021-12-27 RX ORDER — NICOTINE 21 MG/24HR
1 PATCH, TRANSDERMAL 24 HOURS TRANSDERMAL DAILY
Status: DISCONTINUED | OUTPATIENT
Start: 2021-12-28 | End: 2021-12-28

## 2021-12-27 RX ORDER — ONDANSETRON 4 MG/1
4 TABLET, ORALLY DISINTEGRATING ORAL EVERY 6 HOURS PRN
Status: DISCONTINUED | OUTPATIENT
Start: 2021-12-27 | End: 2021-12-29 | Stop reason: HOSPADM

## 2021-12-27 RX ORDER — DEXTROSE MONOHYDRATE 25 G/50ML
25-50 INJECTION, SOLUTION INTRAVENOUS
Status: DISCONTINUED | OUTPATIENT
Start: 2021-12-27 | End: 2021-12-29 | Stop reason: HOSPADM

## 2021-12-27 RX ORDER — LIDOCAINE 40 MG/G
CREAM TOPICAL
Status: DISCONTINUED | OUTPATIENT
Start: 2021-12-27 | End: 2021-12-29 | Stop reason: HOSPADM

## 2021-12-27 RX ORDER — ACETAMINOPHEN 325 MG/1
650 TABLET ORAL EVERY 4 HOURS PRN
Status: DISCONTINUED | OUTPATIENT
Start: 2021-12-27 | End: 2021-12-29 | Stop reason: HOSPADM

## 2021-12-27 RX ORDER — NICOTINE POLACRILEX 4 MG
15-30 LOZENGE BUCCAL
Status: DISCONTINUED | OUTPATIENT
Start: 2021-12-27 | End: 2021-12-29 | Stop reason: HOSPADM

## 2021-12-27 RX ORDER — DIPHENHYDRAMINE HYDROCHLORIDE 50 MG/ML
12.5 INJECTION INTRAMUSCULAR; INTRAVENOUS ONCE
Status: COMPLETED | OUTPATIENT
Start: 2021-12-27 | End: 2021-12-27

## 2021-12-27 RX ORDER — METOCLOPRAMIDE HYDROCHLORIDE 5 MG/ML
5 INJECTION INTRAMUSCULAR; INTRAVENOUS ONCE
Status: COMPLETED | OUTPATIENT
Start: 2021-12-27 | End: 2021-12-27

## 2021-12-27 RX ADMIN — SODIUM CHLORIDE: 9 INJECTION, SOLUTION INTRAVENOUS at 11:18

## 2021-12-27 RX ADMIN — PANTOPRAZOLE SODIUM 80 MG: 40 INJECTION, POWDER, FOR SOLUTION INTRAVENOUS at 11:45

## 2021-12-27 RX ADMIN — ENOXAPARIN SODIUM 40 MG: 40 INJECTION SUBCUTANEOUS at 18:38

## 2021-12-27 RX ADMIN — CEFTRIAXONE 1 G: 1 INJECTION, POWDER, FOR SOLUTION INTRAMUSCULAR; INTRAVENOUS at 17:37

## 2021-12-27 RX ADMIN — DIPHENHYDRAMINE HYDROCHLORIDE 12.5 MG: 50 INJECTION INTRAMUSCULAR; INTRAVENOUS at 09:57

## 2021-12-27 RX ADMIN — SODIUM CHLORIDE, POTASSIUM CHLORIDE, SODIUM LACTATE AND CALCIUM CHLORIDE 1000 ML: 600; 310; 30; 20 INJECTION, SOLUTION INTRAVENOUS at 16:25

## 2021-12-27 RX ADMIN — ONDANSETRON 4 MG: 4 TABLET, ORALLY DISINTEGRATING ORAL at 17:35

## 2021-12-27 RX ADMIN — SODIUM CHLORIDE, POTASSIUM CHLORIDE, SODIUM LACTATE AND CALCIUM CHLORIDE: 600; 310; 30; 20 INJECTION, SOLUTION INTRAVENOUS at 18:35

## 2021-12-27 RX ADMIN — PANTOPRAZOLE SODIUM 40 MG: 40 INJECTION, POWDER, FOR SOLUTION INTRAVENOUS at 21:28

## 2021-12-27 RX ADMIN — METOCLOPRAMIDE HYDROCHLORIDE 5 MG: 5 INJECTION INTRAMUSCULAR; INTRAVENOUS at 09:57

## 2021-12-27 RX ADMIN — TAZOBACTAM SODIUM AND PIPERACILLIN SODIUM 4.5 G: 500; 4 INJECTION, SOLUTION INTRAVENOUS at 09:57

## 2021-12-27 RX ADMIN — PROCHLORPERAZINE MALEATE 10 MG: 10 TABLET ORAL at 18:40

## 2021-12-27 RX ADMIN — SODIUM CHLORIDE 1000 ML: 9 INJECTION, SOLUTION INTRAVENOUS at 13:00

## 2021-12-27 RX ADMIN — MAGNESIUM SULFATE HEPTAHYDRATE 4 G: 40 INJECTION, SOLUTION INTRAVENOUS at 21:28

## 2021-12-27 RX ADMIN — ONDANSETRON 8 MG: 2 INJECTION INTRAMUSCULAR; INTRAVENOUS at 08:59

## 2021-12-27 RX ADMIN — POTASSIUM CHLORIDE 40 MEQ: 1500 TABLET, EXTENDED RELEASE ORAL at 21:17

## 2021-12-27 RX ADMIN — ATORVASTATIN CALCIUM 20 MG: 20 TABLET, FILM COATED ORAL at 21:17

## 2021-12-27 RX ADMIN — SODIUM CHLORIDE, POTASSIUM CHLORIDE, SODIUM LACTATE AND CALCIUM CHLORIDE 1000 ML: 600; 310; 30; 20 INJECTION, SOLUTION INTRAVENOUS at 08:59

## 2021-12-27 RX ADMIN — IOPAMIDOL 58 ML: 755 INJECTION, SOLUTION INTRAVENOUS at 10:30

## 2021-12-27 ASSESSMENT — ENCOUNTER SYMPTOMS
NAUSEA: 1
FEVER: 0
DIARRHEA: 1
VOMITING: 1
COUGH: 1
SHORTNESS OF BREATH: 1

## 2021-12-27 ASSESSMENT — ACTIVITIES OF DAILY LIVING (ADL)
ADLS_ACUITY_SCORE: 11
ADLS_ACUITY_SCORE: 13
ADLS_ACUITY_SCORE: 7
ADLS_ACUITY_SCORE: 7
ADLS_ACUITY_SCORE: 12

## 2021-12-27 ASSESSMENT — MIFFLIN-ST. JEOR: SCORE: 1581.96

## 2021-12-27 NOTE — PROGRESS NOTES
Repeat lactic acid still elevated at 2.3. Patient still tachycardic, normotensive.    Repeat 1L LR bolus ordered.    Dory Seth MD  Internal Medicine & Pediatrics  [facility]

## 2021-12-27 NOTE — PHARMACY-ADMISSION MEDICATION HISTORY
Admission medication history interview status for this patient is complete. See [facility] admission navigator for allergy information, prior to admission medications and immunization status.     Medication history interview done, indicate source(s): [facility]   Medication history resources Facility MAR   Pharmacy: [facility]    Changes made to PTA medication list:  Added: none   Changed: Lantus 18 units every evening --> Lantus 21 units every evening   artificial tears --> Genteal gel 0.3%  1 drop in each eye BID   Reported as Not Taking: artificial tears  Removed: artificial tears     Actions taken by pharmacist (provider contacted, etc):None     Additional medication history information:None    Medication reconciliation/reorder completed by provider prior to medication history?  No          Prior to Admission medications    Medication Sig Last Dose Taking? Auth Provider   acetaminophen (TYLENOL) 325 MG tablet Take 650 mg by mouth every 4 hours as needed for mild pain  Yes Unknown, Entered By History   diphenhydrAMINE (BENADRYL) 50 MG capsule Take 50 mg by mouth every 4 hours as needed for itching or allergies  Yes Unknown, Entered By History   diphenhydrAMINE (BENADRYL) 50 MG capsule Take 50 mg by mouth At Bedtime 12/26/2021 at 2100 Yes Unknown, Entered By History   ferrous sulfate (FEROSUL) 325 (65 Fe) MG tablet Take 1 tablet (325 mg) by mouth daily 12/26/2021 at 0900 Yes Maile Bragg PA-C   glucose (BD GLUCOSE) 4 g chewable tablet Take 4 tablets by mouth as needed for low blood sugar  Yes Unknown, Entered By History   hydrALAZINE (APRESOLINE) 25 MG tablet Take 1 tablet (25 mg) by mouth every 6 hours as needed (HTN; SBP > 160)  Yes Hawk Bueno MD   hypromellose (GENTEAL SEVERE) ophthalmic gel 0.3% Place 1 drop into both eyes 2 times daily 12/26/2021 at 2100 Yes Unknown, Entered By History   insulin glargine (LANTUS PEN) 100 UNIT/ML pen Inject 21 Units Subcutaneous At Bedtime  12/26/2021 at 1900 Yes Unknown, Entered By History   lisinopril (ZESTRIL) 40 MG tablet Take 40 mg by mouth daily 12/26/2021 at 0900 Yes Unknown, Entered By History   metFORMIN (GLUCOPHAGE) 1000 MG tablet Take 1 tablet (1,000 mg) by mouth 2 times daily (with meals) 12/26/2021 at 2100 Yes Paul Andrews MD   ondansetron (ZOFRAN-ODT) 4 MG ODT tab Take 4 mg by mouth 3 times daily as needed for nausea or vomiting  Yes Unknown, Entered By History   pantoprazole (PROTONIX) 40 MG EC tablet Take 1 tablet (40 mg) by mouth 2 times daily 12/26/2021 at 0900 Yes Benton Whitman MD   risperiDONE microspheres ER (RISPERDAL CONSTA) 50 MG injection Inject 50 mg into the muscle every 14 days 12/17/2021 at 0900 Yes Unknown, Entered By History   atorvastatin (LIPITOR) 20 MG tablet Take 20 mg by mouth At Bedtime 12/25/2021 at 2100  Unknown, Entered By History   cholecalciferol (VITAMIN D-1000 MAX ST) 1000 units TABS Take 1,000 Units by mouth daily 12/26/2021 at 0900  Paul Andrews MD   exenatide ER (BYDUREON) 2 MG recon vial kit susp for weekly inj Inject 2 mg Subcutaneous once a week DO not administer until he is seen by his PCP. 12/24/2021 at 0900  Benton Whitman MD   hydrochlorothiazide (HYDRODIURIL) 25 MG tablet Take 1 tablet (25 mg) by mouth daily DO not resume until Na is back to normal or PCP orders 12/26/2021 at 0900  Benton Whitman MD   ibuprofen (ADVIL/MOTRIN) 200 MG tablet Take 200 mg by mouth every 6 hours as needed for mild pain   Unknown, Entered By History

## 2021-12-27 NOTE — H&P
[facility]    History and Physical - Hospitalist Service  Date of Admission:  12/27/2021    Assessment & Plan      Benja Duong is a 56 year old male admitted on 12/27/2021. He has a history of schizoaffective disorder, hypertension, type 2 diabetes mellitus, legal blindness and lives in a group home and presents with hyponatremia and acute kidney injury in setting of several days of vomiting & diarrhea. Suspect he has viral gastroenteritis causing hypovolemic hyponatremia and prerenal KAVITA due to dehydration. Tachycardia is likely secondary to volume depletion. Hemodynamically stable and admitted for IV fluids and further work up.    Nausea/vomiting/diarrhea  Viral gastroenteritis  Flu & COVID19 negative. No signs of colitis on CT so less suspicion for C differential or other enterocolitis pathogens. Possible esophagitis with slow bleed being irritating to gi system and causing nausea and diarrhea however hemoglobin stable so less suspicious. Glucosuria & ketonuria on urinalysis with normal bicarb and only mildly elevated glucose on admission - not consistent with DKA and not on SGLT2 inhibitor which can cause euglycemic DKA.   - IV fluids as per below  - Antiemetics: zofran (ondansetron) and compazine PRN  - clear liquids and advance as tolerated    Hyponatremia  Suspect hypovolemic hyponatremia due to viral gastroenteritis, in setting of thiazide diuretic use for hypertension . S/p 1L IVF. Will give another 2 liters over next 24 hours and recheck Na this afternoon.  -2L NS 100mL/hr  -Recheck Na 4pm: may require more IV fluids  -BMP in AM    Esophagitis  On chronic PPI BID. Hgb stable. Do not think gastroenterology consult indicated at this time as endoscopy not warranted yet.  -Pantoprazole 40mg IV BID  -CBC in AM    Tachycardia  ?UTI (urinary tract infection)  Perinephric stranding  Sinus tachycardia. Likely volume depletion. Urine & blood culture in process. Will switch antibiotics to  ceftriaxone given previous urine culture with E. Coli sensitive to ceftriaxone (Rocephin) and no intraabdominal abscess on CT. May be able to stop pending cultures.  -Repeat lactic acid  -Ceftriaxone (Rocephin) 1g q24h    Acute kidney injury  Creatinine 1.4 up from baseline ~0.7. Suspect prerenal in setting of gastroenterology losses and poor PO intake. IV fluids as per above.  -BMP in AM    Diabetes mellitus type 2  Given poor PO intake and nausea/vomiting and diarrhea holding home medications to avoid hypoglycemia. May require long acting insulin while admitted or higher dose sliding scale.  -low dose insulin sliding scale  -holding home lantus 21u qHS, metformin 1000mg BID  -home atorvastatin    Hypertension  Normotensive here  -Holding home hydrochlorothiazide, hydralazine in setting of hypovolemia    Schizoaffecive disorder  Due for home medication injections on 12/31L risperidone and bydureon.    Iron deficiency  Holding home ferrous sulfate supplement in setting of vomiting and gi irritation.    Legal blindness  -carboxymethylcellulose eye drops PRN     Diet:   Clears, advance as tolerated  DVT Prophylaxis: Enoxaparin (Lovenox) SQ  Carranza Catheter: Not present  Central Lines: None  Code Status:   Full code    Clinically Significant Risk Factors Present on Admission         # Hyponatremia: Na = 121 mmol/L (Ref range: 133 - 144 mmol/L) on admission, will monitor as appropriate            Disposition Plan   Expected Discharge: 12/29/2021   Anticipated discharge location:  Awaiting care coordination huddle normalize sodium, tolerating PO  Delays:          The patient's care was discussed with the Patient.    Dory Seth MD  [facility]  Securely message with the Vocera Web Console (learn more here)      ______________________________________________________________________    Chief Complaint   Nausea, vomiting, diarrhea    History is obtained from the patient & ED (emergency department)  provider    History of Present Illness   Benja Duong is a 56 year old male who has a past medical history of schizoaffective disorder, hypertension, diabetes mellitus on insulin, legal blindness who lives in group home was brought to the ED (emergency department) via EMS due to nausea, vomiting, and diarrhea. Reportedly for past few days recurrent nausea/vomiting/diarrhea. Patient is unsure of hematemesis or hematocheiza due to blindness. His group home sent him to the ED (emergency department) because he seemed more sleepy than normal. He currently denies abdominal pain but is very tired and just wants to lay in bed and sleep.    In ED (emergency department) he was afebrile. tachycardic to 130s, normotensive, saturating 100% on room air.  Labs notable for Na 121, Lactic acid 2.6, creatinine 1.41, bicarb 30, gluc 240. Troponin normal. Flu negative.   Urinalysis with glucosuria, negative nitrite and leuk esterase, positive for ketones.    Got antiemetics and 1L LR bolus.    Episode of emesis afte receiving zofran (ondansetron) and looked dark so got gastric occult blood which was positive.  Gave 80mg IV protonix.    CT chest/abd/pelvis due to concern for sepsis and elevatd D-dimer showed esophagitis, perinephric stranding similar to prevoois CT, negative for pulmonary embolism.    Given a dose of zosyn due to possible UTI (urinary tract infection). Urine culture and blood culture in process.    Review of Systems    The 10 point Review of Systems is negative other than noted in the HPI or here.     Past Medical History    I have reviewed this patient's medical history and updated it with pertinent information if needed.   Past Medical History:   Diagnosis Date     Depression      Diabetes (H)      HTN (hypertension)        Past Surgical History   I have reviewed this patient's surgical history and updated it with pertinent information if needed.  No past surgical history on file.    Social History   I have  "reviewed this patient's social history and updated it with pertinent information if needed.  Social History     Tobacco Use     Smoking status: Unknown If Ever Smoked     Smokeless tobacco: Not on file   Substance Use Topics     Alcohol use: Yes     Comment: \"few beers\"     Drug use: Yes     Types: \"Crack\" cocaine       Family History     Unable to obtain due to: patient mental status    Prior to Admission Medications   Prior to Admission Medications   Prescriptions Last Dose Informant Patient Reported? Taking?   acetaminophen (TYLENOL) 325 MG tablet   Yes Yes   Sig: Take 650 mg by mouth every 4 hours as needed for mild pain   atorvastatin (LIPITOR) 20 MG tablet 12/25/2021 at 2100  Yes No   Sig: Take 20 mg by mouth At Bedtime   cholecalciferol (VITAMIN D-1000 MAX ST) 1000 units TABS 12/26/2021 at 0900  No No   Sig: Take 1,000 Units by mouth daily   diphenhydrAMINE (BENADRYL) 50 MG capsule 12/26/2021 at 2100  Yes Yes   Sig: Take 50 mg by mouth At Bedtime   diphenhydrAMINE (BENADRYL) 50 MG capsule   Yes Yes   Sig: Take 50 mg by mouth every 4 hours as needed for itching or allergies   exenatide ER (BYDUREON) 2 MG recon vial kit susp for weekly inj 12/24/2021 at 0900  Yes No   Sig: Inject 2 mg Subcutaneous once a week DO not administer until he is seen by his PCP.   ferrous sulfate (FEROSUL) 325 (65 Fe) MG tablet 12/26/2021 at 0900  No Yes   Sig: Take 1 tablet (325 mg) by mouth daily   glucose (BD GLUCOSE) 4 g chewable tablet   Yes Yes   Sig: Take 4 tablets by mouth as needed for low blood sugar   hydrALAZINE (APRESOLINE) 25 MG tablet   No Yes   Sig: Take 1 tablet (25 mg) by mouth every 6 hours as needed (HTN; SBP > 160)   hydrochlorothiazide (HYDRODIURIL) 25 MG tablet 12/26/2021 at 0900  Yes No   Sig: Take 1 tablet (25 mg) by mouth daily DO not resume until Na is back to normal or PCP orders   hypromellose (GENTEAL SEVERE) ophthalmic gel 0.3% 12/26/2021 at 2100  Yes Yes   Sig: Place 1 drop into both eyes 2 times daily " "  ibuprofen (ADVIL/MOTRIN) 200 MG tablet   Yes No   Sig: Take 200 mg by mouth every 6 hours as needed for mild pain   insulin glargine (LANTUS PEN) 100 UNIT/ML pen 12/26/2021 at 1900  Yes Yes   Sig: Inject 21 Units Subcutaneous At Bedtime   lisinopril (ZESTRIL) 40 MG tablet 12/26/2021 at 0900  Yes Yes   Sig: Take 40 mg by mouth daily   metFORMIN (GLUCOPHAGE) 1000 MG tablet 12/26/2021 at 2100  No Yes   Sig: Take 1 tablet (1,000 mg) by mouth 2 times daily (with meals)   ondansetron (ZOFRAN-ODT) 4 MG ODT tab   Yes Yes   Sig: Take 4 mg by mouth 3 times daily as needed for nausea or vomiting   pantoprazole (PROTONIX) 40 MG EC tablet 12/26/2021 at 0900  Yes Yes   Sig: Take 1 tablet (40 mg) by mouth 2 times daily   risperiDONE microspheres ER (RISPERDAL CONSTA) 50 MG injection 12/17/2021 at 0900  Yes Yes   Sig: Inject 50 mg into the muscle every 14 days      Facility-Administered Medications: None     Allergies   Allergies   Allergen Reactions     Alprazolam      Other reaction(s): *Unknown States \"I break out\"     Oxycodone-Acetaminophen      Other reaction(s): *Unknown, States \"I break out\"       Physical Exam   Vital Signs: Temp: 98.6  F (37  C) Temp src: Oral BP: 122/76 Pulse: (!) 132   Resp: 18 SpO2: 100 % O2 Device: None (Room air)    Weight: 0 lbs 0 oz    GEN: Sleeping in bed, laying on side, arousable to voice  RESP: Breathing comfortably on room air; no increase work of breathing, no wheezes or crackles appreciated  CV: Warm and well perfused peripheral extremities, RRR no murmur, no lower extremity edema  MS: no gross musculoskeletal defects noted, no edema  Abdominal: soft, nondistended, minimally tender to palpation  NEURO: Alert and oriented to person, place, situation.  PSYCH: Affect normal/bright. Appearance well groomed.   SKIN: dry skin on legs bilaterally    Data   Data reviewed today: I reviewed all medications, new labs and imaging results over the last 24 hours. I personally reviewed the EKG tracing " showing sinus tachycardia, the chest CT image(s) showing no PE and the abdominal CT image(s) showing no intraabdominal abscess, perinephric stranding.      Most Recent 3 BMP's:Recent Labs   Lab Test 12/27/21  0852 10/04/20  0628 10/03/20  1459 10/03/20  0707   * 135  --  137   POTASSIUM 3.5 3.5 3.6 3.2*   CHLORIDE 79* 104  --  104   CO2 30 26  --  26   BUN 36* 3*  --  4*   CR 1.41* 0.64*  --  0.78   ANIONGAP 12 5  --  7   MITCHEL 9.5 8.8  --  8.8   * 186*  --  92     Most Recent 3 Hemoglobins:Recent Labs   Lab Test 12/27/21  0852 10/04/20  0628 10/03/20  0707   HGB 11.7* 9.3* 9.2*     Most Recent D-dimer:Recent Labs   Lab Test 12/27/21  0852   DD 2.20*     Most Recent Urinalysis:Recent Labs   Lab Test 12/27/21  1024   COLOR Light Yellow   APPEARANCE Clear   URINEGLC >=1000*   URINEBILI Negative   URINEKETONE 20 *   SG 1.021   UBLD Negative   URINEPH 7.0   PROTEIN 20 *   NITRITE Negative   LEUKEST Negative   RBCU 6*   WBCU 1

## 2021-12-27 NOTE — ED PROVIDER NOTES
History     Chief Complaint:  Vomiting and diarrhea    HPI     Benja Duong is a 56 year old male who presents with nausea, vomiting and diarrhea.  Patient currently resides in a group home and arrives via EMS.  Patient reports over last 2 to 3 days he's had intermittent nausea, vomiting and diarrhea.  He vomited twice today.  His last episode of diarrhea was yesterday in which there was one episode per 24 hours.  He is uncertain if there was any hematemesis, melena or hematochezia as he is blind.  He reports that at the group home there was a sick contact with COVID-19.  He is vaccinated but not boosted against COVID-19.  He denies any active abdominal pain.  Group home also reported decreased level of alertness.    In addition, he reports chest pain and shortness of breath.  His chest pain is substernal, intermittent and generally lasting 10 minutes per episode. There are no obvious alleviating or aggravating factors.  No history of DVT, PE, unilateral leg swelling, recent immobilization, malignancy.  Per EMS, patient has chronic chest pain and shortness of breath.  Overall patient is a poor historian and further history is limited.    Review of Systems   Constitutional: Negative for fever.   Respiratory: Positive for cough and shortness of breath.    Cardiovascular: Positive for chest pain.   Gastrointestinal: Positive for diarrhea, nausea and vomiting.   Skin: Negative for rash.   All other systems reviewed and are negative.    Allergies:  Alprazolam  Oxycodone-Acetaminophen      Medications:    acetaminophen (TYLENOL) 325 MG tablet  Artificial Tear Solution (TEARS NATURALE OP)  atorvastatin (LIPITOR) 20 MG tablet  cholecalciferol (VITAMIN D-1000 MAX ST) 1000 units TABS  diphenhydrAMINE (BENADRYL) 50 MG capsule  diphenhydrAMINE (BENADRYL) 50 MG capsule  exenatide ER (BYDUREON) 2 MG recon vial kit susp for weekly inj  ferrous sulfate (FEROSUL) 325 (65 Fe) MG tablet  glucose (BD GLUCOSE) 4 g chewable  tablet  hydrALAZINE (APRESOLINE) 25 MG tablet  hydrochlorothiazide (HYDRODIURIL) 25 MG tablet  ibuprofen (ADVIL/MOTRIN) 200 MG tablet  insulin glargine (LANTUS VIAL) 100 UNIT/ML vial  lisinopril (ZESTRIL) 40 MG tablet  metFORMIN (GLUCOPHAGE) 1000 MG tablet  ondansetron (ZOFRAN-ODT) 4 MG ODT tab  pantoprazole (PROTONIX) 40 MG EC tablet  risperiDONE microspheres ER (RISPERDAL CONSTA) 50 MG injection        Past Medical History:    Past Medical History:   Diagnosis Date     Depression      Diabetes (H)      HTN (hypertension)      Patient Active Problem List    Diagnosis Date Noted     Hyperglycemia 10/01/2020     Priority: Medium     Urinary tract infection without hematuria, site unspecified 10/01/2020     Priority: Medium     Sepsis, due to unspecified organism, unspecified whether acute organ dysfunction present (H) 10/01/2020     Priority: Medium     Anemia 08/06/2020     Priority: Medium     Hyponatremia 07/21/2020     Priority: Medium     Psychosis (H) 07/25/2018     Priority: Medium        Past Surgical History:    Tonsillectomy  Vitrectomy    Social History:  Patient arrives via EMS alone from group home    Physical Exam     No data found.    Physical Exam    General:   Ill appearing male  Eyes:    Conjunctiva normal  Neck:    Supple, no meningismus.     CV:     Tachycardic, regular rhythm.      No murmurs, rubs or gallops.       No unilateral leg swelling.       2+ radial pulses bilateral.    PULM:    Clear to auscultation bilateral.       No respiratory distress.      Good air exchange.     No rales or wheezing.     No stridor.  ABD:    Soft, non-tender, non-distended.       No pulsatile masses.       No rebound, guarding or rigidity.  MSK:     No gross deformity to all four extremities.   LYMPH:   No cervical lymphadenopathy.  NEURO:   Mild somnolence     Oriented x 3     No tremor     Speech clear      Good muscle tone, no atrophy.  Skin:    Warm, dry and intact.    Psych:    Flat affect        Emergency  Department Course   ECG:  ECG taken at 0841, ECG read at 0844  Sinus tachycardia   No change as compared to prior, dated 10/01/2020.  Rate 137 bpm. IA interval 118 ms. QRS duration 92 ms. QT/QTc 314/474 ms. P-R-T axes 56 80 30.     Imaging:  CT Chest (PE) Abdomen Pelvis w Contrast   Final Result   IMPRESSION:   1. Marked esophageal wall thickening throughout compatible with   esophagitis.   2. No pulmonary embolism demonstrated.   3. Mild perinephric stranding; however, this is similar to previous   when pyelonephritis was suspected. Clinical correlation to exclude   urinary tract infection/pyelonephritis needed.      ALEKSANDRA LAUGHLIN MD            SYSTEM ID:  [id number]      XR Chest Port 1 View   Final Result   IMPRESSION: No acute disease.      ALEKSANDRA LAUGHLIN MD            SYSTEM ID:  [id number]          Laboratory:  Labs Ordered and Resulted from Time of ED Arrival to Time of ED Departure   D DIMER QUANTITATIVE - Abnormal       Result Value    D-Dimer Quantitative 2.20 (*)    COMPREHENSIVE METABOLIC PANEL - Abnormal    Sodium 121 (*)     Potassium 3.5      Chloride 79 (*)     Carbon Dioxide (CO2) 30      Anion Gap 12      Urea Nitrogen 36 (*)     Creatinine 1.41 (*)     Calcium 9.5      Glucose 240 (*)     Alkaline Phosphatase 71      AST 15      ALT 20      Protein Total 7.4      Albumin 3.8      Bilirubin Total 0.3      GFR Estimate 58 (*)    BLOOD GAS VENOUS WITH OXYHEMOGLOBIN - Abnormal    pH Venous 7.40      pCO2 Venous 56 (*)     pO2 Venous 20 (*)     Bicarbonate Venous 34 (*)     FIO2 0      Oxyhemoglobin Venous 26 (*)     Base Excess/Deficit (+/-) 7.5 (*)    ROUTINE UA WITH MICROSCOPIC REFLEX TO CULTURE - Abnormal    Color Urine Light Yellow      Appearance Urine Clear      Glucose Urine >=1000 (*)     Bilirubin Urine Negative      Ketones Urine 20  (*)     Specific Gravity Urine 1.021      Blood Urine Negative      pH Urine 7.0      Protein Albumin Urine 20  (*)     Urobilinogen Urine Normal      Nitrite Urine  Negative      Leukocyte Esterase Urine Negative      Mucus Urine Present (*)     RBC Urine 6 (*)     WBC Urine 1      Hyaline Casts Urine 3 (*)    LACTIC ACID WHOLE BLOOD - Abnormal    Lactic Acid 2.6 (*)    CBC WITH PLATELETS AND DIFFERENTIAL - Abnormal    WBC Count 17.1 (*)     RBC Count 3.79 (*)     Hemoglobin 11.7 (*)     Hematocrit 33.1 (*)     MCV 87      MCH 30.9      MCHC 35.3      RDW 11.6      Platelet Count 295      % Neutrophils 84      % Lymphocytes 8      % Monocytes 7      % Eosinophils 0      % Basophils 0      % Immature Granulocytes 1      NRBCs per 100 WBC 0      Absolute Neutrophils 14.3 (*)     Absolute Lymphocytes 1.4      Absolute Monocytes 1.2      Absolute Eosinophils 0.0      Absolute Basophils 0.0      Absolute Immature Granulocytes 0.1      Absolute NRBCs 0.0     OCCULT BLOOD GASTRIC - Abnormal    Occult Blood Gastric Positive (*)     pH Gastric 3 pH     TROPONIN I - Normal    Troponin I High Sensitivity 12     INFLUENZA A/B & SARS-COV2 PCR MULTIPLEX - Normal    Influenza A PCR Negative      Influenza B PCR Negative      SARS CoV2 PCR Negative     BLOOD CULTURE   BLOOD CULTURE   URINE CULTURE         Emergency Department Course:    Reviewed:  I reviewed nursing notes, vitals and past history    Assessments:   I obtained history and examined the patient as noted above.    I rechecked the patient and explained findings.       Consults:   Dr. Seth [facility] medicine service    Interventions:  Medications   lactated ringers BOLUS 1,000 mL (has no administration in time range)   ondansetron (ZOFRAN) injection 8 mg (has no administration in time range)       Disposition:  The patient was admitted to the hospital under the care of Dr. Seth.    Impression & Plan      Medical Decision Makin-year-old male presented to the ED with recurrent bouts of nausea, vomiting and diarrhea.  Patient was tachycardic but never hypotensive.  He is hyponatremic secondary to volume depletion at 121.  He  was given a liter of IV fluids and then initiated on IV infusion to prevent overcorrection of sodium.  CT scan of his abdomen has ruled out obstructive process.  He has radiographic esophagitis which is likely contributing to his symptoms.  He had an episode of hematemesis with Gastroccult positive emesis.  He was given a 80 mg IV Protonix.  There is unfortunately evidence of associated acute kidney injury secondary to volume depletion.  Patient will require admission for volume replacement and close monitoring of hyponatremia correction.    There was concern early during ED course for sepsis due to elevated lactate and marked tachycardia.  He has a history of urinary tract infections and CT scan of the abdomen has perinephric stranding.  He was given Zosyn early in the ED course but urinalysis without evidence of infection.  Urine culture and blood cultures pending.  Additional antibiotics were not administered given low suspicion for acute bacterial infection.  COVID and influenza swabs negative.    He also admitted to chest pain and shortness of breath.  He reportedly has a history of chronic chest pain and shortness of breath according to group home.  EKG without ischemic changes.  Troponin within normal limits but D-dimer elevated.  CT scan of the chest ruled out PE and aortic pathology.  Pain in part likely related to esophagitis.  Patient safe for transfer the floor.      Covid-19  Benja Duong was evaluated during a global COVID-19 pandemic, which necessitated consideration that the patient might be at risk for infection with the SARS-CoV-2 virus that causes COVID-19.   Applicable protocols for evaluation were followed during the patient's care.   COVID-19 was considered as part of the patient's evaluation. The plan for testing is:  a test was obtained during this visit.    Diagnosis:    ICD-10-CM    1. Vomiting and diarrhea  R11.10     R19.7    2. Hyponatremia  E87.1    3. Acute kidney injury (H)   N17.9    4. Chest pain, unspecified type  R07.9    5. Esophagitis  K20.90    6. Hematemesis with nausea  K92.0        Discharge Medications:  New Prescriptions    No medications on file            Ryan Valencia MD  12/27/21 [id number]

## 2021-12-27 NOTE — ED TRIAGE NOTES
Pt lives at group home having 3 days if nausea, vomiting and diarrhea. Has chronic chest pain and sob

## 2021-12-27 NOTE — ED NOTES
"[facility]  ED Nurse Handoff Report    Benja Duong is a 56 year old male   ED Chief complaint: Shortness of Breath  . ED Diagnosis:   Final diagnoses:   Vomiting and diarrhea   Hyponatremia   Acute kidney injury (H)   Chest pain, unspecified type     Allergies:   Allergies   Allergen Reactions     Alprazolam      Other reaction(s): *Unknown States \"I break out\"     Oxycodone-Acetaminophen      Other reaction(s): *Unknown, States \"I break out\"       Code Status: Full Code  Activity level - Baseline/Home:  Assist X 1. Activity Level - Current:   Assist X 1. Lift room needed: No. Bariatric: No   Needed: No  Isolation: No. Infection: Not Applicable.     Vital Signs:   Vitals:    12/27/21 1100 12/27/21 1115 12/27/21 1145 12/27/21 1200   BP: (!) 146/78 126/73 132/82 115/66   Pulse: (!) 137   (!) 132   Resp:       Temp:       TempSrc:       SpO2:   96% 97%   Height:           Cardiac Rhythm:  ,      Pain level:    Patient confused: No. Patient Falls Risk: Yes.   Elimination Status: Has voided   Patient Report - Initial Complaint:Benja Duong is a 56 year old male who presents with nausea, vomiting and diarrhea.  Patient currently resides in a group home and arrives via EMS.  Patient reports over last 2 to 3 days he's had intermittent nausea, vomiting and diarrhea.  He vomited twice today.  His last episode of diarrhea was yesterday in which there was one episode per 24 hours.  He is uncertain if there was any hematemesis, melena or hematochezia as he is blind.  He reports that at the group home there was a sick contact with COVID-19.  He is vaccinated but not boosted against COVID-19.  He denies any active abdominal pain.  Group home also reported decreased level of alertness.     In addition, he reports chest pain and shortness of breath.  His chest pain is substernal, intermittent and generally lasting 10 minutes per episode. There are no obvious alleviating or aggravating factors.  No " history of DVT, PE, unilateral leg swelling, recent immobilization, malignancy.  Per EMS, patient has chronic chest pain and shortness of breath.  Overall patient is a poor historian and further history is limited.    Tests Performed: refer to results review. Abnormal Results: refer to results review.   Treatments provided: refer to MAR  Family Comments: group home aware  OBS brochure/video discussed/provided to patient:  Yes  ED Medications:   Medications   lactated ringers BOLUS 1,000 mL (0 mLs Intravenous Stopped 12/27/21 1117)   ondansetron (ZOFRAN) injection 8 mg (8 mg Intravenous Given 12/27/21 0859)   metoclopramide (REGLAN) injection 5 mg (5 mg Intravenous Given 12/27/21 0957)   diphenhydrAMINE (BENADRYL) injection 12.5 mg (12.5 mg Intravenous Given 12/27/21 0957)   piperacillin-tazobactam (ZOSYN) intermittent infusion 4.5 g (0 g Intravenous Stopped 12/27/21 1024)   iopamidol (ISOVUE-370) solution 58 mL (58 mLs Intravenous Given 12/27/21 1030)   sodium chloride (PF) 0.9% PF flush 78 mL (78 mLs Intravenous Given 12/27/21 1030)   sodium chloride 0.9% infusion ( Intravenous New Bag 12/27/21 1118)   pantoprazole (PROTONIX) IV push injection 80 mg (80 mg Intravenous Given 12/27/21 1145)     Drips infusing:  No  For the majority of the shift, the patient's behavior Green. Interventions performed were n/a, pt legally blind requires lots of verbal cues.    Sepsis treatment initiated: No     Patient tested for COVID 19 prior to admission: YES    ED Nurse Name/Phone Number: Robyn Lopez RN,   12:43 PM    RECEIVING UNIT ED HANDOFF REVIEW    Above ED Nurse Handoff Report was reviewed: Yes  Reviewed by: Rachna Velasquez RN on December 27, 2021 at 4:00 PM

## 2021-12-28 LAB
ANION GAP SERPL CALCULATED.3IONS-SCNC: 5 MMOL/L (ref 3–14)
ATRIAL RATE - MUSE: 137 BPM
BUN SERPL-MCNC: 17 MG/DL (ref 7–30)
CALCIUM SERPL-MCNC: 8.8 MG/DL (ref 8.5–10.1)
CHLORIDE BLD-SCNC: 92 MMOL/L (ref 94–109)
CO2 SERPL-SCNC: 32 MMOL/L (ref 20–32)
CREAT SERPL-MCNC: 0.83 MG/DL (ref 0.66–1.25)
DIASTOLIC BLOOD PRESSURE - MUSE: NORMAL MMHG
ERYTHROCYTE [DISTWIDTH] IN BLOOD BY AUTOMATED COUNT: 11.6 % (ref 10–15)
GFR SERPL CREATININE-BSD FRML MDRD: >90 ML/MIN/1.73M2
GLUCOSE BLD-MCNC: 188 MG/DL (ref 70–99)
GLUCOSE BLDC GLUCOMTR-MCNC: 175 MG/DL (ref 70–99)
GLUCOSE BLDC GLUCOMTR-MCNC: 175 MG/DL (ref 70–99)
GLUCOSE BLDC GLUCOMTR-MCNC: 182 MG/DL (ref 70–99)
GLUCOSE BLDC GLUCOMTR-MCNC: 183 MG/DL (ref 70–99)
GLUCOSE BLDC GLUCOMTR-MCNC: 198 MG/DL (ref 70–99)
HCT VFR BLD AUTO: 27.9 % (ref 40–53)
HGB BLD-MCNC: 9.3 G/DL (ref 13.3–17.7)
INTERPRETATION ECG - MUSE: NORMAL
MAGNESIUM SERPL-MCNC: 2.5 MG/DL (ref 1.6–2.3)
MCH RBC QN AUTO: 30.2 PG (ref 26.5–33)
MCHC RBC AUTO-ENTMCNC: 33.3 G/DL (ref 31.5–36.5)
MCV RBC AUTO: 91 FL (ref 78–100)
P AXIS - MUSE: 56 DEGREES
PLATELET # BLD AUTO: 264 10E3/UL (ref 150–450)
POTASSIUM BLD-SCNC: 3.6 MMOL/L (ref 3.4–5.3)
POTASSIUM BLD-SCNC: 3.6 MMOL/L (ref 3.4–5.3)
PR INTERVAL - MUSE: 118 MS
QRS DURATION - MUSE: 92 MS
QT - MUSE: 314 MS
QTC - MUSE: 474 MS
R AXIS - MUSE: 80 DEGREES
RBC # BLD AUTO: 3.08 10E6/UL (ref 4.4–5.9)
SODIUM SERPL-SCNC: 129 MMOL/L (ref 133–144)
SYSTOLIC BLOOD PRESSURE - MUSE: NORMAL MMHG
T AXIS - MUSE: 30 DEGREES
VENTRICULAR RATE- MUSE: 137 BPM
WBC # BLD AUTO: 11.3 10E3/UL (ref 4–11)

## 2021-12-28 PROCEDURE — 250N000013 HC RX MED GY IP 250 OP 250 PS 637: Performed by: INTERNAL MEDICINE

## 2021-12-28 PROCEDURE — 250N000013 HC RX MED GY IP 250 OP 250 PS 637: Performed by: STUDENT IN AN ORGANIZED HEALTH CARE EDUCATION/TRAINING PROGRAM

## 2021-12-28 PROCEDURE — 258N000003 HC RX IP 258 OP 636: Performed by: STUDENT IN AN ORGANIZED HEALTH CARE EDUCATION/TRAINING PROGRAM

## 2021-12-28 PROCEDURE — 80048 BASIC METABOLIC PNL TOTAL CA: CPT | Performed by: STUDENT IN AN ORGANIZED HEALTH CARE EDUCATION/TRAINING PROGRAM

## 2021-12-28 PROCEDURE — C9113 INJ PANTOPRAZOLE SODIUM, VIA: HCPCS | Performed by: STUDENT IN AN ORGANIZED HEALTH CARE EDUCATION/TRAINING PROGRAM

## 2021-12-28 PROCEDURE — 250N000012 HC RX MED GY IP 250 OP 636 PS 637: Performed by: STUDENT IN AN ORGANIZED HEALTH CARE EDUCATION/TRAINING PROGRAM

## 2021-12-28 PROCEDURE — 85027 COMPLETE CBC AUTOMATED: CPT | Performed by: STUDENT IN AN ORGANIZED HEALTH CARE EDUCATION/TRAINING PROGRAM

## 2021-12-28 PROCEDURE — 36415 COLL VENOUS BLD VENIPUNCTURE: CPT | Performed by: STUDENT IN AN ORGANIZED HEALTH CARE EDUCATION/TRAINING PROGRAM

## 2021-12-28 PROCEDURE — 99232 SBSQ HOSP IP/OBS MODERATE 35: CPT | Performed by: INTERNAL MEDICINE

## 2021-12-28 PROCEDURE — 250N000011 HC RX IP 250 OP 636: Performed by: STUDENT IN AN ORGANIZED HEALTH CARE EDUCATION/TRAINING PROGRAM

## 2021-12-28 PROCEDURE — 83735 ASSAY OF MAGNESIUM: CPT | Performed by: INTERNAL MEDICINE

## 2021-12-28 PROCEDURE — 120N000001 HC R&B MED SURG/OB

## 2021-12-28 PROCEDURE — 250N000011 HC RX IP 250 OP 636: Performed by: INTERNAL MEDICINE

## 2021-12-28 RX ORDER — SUCRALFATE ORAL 1 G/10ML
1 SUSPENSION ORAL
Status: DISCONTINUED | OUTPATIENT
Start: 2021-12-28 | End: 2021-12-29 | Stop reason: HOSPADM

## 2021-12-28 RX ORDER — NICOTINE 21 MG/24HR
1 PATCH, TRANSDERMAL 24 HOURS TRANSDERMAL DAILY
Status: DISCONTINUED | OUTPATIENT
Start: 2021-12-28 | End: 2021-12-29 | Stop reason: HOSPADM

## 2021-12-28 RX ORDER — SODIUM CHLORIDE AND POTASSIUM CHLORIDE 150; 900 MG/100ML; MG/100ML
INJECTION, SOLUTION INTRAVENOUS CONTINUOUS
Status: DISCONTINUED | OUTPATIENT
Start: 2021-12-28 | End: 2021-12-29 | Stop reason: HOSPADM

## 2021-12-28 RX ADMIN — POTASSIUM CHLORIDE AND SODIUM CHLORIDE: 900; 150 INJECTION, SOLUTION INTRAVENOUS at 14:24

## 2021-12-28 RX ADMIN — INSULIN ASPART 1 UNITS: 100 INJECTION, SOLUTION INTRAVENOUS; SUBCUTANEOUS at 08:28

## 2021-12-28 RX ADMIN — PANTOPRAZOLE SODIUM 40 MG: 40 INJECTION, POWDER, FOR SOLUTION INTRAVENOUS at 21:36

## 2021-12-28 RX ADMIN — SUCRALFATE 1 G: 1 SUSPENSION ORAL at 21:36

## 2021-12-28 RX ADMIN — ENOXAPARIN SODIUM 40 MG: 40 INJECTION SUBCUTANEOUS at 18:30

## 2021-12-28 RX ADMIN — ONDANSETRON 4 MG: 4 TABLET, ORALLY DISINTEGRATING ORAL at 13:01

## 2021-12-28 RX ADMIN — NICOTINE 1 PATCH: 14 PATCH, EXTENDED RELEASE TRANSDERMAL at 05:23

## 2021-12-28 RX ADMIN — NICOTINE POLACRILEX 2 MG: 2 GUM, CHEWING ORAL at 09:52

## 2021-12-28 RX ADMIN — ATORVASTATIN CALCIUM 20 MG: 20 TABLET, FILM COATED ORAL at 21:36

## 2021-12-28 RX ADMIN — SUCRALFATE 1 G: 1 SUSPENSION ORAL at 18:30

## 2021-12-28 RX ADMIN — POTASSIUM CHLORIDE AND SODIUM CHLORIDE: 900; 150 INJECTION, SOLUTION INTRAVENOUS at 22:43

## 2021-12-28 RX ADMIN — PANTOPRAZOLE SODIUM 40 MG: 40 INJECTION, POWDER, FOR SOLUTION INTRAVENOUS at 08:29

## 2021-12-28 RX ADMIN — INSULIN ASPART 1 UNITS: 100 INJECTION, SOLUTION INTRAVENOUS; SUBCUTANEOUS at 12:33

## 2021-12-28 RX ADMIN — NICOTINE POLACRILEX 2 MG: 2 GUM, CHEWING ORAL at 02:12

## 2021-12-28 RX ADMIN — SODIUM CHLORIDE, POTASSIUM CHLORIDE, SODIUM LACTATE AND CALCIUM CHLORIDE: 600; 310; 30; 20 INJECTION, SOLUTION INTRAVENOUS at 08:30

## 2021-12-28 ASSESSMENT — ACTIVITIES OF DAILY LIVING (ADL)
ADLS_ACUITY_SCORE: 13
ADLS_ACUITY_SCORE: 12
ADLS_ACUITY_SCORE: 12
ADLS_ACUITY_SCORE: 13
ADLS_ACUITY_SCORE: 12
ADLS_ACUITY_SCORE: 13
ADLS_ACUITY_SCORE: 12
ADLS_ACUITY_SCORE: 12
ADLS_ACUITY_SCORE: 13
ADLS_ACUITY_SCORE: 13
ADLS_ACUITY_SCORE: 12
ADLS_ACUITY_SCORE: 13
ADLS_ACUITY_SCORE: 12

## 2021-12-28 NOTE — PROGRESS NOTES
[facility]    Hospitalist Progress Note      Assessment & Plan   Benja Duong is a 56 year old male who was admitted on 12/27/2021.    Summary of Stay:     Benja Duong is a 56 year old male admitted on 12/27/2021. He has a history of schizoaffective disorder, hypertension, type 2 diabetes mellitus, legal blindness and lives in a group home and presents with hyponatremia and acute kidney injury in setting of several days of vomiting & diarrhea. Suspect he has viral gastroenteritis causing hypovolemic hyponatremia and prerenal KAVITA due to dehydration.  Also noted to have tachycardia in the setting of volume depletion.    Plan:    Nausea and vomiting.  Dehydration.  -Clinically still volume depleted.  -Oral intake remains poor.  After after having a couple bites of lunch, patient developed nausea.  Zofran given.  -Continue IV fluid.  Change fluid to NS plus KCl, increase rate to 125 cc/h  -Plan for discharge once adequately rehydrated and able to keep food down without nausea and vomiting.    Hyponatremia  -Due to volume depletion.  -Yesterday, initial BMP showed a sodium of 121 but another sodium result was reported as 135.  Unclear which one of them was accurate.  But either way, today sodium is 129 so appropriate rise.  -Change IV fluid to normal saline.  -HCTZ on hold.    Sinus tachycardia  -Due to volume depletion.  Continue IV fluid hydration.    Esophagitis  -PPI.  Add sucralfate.    Perinephric stranding.  Likely chronic.  -UA is unremarkable.  Discontinue ceftriaxone.    Acute kidney injury  -Due to volume depletion.  Improved.  Lisinopril on hold.    Type 2 diabetes  -On Lantus and Metformin at baseline which are on hold.  Sliding scale insulin for now.    Schizoaffective disorder.  --Due for home medication injections on 12/31 risperidone and bydureon.    Dyslipidemia  -On atorvastatin      DVT Prophylaxis: Enoxaparin (Lovenox) SQ  Code Status: Full Code  Expected discharge:  1-2 days    We are operating under suboptimal conditions in the setting of a worldwide pandemic.  Hospitals are running at full capacity with limited bed availability.  We are providing the best possible patient care with limited resources.    Jasvir Awan MD  Text Page (7am - 6pm, M-F)    Interval History   Patient was evaluated with nursing staff. Overnight issues discussed.    Review of systems:  No abdominal pain.  No diarrhea.  Ongoing nausea.  No chest pain/palpitations.  No new cough/shortness of breath.  No headache/visual disturbance/new weakness.    -Data reviewed today: Labs and medications.    Physical Exam   Temp: 98.2  F (36.8  C) Temp src: Oral BP: (!) 151/75 Pulse: (!) 122   Resp: 16 SpO2: 95 % O2 Device: None (Room air)    Vitals:    12/27/21 1722   Weight: 79.8 kg (176 lb)     Vital Signs with Ranges  Temp:  [98.2  F (36.8  C)-98.9  F (37.2  C)] 98.2  F (36.8  C)  Pulse:  [122-130] 122  Resp:  [16-20] 16  BP: (105-151)/(67-76) 151/75  SpO2:  [93 %-97 %] 95 %  I/O last 3 completed shifts:  In: 620 [P.O.:200; I.V.:420]  Out: 1425 [Urine:1425]    Constitutional: Awake, alert, cooperative, no apparent distress  HEENT: Dry mucous membranes.  Respiratory: No crackles. No wheezing. Equal breath sounds bilaterally.  Cardiovascular: Regular rate and rhythm, normal S1 and S2, and no murmur noted  GI: Normal bowel sounds, soft, non-distended, non-tender  Extremities: No pitting edema     Medications     0.9% sodium chloride + KCl 20 mEq/L         atorvastatin  20 mg Oral At Bedtime     enoxaparin ANTICOAGULANT  40 mg Subcutaneous Q24H     insulin aspart  1-3 Units Subcutaneous TID AC     insulin aspart  1-3 Units Subcutaneous At Bedtime     nicotine  1 patch Transdermal Daily     nicotine   Transdermal Q8H     pantoprazole (PROTONIX) IV  40 mg Intravenous BID     sodium chloride (PF)  3 mL Intracatheter Q8H       Data   Recent Labs   Lab 12/28/21  1304 12/28/21  1141 12/28/21  0942 12/27/21 2120  12/27/21  1751 12/27/21  1750 12/27/21  0852   WBC  --   --  11.3*  --   --   --  17.1*   HGB  --   --  9.3*  --   --   --  11.7*   MCV  --   --  91  --   --   --  87   PLT  --   --  264  --   --   --  295   NA  --   --  129*  --   --   --  135  121*   POTASSIUM  --   --  3.6  3.6  --  3.2*  --  3.5   CHLORIDE  --   --  92*  --   --   --  79*   CO2  --   --  32  --   --   --  30   BUN  --   --  17  --   --   --  36*   CR  --   --  0.83  --  1.03  --  1.41*   ANIONGAP  --   --  5  --   --   --  12   MITCHEL  --   --  8.8  --   --   --  9.5   * 175* 188*   < >  --    < > 240*   ALBUMIN  --   --   --   --   --   --  3.8   PROTTOTAL  --   --   --   --   --   --  7.4   BILITOTAL  --   --   --   --   --   --  0.3   ALKPHOS  --   --   --   --   --   --  71   ALT  --   --   --   --   --   --  20   AST  --   --   --   --   --   --  15    < > = values in this interval not displayed.       No results found for this or any previous visit (from the past 24 hour(s)).

## 2021-12-28 NOTE — PLAN OF CARE
A&Ox4. Irritable at times. VSS ex tachycardia (-130s). C/o intermittent CP after coughing & swallowing. Pt reports it resolves w/o intervention, declined med intervention. Pt legally blind, requires lots of verbal cues. R PIV infusing LR @ 100 ml/hr. Clear liquid diet, pt did not eat this evening. , 180. Prn zofran & compazine given for nausea. Replacing Mg, redraw in AM. Replaced K+, redraw ordered for 01:15.  Will continue to monitor per POC.

## 2021-12-28 NOTE — PLAN OF CARE
"BP (!) 151/75 (BP Location: Right arm)   Pulse (!) 122   Temp 98.3  F (36.8  C) (Oral)   Resp 16   Ht 1.702 m (5' 7\")   Wt 79.8 kg (176 lb)   SpO2 95%   BMI 27.57 kg/m      Pt stable with a high BP. Pt is A&O but very irritable. Pt refused multiple cares during shift. An order for nicotine gum and patches was obtained. Pt is on clear liquids diet. Pt is blind in both eyes. Assistance with voiding is required. Urinal at bedside. Alarms on and activated. Nicotine placed at 0530 and is on left arm. LR running at 100 ml/hr.   "

## 2021-12-28 NOTE — PROVIDER NOTIFICATION
MD Notification    Notified Person: MD    Notified Person Name:  Awan    Notification Date/Time: 12/28/2021 0131    Notification Interaction: Vocera page    Purpose of Notification: HR 120s. SBP 150s. Please advise.    Orders Received: RN to continue to monitor. Per MD, patient HR is probably related to dehydration. RN to update MD with patient response to lunch.    Comments:

## 2021-12-28 NOTE — UTILIZATION REVIEW
Admission Status; Secondary Review Determination       Under the authority of the Utilization Management Committee, the utilization review process indicated a secondary review on the above patient. The review outcome is based on review of the medical records, discussions with staff, and applying clinical experience noted on the date of the review.     (x) Inpatient Status Appropriate - This patient's medical care is consistent with medical management for inpatient care and reasonable inpatient medical practice.     RATIONALE FOR DETERMINATION   56 year old male admitted on 12/27/2021. He has a history of schizoaffective disorder, hypertension, type 2 diabetes mellitus, legal blindness and lives in a group home and presents with hyponatremia and acute kidney injury in setting of several days of vomiting & diarrhea. Suspect he has viral gastroenteritis causing hypovolemic hyponatremia and prerenal KAVITA due to dehydration. Tachycardia is likely secondary to volume depletion.   With significant electrolyte abnormalities sodium of 121 elevated creatinine almost doubled to baseline significant leukocytosis severe tachycardia with heart rate in the 130s and remained in 120s after treatment overnight in the hospital patient is expected to have more than 2 midnights of hospital care at the time of admission. Inpatient admission is appropriate.     This document was produced using voice recognition software       The information on this document is developed by the utilization review team in order for the business office to ensure compliance. This only denotes the appropriateness of proper admission status and does not reflect the quality of care rendered.   The definitions of Inpatient Status and Observation Status used in making the determination above are those provided in the CMS Coverage Manual, Chapter 1 and Chapter 6, section 70.4.   Sincerely,   TRUONG ASTORGA MD   System Medical Director   Utilization Management    [facility].

## 2021-12-28 NOTE — PROVIDER NOTIFICATION
MD Notification    Notified Person: MD    Notified Person Name:  Awan    Notification Date/Time: 12/28/2021 1320    Notification Interaction: Vocera page     Purpose of Notification: Patient with nausea after eating a couple of bits of lunch. Given PRN Zofran. Also, getting up with assist of 1 but needs to walk yet today. Do you want to order PT?    Orders Received: PT ordered by MD. IV fluids changed to NS with K+.     Comments:

## 2021-12-29 ENCOUNTER — APPOINTMENT (OUTPATIENT)
Dept: PHYSICAL THERAPY | Facility: CLINIC | Age: 56
DRG: 683 | End: 2021-12-29
Attending: INTERNAL MEDICINE
Payer: COMMERCIAL

## 2021-12-29 VITALS
BODY MASS INDEX: 27.62 KG/M2 | OXYGEN SATURATION: 98 % | TEMPERATURE: 98.6 F | HEIGHT: 67 IN | WEIGHT: 176 LBS | HEART RATE: 111 BPM | SYSTOLIC BLOOD PRESSURE: 168 MMHG | RESPIRATION RATE: 20 BRPM | DIASTOLIC BLOOD PRESSURE: 87 MMHG

## 2021-12-29 LAB
ANION GAP SERPL CALCULATED.3IONS-SCNC: 6 MMOL/L (ref 3–14)
BACTERIA UR CULT: NORMAL
BUN SERPL-MCNC: 9 MG/DL (ref 7–30)
CALCIUM SERPL-MCNC: 8.1 MG/DL (ref 8.5–10.1)
CHLORIDE BLD-SCNC: 100 MMOL/L (ref 94–109)
CO2 SERPL-SCNC: 29 MMOL/L (ref 20–32)
CREAT SERPL-MCNC: 0.78 MG/DL (ref 0.66–1.25)
GFR SERPL CREATININE-BSD FRML MDRD: >90 ML/MIN/1.73M2
GLUCOSE BLD-MCNC: 121 MG/DL (ref 70–99)
GLUCOSE BLDC GLUCOMTR-MCNC: 121 MG/DL (ref 70–99)
GLUCOSE BLDC GLUCOMTR-MCNC: 128 MG/DL (ref 70–99)
GLUCOSE BLDC GLUCOMTR-MCNC: 158 MG/DL (ref 70–99)
MAGNESIUM SERPL-MCNC: 1.9 MG/DL (ref 1.6–2.3)
POTASSIUM BLD-SCNC: 4 MMOL/L (ref 3.4–5.3)
SODIUM SERPL-SCNC: 135 MMOL/L (ref 133–144)

## 2021-12-29 PROCEDURE — 250N000011 HC RX IP 250 OP 636: Performed by: INTERNAL MEDICINE

## 2021-12-29 PROCEDURE — 36415 COLL VENOUS BLD VENIPUNCTURE: CPT | Performed by: INTERNAL MEDICINE

## 2021-12-29 PROCEDURE — 97161 PT EVAL LOW COMPLEX 20 MIN: CPT | Mod: GP

## 2021-12-29 PROCEDURE — 99239 HOSP IP/OBS DSCHRG MGMT >30: CPT | Performed by: INTERNAL MEDICINE

## 2021-12-29 PROCEDURE — 250N000011 HC RX IP 250 OP 636: Performed by: STUDENT IN AN ORGANIZED HEALTH CARE EDUCATION/TRAINING PROGRAM

## 2021-12-29 PROCEDURE — C9113 INJ PANTOPRAZOLE SODIUM, VIA: HCPCS | Performed by: STUDENT IN AN ORGANIZED HEALTH CARE EDUCATION/TRAINING PROGRAM

## 2021-12-29 PROCEDURE — 250N000013 HC RX MED GY IP 250 OP 250 PS 637: Performed by: INTERNAL MEDICINE

## 2021-12-29 PROCEDURE — 83735 ASSAY OF MAGNESIUM: CPT | Performed by: INTERNAL MEDICINE

## 2021-12-29 PROCEDURE — 80048 BASIC METABOLIC PNL TOTAL CA: CPT | Performed by: INTERNAL MEDICINE

## 2021-12-29 RX ADMIN — POTASSIUM CHLORIDE AND SODIUM CHLORIDE: 900; 150 INJECTION, SOLUTION INTRAVENOUS at 07:48

## 2021-12-29 RX ADMIN — SUCRALFATE 1 G: 1 SUSPENSION ORAL at 09:56

## 2021-12-29 RX ADMIN — PANTOPRAZOLE SODIUM 40 MG: 40 INJECTION, POWDER, FOR SOLUTION INTRAVENOUS at 09:56

## 2021-12-29 RX ADMIN — NICOTINE POLACRILEX 2 MG: 2 GUM, CHEWING ORAL at 04:20

## 2021-12-29 RX ADMIN — NICOTINE POLACRILEX 2 MG: 2 GUM, CHEWING ORAL at 07:38

## 2021-12-29 RX ADMIN — ONDANSETRON 4 MG: 4 TABLET, ORALLY DISINTEGRATING ORAL at 03:06

## 2021-12-29 RX ADMIN — SUCRALFATE 1 G: 1 SUSPENSION ORAL at 13:11

## 2021-12-29 RX ADMIN — NICOTINE 1 PATCH: 14 PATCH, EXTENDED RELEASE TRANSDERMAL at 06:22

## 2021-12-29 RX ADMIN — NICOTINE POLACRILEX 2 MG: 2 GUM, CHEWING ORAL at 10:47

## 2021-12-29 ASSESSMENT — ACTIVITIES OF DAILY LIVING (ADL)
ADLS_ACUITY_SCORE: 14
ADLS_ACUITY_SCORE: 12
ADLS_ACUITY_SCORE: 14
ADLS_ACUITY_SCORE: 12
ADLS_ACUITY_SCORE: 14
ADLS_ACUITY_SCORE: 12
ADLS_ACUITY_SCORE: 14

## 2021-12-29 NOTE — DISCHARGE SUMMARY
[facility]    Discharge Summary  Hospitalist    Date of Admission:  12/27/2021  Date of Discharge:  12/29/2021  Discharging Provider: Jasvir Awan MD  Date of Service (when I saw the patient): 12/29/21    Discharge Diagnoses   Nausea and vomiting. Dehydration. This could be due to viral gastroenteritis.  Acute kidney injury. Due to dehydration.  Hyponatremia.  Esophagitis.  Chronic perinephric stranding.  Type 2 diabetes.  Dyslipidemia.  Schizoaffective disorder.    History of Present Illness   Benja Duong is a 56 year old male admitted on 12/27/2021. He has a history of schizoaffective disorder, hypertension, type 2 diabetes mellitus, legal blindness and lives in a group home and presents with hyponatremia and acute kidney injury in setting of several days of vomiting & diarrhea. Suspect he has viral gastroenteritis causing hypovolemic hyponatremia and prerenal KAVITA due to dehydration.  Also noted to have tachycardia in the setting of volume depletion.    Hospital Course     Nausea and vomiting.  Dehydration.  -Clinically he appeared volume depleted. Admitted to medicine service. Started on IV fluid hydration.  -Significantly improved. No more nausea or vomiting. Able to tolerate oral diet. Good oral intake. He is eager to go home.     Hyponatremia  -Due to volume depletion.  -Improved.     Sinus tachycardia  -Due to volume depletion. Improved.     Esophagitis  -PPI.      Perinephric stranding.  Likely chronic.  -UA is unremarkable.   Urine culture negative.     Acute kidney injury  -Due to volume depletion.  Improved.      Type 2 diabetes  -On Lantus and Metformin at baseline which are on hold.  Sliding scale insulin for now.     Schizoaffective disorder.  --Due for home medication injections on 12/31 risperidone and bydureon.     Dyslipidemia  -On atorvastatin      I personally evaluated and examined the patient on the day of discharge.    Jasvir Awan MD      Pending Results   These  results will be followed up by PCP  Unresulted Labs Ordered in the Past 30 Days of this Admission     Date and Time Order Name Status Description    12/27/2021  8:39 AM Blood Culture Peripheral Blood Preliminary     12/27/2021  8:39 AM Blood Culture Peripheral Blood Preliminary                Primary Care Physician   Cleopatra Andujar        Discharge Disposition   Discharged to home  Condition at discharge: Stable    Consultations This Hospital Stay   PHYSICAL THERAPY ADULT IP CONSULT    Time Spent on this Encounter   Discharge time: greater than 30 minutes.    Discharge Orders      Reason for your hospital stay    Nausea and vomiting, dehydration. Resolved.     Follow-up and recommended labs and tests     Follow up with primary care provider, Cleopatra Andujar, within 7 days for hospital follow- up.     Activity    Your activity upon discharge: activity as tolerated     Diet    Follow this diet upon discharge: Orders Placed This Encounter      Moderate Consistent Carb (60 g CHO per Meal) Diet     Discharge Medications   Current Discharge Medication List      CONTINUE these medications which have NOT CHANGED    Details   acetaminophen (TYLENOL) 325 MG tablet Take 650 mg by mouth every 4 hours as needed for mild pain      !! diphenhydrAMINE (BENADRYL) 50 MG capsule Take 50 mg by mouth every 4 hours as needed for itching or allergies      !! diphenhydrAMINE (BENADRYL) 50 MG capsule Take 50 mg by mouth At Bedtime      ferrous sulfate (FEROSUL) 325 (65 Fe) MG tablet Take 1 tablet (325 mg) by mouth daily  Qty: 30 tablet, Refills: 1    Associated Diagnoses: Iron deficiency anemia, unspecified iron deficiency anemia type      glucose (BD GLUCOSE) 4 g chewable tablet Take 4 tablets by mouth as needed for low blood sugar      hydrALAZINE (APRESOLINE) 25 MG tablet Take 1 tablet (25 mg) by mouth every 6 hours as needed (HTN; SBP > 160)  Qty: 30 tablet, Refills: 1    Associated Diagnoses: Essential hypertension     "  hypromellose (GENTEAL SEVERE) ophthalmic gel 0.3% Place 1 drop into both eyes 2 times daily      insulin glargine (LANTUS PEN) 100 UNIT/ML pen Inject 21 Units Subcutaneous At Bedtime      lisinopril (ZESTRIL) 40 MG tablet Take 40 mg by mouth daily      metFORMIN (GLUCOPHAGE) 1000 MG tablet Take 1 tablet (1,000 mg) by mouth 2 times daily (with meals)  Qty: 60 tablet, Refills: 1      ondansetron (ZOFRAN-ODT) 4 MG ODT tab Take 4 mg by mouth 3 times daily as needed for nausea or vomiting      pantoprazole (PROTONIX) 40 MG EC tablet Take 1 tablet (40 mg) by mouth 2 times daily      risperiDONE microspheres ER (RISPERDAL CONSTA) 50 MG injection Inject 50 mg into the muscle every 14 days      atorvastatin (LIPITOR) 20 MG tablet Take 20 mg by mouth At Bedtime      cholecalciferol (VITAMIN D-1000 MAX ST) 1000 units TABS Take 1,000 Units by mouth daily  Qty: 30 tablet, Refills: 1      exenatide ER (BYDUREON) 2 MG recon vial kit susp for weekly inj Inject 2 mg Subcutaneous once a week DO not administer until he is seen by his PCP.  Qty:        hydrochlorothiazide (HYDRODIURIL) 25 MG tablet Take 1 tablet (25 mg) by mouth daily DO not resume until Na is back to normal or PCP orders  Qty:        ibuprofen (ADVIL/MOTRIN) 200 MG tablet Take 200 mg by mouth every 6 hours as needed for mild pain       !! - Potential duplicate medications found. Please discuss with provider.        Allergies   Allergies   Allergen Reactions     Alprazolam      Other reaction(s): *Unknown States \"I break out\"     Oxycodone-Acetaminophen      Other reaction(s): *Unknown, States \"I break out\"     Data   Most Recent 3 CBC's:Recent Labs   Lab Test 12/28/21  0942 12/27/21  0852 10/04/20  0628   WBC 11.3* 17.1* 11.3*   HGB 9.3* 11.7* 9.3*   MCV 91 87 90    295 286      Most Recent 3 BMP's:  Recent Labs   Lab Test 12/29/21  1216 12/29/21  0827 12/29/21  0826 12/28/21  1141 12/28/21  0942 12/27/21  2127 12/27/21  1751 12/27/21  1750 12/27/21  0852 "   NA  --  135  --   --  129*  --   --   --  135  121*   POTASSIUM  --  4.0  --   --  3.6  3.6  --  3.2*  --  3.5   CHLORIDE  --  100  --   --  92*  --   --   --  79*   CO2  --  29  --   --  32  --   --   --  30   BUN  --  9  --   --  17  --   --   --  36*   CR  --  0.78  --   --  0.83  --  1.03  --  1.41*   ANIONGAP  --  6  --   --  5  --   --   --  12   MITCHEL  --  8.1*  --   --  8.8  --   --   --  9.5   * 121* 121*   < > 188*   < >  --    < > 240*    < > = values in this interval not displayed.     Most Recent 2 LFT's:  Recent Labs   Lab Test 12/27/21  0852 10/03/20  1459   AST 15 13   ALT 20 13   ALKPHOS 71 99   BILITOTAL 0.3 0.3     Most Recent INR's and Anticoagulation Dosing History:  Anticoagulation Dose History    There is no flowsheet data to display.       Most Recent 3 Troponin's:  Recent Labs   Lab Test 10/01/20  1835 07/22/20  0219 07/21/20  2246   TROPI <0.015 <0.015 <0.015     Most Recent Cholesterol Panel:  Recent Labs   Lab Test 07/26/18  0751   CHOL 157   LDL 60   HDL 36*   TRIG 305*     Most Recent 6 Bacteria Isolates From Any Culture (See EPIC Reports for Culture Details):  Recent Labs   Lab Test 10/01/20  2105 10/01/20  1945 08/06/20  1219   CULT Cultured on the 1st day of incubation:  Escherichia coli  Susceptibility testing done on previous specimen  *  Critical Value/Significant Value, preliminary result only, called to and read back by  Mansi Bal RN @ 1002 10.2.20 JE    Cultured on the 1st day of incubation:  Escherichia coli  *  Critical Value/Significant Value, preliminary result only, called to and read back by  Mansi Bal RN @ 1002 10.2.20 JE    (Note)  POSITIVE for E.COLI by Verigene multiplex nucleic acid test. Final  identification and antimicrobial susceptibility testing will be  verified by standard methods. Verigene test will not distinguish  E.coli from Shigella species including S.dysenteriae, S.flexneri,  S.boydii, and S.sonnei. Specimens containing Shigella  species or  E.coli will be reported as Positive for E.coli.    Specimen tested with Bukupeigene multiplex, gram-negative blood culture  nucleic acid test for the following targets: Acinetobacter sp.,  Citrobacter sp., Enterobacter sp., Proteus sp., E. coli, K.  pneumoniae/oxytoca, P. aeruginosa, and the following resistance  markers: CTXM, KPC, NDM, VIM, IMP and OXA.      Critical Value/Significant Value called to and read back by JOSE ALBERTO  [id number]  BOB STUBBS 10.02.2020 AT 12.15PM,ZG     >100,000 colonies/mL  Escherichia coli  * >100,000 colonies/mL  Klebsiella oxytoca  *  50,000 to 100,000 colonies/mL  Escherichia coli  *     Most Recent TSH, T4 and A1c Labs:  Recent Labs   Lab Test 12/27/21  0852 10/01/20  1835   TSH  --  0.92   A1C 6.9*  --

## 2021-12-29 NOTE — PLAN OF CARE
A&Ox3-4. Patient asked what the date was today. Legally blind in both eyes. VSS on RA except HTN and tachycardia. Denies pain. LS diminished. C/o nausea this afternoon, given PRN zofran x 1 with relief. Moderate carb diet, poor appetite. BG checks with meals and HS. IV fluids running. K+/Mg protocols. Voiding via urinal. PT consulted. Nursing will continue to monitor.

## 2021-12-29 NOTE — PLAN OF CARE
Patient discharged back to group home via private car, transported via wheelchair with support staff.  Patient verbalized and received copy of discharge instructions.  Personal belongings gathered and sent with patient.  VSS. IV removed prior to discharge.

## 2021-12-29 NOTE — PLAN OF CARE
"BP (!) 163/80 (BP Location: Right arm)   Pulse 107   Temp 98.7  F (37.1  C) (Oral)   Resp 20   Ht 1.702 m (5' 7\")   Wt 79.8 kg (176 lb)   SpO2 96%   BMI 27.57 kg/m      Recent Labs   Lab 12/29/21  0225 12/28/21  2224 12/28/21  1838 12/28/21  1304 12/28/21  1141 12/28/21  0942   * 175* 198* 182* 175* 188*       Pt is stable with high BP. On RA. More cooperative tonight. Nicotine patch on right arm. Pt was nauseous at 0300 and zofran odt given. Nausea resolved. Pt drank 3 canned pops tonight. Nicotine given at 0400. Urinal at bedside. 0200 BG was 128. Continue POC.   "

## 2021-12-30 ENCOUNTER — PATIENT OUTREACH (OUTPATIENT)
Dept: CARE COORDINATION | Facility: CLINIC | Age: 56
End: 2021-12-30
Payer: COMMERCIAL

## 2021-12-30 DIAGNOSIS — Z71.89 OTHER SPECIFIED COUNSELING: ICD-10-CM

## 2021-12-30 NOTE — PROGRESS NOTES
Clinic Care Coordination Contact    Background: Care Coordination referral placed from [facility] discharge report for reason of patient meeting criteria for a TCM outreach call by [facility] team.    Assessment: Upon chart review, [facility] Team member will cancel/close the referral for TCM outreach due to reason below:    Patient has discharged to a Group home, Memory Care or Nursing Home    Plan: Care Coordination referral for TCM outreach canceled.    Mally Martin MA  [facility] [place], [facility]

## 2022-01-01 LAB
BACTERIA BLD CULT: NO GROWTH
BACTERIA BLD CULT: NO GROWTH

## 2022-01-13 ENCOUNTER — HOSPITAL ENCOUNTER (INPATIENT)
Facility: CLINIC | Age: 57
LOS: 2 days | Discharge: NURSING, REHAB OR LONG-TERM CARE | DRG: 683 | End: 2022-01-15
Attending: EMERGENCY MEDICINE | Admitting: STUDENT IN AN ORGANIZED HEALTH CARE EDUCATION/TRAINING PROGRAM
Payer: COMMERCIAL

## 2022-01-13 ENCOUNTER — APPOINTMENT (OUTPATIENT)
Dept: CT IMAGING | Facility: CLINIC | Age: 57
DRG: 683 | End: 2022-01-13
Attending: EMERGENCY MEDICINE
Payer: COMMERCIAL

## 2022-01-13 DIAGNOSIS — R11.2 INTRACTABLE VOMITING WITH NAUSEA: Primary | ICD-10-CM

## 2022-01-13 DIAGNOSIS — N17.9 ACUTE RENAL FAILURE, UNSPECIFIED ACUTE RENAL FAILURE TYPE (H): ICD-10-CM

## 2022-01-13 DIAGNOSIS — E87.1 HYPONATREMIA: ICD-10-CM

## 2022-01-13 DIAGNOSIS — E86.9 VOLUME DEPLETION: ICD-10-CM

## 2022-01-13 DIAGNOSIS — R33.9 URINARY RETENTION: ICD-10-CM

## 2022-01-13 LAB
ALBUMIN SERPL-MCNC: 3.8 G/DL (ref 3.4–5)
ALBUMIN UR-MCNC: 30 MG/DL
ALP SERPL-CCNC: 65 U/L (ref 40–150)
ALT SERPL W P-5'-P-CCNC: 14 U/L (ref 0–70)
ANION GAP SERPL CALCULATED.3IONS-SCNC: 14 MMOL/L (ref 3–14)
APPEARANCE UR: CLEAR
AST SERPL W P-5'-P-CCNC: 11 U/L (ref 0–45)
BASOPHILS # BLD AUTO: 0.1 10E3/UL (ref 0–0.2)
BASOPHILS NFR BLD AUTO: 0 %
BILIRUB SERPL-MCNC: 0.4 MG/DL (ref 0.2–1.3)
BILIRUB UR QL STRIP: NEGATIVE
BUN SERPL-MCNC: 33 MG/DL (ref 7–30)
CALCIUM SERPL-MCNC: 9.7 MG/DL (ref 8.5–10.1)
CHLORIDE BLD-SCNC: 86 MMOL/L (ref 94–109)
CK SERPL-CCNC: 136 U/L (ref 30–300)
CO2 SERPL-SCNC: 27 MMOL/L (ref 20–32)
COLOR UR AUTO: YELLOW
CREAT SERPL-MCNC: 5.29 MG/DL (ref 0.66–1.25)
EOSINOPHIL # BLD AUTO: 0.1 10E3/UL (ref 0–0.7)
EOSINOPHIL NFR BLD AUTO: 1 %
ERYTHROCYTE [DISTWIDTH] IN BLOOD BY AUTOMATED COUNT: 11.8 % (ref 10–15)
FLUAV RNA SPEC QL NAA+PROBE: NEGATIVE
FLUBV RNA RESP QL NAA+PROBE: NEGATIVE
GFR SERPL CREATININE-BSD FRML MDRD: 12 ML/MIN/1.73M2
GLUCOSE BLD-MCNC: 104 MG/DL (ref 70–99)
GLUCOSE UR STRIP-MCNC: NEGATIVE MG/DL
HCT VFR BLD AUTO: 32.6 % (ref 40–53)
HGB BLD-MCNC: 10.5 G/DL (ref 13.3–17.7)
HGB UR QL STRIP: NEGATIVE
HOLD SPECIMEN: NORMAL
HOLD SPECIMEN: NORMAL
HYALINE CASTS: 26 /LPF
IMM GRANULOCYTES # BLD: 0 10E3/UL
IMM GRANULOCYTES NFR BLD: 0 %
KETONES UR STRIP-MCNC: NEGATIVE MG/DL
LACTATE SERPL-SCNC: 1.5 MMOL/L (ref 0.7–2)
LEUKOCYTE ESTERASE UR QL STRIP: NEGATIVE
LIPASE SERPL-CCNC: 35 U/L (ref 73–393)
LYMPHOCYTES # BLD AUTO: 2.7 10E3/UL (ref 0.8–5.3)
LYMPHOCYTES NFR BLD AUTO: 22 %
MCH RBC QN AUTO: 29.5 PG (ref 26.5–33)
MCHC RBC AUTO-ENTMCNC: 32.2 G/DL (ref 31.5–36.5)
MCV RBC AUTO: 92 FL (ref 78–100)
MONOCYTES # BLD AUTO: 1 10E3/UL (ref 0–1.3)
MONOCYTES NFR BLD AUTO: 9 %
MUCOUS THREADS #/AREA URNS LPF: PRESENT /LPF
NEUTROPHILS # BLD AUTO: 8.1 10E3/UL (ref 1.6–8.3)
NEUTROPHILS NFR BLD AUTO: 68 %
NITRATE UR QL: NEGATIVE
NRBC # BLD AUTO: 0 10E3/UL
NRBC BLD AUTO-RTO: 0 /100
PH UR STRIP: 6.5 [PH] (ref 5–7)
PLATELET # BLD AUTO: 415 10E3/UL (ref 150–450)
POTASSIUM BLD-SCNC: 3.4 MMOL/L (ref 3.4–5.3)
PROT SERPL-MCNC: 7.5 G/DL (ref 6.8–8.8)
RBC # BLD AUTO: 3.56 10E6/UL (ref 4.4–5.9)
RBC URINE: 4 /HPF
SARS-COV-2 RNA RESP QL NAA+PROBE: NEGATIVE
SODIUM SERPL-SCNC: 127 MMOL/L (ref 133–144)
SP GR UR STRIP: 1.02 (ref 1–1.03)
TROPONIN I SERPL HS-MCNC: 15 NG/L
UROBILINOGEN UR STRIP-MCNC: NORMAL MG/DL
WBC # BLD AUTO: 12 10E3/UL (ref 4–11)
WBC URINE: 5 /HPF

## 2022-01-13 PROCEDURE — 85025 COMPLETE CBC W/AUTO DIFF WBC: CPT | Performed by: EMERGENCY MEDICINE

## 2022-01-13 PROCEDURE — 96365 THER/PROPH/DIAG IV INF INIT: CPT

## 2022-01-13 PROCEDURE — 36415 COLL VENOUS BLD VENIPUNCTURE: CPT | Performed by: EMERGENCY MEDICINE

## 2022-01-13 PROCEDURE — 84484 ASSAY OF TROPONIN QUANT: CPT | Performed by: EMERGENCY MEDICINE

## 2022-01-13 PROCEDURE — 120N000004 HC R&B MS OVERFLOW

## 2022-01-13 PROCEDURE — 82550 ASSAY OF CK (CPK): CPT | Performed by: EMERGENCY MEDICINE

## 2022-01-13 PROCEDURE — 96375 TX/PRO/DX INJ NEW DRUG ADDON: CPT

## 2022-01-13 PROCEDURE — 250N000011 HC RX IP 250 OP 636: Performed by: EMERGENCY MEDICINE

## 2022-01-13 PROCEDURE — 87040 BLOOD CULTURE FOR BACTERIA: CPT | Performed by: EMERGENCY MEDICINE

## 2022-01-13 PROCEDURE — 258N000003 HC RX IP 258 OP 636: Performed by: EMERGENCY MEDICINE

## 2022-01-13 PROCEDURE — 51702 INSERT TEMP BLADDER CATH: CPT

## 2022-01-13 PROCEDURE — 99223 1ST HOSP IP/OBS HIGH 75: CPT | Mod: AI | Performed by: STUDENT IN AN ORGANIZED HEALTH CARE EDUCATION/TRAINING PROGRAM

## 2022-01-13 PROCEDURE — 80053 COMPREHEN METABOLIC PANEL: CPT | Performed by: EMERGENCY MEDICINE

## 2022-01-13 PROCEDURE — C9803 HOPD COVID-19 SPEC COLLECT: HCPCS

## 2022-01-13 PROCEDURE — 83690 ASSAY OF LIPASE: CPT | Performed by: EMERGENCY MEDICINE

## 2022-01-13 PROCEDURE — 93005 ELECTROCARDIOGRAM TRACING: CPT

## 2022-01-13 PROCEDURE — 96376 TX/PRO/DX INJ SAME DRUG ADON: CPT

## 2022-01-13 PROCEDURE — 96361 HYDRATE IV INFUSION ADD-ON: CPT

## 2022-01-13 PROCEDURE — 87636 SARSCOV2 & INF A&B AMP PRB: CPT | Performed by: EMERGENCY MEDICINE

## 2022-01-13 PROCEDURE — 84443 ASSAY THYROID STIM HORMONE: CPT | Performed by: EMERGENCY MEDICINE

## 2022-01-13 PROCEDURE — 96367 TX/PROPH/DG ADDL SEQ IV INF: CPT

## 2022-01-13 PROCEDURE — 83605 ASSAY OF LACTIC ACID: CPT | Performed by: EMERGENCY MEDICINE

## 2022-01-13 PROCEDURE — 74176 CT ABD & PELVIS W/O CONTRAST: CPT

## 2022-01-13 PROCEDURE — 81001 URINALYSIS AUTO W/SCOPE: CPT | Performed by: EMERGENCY MEDICINE

## 2022-01-13 PROCEDURE — 99285 EMERGENCY DEPT VISIT HI MDM: CPT | Mod: 25

## 2022-01-13 RX ORDER — CEFEPIME HYDROCHLORIDE 1 G/1
1 INJECTION, POWDER, FOR SOLUTION INTRAMUSCULAR; INTRAVENOUS ONCE
Status: COMPLETED | OUTPATIENT
Start: 2022-01-13 | End: 2022-01-14

## 2022-01-13 RX ORDER — DEXTROSE MONOHYDRATE 25 G/50ML
25-50 INJECTION, SOLUTION INTRAVENOUS
Status: DISCONTINUED | OUTPATIENT
Start: 2022-01-13 | End: 2022-01-15 | Stop reason: HOSPADM

## 2022-01-13 RX ORDER — SODIUM CHLORIDE, SODIUM LACTATE, POTASSIUM CHLORIDE, CALCIUM CHLORIDE 600; 310; 30; 20 MG/100ML; MG/100ML; MG/100ML; MG/100ML
INJECTION, SOLUTION INTRAVENOUS ONCE
Status: COMPLETED | OUTPATIENT
Start: 2022-01-13 | End: 2022-01-14

## 2022-01-13 RX ORDER — NICOTINE POLACRILEX 4 MG
15-30 LOZENGE BUCCAL
Status: DISCONTINUED | OUTPATIENT
Start: 2022-01-13 | End: 2022-01-15 | Stop reason: HOSPADM

## 2022-01-13 RX ORDER — ONDANSETRON 2 MG/ML
4 INJECTION INTRAMUSCULAR; INTRAVENOUS ONCE
Status: COMPLETED | OUTPATIENT
Start: 2022-01-13 | End: 2022-01-13

## 2022-01-13 RX ORDER — SODIUM CHLORIDE 9 MG/ML
INJECTION, SOLUTION INTRAVENOUS CONTINUOUS
Status: DISCONTINUED | OUTPATIENT
Start: 2022-01-13 | End: 2022-01-14

## 2022-01-13 RX ADMIN — ONDANSETRON 4 MG: 2 INJECTION INTRAMUSCULAR; INTRAVENOUS at 21:35

## 2022-01-13 RX ADMIN — SODIUM CHLORIDE, POTASSIUM CHLORIDE, SODIUM LACTATE AND CALCIUM CHLORIDE: 600; 310; 30; 20 INJECTION, SOLUTION INTRAVENOUS at 22:33

## 2022-01-13 RX ADMIN — SODIUM CHLORIDE, POTASSIUM CHLORIDE, SODIUM LACTATE AND CALCIUM CHLORIDE 1000 ML: 600; 310; 30; 20 INJECTION, SOLUTION INTRAVENOUS at 20:38

## 2022-01-13 ASSESSMENT — ENCOUNTER SYMPTOMS
NAUSEA: 1
SHORTNESS OF BREATH: 0
CHILLS: 1
ABDOMINAL PAIN: 1
WEAKNESS: 1
FEVER: 0
COUGH: 0
DIFFICULTY URINATING: 0
FATIGUE: 1
DYSURIA: 0
VOMITING: 1

## 2022-01-13 ASSESSMENT — ACTIVITIES OF DAILY LIVING (ADL): ADLS_ACUITY_SCORE: 12

## 2022-01-13 NOTE — ED TRIAGE NOTES
"Nausea, vomiting and diarrhea for a \"week or so\" since he was discharged with gastritis. Today pt reports feeling dizzy as well. Tachycardic in triage otherwise VSS on RA. Pt is legally blind, group home nurse at pt's side for assistance.   "

## 2022-01-14 ENCOUNTER — APPOINTMENT (OUTPATIENT)
Dept: GENERAL RADIOLOGY | Facility: CLINIC | Age: 57
DRG: 683 | End: 2022-01-14
Attending: INTERNAL MEDICINE
Payer: COMMERCIAL

## 2022-01-14 LAB
ANION GAP SERPL CALCULATED.3IONS-SCNC: 9 MMOL/L (ref 3–14)
ATRIAL RATE - MUSE: 137 BPM
BUN SERPL-MCNC: 33 MG/DL (ref 7–30)
CALCIUM SERPL-MCNC: 8.4 MG/DL (ref 8.5–10.1)
CHLORIDE BLD-SCNC: 90 MMOL/L (ref 94–109)
CO2 SERPL-SCNC: 30 MMOL/L (ref 20–32)
CREAT SERPL-MCNC: 4.68 MG/DL (ref 0.66–1.25)
CREAT UR-MCNC: 305 MG/DL
DIASTOLIC BLOOD PRESSURE - MUSE: NORMAL MMHG
ERYTHROCYTE [DISTWIDTH] IN BLOOD BY AUTOMATED COUNT: 11.8 % (ref 10–15)
GFR SERPL CREATININE-BSD FRML MDRD: 14 ML/MIN/1.73M2
GLUCOSE BLD-MCNC: 77 MG/DL (ref 70–99)
GLUCOSE BLDC GLUCOMTR-MCNC: 128 MG/DL (ref 70–99)
GLUCOSE BLDC GLUCOMTR-MCNC: 136 MG/DL (ref 70–99)
GLUCOSE BLDC GLUCOMTR-MCNC: 146 MG/DL (ref 70–99)
GLUCOSE BLDC GLUCOMTR-MCNC: 67 MG/DL (ref 70–99)
GLUCOSE BLDC GLUCOMTR-MCNC: 68 MG/DL (ref 70–99)
GLUCOSE BLDC GLUCOMTR-MCNC: 71 MG/DL (ref 70–99)
GLUCOSE BLDC GLUCOMTR-MCNC: 77 MG/DL (ref 70–99)
GLUCOSE BLDC GLUCOMTR-MCNC: 86 MG/DL (ref 70–99)
GLUCOSE BLDC GLUCOMTR-MCNC: 90 MG/DL (ref 70–99)
HCT VFR BLD AUTO: 25.8 % (ref 40–53)
HGB BLD-MCNC: 8.9 G/DL (ref 13.3–17.7)
INTERPRETATION ECG - MUSE: NORMAL
MCH RBC QN AUTO: 30.6 PG (ref 26.5–33)
MCHC RBC AUTO-ENTMCNC: 34.5 G/DL (ref 31.5–36.5)
MCV RBC AUTO: 89 FL (ref 78–100)
P AXIS - MUSE: 52 DEGREES
PLATELET # BLD AUTO: 355 10E3/UL (ref 150–450)
POTASSIUM BLD-SCNC: 3.2 MMOL/L (ref 3.4–5.3)
PR INTERVAL - MUSE: 120 MS
QRS DURATION - MUSE: 90 MS
QT - MUSE: 302 MS
QTC - MUSE: 456 MS
R AXIS - MUSE: 43 DEGREES
RBC # BLD AUTO: 2.91 10E6/UL (ref 4.4–5.9)
SODIUM SERPL-SCNC: 129 MMOL/L (ref 133–144)
SODIUM UR-SCNC: 42 MMOL/L
SYSTOLIC BLOOD PRESSURE - MUSE: NORMAL MMHG
T AXIS - MUSE: 43 DEGREES
TSH SERPL DL<=0.005 MIU/L-ACNC: 0.84 MU/L (ref 0.4–4)
UUN UR-MCNC: 327 MG/DL
UUN/CREAT 24H UR: 1 G/G CR
VENTRICULAR RATE- MUSE: 137 BPM
WBC # BLD AUTO: 8.5 10E3/UL (ref 4–11)

## 2022-01-14 PROCEDURE — 99233 SBSQ HOSP IP/OBS HIGH 50: CPT | Performed by: INTERNAL MEDICINE

## 2022-01-14 PROCEDURE — 36415 COLL VENOUS BLD VENIPUNCTURE: CPT | Performed by: EMERGENCY MEDICINE

## 2022-01-14 PROCEDURE — 36415 COLL VENOUS BLD VENIPUNCTURE: CPT | Performed by: STUDENT IN AN ORGANIZED HEALTH CARE EDUCATION/TRAINING PROGRAM

## 2022-01-14 PROCEDURE — 250N000011 HC RX IP 250 OP 636: Performed by: STUDENT IN AN ORGANIZED HEALTH CARE EDUCATION/TRAINING PROGRAM

## 2022-01-14 PROCEDURE — 250N000013 HC RX MED GY IP 250 OP 250 PS 637: Performed by: PHYSICIAN ASSISTANT

## 2022-01-14 PROCEDURE — 84300 ASSAY OF URINE SODIUM: CPT | Performed by: STUDENT IN AN ORGANIZED HEALTH CARE EDUCATION/TRAINING PROGRAM

## 2022-01-14 PROCEDURE — 120N000004 HC R&B MS OVERFLOW

## 2022-01-14 PROCEDURE — 36415 COLL VENOUS BLD VENIPUNCTURE: CPT | Performed by: INTERNAL MEDICINE

## 2022-01-14 PROCEDURE — 250N000011 HC RX IP 250 OP 636: Performed by: EMERGENCY MEDICINE

## 2022-01-14 PROCEDURE — 250N000013 HC RX MED GY IP 250 OP 250 PS 637: Performed by: STUDENT IN AN ORGANIZED HEALTH CARE EDUCATION/TRAINING PROGRAM

## 2022-01-14 PROCEDURE — 84540 ASSAY OF URINE/UREA-N: CPT | Performed by: STUDENT IN AN ORGANIZED HEALTH CARE EDUCATION/TRAINING PROGRAM

## 2022-01-14 PROCEDURE — 84132 ASSAY OF SERUM POTASSIUM: CPT | Performed by: INTERNAL MEDICINE

## 2022-01-14 PROCEDURE — 250N000013 HC RX MED GY IP 250 OP 250 PS 637: Performed by: INTERNAL MEDICINE

## 2022-01-14 PROCEDURE — 71046 X-RAY EXAM CHEST 2 VIEWS: CPT

## 2022-01-14 PROCEDURE — 85027 COMPLETE CBC AUTOMATED: CPT | Performed by: STUDENT IN AN ORGANIZED HEALTH CARE EDUCATION/TRAINING PROGRAM

## 2022-01-14 PROCEDURE — 80048 BASIC METABOLIC PNL TOTAL CA: CPT | Performed by: EMERGENCY MEDICINE

## 2022-01-14 PROCEDURE — 258N000003 HC RX IP 258 OP 636: Performed by: STUDENT IN AN ORGANIZED HEALTH CARE EDUCATION/TRAINING PROGRAM

## 2022-01-14 PROCEDURE — 258N000001 HC RX 258: Performed by: INTERNAL MEDICINE

## 2022-01-14 RX ORDER — SODIUM CHLORIDE, SODIUM LACTATE, POTASSIUM CHLORIDE, CALCIUM CHLORIDE 600; 310; 30; 20 MG/100ML; MG/100ML; MG/100ML; MG/100ML
INJECTION, SOLUTION INTRAVENOUS CONTINUOUS
Status: DISCONTINUED | OUTPATIENT
Start: 2022-01-14 | End: 2022-01-14

## 2022-01-14 RX ORDER — ONDANSETRON 2 MG/ML
4 INJECTION INTRAMUSCULAR; INTRAVENOUS EVERY 6 HOURS PRN
Status: DISCONTINUED | OUTPATIENT
Start: 2022-01-14 | End: 2022-01-15 | Stop reason: HOSPADM

## 2022-01-14 RX ORDER — LIDOCAINE 40 MG/G
CREAM TOPICAL
Status: DISCONTINUED | OUTPATIENT
Start: 2022-01-14 | End: 2022-01-14

## 2022-01-14 RX ORDER — NITROGLYCERIN 0.4 MG/1
0.4 TABLET SUBLINGUAL EVERY 5 MIN PRN
Status: DISCONTINUED | OUTPATIENT
Start: 2022-01-14 | End: 2022-01-15 | Stop reason: HOSPADM

## 2022-01-14 RX ORDER — AMOXICILLIN 250 MG
1 CAPSULE ORAL 2 TIMES DAILY
Status: DISCONTINUED | OUTPATIENT
Start: 2022-01-14 | End: 2022-01-15 | Stop reason: HOSPADM

## 2022-01-14 RX ORDER — HALOPERIDOL 5 MG/ML
2 INJECTION INTRAMUSCULAR EVERY 6 HOURS PRN
Status: DISCONTINUED | OUTPATIENT
Start: 2022-01-14 | End: 2022-01-15 | Stop reason: HOSPADM

## 2022-01-14 RX ORDER — AMOXICILLIN 250 MG
2 CAPSULE ORAL 2 TIMES DAILY
Status: DISCONTINUED | OUTPATIENT
Start: 2022-01-14 | End: 2022-01-15 | Stop reason: HOSPADM

## 2022-01-14 RX ORDER — PROCHLORPERAZINE 25 MG
25 SUPPOSITORY, RECTAL RECTAL EVERY 12 HOURS PRN
Status: DISCONTINUED | OUTPATIENT
Start: 2022-01-14 | End: 2022-01-15 | Stop reason: HOSPADM

## 2022-01-14 RX ORDER — ONDANSETRON 4 MG/1
4 TABLET, ORALLY DISINTEGRATING ORAL EVERY 6 HOURS PRN
Status: DISCONTINUED | OUTPATIENT
Start: 2022-01-14 | End: 2022-01-15 | Stop reason: HOSPADM

## 2022-01-14 RX ORDER — HYDRALAZINE HYDROCHLORIDE 25 MG/1
25 TABLET, FILM COATED ORAL EVERY 6 HOURS PRN
Status: DISCONTINUED | OUTPATIENT
Start: 2022-01-14 | End: 2022-01-15 | Stop reason: HOSPADM

## 2022-01-14 RX ORDER — LIDOCAINE 40 MG/G
CREAM TOPICAL
Status: DISCONTINUED | OUTPATIENT
Start: 2022-01-14 | End: 2022-01-15 | Stop reason: HOSPADM

## 2022-01-14 RX ORDER — DIPHENHYDRAMINE HCL 25 MG
25 CAPSULE ORAL EVERY 6 HOURS PRN
Status: DISCONTINUED | OUTPATIENT
Start: 2022-01-14 | End: 2022-01-15 | Stop reason: HOSPADM

## 2022-01-14 RX ORDER — PROCHLORPERAZINE MALEATE 10 MG
10 TABLET ORAL EVERY 6 HOURS PRN
Status: DISCONTINUED | OUTPATIENT
Start: 2022-01-14 | End: 2022-01-15 | Stop reason: HOSPADM

## 2022-01-14 RX ORDER — PANTOPRAZOLE SODIUM 40 MG/1
40 TABLET, DELAYED RELEASE ORAL 2 TIMES DAILY
Status: DISCONTINUED | OUTPATIENT
Start: 2022-01-14 | End: 2022-01-15 | Stop reason: HOSPADM

## 2022-01-14 RX ORDER — NICOTINE 21 MG/24HR
1 PATCH, TRANSDERMAL 24 HOURS TRANSDERMAL DAILY
Status: DISCONTINUED | OUTPATIENT
Start: 2022-01-14 | End: 2022-01-15 | Stop reason: HOSPADM

## 2022-01-14 RX ORDER — POTASSIUM CHLORIDE 1.5 G/1.58G
20 POWDER, FOR SOLUTION ORAL ONCE
Status: COMPLETED | OUTPATIENT
Start: 2022-01-14 | End: 2022-01-14

## 2022-01-14 RX ORDER — ATORVASTATIN CALCIUM 20 MG/1
20 TABLET, FILM COATED ORAL AT BEDTIME
Status: DISCONTINUED | OUTPATIENT
Start: 2022-01-14 | End: 2022-01-15 | Stop reason: HOSPADM

## 2022-01-14 RX ADMIN — PROCHLORPERAZINE EDISYLATE 10 MG: 5 INJECTION INTRAMUSCULAR; INTRAVENOUS at 04:49

## 2022-01-14 RX ADMIN — Medication 125 MCG: at 12:44

## 2022-01-14 RX ADMIN — ONDANSETRON 4 MG: 2 INJECTION INTRAMUSCULAR; INTRAVENOUS at 00:59

## 2022-01-14 RX ADMIN — ATORVASTATIN CALCIUM 20 MG: 20 TABLET, FILM COATED ORAL at 21:12

## 2022-01-14 RX ADMIN — ONDANSETRON 4 MG: 2 INJECTION INTRAMUSCULAR; INTRAVENOUS at 08:39

## 2022-01-14 RX ADMIN — SODIUM CHLORIDE, POTASSIUM CHLORIDE, SODIUM LACTATE AND CALCIUM CHLORIDE: 600; 310; 30; 20 INJECTION, SOLUTION INTRAVENOUS at 08:29

## 2022-01-14 RX ADMIN — DEXTROSE AND SODIUM CHLORIDE: 5; 450 INJECTION, SOLUTION INTRAVENOUS at 19:59

## 2022-01-14 RX ADMIN — SODIUM CHLORIDE, POTASSIUM CHLORIDE, SODIUM LACTATE AND CALCIUM CHLORIDE: 600; 310; 30; 20 INJECTION, SOLUTION INTRAVENOUS at 00:59

## 2022-01-14 RX ADMIN — DEXTROSE 15 G: 15 GEL ORAL at 04:41

## 2022-01-14 RX ADMIN — SENNOSIDES AND DOCUSATE SODIUM 2 TABLET: 50; 8.6 TABLET ORAL at 19:56

## 2022-01-14 RX ADMIN — DEXTROSE AND SODIUM CHLORIDE: 5; 450 INJECTION, SOLUTION INTRAVENOUS at 10:08

## 2022-01-14 RX ADMIN — CEFEPIME HYDROCHLORIDE 1 G: 1 INJECTION, POWDER, FOR SOLUTION INTRAMUSCULAR; INTRAVENOUS at 00:58

## 2022-01-14 RX ADMIN — POTASSIUM CHLORIDE 20 MEQ: 1.5 POWDER, FOR SOLUTION ORAL at 18:36

## 2022-01-14 RX ADMIN — PANTOPRAZOLE SODIUM 40 MG: 40 TABLET, DELAYED RELEASE ORAL at 19:56

## 2022-01-14 ASSESSMENT — ACTIVITIES OF DAILY LIVING (ADL)
ADLS_ACUITY_SCORE: 5
ADLS_ACUITY_SCORE: 12
ADLS_ACUITY_SCORE: 12
ADLS_ACUITY_SCORE: 5
ADLS_ACUITY_SCORE: 12
ADLS_ACUITY_SCORE: 5
ADLS_ACUITY_SCORE: 12
ADLS_ACUITY_SCORE: 5
ADLS_ACUITY_SCORE: 12
ADLS_ACUITY_SCORE: 5
ADLS_ACUITY_SCORE: 12
ADLS_ACUITY_SCORE: 5
ADLS_ACUITY_SCORE: 12
ADLS_ACUITY_SCORE: 12
ADLS_ACUITY_SCORE: 5
ADLS_ACUITY_SCORE: 5
DEPENDENT_IADLS:: CLEANING;COOKING;LAUNDRY;MEAL PREPARATION;SHOPPING;MEDICATION MANAGEMENT;MONEY MANAGEMENT;TRANSPORTATION
ADLS_ACUITY_SCORE: 5
ADLS_ACUITY_SCORE: 12
ADLS_ACUITY_SCORE: 12
ADLS_ACUITY_SCORE: 5
ADLS_ACUITY_SCORE: 12
ADLS_ACUITY_SCORE: 5
ADLS_ACUITY_SCORE: 12
ADLS_ACUITY_SCORE: 12

## 2022-01-14 NOTE — PROGRESS NOTES
[facility]    Medicine Progress Note - Hospitalist Service       Date of Admission:  1/13/2022    Assessment & Plan          Benja Duong is a 56 year old male with PMH including schizoaffective disorder, hypertension, type 2 diabetes, legal blindness who presents with persistent nausea/vomiting.  Found to have acute kidney injury.    1.  Acute kidney injury.  Creatinine initially elevated at 5.3.  Likely prerenal from very poor recent oral intake. Did have a dose of IV cefepime in the ER. Urinalysis without evidence for acute infection.  -Continue IV fluids.  -Avoid nephrotoxins as able.  -Recheck metabolic panel tomorrow.    2.  Hyponatremia.  Sodium initially 127.  Likely due to dehydration.  -Continue IV fluids.  -Recheck metabolic panel tomorrow.    3.  Persistent nausea and vomiting.  Suspect most likely related to cannabinoid hyperemesis syndrome.  -Would benefit from long-term marijuana cessation.    -Antiemetics as needed.  -IV fluids.    4.  Hypokalemia.  Likely due to poor recent oral intake.  -Potassium replacement protocol.    5.  Hypoglycemia.  Blood glucose 67 this morning.  Normally on metformin and Lantus to treat diabetes.  -Continue to hold glargine insulin and metformin.  -Change IV fluids to contain dextrose.  -Will likely become hyperglycemic at some point once kidney function improves and previous metformin and glargine metabolize out of system.  -Continue to monitor blood glucose.    6.  Diabetes mellitus type 2.  -Hold metformin and glargine for now.  -Continue to monitor blood glucose.  -Aspart sliding scale.    7. Hyperlipidemia.  -Continue atorvastatin.    8. GERD.  -Continue pantoprazole.    9. sinus tachycardia. TSH normal. Suspect due to dehydration.  -Continue IV fluids.  -Monitor on telemetry.         Diet: Full Liquid Diet    DVT Prophylaxis: Pneumatic Compression Devices  Carranza Catheter: Not present  Central Lines: None  Code Status: Full Code           Dave Huber, DO  Hospitalist Service  [facility]  Securely message with the [email or web address] Web Console (learn more here)  Text page via V[employer] Paging/Directory        Clinically Significant Risk Factors Present on Admission         # Hyponatremia: Na = 129 mmol/L (Ref range: 133 - 144 mmol/L) on admission, will monitor as appropriate  # Hypocalcemia: Ca = 8.4 mg/dL (Ref range: 8.5 - 10.1 mg/dL) and/or iCa = N/A on admission, will replace as needed       # Diabetes, type II: last A1C 6.9 % (Ref range: 0.0 - 5.6 %)  # Overweight: last There is no height or weight on file to calculate BMI.      ______________________________________________________________________    Interval History   Feeling better today. Still with nausea, but improved. Denies chest pain, shortness of breath, fevers, chills, or diarrhea.    Data reviewed today: I reviewed all medications, new labs and imaging results over the last 24 hours.    Physical Exam   Vital Signs: Temp: 99.6  F (37.6  C) Temp src: Oral BP: 124/72 Pulse: 120   Resp: 13 SpO2: 100 % O2 Device: None (Room air)    Weight: 0 lbs 0 oz  Gen:  NAD, A&Ox3.  Eyes:  Sclera anicteric.  OP:  MMM, no lesions.  Neck:  Supple.  CV:  Regular, mildly tachycardic, no loud murmurs.  Lung:  CTA b/l, normal effort.  Ab:  +BS, soft.  Skin:  Warm, dry to touch.  No rash.  Ext:  No pitting edema LE b/l.      Data   Recent Labs   Lab 01/14/22  1006 01/14/22  0833 01/14/22  0542 01/14/22  0434 01/14/22  0257 01/14/22  0026 01/13/22  1740   WBC  --   --   --   --   --   --  12.0*   HGB  --   --   --   --   --   --  10.5*   MCV  --   --   --   --   --   --  92   PLT  --   --   --   --   --   --  415   NA  --   --   --   --  129*  --  127*   POTASSIUM  --   --   --   --  3.2*  --  3.4   CHLORIDE  --   --   --   --  90*  --  86*   CO2  --   --   --   --  30  --  27   BUN  --   --   --   --  33*  --  33*   CR  --   --   --   --  4.68*  --  5.29*   ANIONGAP  --   --   --   --  9   --  14   MITCHEL  --   --   --   --  8.4*  --  9.7   GLC 77 67* 86   < > 77   < > 104*   ALBUMIN  --   --   --   --   --   --  3.8   PROTTOTAL  --   --   --   --   --   --  7.5   BILITOTAL  --   --   --   --   --   --  0.4   ALKPHOS  --   --   --   --   --   --  65   ALT  --   --   --   --   --   --  14   AST  --   --   --   --   --   --  11   LIPASE  --   --   --   --   --   --  35*    < > = values in this interval not displayed.

## 2022-01-14 NOTE — ED PROVIDER NOTES
History     Chief Complaint:  Nausea, Vomiting, & Diarrhea; Heartburn; and Dizziness       HPI   Benja Duong is a 56 year old male with history of esophagitis, chronic perinephric stranding, type 2 diabetes, dyslipidemia, schizoaffective disorder, legal blindness who presents with vomiting and diarrhea.  He was admitted from 12/27 to 12/29/2021 for vomiting, dehydration, and acute kidney injury.  At that time, symptoms were thought to be related to viral gastroenteritis.  Today, he states that he had recurrent symptoms several days ago, including vomiting and diarrhea.  He has had difficulty tolerating oral fluids, though only vomited 3 times today.  He states that he is continued to have watery diarrhea.  He does not know if there is any blood in the diarrhea or vomit as he is blind.  He denies any abdominal pain, fever, but is feeling chilled.  He has not taken any medications for symptoms at home.  He states that he is urinating normally.  He denies any dysuria.    ROS:  Review of Systems   Constitutional: Positive for chills and fatigue. Negative for fever.   Respiratory: Negative for cough and shortness of breath.    Gastrointestinal: Positive for abdominal pain, nausea and vomiting.   Genitourinary: Negative for difficulty urinating and dysuria.   Neurological: Positive for weakness.   All other systems reviewed and are negative.      Allergies:  Alprazolam  Oxycodone-Acetaminophen     Medications:    acetaminophen (TYLENOL) 325 MG tablet  atorvastatin (LIPITOR) 20 MG tablet  cholecalciferol (VITAMIN D-1000 MAX ST) 1000 units TABS  diphenhydrAMINE (BENADRYL) 50 MG capsule  diphenhydrAMINE (BENADRYL) 50 MG capsule  exenatide ER (BYDUREON) 2 MG recon vial kit susp for weekly inj  ferrous sulfate (FEROSUL) 325 (65 Fe) MG tablet  glucose (BD GLUCOSE) 4 g chewable tablet  hydrALAZINE (APRESOLINE) 25 MG tablet  hydrochlorothiazide (HYDRODIURIL) 25 MG tablet  hypromellose (GENTEAL SEVERE) ophthalmic gel  0.3%  ibuprofen (ADVIL/MOTRIN) 200 MG tablet  insulin glargine (LANTUS PEN) 100 UNIT/ML pen  lisinopril (ZESTRIL) 40 MG tablet  metFORMIN (GLUCOPHAGE) 1000 MG tablet  ondansetron (ZOFRAN-ODT) 4 MG ODT tab  pantoprazole (PROTONIX) 40 MG EC tablet  risperiDONE microspheres ER (RISPERDAL CONSTA) 50 MG injection        Past Medical History:    Diabetes  Schizophrenia  Latent tuberculosis  Hypertension    Past Surgical History:    Tonsillectomy  Vitrectomy    Family History:    Patient arrives by himself from group home.      Social History:  He admits to smoking tobacco.  PCP: Cleopatra Andujar   History of heavy alcohol use and cocaine use.    Physical Exam     Patient Vitals for the past 24 hrs:   BP Temp Temp src Pulse Resp SpO2   01/13/22 2215 (!) 153/99 -- -- (!) 134 -- 99 %   01/13/22 2201 -- -- -- (!) 128 -- 99 %   01/13/22 2200 (!) 167/87 -- -- (!) 129 -- 99 %   01/13/22 2159 -- -- -- (!) 129 -- 99 %   01/13/22 2158 -- -- -- (!) 129 -- 99 %   01/13/22 2157 -- -- -- (!) 129 -- 99 %   01/13/22 2156 -- 99.3  F (37.4  C) Oral -- -- --   01/13/22 2146 -- -- -- (!) 129 -- 100 %   01/13/22 2145 (!) 159/86 -- -- (!) 128 -- 100 %   01/13/22 2142 -- -- -- (!) 130 -- 98 %   01/13/22 2141 -- -- -- (!) 133 -- 99 %   01/13/22 2140 (!) 143/93 -- -- (!) 131 -- 99 %   01/13/22 2133 (!) 143/76 -- -- (!) 132 -- --   01/13/22 2132 (!) 143/76 -- -- (!) 132 -- --   01/13/22 1453 128/75 98.9  F (37.2  C) Temporal (!) 136 19 100 %        Physical Exam  General: alert, lying on his right side on the gurney, ill-appearing.  HENT: mucous membranes dry  CV: tachycardic rate, regular rhythm  Resp: normal effort, clear throughout, no crackles or wheezing  GI: abdomen soft and nontender, no guarding  MSK: no bony tenderness  Skin: skin dry, flaking  Extremities: no edema, calves non-tender  Neuro: Provides minimal information.  Answers questions appropriately.  Moving all extremities equally.  Psych: Withdrawn.      Emergency Department  Course   ECG:       EKG Interpretation:      Interpreted by Nadege Hernandez MD  Time reviewed:  Symptoms at time of EKG: Tachycardia  Rhythm: Sinus tachycardia  Rate: 137  Axis: Normal  Ectopy: None  Conduction: Normal  ST Segments/ T Waves: No ST-T wave changes and No acute ischemic changes  Q Waves: None  Comparison to prior: Unchanged from 12/27/21    Clinical Impression: sinus tachycardia        Imaging:  CT Abdomen Pelvis w/o Contrast   Final Result   IMPRESSION:    1.  Mild perinephric stranding is nonspecific. There are nonobstructing right renal calculi. No ureteral calculi. No hydronephrosis or hydroureter. Cannot assess for pyelonephritis without IV contrast.   2.  Mild inflammation of fat within a ventral abdominal wall hernia.   3.  Cholelithiasis.            Report per radiology    Laboratory:  Labs Ordered and Resulted from Time of ED Arrival to Time of ED Departure   COMPREHENSIVE METABOLIC PANEL - Abnormal       Result Value    Sodium 127 (*)     Potassium 3.4      Chloride 86 (*)     Carbon Dioxide (CO2) 27      Anion Gap 14      Urea Nitrogen 33 (*)     Creatinine 5.29 (*)     Calcium 9.7      Glucose 104 (*)     Alkaline Phosphatase 65      AST 11      ALT 14      Protein Total 7.5      Albumin 3.8      Bilirubin Total 0.4      GFR Estimate 12 (*)    LIPASE - Abnormal    Lipase 35 (*)    CBC WITH PLATELETS AND DIFFERENTIAL - Abnormal    WBC Count 12.0 (*)     RBC Count 3.56 (*)     Hemoglobin 10.5 (*)     Hematocrit 32.6 (*)     MCV 92      MCH 29.5      MCHC 32.2      RDW 11.8      Platelet Count 415      % Neutrophils 68      % Lymphocytes 22      % Monocytes 9      % Eosinophils 1      % Basophils 0      % Immature Granulocytes 0      NRBCs per 100 WBC 0      Absolute Neutrophils 8.1      Absolute Lymphocytes 2.7      Absolute Monocytes 1.0      Absolute Eosinophils 0.1      Absolute Basophils 0.1      Absolute Immature Granulocytes 0.0      Absolute NRBCs 0.0     LACTIC ACID WHOLE BLOOD    TROPONIN I   INFLUENZA A/B & SARS-COV2 PCR MULTIPLEX   BLOOD CULTURE   BLOOD CULTURE        Procedures   Carranza catheter placement.  Approximately 500 cc of dark urine obtained following catheter placement.    Emergency Department Course:    Reviewed:  I reviewed nursing notes, vitals, past medical history and Care Everywhere    Assessments:   I obtained history and examined the patient as noted above.   2035 I rechecked the patient and explained findings.   2240 Patient continues to be tachycardic despite fluids.      Consults:   9:43 PM  I attempted to call staff at Group Home.    9:59 PM  Group home staff states that the patient developed symptoms 2 weeks ago, including Nausea, vomiting, weakness.  Hospitalized, diagnosed with gastroenteritis.  Discharged without antibiotics.  Staff feels that symptoms are the same as last time. Very weak, poor appetite.  Diarrhea not as bad.  Staff doesn't know why he mentioned smelling gasoline.    Interventions:  Medications   lactated ringers infusion (has no administration in time range)   lactated ringers BOLUS 1,000 mL (1,000 mLs Intravenous New Bag 1/13/22 2038)   ondansetron (ZOFRAN) injection 4 mg (4 mg Intravenous Given 1/13/22 2135)        Disposition:  The patient was admitted to the hospital under the care of Dr. Francisco.     Impression & Plan    CMS Diagnoses: None    Covid-19  Benja Duong was evaluated during a global COVID-19 pandemic, which necessitated consideration that the patient might be at risk for infection with the SARS-CoV-2 virus that causes COVID-19.   Applicable protocols for evaluation were followed during the patient's care.   COVID-19 was considered as part of the patient's evaluation. The plan for testing is:  a test was obtained during this visit.     Medical Decision Making:  Benja Duong is a 56 year old male with history of esophagitis, chronic perinephric stranding, type 2 diabetes, dyslipidemia, schizoaffective disorder, legal  blindness who presents with vomiting and diarrhea.  On exam, the patient is a poor historian.  He is tachycardic, with low-grade fever.  He appears very volume depleted.  Labs demonstrate acute renal failure, as well as hyponatremia.  CT scan was obtained, which demonstrates persistent perinephric stranding, which seems to be a chronic finding.  He was also noted to have urinary retention.  Carranza catheter was placed in the ED, with about 500 cc of dark urine returned.  No evidence for urinary tract infection.  The cause of his acute renal failure seems to be likely combination of prerenal factors as well as postobstructive, given urinary retention on CT.  The patient was persistently tachycardic.    EKG demonstrates a sinus tachycardia.  This is likely also possibly related to combination of volume depletion, as urine continues to be dark and concentrated appearing.  Sepsis was considered as well, and he was started on broad-spectrum IV antibiotics pending results from blood cultures.  Abdominal exam is benign, in spite of symptoms of persistent vomiting and diarrhea.  No evidence for withdrawal syndrome.  He will be admitted for continued work up of renal failure.      Diagnosis:    ICD-10-CM    1. Acute renal failure, unspecified acute renal failure type (H)  N17.9    2. Volume depletion  E86.9    3. Urinary retention  R33.9    4. Hyponatremia  E87.1         1/13/2022   Nadege Hernandez MD Pepper, Tracy Lynn, MD  01/13/22 5787

## 2022-01-14 NOTE — ED NOTES
"[facility]  ED Nurse Handoff Report    Benja Duong is a 56 year old male   ED Chief complaint: Nausea, Vomiting, & Diarrhea; Heartburn; and Dizziness  . ED Diagnosis:   Final diagnoses:   Acute renal failure, unspecified acute renal failure type (H)   Volume depletion   Urinary retention   Hyponatremia     Allergies:   Allergies   Allergen Reactions     Alprazolam      Other reaction(s): *Unknown States \"I break out\"     Oxycodone-Acetaminophen      Other reaction(s): *Unknown, States \"I break out\"       Code Status: Full Code  Activity level - Baseline/Home:  Stand by Assist. Activity Level - Current:   Stand by Assist. Lift room needed: No. Bariatric: No   Needed: No. LEGALLY BLIND.  Isolation: No. Infection: Not Applicable.     Vital Signs:   Vitals:    01/14/22 0715 01/14/22 0730 01/14/22 0815 01/14/22 0829   BP:    124/72   Pulse: 114 112 117 120   Resp: 10 10 11 15   Temp:    100.1  F (37.8  C)   TempSrc:    Oral   SpO2:    100%       Cardiac Rhythm:  ,   Cardiac  Cardiac Rhythm: Sinus tachycardia  Pain level:    Patient confused: No. Patient Falls Risk: Yes.   Elimination Status: Has voided   Patient Report - Initial Complaint: Nausea, vomiting and diarrhea for a \"week or so\" since he was discharged with gastritis. Today pt reports feeling dizzy as well. Tachycardic in triage otherwise VSS on RA. Pt is legally blind, group home nurse at pt's side for assistance.   Focused Assessment: Gastrointestinal - Gastrointestinal WDL: .WDL except; GI symptoms  GI Signs/Symptoms: nausea; hiccups; diarrhea   Cardiac - Cardiac WDL: .WDL except; rhythm  Cardiac Rhythm: apical pulse regular; tachycardic   Cardiac Monitoring - EKG Monitoring: Yes  Cardiac Regularity: Regular  Cardiac Rhythm: ST   Genitourinary - Genitourinary WDL: .WDL except; voiding ability/characteristics; urine; general symptoms  Voiding Characteristics: urethral catheter (bladder)   Genitourinary -  Signs and Symptoms: " urinary retention   Tests Performed: 12 lead EKG, Labs,  COVID, CT abdomen  Abnormal Results:   Abnormal Labs Resulted from Time of ED Arrival to Time of ED Departure   COMPREHENSIVE METABOLIC PANEL - Abnormal       Result Value    Sodium 127 (*)     Potassium 3.4      Chloride 86 (*)     Carbon Dioxide (CO2) 27      Anion Gap 14      Urea Nitrogen 33 (*)     Creatinine 5.29 (*)     Calcium 9.7      Glucose 104 (*)     Alkaline Phosphatase 65      AST 11      ALT 14      Protein Total 7.5      Albumin 3.8      Bilirubin Total 0.4      GFR Estimate 12 (*)    LIPASE - Abnormal    Lipase 35 (*)    CBC WITH PLATELETS AND DIFFERENTIAL - Abnormal    WBC Count 12.0 (*)     RBC Count 3.56 (*)     Hemoglobin 10.5 (*)     Hematocrit 32.6 (*)     MCV 92      MCH 29.5      MCHC 32.2      RDW 11.8      Platelet Count 415      % Neutrophils 68      % Lymphocytes 22      % Monocytes 9      % Eosinophils 1      % Basophils 0      % Immature Granulocytes 0      NRBCs per 100 WBC 0      Absolute Neutrophils 8.1      Absolute Lymphocytes 2.7      Absolute Monocytes 1.0      Absolute Eosinophils 0.1      Absolute Basophils 0.1      Absolute Immature Granulocytes 0.0      Absolute NRBCs 0.0     ROUTINE UA WITH MICROSCOPIC REFLEX TO CULTURE - Abnormal    Color Urine Yellow      Appearance Urine Clear      Glucose Urine Negative      Bilirubin Urine Negative      Ketones Urine Negative      Specific Gravity Urine 1.021      Blood Urine Negative      pH Urine 6.5      Protein Albumin Urine 30  (*)     Urobilinogen Urine Normal      Nitrite Urine Negative      Leukocyte Esterase Urine Negative      Mucus Urine Present (*)     RBC Urine 4 (*)     WBC Urine 5      Hyaline Casts Urine 26 (*)    BASIC METABOLIC PANEL - Abnormal    Sodium 129 (*)     Potassium 3.2 (*)     Chloride 90 (*)     Carbon Dioxide (CO2) 30      Anion Gap 9      Urea Nitrogen 33 (*)     Creatinine 4.68 (*)     Calcium 8.4 (*)     Glucose 77      GFR Estimate 14 (*)     GLUCOSE BY METER - Abnormal    GLUCOSE BY METER POCT 68 (*)      CT Abdomen Pelvis w/o Contrast   Final Result   IMPRESSION:    1.  Mild perinephric stranding is nonspecific. There are nonobstructing right renal calculi. No ureteral calculi. No hydronephrosis or hydroureter. Cannot assess for pyelonephritis without IV contrast.   2.  Mild inflammation of fat within a ventral abdominal wall hernia.   3.  Cholelithiasis.             Treatments provided: See MAR. Carranza placed for retention.  Family Comments: From group home. No family listed.  OBS brochure/video discussed/provided to patient:  N/A  ED Medications:   Medications   lidocaine 1 % 0.1-1 mL (has no administration in time range)   lidocaine (LMX4) cream (has no administration in time range)   sodium chloride (PF) 0.9% PF flush 3 mL (3 mLs Intracatheter Not Given 1/14/22 0742)   sodium chloride (PF) 0.9% PF flush 3 mL (has no administration in time range)   melatonin tablet 1 mg (has no administration in time range)   lactated ringers infusion ( Intravenous New Bag 1/14/22 0829)   senna-docusate (SENOKOT-S/PERICOLACE) 8.6-50 MG per tablet 1 tablet (1 tablet Oral Not Given 1/14/22 0838)     Or   senna-docusate (SENOKOT-S/PERICOLACE) 8.6-50 MG per tablet 2 tablet ( Oral See Alternative 1/14/22 0838)   ondansetron (ZOFRAN-ODT) ODT tab 4 mg ( Oral See Alternative 1/14/22 0839)     Or   ondansetron (ZOFRAN) injection 4 mg (4 mg Intravenous Given 1/14/22 0839)   prochlorperazine (COMPAZINE) injection 10 mg (10 mg Intravenous Given 1/14/22 0449)     Or   prochlorperazine (COMPAZINE) tablet 10 mg ( Oral See Alternative 1/14/22 0449)     Or   prochlorperazine (COMPAZINE) suppository 25 mg ( Rectal See Alternative 1/14/22 0449)   atorvastatin (LIPITOR) tablet 20 mg (20 mg Oral Not Given 1/14/22 0447)   cholecalciferol TABS 1,000 Units (has no administration in time range)   hydrALAZINE (APRESOLINE) tablet 25 mg (has no administration in time range)   pantoprazole  (PROTONIX) EC tablet 40 mg (has no administration in time range)   glucose gel 15-30 g (15 g Oral Given 1/14/22 0441)     Or   dextrose 50 % injection 25-50 mL ( Intravenous See Alternative 1/14/22 0441)     Or   glucagon injection 1 mg ( Subcutaneous See Alternative 1/14/22 0441)   insulin aspart (NovoLOG) injection (RAPID ACTING) (1 Units Subcutaneous Not Given 1/14/22 0839)   insulin aspart (NovoLOG) injection (RAPID ACTING) (0 Units Subcutaneous Hold 1/14/22 0039)   sodium chloride 0.9% infusion ( Intravenous Not Given 1/14/22 0101)   lactated ringers BOLUS 1,000 mL (0 mLs Intravenous Stopped 1/13/22 2232)   ondansetron (ZOFRAN) injection 4 mg (4 mg Intravenous Given 1/13/22 2135)   lactated ringers infusion (0 mLs Intravenous Stopped 1/14/22 0059)   ceFEPIme (MAXIPIME) 1g vial to attach to  ml bag for ADULTS or NS 50 ml bag for PEDS (0 g Intravenous Stopped 1/14/22 0218)     Drips infusing:  Yes.  mL/hr  For the majority of the shift, the patient's behavior Green. Interventions performed were N/A    Sepsis treatment initiated: No     Patient tested for COVID 19 prior to admission: YES    ED Nurse Name/Phone Number: Keyonna Aquino RN,   8:47 AM    RECEIVING UNIT ED HANDOFF REVIEW    Above ED Nurse Handoff Report was reviewed: Yes  Reviewed by: Sherry Faulkner RN on January 14, 2022 at 9:39 AM

## 2022-01-14 NOTE — PHARMACY-ADMISSION MEDICATION HISTORY
Admission medication history interview status for this patient is complete. See [facility] admission navigator for allergy information, prior to admission medications and immunization status.     Medication history resources: Staff from [facility]; discharge summary formerly [facility] 12/29/21    Changes made to PTA medication list: none    Actions taken by pharmacist (provider contacted, etc):None   Additional medication history information: not currently using Bydureon, due for Risperdal injection yesterday  Medication reconciliation/reorder completed by provider prior to medication history?  no     For patients on insulin therapy:   Do you use sliding scale insulin based on blood sugars? no  What is your pre-meal insulin coverage?  n/a  Do you typically eat three meals a day? yes  How many times do you check your blood glucose per day? 1-2x/day  How many episodes of hypoglycemia do you typically have per month? none  Do you have a Continuous Glucose Monitor (CGM)?  no    Prior to Admission medications    Medication Sig Last Dose Taking? Auth Provider   acetaminophen (TYLENOL) 325 MG tablet Take 650 mg by mouth every 4 hours as needed for mild pain Past Month at Unknown time Yes Unknown, Entered By History   atorvastatin (LIPITOR) 20 MG tablet Take 20 mg by mouth At Bedtime 1/12/2022 at hs Yes Unknown, Entered By History   cholecalciferol (VITAMIN D-1000 MAX ST) 1000 units TABS Take 1,000 Units by mouth daily 1/13/2022 at am Yes Paul Andrews MD   diphenhydrAMINE (BENADRYL) 50 MG capsule Take 50 mg by mouth every 4 hours as needed for itching or allergies Past Month at Unknown time Yes Unknown, Entered By History   exenatide ER (BYDUREON) 2 MG recon vial kit susp for weekly inj Inject 2 mg Subcutaneous once a week DO not administer until he is seen by his PCP. More than a month at not utilizing currently Yes Benton Whitman MD   ferrous sulfate (FEROSUL) 325 (65 Fe) MG tablet Take 1 tablet (325 mg) by mouth daily 1/13/2022  at am Yes Maile Bragg PA-C   glucose (BD GLUCOSE) 4 g chewable tablet Take 4 tablets by mouth as needed for low blood sugar More than a month at Unknown time Yes Unknown, Entered By History   hydrALAZINE (APRESOLINE) 25 MG tablet Take 1 tablet (25 mg) by mouth every 6 hours as needed (HTN; SBP > 160) More than a month at Unknown time Yes Hawk Bueno MD   hydrochlorothiazide (HYDRODIURIL) 25 MG tablet Take 1 tablet (25 mg) by mouth daily DO not resume until Na is back to normal or PCP orders 1/13/2022 at am Yes Benton Whitman MD   hypromellose (GENTEAL SEVERE) ophthalmic gel 0.3% Place 1 drop into both eyes 2 times daily 1/13/2022 at x 1 Yes Unknown, Entered By History   ibuprofen (ADVIL/MOTRIN) 200 MG tablet Take 200 mg by mouth every 6 hours as needed for mild pain Past Month at Unknown time Yes Unknown, Entered By History   lisinopril (ZESTRIL) 40 MG tablet Take 40 mg by mouth daily 1/13/2022 at am Yes Unknown, Entered By History   metFORMIN (GLUCOPHAGE) 1000 MG tablet Take 1 tablet (1,000 mg) by mouth 2 times daily (with meals) 1/13/2022 at x 1 Yes Paul Andrews MD   ondansetron (ZOFRAN-ODT) 4 MG ODT tab Take 4 mg by mouth 3 times daily as needed for nausea or vomiting More than a month at Unknown time Yes Unknown, Entered By History   pantoprazole (PROTONIX) 40 MG EC tablet Take 1 tablet (40 mg) by mouth 2 times daily 1/13/2022 at x 1 Yes Benton Whitman MD   risperiDONE microspheres ER (RISPERDAL CONSTA) 50 MG injection Inject 50 mg into the muscle every 14 days 12/30/2021 Yes Unknown, Entered By History   diphenhydrAMINE (BENADRYL) 50 MG capsule Take 50 mg by mouth At Bedtime 1/13/2022 at hs  Unknown, Entered By History   insulin glargine (LANTUS PEN) 100 UNIT/ML pen Inject 21 Units Subcutaneous At Bedtime 1/13/2022 at hs  Unknown, Entered By History

## 2022-01-14 NOTE — ED NOTES
Genitourinary - Genitourinary Comment: pt comes in with elevated creatine level.     Gastrointestinal - Gastrointestinal Comment: pt comes in with N/V/D. pt recently discharged with gastritis. + dehydration, + weakness

## 2022-01-14 NOTE — PROGRESS NOTES
ROOM # 212-1    Living Situation (if not independent, order SW consult): Group home   Facility name: [facility]  : Sedrick [phone]    Activity level at baseline: Ind  Activity level on admit: Assist x2      Patient registered to observation; given Patient Bill of Rights; given the opportunity to ask questions about observation status and their plan of care.  Patient has been oriented to the observation room, bathroom and call light is in place.    Discussed discharge goals and expectations with patient/family.

## 2022-01-14 NOTE — CONSULTS
Care Management Initial Consult    General Information  Assessment completed with: Caregiver, Sedrick GONZALEZ RN   Type of CM/SW Visit: Initial Assessment    Primary Care Provider verified and updated as needed: No   Readmission within the last 30 days:  (Recently at the ED)      Reason for Consult: discharge planning  Advance Care Planning:            Communication Assessment  Patient's communication style: spoken language (English or Bilingual)    Hearing Difficulty or Deaf: no   Wear Glasses or Blind: yes    Cognitive  Cognitive/Neuro/Behavioral:                        Living Environment:   People in home: facility resident     Current living Arrangements: group home ([facility] [phone])      Able to return to prior arrangements: yes       Family/Social Support:  Care provided by: other (see comments)  Provides care for: no one, unable/limited ability to care for self  Marital Status: Single  Facility resident(s)/Staff          Description of Support System: Supportive    Support Assessment: Adequate family and caregiver support    Current Resources:   Patient receiving home care services: No     Community Resources:    Equipment currently used at home:    Supplies currently used at home:      Employment/Financial:  Employment Status:          Financial Concerns: No concerns identified   Referral to Financial Counselor: No       Lifestyle & Psychosocial Needs:  Social Determinants of Health     Tobacco Use: Not on file   Alcohol Use: Not on file   Financial Resource Strain: Not on file   Food Insecurity: Not on file   Transportation Needs: Not on file   Physical Activity: Not on file   Stress: Not on file   Social Connections: Not on file   Intimate Partner Violence: Not on file   Depression: Not on file   Housing Stability: Not on file       Functional Status:  Prior to admission patient needed assistance:   Dependent ADLs:: Independent  Dependent IADLs:: Cleaning,Cooking,Laundry,Meal  Preparation,Shopping,Medication Management,Money Management,Transportation       Values/Beliefs:  Spiritual, Cultural Beliefs, Yazdanism Practices, Values that affect care:                 Additional Information:  Phone call with facility nurse, Sedrick [phone]. Sedrick confirms pt lives at [facility] [phone] fax [phone]. Sedrick reports at baseline pt is independent in the home and requires minimal assistance. He reports pt was not blind before the pt moved to the facility, so the pt is aware of the surroundings. Sedrick reports they use [facility] pharmacy [facility] [phone] and any new meds can be send directly to the pharmacy at discharge. Staff provide meals, manage medications and provide transport. They can accept pt on the weekends. They would prefer [facility] transport when pt is medically ready.    Sedrick reports pt was recently discharge from the ED with no medications or medical update from the medical team. He would like a phone call from the floor nurse to discuss possible new medications so he can be aware prior to discharge. SARY called and updated pt's floor nurse requesting they call Sedrick for an update.       Madeline Brunson MSW

## 2022-01-14 NOTE — H&P
[facility]  Hospitalist Admission Note  Name: Benja Duong    MRN: [id number]  YOB: 1965    Age: 56 year old  Date of admission: 1/13/2022  Primary care provider: Cleopatra Andujar    Chief Complaint:  N/V    Assessment and Plan:   Acute kidney injury:  Patient presents with creatinine of 5.29, BUN of 33.  Baseline creatinine is normal.  Suspect prerenal injury in the setting of multiple weeks of vomiting and very poor p.o. intake.  Patient does have perinephric stranding on CT abdomen pelvis which was present during his last admission.  He does endorse some CVA tenderness.  Given elevated white count, tachycardia, perinephric stranding, pyelonephritis should be ruled out.  Urinalysis and urine culture to be obtained in the emergency department.  Hopefully the patient will improve with aggressive fluid resuscitation.  -Consider nephrology consultation in the morning if kidney function not improving with fluid resuscitation  -Continue maintenance fluids at 125 cc/h  -Continue empiric coverage for possible infection, may be able to discontinue antibiotic coverage of urinalysis results negative  -Follow-up urinalysis and urine culture  -Strict ins and outs  -Daily weights  -Daily BMP    SIRS vs Sepsis:  Patient presents with tachycardia greater than 130, leukocytosis to greater than 12, temperature to 99.3.  Meet SIRS criteria.  Meets sepsis criteria if there is presence of infection.  Plan to treat with aggressive IV fluid resuscitation and empiric antibiotic therapy as above.  -IV fluids and antibiotic coverage as above    Weakness  Nausea/vomiting:  Patient was admitted 2 to 3 weeks ago for suspected viral gastroenteritis.  He had improved and was discharged.  It seems that since that time, the patient has had ongoing vomiting.  The patient reports that he does not were the last time that he was able to tolerate oral intake without vomiting.  He reports that he is also vomiting  any liquid intake.  Etiology for his nausea and vomiting are unclear.  The patient does have a history of diabetes, and therefore gastroparesis be considered, however, diabetes seems well controlled.  CT abdomen pelvis did show evidence of cholelithiasis which I suppose may lead to such symptoms but seems less likely given lack of abdominal pain.  I do see that the patient has a history of severe cannabis use disorder and therefore I would wonder if this may be a cannabinoid hyperemesis syndrome.  -Advance diet as tolerated  -If patient is unable to tolerate diet over the next 24 hours, and consideration for GI consultation  -Would discuss marijuana use with the patient more closely in the morning as this was not discussed on HPI    Sinus tachycardia:  Tachycardic to the 130s, suspect that this is in the setting of significant hypovolemia in the setting of his nausea and vomiting.  -Fluid resuscitation as above.    Hyponatremia:  Suspect hypovolemic hyponatremia in the setting of nausea and vomiting as noted above.  -Fluid resuscitation as above    Type 2 diabetes mellitus:  A1c recently 6%.  Prescribed Lantus, metformin as an outpatient.  -Hold PTA Lantus given poor p.o. intake  -Low-dose sliding scale insulin    Schizoaffective disorder:  Receives subcutaneous risperidone and exenatide as an outpatient.  I am not sure when he last received this dose.  We will await from a track.    Hyperlipidemia:  Continue PTA statin      DVT Prophylaxis: Pneumatic Compression Devices  Code Status: Full Code  Discharge Dispo:  pending clinical improvement  Estimated Disch Date / # of Days until Disch: 2-4 days      History of Present Illness:  Benja Duong is a 56 year old male with PMH including schizoaffective disorder, hypertension, type 2 diabetes, legal blindness who presents with persistent nausea/vomiting.    The patient reports that he was just hospitalized here for similar symptoms.  He reports that he was feeling  "well when he was discharged home.  Unfortunately, his symptoms returned soon after he returned home.  Therefore, he reports that he has had weeks now of persistent nausea and vomiting.  He reports that he is vomiting up both solids and liquids.  Reports that he has been unable to tolerate any oral intake including liquid only intake.  Does not remember last meal that he is able to keep down.  He reports that he feels little bit jealous and feels a small amount of pain in his chest.  Otherwise, the patient feels at his baseline and denies review of systems.    ED work-up was notable for a creatinine that was elevated greater than 5, BUN elevated greater than 30, leukocytosis to 12, sodium of 127.  He underwent CT abdomen pelvis which showed perinephric stranding which was apparently present on his last CT as well, nonobstructing right renal calculi, mild inflammation of fat within a ventral abdominal wall hernia, cholelithiasis.     Past Medical History:  Past Medical History:   Diagnosis Date     Depression      Diabetes (H)      HTN (hypertension)      Past Surgical History:  Patient reports \"eye\" surgery    Social History:  Social History     Tobacco Use     Smoking status: Unknown If Ever Smoked     Smokeless tobacco: Not on file   Substance Use Topics     Alcohol use: Yes     Comment: \"few beers\"     Social History     Social History Narrative     Not on file     Family History:  No family history on file.  Allergies:  Allergies   Allergen Reactions     Alprazolam      Other reaction(s): *Unknown States \"I break out\"     Oxycodone-Acetaminophen      Other reaction(s): *Unknown, States \"I break out\"     Medications:  (Not in a hospital admission)    Review of Systems:  A Comprehensive greater than 10 system review of systems was carried out.  Pertinent positives and negatives are noted above.  Otherwise negative for contributory information.     Physical Exam:  Blood pressure (!) 153/99, pulse (!) 134, " temperature 99.3  F (37.4  C), temperature source Oral, resp. rate 19, SpO2 99 %.  Wt Readings from Last 1 Encounters:   12/27/21 79.8 kg (176 lb)     Exam:  General: Alert, awake, no acute distress.  Mostly answers questions in [facility] or [facility].   HEENT: NC/AT, eyes anicteric, external occular movements intact, face symmetric.    Cardiac: tachycardia, S1, S2.  No murmurs appreciated.  Pulmonary: Normal chest rise, normal work of breathing.  Lungs CTA BL  Abdomen: soft, non-tender, non-distended.  Bowel Sounds Present.  No guarding.  Extremities: no deformities.  Warm, well perfused.  Skin: no rashes or lesions noted.  Warm and Dry.  Neuro: No focal deficits noted.  Speech clear.  Coordination and strength grossly normal.  Psych: Flat affect.    Data:  EKG:  Sinus tachcyardai  Imaging:  Recent Results (from the past 48 hour(s))   CT Abdomen Pelvis w/o Contrast    Narrative    EXAM: CT ABDOMEN PELVIS W/O CONTRAST  LOCATION: [facility]  DATE/TIME: 1/13/2022 8:56 PM    INDICATION: Sepsis  COMPARISON: 10/04/2020   TECHNIQUE: CT scan of the abdomen and pelvis was performed without IV contrast. Multiplanar reformats were obtained. Dose reduction techniques were used.  CONTRAST: None.    FINDINGS:   LOWER CHEST: Small hiatal hernia.     HEPATOBILIARY: Cholelithiasis.     PANCREAS: Normal.    SPLEEN: Normal.    ADRENAL GLANDS: Normal.    KIDNEYS/BLADDER: There is mild perinephric stranding. There are punctate nonobstructing renal calculi in the right kidney. A hypoattenuating right renal lesion is incompletely assessed. No hydronephrosis or hydroureter. No ureteral calculi. The urinary   bladder is distended.     BOWEL: Normal.    LYMPH NODES: Normal.    VASCULATURE: Atherosclerotic disease.     PELVIC ORGANS: Normal.    MUSCULOSKELETAL:  A midline ventral abdominal wall hernia 1.5 cm above the umbilicus contains fat with mild stranding. The opening is 21 mm transverse by 14 mm craniocaudal.        Impression    IMPRESSION:   1.  Mild perinephric stranding is nonspecific. There are nonobstructing right renal calculi. No ureteral calculi. No hydronephrosis or hydroureter. Cannot assess for pyelonephritis without IV contrast.  2.  Mild inflammation of fat within a ventral abdominal wall hernia.  3.  Cholelithiasis.         Labs:  Recent Labs   Lab 01/13/22  1740   WBC 12.0*   HGB 10.5*   HCT 32.6*   MCV 92             Lab Results   Component Value Date     01/13/2022     12/29/2021     12/28/2021     10/04/2020     10/03/2020     10/02/2020    Lab Results   Component Value Date    CHLORIDE 86 01/13/2022    CHLORIDE 100 12/29/2021    CHLORIDE 92 12/28/2021    CHLORIDE 104 10/04/2020    CHLORIDE 104 10/03/2020    CHLORIDE 98 10/02/2020    Lab Results   Component Value Date    BUN 33 01/13/2022    BUN 9 12/29/2021    BUN 17 12/28/2021    BUN 3 10/04/2020    BUN 4 10/03/2020    BUN 5 10/02/2020      Lab Results   Component Value Date    POTASSIUM 3.4 01/13/2022    POTASSIUM 4.0 12/29/2021    POTASSIUM 3.6 12/28/2021    POTASSIUM 3.6 12/28/2021    POTASSIUM 3.5 10/04/2020    POTASSIUM 3.6 10/03/2020    POTASSIUM 3.2 10/03/2020    Lab Results   Component Value Date    CO2 27 01/13/2022    CO2 29 12/29/2021    CO2 32 12/28/2021    CO2 26 10/04/2020    CO2 26 10/03/2020    CO2 28 10/02/2020    Lab Results   Component Value Date    CR 5.29 01/13/2022    CR 0.78 12/29/2021    CR 0.83 12/28/2021    CR 0.64 10/04/2020    CR 0.78 10/03/2020    CR 0.81 10/02/2020            DO Pb Mackenzieist  [facility]

## 2022-01-15 VITALS
RESPIRATION RATE: 18 BRPM | DIASTOLIC BLOOD PRESSURE: 94 MMHG | WEIGHT: 165.1 LBS | HEIGHT: 67 IN | OXYGEN SATURATION: 100 % | TEMPERATURE: 98.4 F | BODY MASS INDEX: 25.91 KG/M2 | SYSTOLIC BLOOD PRESSURE: 171 MMHG | HEART RATE: 113 BPM

## 2022-01-15 PROBLEM — R11.2 INTRACTABLE VOMITING WITH NAUSEA: Status: ACTIVE | Noted: 2022-01-15

## 2022-01-15 PROBLEM — E87.6 HYPOKALEMIA: Status: ACTIVE | Noted: 2022-01-15

## 2022-01-15 LAB
ALBUMIN SERPL-MCNC: 2.8 G/DL (ref 3.4–5)
ALP SERPL-CCNC: 50 U/L (ref 40–150)
ALT SERPL W P-5'-P-CCNC: 13 U/L (ref 0–70)
ANION GAP SERPL CALCULATED.3IONS-SCNC: 7 MMOL/L (ref 3–14)
AST SERPL W P-5'-P-CCNC: 8 U/L (ref 0–45)
BILIRUB SERPL-MCNC: 0.4 MG/DL (ref 0.2–1.3)
BUN SERPL-MCNC: 19 MG/DL (ref 7–30)
CALCIUM SERPL-MCNC: 8.8 MG/DL (ref 8.5–10.1)
CHLORIDE BLD-SCNC: 93 MMOL/L (ref 94–109)
CO2 SERPL-SCNC: 33 MMOL/L (ref 20–32)
CREAT SERPL-MCNC: 1.46 MG/DL (ref 0.66–1.25)
ERYTHROCYTE [DISTWIDTH] IN BLOOD BY AUTOMATED COUNT: 11.5 % (ref 10–15)
GFR SERPL CREATININE-BSD FRML MDRD: 56 ML/MIN/1.73M2
GLUCOSE BLD-MCNC: 152 MG/DL (ref 70–99)
GLUCOSE BLDC GLUCOMTR-MCNC: 134 MG/DL (ref 70–99)
GLUCOSE BLDC GLUCOMTR-MCNC: 167 MG/DL (ref 70–99)
GLUCOSE BLDC GLUCOMTR-MCNC: 203 MG/DL (ref 70–99)
HCT VFR BLD AUTO: 27.2 % (ref 40–53)
HGB BLD-MCNC: 8.8 G/DL (ref 13.3–17.7)
MCH RBC QN AUTO: 29.4 PG (ref 26.5–33)
MCHC RBC AUTO-ENTMCNC: 32.4 G/DL (ref 31.5–36.5)
MCV RBC AUTO: 91 FL (ref 78–100)
PLATELET # BLD AUTO: 360 10E3/UL (ref 150–450)
POTASSIUM BLD-SCNC: 3.3 MMOL/L (ref 3.4–5.3)
POTASSIUM BLD-SCNC: 3.3 MMOL/L (ref 3.4–5.3)
PROT SERPL-MCNC: 6 G/DL (ref 6.8–8.8)
RBC # BLD AUTO: 2.99 10E6/UL (ref 4.4–5.9)
SODIUM SERPL-SCNC: 133 MMOL/L (ref 133–144)
WBC # BLD AUTO: 8 10E3/UL (ref 4–11)

## 2022-01-15 PROCEDURE — 85027 COMPLETE CBC AUTOMATED: CPT | Performed by: INTERNAL MEDICINE

## 2022-01-15 PROCEDURE — 250N000013 HC RX MED GY IP 250 OP 250 PS 637: Performed by: STUDENT IN AN ORGANIZED HEALTH CARE EDUCATION/TRAINING PROGRAM

## 2022-01-15 PROCEDURE — 258N000001 HC RX 258: Performed by: INTERNAL MEDICINE

## 2022-01-15 PROCEDURE — 80053 COMPREHEN METABOLIC PANEL: CPT | Performed by: INTERNAL MEDICINE

## 2022-01-15 PROCEDURE — 99239 HOSP IP/OBS DSCHRG MGMT >30: CPT | Performed by: INTERNAL MEDICINE

## 2022-01-15 PROCEDURE — 250N000013 HC RX MED GY IP 250 OP 250 PS 637: Performed by: PHYSICIAN ASSISTANT

## 2022-01-15 PROCEDURE — 82040 ASSAY OF SERUM ALBUMIN: CPT | Performed by: INTERNAL MEDICINE

## 2022-01-15 PROCEDURE — 36415 COLL VENOUS BLD VENIPUNCTURE: CPT | Performed by: INTERNAL MEDICINE

## 2022-01-15 PROCEDURE — 250N000013 HC RX MED GY IP 250 OP 250 PS 637: Performed by: INTERNAL MEDICINE

## 2022-01-15 RX ORDER — ONDANSETRON 4 MG/1
4 TABLET, ORALLY DISINTEGRATING ORAL 3 TIMES DAILY PRN
Qty: 10 TABLET | Refills: 1 | Status: SHIPPED | OUTPATIENT
Start: 2022-01-15 | End: 2022-04-19

## 2022-01-15 RX ORDER — TAMSULOSIN HYDROCHLORIDE 0.4 MG/1
0.4 CAPSULE ORAL DAILY
Status: DISCONTINUED | OUTPATIENT
Start: 2022-01-15 | End: 2022-01-15 | Stop reason: HOSPADM

## 2022-01-15 RX ORDER — TAMSULOSIN HYDROCHLORIDE 0.4 MG/1
0.4 CAPSULE ORAL DAILY
Qty: 30 CAPSULE | Refills: 3 | Status: SHIPPED | OUTPATIENT
Start: 2022-01-15

## 2022-01-15 RX ORDER — POTASSIUM CHLORIDE 1500 MG/1
20 TABLET, EXTENDED RELEASE ORAL ONCE
Status: COMPLETED | OUTPATIENT
Start: 2022-01-15 | End: 2022-01-15

## 2022-01-15 RX ADMIN — POTASSIUM CHLORIDE 20 MEQ: 1500 TABLET, EXTENDED RELEASE ORAL at 01:33

## 2022-01-15 RX ADMIN — Medication 125 MCG: at 08:50

## 2022-01-15 RX ADMIN — PANTOPRAZOLE SODIUM 40 MG: 40 TABLET, DELAYED RELEASE ORAL at 08:51

## 2022-01-15 RX ADMIN — DEXTROSE AND SODIUM CHLORIDE: 5; 450 INJECTION, SOLUTION INTRAVENOUS at 04:18

## 2022-01-15 ASSESSMENT — ACTIVITIES OF DAILY LIVING (ADL)
ADLS_ACUITY_SCORE: 10
WALKING_OR_CLIMBING_STAIRS_DIFFICULTY: NO
ADLS_ACUITY_SCORE: 10
DRESSING/BATHING_DIFFICULTY: NO
ADLS_ACUITY_SCORE: 10
FALL_HISTORY_WITHIN_LAST_SIX_MONTHS: YES
ADLS_ACUITY_SCORE: 10
ADLS_ACUITY_SCORE: 10
VISION_MANAGEMENT: BLIND
TOILETING_ISSUES: NO
DIFFICULTY_EATING/SWALLOWING: NO
ADLS_ACUITY_SCORE: 10
ADLS_ACUITY_SCORE: 5
ADLS_ACUITY_SCORE: 10
NUMBER_OF_TIMES_PATIENT_HAS_FALLEN_WITHIN_LAST_SIX_MONTHS: 2
ADLS_ACUITY_SCORE: 5
DOING_ERRANDS_INDEPENDENTLY_DIFFICULTY: YES
CONCENTRATING,_REMEMBERING_OR_MAKING_DECISIONS_DIFFICULTY: NO
DIFFICULTY_COMMUNICATING: NO
WEAR_GLASSES_OR_BLIND: YES
ADLS_ACUITY_SCORE: 10
ADLS_ACUITY_SCORE: 5
WHICH_OF_THE_ABOVE_FUNCTIONAL_RISKS_HAD_A_RECENT_ONSET_OR_CHANGE?: FALL HISTORY
ADLS_ACUITY_SCORE: 5
ADLS_ACUITY_SCORE: 10

## 2022-01-15 ASSESSMENT — MIFFLIN-ST. JEOR: SCORE: 1532.52

## 2022-01-15 NOTE — PLAN OF CARE
"INPATIENT NOTE: [id number]     PRIMARY PROBLEM:        Vital signs:  Temp: 98.1  F (36.7  C) Temp src: Oral BP: 134/73 Pulse: 117   Resp: 16 SpO2: 98 % O2 Device: None (Room air)   Height: 170.2 cm (5' 7\") Weight: 78.5 kg (173 lb)  Estimated body mass index is 27.1 kg/m  as calculated from the following:    Height as of this encounter: 1.702 m (5' 7\").    Weight as of this encounter: 78.5 kg (173 lb).        Orientation: Alert and Oriented x4. Pt is LEGALLY BLIND Neuro: Intact   Pain status: denying pain.   Resp: Lung sounds are Clear. Denies any SOB.   Cardiac: WNL, Denies any Chest Pain   GI: Bowels are active x 4 Quads. Last BM   : Voiding with no concern    Skin: WNL    LDA: Peripheral IV   Infusions: Patient is D5% 0.45 @ 125 ml/hr.  Pertinent Labs: K+ replaced  Diet: Full liquid  Activity: are SBA with Gait Belt and Walker  Major Shift Event: pt potassium 3.3. potassium replaced and recheck at 0600.     Will continue to monitor and provide cares.     Everardo Acuña RN  "

## 2022-01-15 NOTE — DISCHARGE INSTRUCTIONS
Follow up with Urology in 1-2 weeks for Carranza removal and voiding trial. :    Call on Monday to schedule this appointment  [facility]  [facility], [address]   In [facility]   [phone]

## 2022-01-15 NOTE — PROGRESS NOTES
Pt A&Ox4, vitally stable, afebrile on RA. Denies pain. Pt continues to c/o nausea, no emesis. Declined any medications to relieve nausea. Assist x2 slide board, pt states he is unable to bear any weight to his BLE's. Has mckeon catheter in place. K+ 3.2, will replace per protocol. IVF infusing. Continue to monitor.       Temp: 99.7  F (37.6  C) Temp src: Oral BP: (!) 147/86 Pulse: (!) 124   Resp: 13 SpO2: 98 % O2 Device: None (Room air)

## 2022-01-15 NOTE — DISCHARGE SUMMARY
"Discharge Summary    Benja Duong MRN# [id number]   YOB: 1965 Age: 56 year old     Date of Admission:  1/13/2022  Date of Discharge:  1/15/2022  Admitting Physician:  Mike Francisco DO  Discharge Physician:  Garth Gutierrez MD  Discharging Service:  Hospitalist     Home clinic: [facility], [facility]          Discharge Diagnosis:   1.  Acute kidney injury due to dehydration and urine retention.     2.  Hyponatremia due to dehydration.     3.  Intractable nausea and vomiting, resolved. Consider due to gastroenteritis or gastroparesis.     4. Urine retention, likely due to benign prostatic hyperplasia (500 ml of urine returned with Carranza placement)    5.  Hypokalemia, replaced.     6.  Hypoglycemia, resolved.     7.  Diabetes mellitus type 2.     8. Hyperlipidemia.     9. GERD.             Discharge Disposition:   Discharged to home           Allergies:   Allergies   Allergen Reactions     Alprazolam      Other reaction(s): *Unknown States \"I break out\"     Oxycodone-Acetaminophen      Other reaction(s): *Unknown, States \"I break out\"                Condition on Discharge:   Discharge condition: Stable   Discharge vitals: Blood pressure (!) 171/94, pulse 113, temperature 98.4  F (36.9  C), temperature source Axillary, resp. rate 18, height 1.702 m (5' 7\"), weight 74.9 kg (165 lb 1.6 oz), SpO2 100 %.   Code status on discharge: Full Code   Physical exam on day of discharge:   GENERAL:  Comfortable. Cooperative.  PSYCH: pleasant, oriented, No acute distress.  EYES: PERRLA, Normal conjunctiva.  HEART:  Regular rate and rhythm. No JVD. Pulses normal. No edema.  LUNGS:  Clear to auscultation, normal Respiratory effort.  ABDOMEN:  Soft, no hepatosplenomegaly, normal bowel sounds.  EXTREMETIES: No clubbing, cyanosis or ischemia  SKIN:  Dry to touch, No rash.         History of Present Illness and Hospital Course:     See detailed admission note for full details.  Benja Duong is " a 56 year old male with history of schizoaffective disorder, hypertension, type 2 diabetes, and legal blindness. He presented to the [facility] ED on 1/13/22 for evaluation of recurrent intractable nausea and vomiting. He had recently been admitted here with the same from 12/27/21 through 12/29/21 (thought to be due to viral gastroenteritis). ED evaluation showed stable vital signs, urine retention (Carranza placed with return of 500 ml of urine), acute kidney injury with BUN 33 and creatinine 5.29, Na 127, and WBC 12. He was treated with supportive cares and IV fluid hydration with improvement of nausea, vomiting, and renal function. Hospital course was complicated by hypokalemia that was treated with replacement. Hospital course was also complicated by hypoglycemia (BG 68) due to KAVITA, prior to admission diabetes medication, and poor oral intake. This improved. Benja is doing better and will discharge back to his group home today. Carranza catheter is being left in place on discharge. Benja is going to be started on Flomax. I am recommending that he follow up with Urology in 1-2 weeks for Carranza removal and voiding trial.            Procedures / Imaging:   CT abdomen and pelvis  CXR           Consultations:   No consultations were requested during this admission             Pending Results:   None           Discharge Instructions and Follow-Up:   Discharge diet: Regular   Discharge activity: Activity as tolerated   Discharge follow-up: Follow up with primary care provider in 1 week with BMP and CBC  Follow up with [facility] in 1-2 weeks for Carranza removal and voiding trial.   Outpatient therapy: None    Other instructions: Carranza in place on discharge             Discharge Medications:   Current Discharge Medication List      START taking these medications    Details   tamsulosin (FLOMAX) 0.4 MG capsule Take 1 capsule (0.4 mg) by mouth daily  Qty: 30 capsule, Refills: 3    Associated Diagnoses: Urinary retention          CONTINUE these medications which have NOT CHANGED    Details   acetaminophen (TYLENOL) 325 MG tablet Take 650 mg by mouth every 4 hours as needed for mild pain      atorvastatin (LIPITOR) 20 MG tablet Take 20 mg by mouth At Bedtime      cholecalciferol (VITAMIN D-1000 MAX ST) 1000 units TABS Take 1,000 Units by mouth daily  Qty: 30 tablet, Refills: 1      !! diphenhydrAMINE (BENADRYL) 50 MG capsule Take 50 mg by mouth every 4 hours as needed for itching or allergies      exenatide ER (BYDUREON) 2 MG recon vial kit susp for weekly inj Inject 2 mg Subcutaneous once a week DO not administer until he is seen by his PCP.  Qty:        ferrous sulfate (FEROSUL) 325 (65 Fe) MG tablet Take 1 tablet (325 mg) by mouth daily  Qty: 30 tablet, Refills: 1    Associated Diagnoses: Iron deficiency anemia, unspecified iron deficiency anemia type      glucose (BD GLUCOSE) 4 g chewable tablet Take 4 tablets by mouth as needed for low blood sugar      hydrALAZINE (APRESOLINE) 25 MG tablet Take 1 tablet (25 mg) by mouth every 6 hours as needed (HTN; SBP > 160)  Qty: 30 tablet, Refills: 1    Associated Diagnoses: Essential hypertension      hydrochlorothiazide (HYDRODIURIL) 25 MG tablet Take 1 tablet (25 mg) by mouth daily DO not resume until Na is back to normal or PCP orders  Qty:        hypromellose (GENTEAL SEVERE) ophthalmic gel 0.3% Place 1 drop into both eyes 2 times daily      ibuprofen (ADVIL/MOTRIN) 200 MG tablet Take 200 mg by mouth every 6 hours as needed for mild pain      lisinopril (ZESTRIL) 40 MG tablet Take 40 mg by mouth daily      metFORMIN (GLUCOPHAGE) 1000 MG tablet Take 1 tablet (1,000 mg) by mouth 2 times daily (with meals)  Qty: 60 tablet, Refills: 1      ondansetron (ZOFRAN-ODT) 4 MG ODT tab Take 4 mg by mouth 3 times daily as needed for nausea or vomiting      pantoprazole (PROTONIX) 40 MG EC tablet Take 1 tablet (40 mg) by mouth 2 times daily      risperiDONE microspheres ER (RISPERDAL CONSTA) 50 MG injection  Inject 50 mg into the muscle every 14 days      !! diphenhydrAMINE (BENADRYL) 50 MG capsule Take 50 mg by mouth At Bedtime      insulin glargine (LANTUS PEN) 100 UNIT/ML pen Inject 21 Units Subcutaneous At Bedtime       !! - Potential duplicate medications found. Please discuss with provider.             Total time spent in face to face contact with the patient and coordinating discharge was:  35 Minutes

## 2022-01-15 NOTE — PROGRESS NOTES
Pt A&Ox4, VSS. Denies any pain/nausea/vomiting or diarrhea. BS active x4. LS CTA. Pt had mckeon catheter placed in ED d/t retention. Pt's was going to get discharged with mckeon and f/u with urology outpt. Pt's group home requesting for mckeon to be removed to see if he can urinate in his own. Pt voided x2 small amounts. PVR 37 mls. Updated GH. Plan for discharge.

## 2022-01-15 NOTE — PROGRESS NOTES
Care Management Discharge Note    Discharge Date: 1/15/22       Discharge Disposition:  [facility] [phone] fax [phone]    Discharge Services:  none    Discharge DME:  none    Discharge Transportation:      Private pay costs discussed: transportation costs    Education Provided on the Discharge Plan:   staff  Persons Notified of Discharge Plans: Pt  Patient/Family in Agreement with the Plan:  yes    Additional Information:  I called [facility] [phone] fax [phone] and left a vm.  I called the [phone], but it just kept ringing.  I tentatively set up  transport for today at 1300. Pt updated and agreeable.     ADDENDUM 10:19   Any new meds need to be filled here.  They are aware of discharge planning.  Transport set up for 1300 today.  Will update MD.      11:58 Orders faxed.      12:34  Pt will keep mckeon in .  Pharmacy will send up flomax and they are aware he is discharging at 1pm.  I added to discharge AVS     Follow up with Urology in 1-2 weeks for Mckeon removal and voiding trial. :    Call on Monday to schedule this appointment  [facility]  [facility], [address] [facility] [address]   In [facility]   [phone]    ADDENDUM 4007 I canceled transport.   will provide transport.    Jinny Green RN BSN   Inpatient Care Coordination    [phone]          Vianey Green RN

## 2022-01-15 NOTE — PROGRESS NOTES
Patient's After Visit Summary was reviewed with patient and group home [facility]  Patient verbalized understanding of After Visit Summary, recommended follow up and was given an opportunity to ask questions.   Discharge medications sent home with patient/family: YES  Discharged with group home

## 2022-01-18 LAB
BACTERIA BLD CULT: NO GROWTH
BACTERIA BLD CULT: NO GROWTH

## 2022-04-19 ENCOUNTER — APPOINTMENT (OUTPATIENT)
Dept: CT IMAGING | Facility: CLINIC | Age: 57
DRG: 871 | End: 2022-04-19
Attending: EMERGENCY MEDICINE
Payer: COMMERCIAL

## 2022-04-19 ENCOUNTER — APPOINTMENT (OUTPATIENT)
Dept: GENERAL RADIOLOGY | Facility: CLINIC | Age: 57
DRG: 871 | End: 2022-04-19
Attending: EMERGENCY MEDICINE
Payer: COMMERCIAL

## 2022-04-19 ENCOUNTER — APPOINTMENT (OUTPATIENT)
Dept: CARDIOLOGY | Facility: CLINIC | Age: 57
DRG: 871 | End: 2022-04-19
Attending: HOSPITALIST
Payer: COMMERCIAL

## 2022-04-19 ENCOUNTER — HOSPITAL ENCOUNTER (INPATIENT)
Facility: CLINIC | Age: 57
LOS: 2 days | Discharge: NURSING, REHAB OR LONG-TERM CARE | DRG: 871 | End: 2022-04-21
Attending: EMERGENCY MEDICINE | Admitting: HOSPITALIST
Payer: COMMERCIAL

## 2022-04-19 DIAGNOSIS — A41.9 SEPTIC SHOCK (H): ICD-10-CM

## 2022-04-19 DIAGNOSIS — Z79.4 TYPE 2 DIABETES MELLITUS WITH OTHER SPECIFIED COMPLICATION, WITH LONG-TERM CURRENT USE OF INSULIN (H): Primary | ICD-10-CM

## 2022-04-19 DIAGNOSIS — E11.69 TYPE 2 DIABETES MELLITUS WITH OTHER SPECIFIED COMPLICATION, WITH LONG-TERM CURRENT USE OF INSULIN (H): Primary | ICD-10-CM

## 2022-04-19 DIAGNOSIS — R73.9 HYPERGLYCEMIA: ICD-10-CM

## 2022-04-19 DIAGNOSIS — J18.9 PNEUMONIA DUE TO INFECTIOUS ORGANISM, UNSPECIFIED LATERALITY, UNSPECIFIED PART OF LUNG: ICD-10-CM

## 2022-04-19 DIAGNOSIS — A41.9 SEPSIS, DUE TO UNSPECIFIED ORGANISM, UNSPECIFIED WHETHER ACUTE ORGAN DYSFUNCTION PRESENT (H): ICD-10-CM

## 2022-04-19 DIAGNOSIS — R65.21 SEPTIC SHOCK (H): ICD-10-CM

## 2022-04-19 DIAGNOSIS — E87.20 LACTIC ACIDOSIS: ICD-10-CM

## 2022-04-19 DIAGNOSIS — E87.1 HYPONATREMIA: ICD-10-CM

## 2022-04-19 LAB
ALBUMIN SERPL-MCNC: 3.8 G/DL (ref 3.4–5)
ALBUMIN UR-MCNC: NEGATIVE MG/DL
ALP SERPL-CCNC: 70 U/L (ref 40–150)
ALT SERPL W P-5'-P-CCNC: 21 U/L (ref 0–70)
ANION GAP SERPL CALCULATED.3IONS-SCNC: 7 MMOL/L (ref 3–14)
ANION GAP SERPL CALCULATED.3IONS-SCNC: 9 MMOL/L (ref 3–14)
APPEARANCE UR: CLEAR
AST SERPL W P-5'-P-CCNC: 12 U/L (ref 0–45)
ATRIAL RATE - MUSE: 122 BPM
BASOPHILS # BLD AUTO: 0.1 10E3/UL (ref 0–0.2)
BASOPHILS NFR BLD AUTO: 1 %
BILIRUB SERPL-MCNC: 0.2 MG/DL (ref 0.2–1.3)
BILIRUB UR QL STRIP: NEGATIVE
BUN SERPL-MCNC: 11 MG/DL (ref 7–30)
BUN SERPL-MCNC: 13 MG/DL (ref 7–30)
C PNEUM DNA SPEC QL NAA+PROBE: NOT DETECTED
CALCIUM SERPL-MCNC: 9.3 MG/DL (ref 8.5–10.1)
CALCIUM SERPL-MCNC: 9.8 MG/DL (ref 8.5–10.1)
CHLORIDE BLD-SCNC: 100 MMOL/L (ref 94–109)
CHLORIDE BLD-SCNC: 91 MMOL/L (ref 94–109)
CO2 SERPL-SCNC: 27 MMOL/L (ref 20–32)
CO2 SERPL-SCNC: 28 MMOL/L (ref 20–32)
COLOR UR AUTO: ABNORMAL
CREAT SERPL-MCNC: 1.02 MG/DL (ref 0.66–1.25)
CREAT SERPL-MCNC: 1.17 MG/DL (ref 0.66–1.25)
DIASTOLIC BLOOD PRESSURE - MUSE: NORMAL MMHG
EOSINOPHIL # BLD AUTO: 0.3 10E3/UL (ref 0–0.7)
EOSINOPHIL NFR BLD AUTO: 2 %
ERYTHROCYTE [DISTWIDTH] IN BLOOD BY AUTOMATED COUNT: 12.8 % (ref 10–15)
ERYTHROCYTE [DISTWIDTH] IN BLOOD BY AUTOMATED COUNT: 12.9 % (ref 10–15)
FLUAV H1 2009 PAND RNA SPEC QL NAA+PROBE: NOT DETECTED
FLUAV H1 RNA SPEC QL NAA+PROBE: NOT DETECTED
FLUAV H3 RNA SPEC QL NAA+PROBE: NOT DETECTED
FLUAV RNA SPEC QL NAA+PROBE: NEGATIVE
FLUAV RNA SPEC QL NAA+PROBE: NOT DETECTED
FLUBV RNA RESP QL NAA+PROBE: NEGATIVE
FLUBV RNA SPEC QL NAA+PROBE: NOT DETECTED
GFR SERPL CREATININE-BSD FRML MDRD: 73 ML/MIN/1.73M2
GFR SERPL CREATININE-BSD FRML MDRD: 86 ML/MIN/1.73M2
GLUCOSE BLD-MCNC: 116 MG/DL (ref 70–99)
GLUCOSE BLD-MCNC: 306 MG/DL (ref 70–99)
GLUCOSE BLDC GLUCOMTR-MCNC: 205 MG/DL (ref 70–99)
GLUCOSE BLDC GLUCOMTR-MCNC: 216 MG/DL (ref 70–99)
GLUCOSE BLDC GLUCOMTR-MCNC: 349 MG/DL (ref 70–99)
GLUCOSE BLDC GLUCOMTR-MCNC: 354 MG/DL (ref 70–99)
GLUCOSE BLDC GLUCOMTR-MCNC: 98 MG/DL (ref 70–99)
GLUCOSE UR STRIP-MCNC: >=1000 MG/DL
HADV DNA SPEC QL NAA+PROBE: NOT DETECTED
HBA1C MFR BLD: 8.1 % (ref 0–5.6)
HCO3 BLDV-SCNC: 27 MMOL/L (ref 21–28)
HCO3 BLDV-SCNC: 27 MMOL/L (ref 21–28)
HCO3 BLDV-SCNC: 30 MMOL/L (ref 21–28)
HCOV PNL SPEC NAA+PROBE: NOT DETECTED
HCT VFR BLD AUTO: 29 % (ref 40–53)
HCT VFR BLD AUTO: 34.5 % (ref 40–53)
HGB BLD-MCNC: 11.3 G/DL (ref 13.3–17.7)
HGB BLD-MCNC: 9.5 G/DL (ref 13.3–17.7)
HGB UR QL STRIP: NEGATIVE
HMPV RNA SPEC QL NAA+PROBE: NOT DETECTED
HOLD SPECIMEN: NORMAL
HPIV1 RNA SPEC QL NAA+PROBE: NOT DETECTED
HPIV2 RNA SPEC QL NAA+PROBE: NOT DETECTED
HPIV3 RNA SPEC QL NAA+PROBE: NOT DETECTED
HPIV4 RNA SPEC QL NAA+PROBE: NOT DETECTED
HYALINE CASTS: 28 /LPF
IMM GRANULOCYTES # BLD: 0.1 10E3/UL
IMM GRANULOCYTES NFR BLD: 1 %
INTERPRETATION ECG - MUSE: NORMAL
KETONES BLD-SCNC: 0.1 MMOL/L (ref 0–0.6)
KETONES UR STRIP-MCNC: NEGATIVE MG/DL
LACTATE BLD-SCNC: 1.5 MMOL/L
LACTATE BLD-SCNC: 3.2 MMOL/L
LACTATE BLD-SCNC: 4.1 MMOL/L
LEUKOCYTE ESTERASE UR QL STRIP: NEGATIVE
LIPASE SERPL-CCNC: 69 U/L (ref 73–393)
LYMPHOCYTES # BLD AUTO: 3.3 10E3/UL (ref 0.8–5.3)
LYMPHOCYTES NFR BLD AUTO: 25 %
M PNEUMO DNA SPEC QL NAA+PROBE: NOT DETECTED
MCH RBC QN AUTO: 28.9 PG (ref 26.5–33)
MCH RBC QN AUTO: 29.1 PG (ref 26.5–33)
MCHC RBC AUTO-ENTMCNC: 32.8 G/DL (ref 31.5–36.5)
MCHC RBC AUTO-ENTMCNC: 32.8 G/DL (ref 31.5–36.5)
MCV RBC AUTO: 88 FL (ref 78–100)
MCV RBC AUTO: 89 FL (ref 78–100)
MONOCYTES # BLD AUTO: 0.8 10E3/UL (ref 0–1.3)
MONOCYTES NFR BLD AUTO: 6 %
MUCOUS THREADS #/AREA URNS LPF: PRESENT /LPF
NEUTROPHILS # BLD AUTO: 8.8 10E3/UL (ref 1.6–8.3)
NEUTROPHILS NFR BLD AUTO: 65 %
NITRATE UR QL: NEGATIVE
NRBC # BLD AUTO: 0 10E3/UL
NRBC BLD AUTO-RTO: 0 /100
P AXIS - MUSE: 46 DEGREES
PCO2 BLDV: 42 MM HG (ref 40–50)
PCO2 BLDV: 49 MM HG (ref 40–50)
PCO2 BLDV: 49 MM HG (ref 40–50)
PH BLDV: 7.36 [PH] (ref 7.32–7.43)
PH BLDV: 7.4 [PH] (ref 7.32–7.43)
PH BLDV: 7.42 [PH] (ref 7.32–7.43)
PH UR STRIP: 6 [PH] (ref 5–7)
PLATELET # BLD AUTO: 232 10E3/UL (ref 150–450)
PLATELET # BLD AUTO: 263 10E3/UL (ref 150–450)
PO2 BLDV: 22 MM HG (ref 25–47)
PO2 BLDV: 36 MM HG (ref 25–47)
PO2 BLDV: <15 MM HG (ref 25–47)
POTASSIUM BLD-SCNC: 3.6 MMOL/L (ref 3.4–5.3)
POTASSIUM BLD-SCNC: 3.6 MMOL/L (ref 3.4–5.3)
POTASSIUM BLD-SCNC: 4.1 MMOL/L (ref 3.4–5.3)
PR INTERVAL - MUSE: 128 MS
PROCALCITONIN SERPL-MCNC: <0.05 NG/ML
PROT SERPL-MCNC: 7.1 G/DL (ref 6.8–8.8)
QRS DURATION - MUSE: 92 MS
QT - MUSE: 322 MS
QTC - MUSE: 458 MS
R AXIS - MUSE: 33 DEGREES
RBC # BLD AUTO: 3.26 10E6/UL (ref 4.4–5.9)
RBC # BLD AUTO: 3.91 10E6/UL (ref 4.4–5.9)
RBC URINE: 2 /HPF
RSV RNA SPEC NAA+PROBE: NEGATIVE
RSV RNA SPEC QL NAA+PROBE: NOT DETECTED
RSV RNA SPEC QL NAA+PROBE: NOT DETECTED
RV+EV RNA SPEC QL NAA+PROBE: NOT DETECTED
SAO2 % BLDV: 38 % (ref 94–100)
SAO2 % BLDV: 68 % (ref 94–100)
SARS-COV-2 RNA RESP QL NAA+PROBE: NEGATIVE
SODIUM SERPL-SCNC: 127 MMOL/L (ref 133–144)
SODIUM SERPL-SCNC: 135 MMOL/L (ref 133–144)
SP GR UR STRIP: 1.01 (ref 1–1.03)
SQUAMOUS EPITHELIAL: <1 /HPF
SYSTOLIC BLOOD PRESSURE - MUSE: NORMAL MMHG
T AXIS - MUSE: 45 DEGREES
TROPONIN I SERPL HS-MCNC: 9 NG/L
TSH SERPL DL<=0.005 MIU/L-ACNC: 2.44 MU/L (ref 0.4–4)
UROBILINOGEN UR STRIP-MCNC: NORMAL MG/DL
VENTRICULAR RATE- MUSE: 122 BPM
WBC # BLD AUTO: 13 10E3/UL (ref 4–11)
WBC # BLD AUTO: 13.2 10E3/UL (ref 4–11)
WBC URINE: 1 /HPF

## 2022-04-19 PROCEDURE — 82803 BLOOD GASES ANY COMBINATION: CPT

## 2022-04-19 PROCEDURE — 96361 HYDRATE IV INFUSION ADD-ON: CPT

## 2022-04-19 PROCEDURE — 93325 DOPPLER ECHO COLOR FLOW MAPG: CPT | Mod: 26 | Performed by: INTERNAL MEDICINE

## 2022-04-19 PROCEDURE — 82010 KETONE BODYS QUAN: CPT | Performed by: EMERGENCY MEDICINE

## 2022-04-19 PROCEDURE — 93321 DOPPLER ECHO F-UP/LMTD STD: CPT | Mod: 26 | Performed by: INTERNAL MEDICINE

## 2022-04-19 PROCEDURE — 36415 COLL VENOUS BLD VENIPUNCTURE: CPT | Performed by: HOSPITALIST

## 2022-04-19 PROCEDURE — 84132 ASSAY OF SERUM POTASSIUM: CPT | Performed by: HOSPITALIST

## 2022-04-19 PROCEDURE — 74176 CT ABD & PELVIS W/O CONTRAST: CPT

## 2022-04-19 PROCEDURE — 85027 COMPLETE CBC AUTOMATED: CPT | Performed by: HOSPITALIST

## 2022-04-19 PROCEDURE — 36415 COLL VENOUS BLD VENIPUNCTURE: CPT | Performed by: EMERGENCY MEDICINE

## 2022-04-19 PROCEDURE — 250N000011 HC RX IP 250 OP 636: Performed by: HOSPITALIST

## 2022-04-19 PROCEDURE — 250N000011 HC RX IP 250 OP 636: Performed by: EMERGENCY MEDICINE

## 2022-04-19 PROCEDURE — 99285 EMERGENCY DEPT VISIT HI MDM: CPT | Mod: 25

## 2022-04-19 PROCEDURE — 81003 URINALYSIS AUTO W/O SCOPE: CPT | Performed by: EMERGENCY MEDICINE

## 2022-04-19 PROCEDURE — 96365 THER/PROPH/DIAG IV INF INIT: CPT

## 2022-04-19 PROCEDURE — 87637 SARSCOV2&INF A&B&RSV AMP PRB: CPT | Performed by: EMERGENCY MEDICINE

## 2022-04-19 PROCEDURE — 258N000003 HC RX IP 258 OP 636: Performed by: EMERGENCY MEDICINE

## 2022-04-19 PROCEDURE — 84443 ASSAY THYROID STIM HORMONE: CPT | Performed by: HOSPITALIST

## 2022-04-19 PROCEDURE — 93308 TTE F-UP OR LMTD: CPT | Mod: 26 | Performed by: INTERNAL MEDICINE

## 2022-04-19 PROCEDURE — 258N000003 HC RX IP 258 OP 636: Performed by: HOSPITALIST

## 2022-04-19 PROCEDURE — C9803 HOPD COVID-19 SPEC COLLECT: HCPCS

## 2022-04-19 PROCEDURE — 84145 PROCALCITONIN (PCT): CPT | Performed by: EMERGENCY MEDICINE

## 2022-04-19 PROCEDURE — 84484 ASSAY OF TROPONIN QUANT: CPT | Performed by: EMERGENCY MEDICINE

## 2022-04-19 PROCEDURE — 83690 ASSAY OF LIPASE: CPT | Performed by: EMERGENCY MEDICINE

## 2022-04-19 PROCEDURE — 87077 CULTURE AEROBIC IDENTIFY: CPT | Performed by: EMERGENCY MEDICINE

## 2022-04-19 PROCEDURE — 85004 AUTOMATED DIFF WBC COUNT: CPT | Performed by: EMERGENCY MEDICINE

## 2022-04-19 PROCEDURE — 87149 DNA/RNA DIRECT PROBE: CPT | Performed by: EMERGENCY MEDICINE

## 2022-04-19 PROCEDURE — 83605 ASSAY OF LACTIC ACID: CPT

## 2022-04-19 PROCEDURE — 250N000011 HC RX IP 250 OP 636

## 2022-04-19 PROCEDURE — 80053 COMPREHEN METABOLIC PANEL: CPT | Performed by: EMERGENCY MEDICINE

## 2022-04-19 PROCEDURE — 83036 HEMOGLOBIN GLYCOSYLATED A1C: CPT | Performed by: HOSPITALIST

## 2022-04-19 PROCEDURE — 93325 DOPPLER ECHO COLOR FLOW MAPG: CPT

## 2022-04-19 PROCEDURE — 250N000012 HC RX MED GY IP 250 OP 636 PS 637: Performed by: HOSPITALIST

## 2022-04-19 PROCEDURE — 99223 1ST HOSP IP/OBS HIGH 75: CPT | Mod: AI | Performed by: HOSPITALIST

## 2022-04-19 PROCEDURE — 87581 M.PNEUMON DNA AMP PROBE: CPT | Performed by: HOSPITALIST

## 2022-04-19 PROCEDURE — 93005 ELECTROCARDIOGRAM TRACING: CPT

## 2022-04-19 PROCEDURE — 96367 TX/PROPH/DG ADDL SEQ IV INF: CPT

## 2022-04-19 PROCEDURE — 71045 X-RAY EXAM CHEST 1 VIEW: CPT

## 2022-04-19 PROCEDURE — 120N000001 HC R&B MED SURG/OB

## 2022-04-19 RX ORDER — DEXTROSE MONOHYDRATE 25 G/50ML
25-50 INJECTION, SOLUTION INTRAVENOUS
Status: DISCONTINUED | OUTPATIENT
Start: 2022-04-19 | End: 2022-04-21 | Stop reason: HOSPADM

## 2022-04-19 RX ORDER — DIPHENHYDRAMINE HCL 25 MG
50 TABLET ORAL EVERY 6 HOURS PRN
COMMUNITY
End: 2024-03-17

## 2022-04-19 RX ORDER — LANOLIN ALCOHOL/MO/W.PET/CERES
3 CREAM (GRAM) TOPICAL
Status: DISCONTINUED | OUTPATIENT
Start: 2022-04-19 | End: 2022-04-21 | Stop reason: HOSPADM

## 2022-04-19 RX ORDER — IBUPROFEN 200 MG
200 TABLET ORAL EVERY 6 HOURS PRN
Status: ON HOLD | COMMUNITY
End: 2022-04-20

## 2022-04-19 RX ORDER — LISINOPRIL 10 MG/1
10 TABLET ORAL DAILY
Status: DISCONTINUED | OUTPATIENT
Start: 2022-04-19 | End: 2022-04-20

## 2022-04-19 RX ORDER — SODIUM CHLORIDE 9 MG/ML
INJECTION, SOLUTION INTRAVENOUS ONCE
Status: COMPLETED | OUTPATIENT
Start: 2022-04-19 | End: 2022-04-19

## 2022-04-19 RX ORDER — AZITHROMYCIN 250 MG/1
250 TABLET, FILM COATED ORAL DAILY
Status: DISCONTINUED | OUTPATIENT
Start: 2022-04-20 | End: 2022-04-21 | Stop reason: HOSPADM

## 2022-04-19 RX ORDER — SODIUM CHLORIDE 9 MG/ML
INJECTION, SOLUTION INTRAVENOUS CONTINUOUS
Status: DISCONTINUED | OUTPATIENT
Start: 2022-04-19 | End: 2022-04-19

## 2022-04-19 RX ORDER — LIDOCAINE 40 MG/G
CREAM TOPICAL
Status: DISCONTINUED | OUTPATIENT
Start: 2022-04-19 | End: 2022-04-21 | Stop reason: HOSPADM

## 2022-04-19 RX ORDER — AZITHROMYCIN 500 MG/5ML
500 INJECTION, POWDER, LYOPHILIZED, FOR SOLUTION INTRAVENOUS ONCE
Status: COMPLETED | OUTPATIENT
Start: 2022-04-19 | End: 2022-04-19

## 2022-04-19 RX ORDER — NICOTINE POLACRILEX 4 MG
15-30 LOZENGE BUCCAL
Status: DISCONTINUED | OUTPATIENT
Start: 2022-04-19 | End: 2022-04-21 | Stop reason: HOSPADM

## 2022-04-19 RX ORDER — ACETAMINOPHEN 325 MG/1
650 TABLET ORAL EVERY 4 HOURS PRN
COMMUNITY

## 2022-04-19 RX ORDER — ONDANSETRON 4 MG/1
4 TABLET, FILM COATED ORAL EVERY 8 HOURS PRN
Status: ON HOLD | COMMUNITY
End: 2024-08-04

## 2022-04-19 RX ORDER — AZITHROMYCIN 250 MG/1
250 TABLET, FILM COATED ORAL DAILY
Status: DISCONTINUED | OUTPATIENT
Start: 2022-04-20 | End: 2022-04-19

## 2022-04-19 RX ORDER — CEFTRIAXONE 1 G/1
1 INJECTION, POWDER, FOR SOLUTION INTRAMUSCULAR; INTRAVENOUS EVERY 24 HOURS
Status: DISCONTINUED | OUTPATIENT
Start: 2022-04-19 | End: 2022-04-20

## 2022-04-19 RX ORDER — ONDANSETRON 2 MG/ML
4 INJECTION INTRAMUSCULAR; INTRAVENOUS EVERY 6 HOURS PRN
Status: DISCONTINUED | OUTPATIENT
Start: 2022-04-19 | End: 2022-04-21 | Stop reason: HOSPADM

## 2022-04-19 RX ORDER — ONDANSETRON 4 MG/1
4 TABLET, ORALLY DISINTEGRATING ORAL EVERY 6 HOURS PRN
Status: DISCONTINUED | OUTPATIENT
Start: 2022-04-19 | End: 2022-04-21 | Stop reason: HOSPADM

## 2022-04-19 RX ADMIN — SODIUM CHLORIDE 500 ML: 9 INJECTION, SOLUTION INTRAVENOUS at 03:08

## 2022-04-19 RX ADMIN — SODIUM CHLORIDE 1000 ML: 9 INJECTION, SOLUTION INTRAVENOUS at 00:55

## 2022-04-19 RX ADMIN — INSULIN ASPART 5 UNITS: 100 INJECTION, SOLUTION INTRAVENOUS; SUBCUTANEOUS at 13:30

## 2022-04-19 RX ADMIN — TAZOBACTAM SODIUM AND PIPERACILLIN SODIUM 3.38 G: 375; 3 INJECTION, SOLUTION INTRAVENOUS at 08:09

## 2022-04-19 RX ADMIN — SODIUM CHLORIDE: 9 INJECTION, SOLUTION INTRAVENOUS at 03:09

## 2022-04-19 RX ADMIN — AZITHROMYCIN 500 MG: 500 INJECTION, POWDER, LYOPHILIZED, FOR SOLUTION INTRAVENOUS at 06:39

## 2022-04-19 RX ADMIN — SODIUM CHLORIDE: 900 INJECTION INTRAVENOUS at 06:22

## 2022-04-19 RX ADMIN — TAZOBACTAM SODIUM AND PIPERACILLIN SODIUM 4.5 G: 500; 4 INJECTION, SOLUTION INTRAVENOUS at 02:22

## 2022-04-19 RX ADMIN — SODIUM CHLORIDE 1000 ML: 9 INJECTION, SOLUTION INTRAVENOUS at 02:06

## 2022-04-19 ASSESSMENT — ACTIVITIES OF DAILY LIVING (ADL)
ADLS_ACUITY_SCORE: 6
ADLS_ACUITY_SCORE: 6
ADLS_ACUITY_SCORE: 5
ADLS_ACUITY_SCORE: 6
ADLS_ACUITY_SCORE: 5
ADLS_ACUITY_SCORE: 6
ADLS_ACUITY_SCORE: 5
DEPENDENT_IADLS:: CLEANING;COOKING;LAUNDRY;MEAL PREPARATION;SHOPPING;MEDICATION MANAGEMENT;MONEY MANAGEMENT;TRANSPORTATION
ADLS_ACUITY_SCORE: 6
ADLS_ACUITY_SCORE: 12
ADLS_ACUITY_SCORE: 6
ADLS_ACUITY_SCORE: 6
ADLS_ACUITY_SCORE: 12
ADLS_ACUITY_SCORE: 6
ADLS_ACUITY_SCORE: 5
ADLS_ACUITY_SCORE: 12
ADLS_ACUITY_SCORE: 6

## 2022-04-19 ASSESSMENT — ENCOUNTER SYMPTOMS
FEVER: 0
DYSURIA: 0
COUGH: 1
DIARRHEA: 0
MYALGIAS: 0
VOMITING: 0
NAUSEA: 1
POLYDIPSIA: 0
CHILLS: 1
ABDOMINAL PAIN: 0

## 2022-04-19 NOTE — PLAN OF CARE
Goal Outcome Evaluation:      Pertinent assessments: Assumed care of pt from [id number]. VSS-BP elevated but within parameters. A&Ox4. Apical pulse regular. Lungs diminished. Bowel sounds active in all quadrants. Tolerating diet. Voiding spontaneously with adequate output. MIVF running TKO. Denies pain, sats 95> on RA.    Major Shift Events: Viral nasal swab completed, encourage self-care.  Treatment Plan: encourage self-care, IV Abx, pending Viral swab

## 2022-04-19 NOTE — CONSULTS
Care Management Initial Consult    General Information  Assessment completed with: Patient, Care Team Member, Patient and GH staff  Type of CM/SW Visit: Initial Assessment    Primary Care Provider verified and updated as needed: No   Readmission within the last 30 days: no previous admission in last 30 days      Reason for Consult: discharge planning  Advance Care Planning:            Communication Assessment  Patient's communication style: spoken language (English or Bilingual)    Hearing Difficulty or Deaf: no   Wear Glasses or Blind: yes    Cognitive  Cognitive/Neuro/Behavioral: WDL                      Living Environment:   People in home: alone     Current living Arrangements: group home      Able to return to prior arrangements: yes       Family/Social Support:  Care provided by: self  Provides care for: no one, unable/limited ability to care for self                Description of Support System:           Current Resources:   Patient receiving home care services:       Community Resources: [employer]  Equipment currently used at home: none  Supplies currently used at home:      Employment/Financial:  Employment Status:          Financial Concerns:             Lifestyle & Psychosocial Needs:  Social Determinants of Health     Tobacco Use: Not on file   Alcohol Use: Not on file   Financial Resource Strain: Not on file   Food Insecurity: Not on file   Transportation Needs: Not on file   Physical Activity: Not on file   Stress: Not on file   Social Connections: Not on file   Intimate Partner Violence: Not on file   Depression: Not on file   Housing Stability: Not on file       Functional Status:  Prior to admission patient needed assistance:   Dependent ADLs:: Dressing, Bathing  Dependent IADLs:: Cleaning, Cooking, Laundry, Meal Preparation, Shopping, Medication Management, Money Management, Transportation  Assesssment of Functional Status: Needs assistance with transportation, Has complex medical needs,  requires placement in a facility, Needs assistance with meals, Needs assistance with medications    Mental Health Status:            Additional Information:  SW met with patient at bedside. Patient is boarding in the ED. Patient is legally blind. Per courts website, patient is under a civil commitment through [facility]. Patient gave permission for SW to contact his group home. Patient gets assistance with meals, medication administration, and transportation. Patient can bath on his own, but staff hand him soap and turn on the water. They also ensure all the soap is off. Staff lay out the patient's clothes and guide him to help put on clothes. Patient does not use assistive devices to walk. Patient has repyee that manages his fiances such as rent but he manages the rest of his money independently.     Group Home can transport at discharge.     Patient lives at [facility].   [facility] [phone] fax [phone]  [phone]     Contacts:   Cleopatra Andujar- Psych Provider at [facility]- [phone]  Db Puentes- PCP- [facility]- [phone]  [facility]inny Zhu- [phone] Fax: [phone]  Hgnlk[facility]Hxhkh-[phone]  [facility] - [phone]. Fax- [phone]    Patient does not know which  is his mental health  and which one is his CADI . He gave permission for SARY to call them. SARY LVM with Anika. SARY LVM with Jinny.     Addendum 1412: SARY received VM from Anika who reports she is the CADI . The mental health  is Zoe and can be reached at [phone].     MADDIE Bolivar, [facility]  , ED Care Management   [phone]  Teagan Golden

## 2022-04-19 NOTE — ED NOTES
Bed: ED14  Expected date:   Expected time:   Means of arrival:   Comments:  BV4- 49M Hyperglycemia/HTN

## 2022-04-19 NOTE — H&P
[facility]  Hospitalist H&P    Name: Benja Duong      MRN: [id number]  YOB: 1965    Age: 56 year old  Date of admission: 4/19/2022  Primary care provider: Rashid, [facility]            Assessment and Plan:     Benja Duong is a 56 year old male with a history of depression, diabetes, hypertension, schizophrenia, polysubstance abuse but apparently now sober, GERD who presents with hyperglycemia and hypertension.  Patient resides in a group home.  Evidently staff has noticed his blood sugar has been fairly elevated in the 300-400 range.  His blood pressure has also been on the higher side systolically running 160-180.  Patient reports some mild cough and shortness of breath that is chronic in nature.  He denies any fevers.  He denies abdominal pain, nausea, vomiting, or diarrhea.  No dysuria, frequency or urgency.  He was brought to the emergency department for evaluation.    Here in the hospital temperature is 97.9, heart rate 126, blood pressure 143/103, respiratory 18 and oxygen saturation 97% on room air.  Laboratory work shows sodium 127, chloride 91, white blood cells 13.2, hemoglobin 11.3.  Lactic acid level was 3.2.  Remainder of the basic metabolic panel, CBC, lipase, and troponin are normal.  Blood sugar elevated to 306.  Chest x-ray is clear.  CT of the abdomen shows minimal patchy groundglass opacity of the right lower lobe and some other less pertinent and likely incidental findings.  Patient was given IV fluids and Zosyn and will be admitted for further management.    Problem list:  1. Suspected early sepsis and lactic acidosis secondary to community-acquired pneumonia: Patient has mild cough and shortness of breath although he reports this is chronic.  He has mild leukocytosis but no fever.  He had lactic acid level of greater than 4 which trended down to 3.2 after fluid resuscitation.  CT shows a mild patchy groundglass opacity of the  right lower lobe which could be community-acquired versus aspiration pneumonia.  For now we will continue Zosyn and add azithromycin.  He looks clinically well at the bedside although remains tachycardic.  We will continue normal saline at 100 cc/h.  Urinalysis is benign.  Follow-up cultures.  2. Hypovolemic hyponatremia: He does have some degree of chronic hyponatremia.  This is likely due to SIADH from his psychiatric medications.  He does however appear volume depleted on examination.  We will continue normal saline and recheck basic metabolic panel in the morning.  3. Diabetes mellitus: Hold exenatide and metformin.  Resume Lantus 21 units daily.  Add medium sliding scale insulin 1 unit per 15 g carbohydrate.  4. Schizophrenia: Resume prior to admission psychiatric medication including Risperdal.  5. Hypertension: Blood pressure is slightly elevated.  Given what could be developing sepsis we will hold his prior to admission hydrochlorothiazide, hydralazine and lisinopril.  6. Sinus tachycardia: Has been noted in prior documentation.  This is likely in the setting of significant hypovolemia and hopefully will respond to fluid resuscitation.    Code status: Full.  Admit to inpatient status.  Prophylaxis: PCD's.  Disposition: Back to group home in 2 days.            Chief Complaint:     Hypertension and hyperglycemia.         History of Present Illness:   Benja Duong is a 56 year old male who presents with hyperglycemia and hypertension.  History was obtained from my discussion with the patient at the bedside.  I also discussed the case with the ED provider.  The electronic medical record was also reviewed.    Patient resides in a group home.  Evidently staff has noticed his blood sugar has been fairly elevated in the 300-400 range.  His blood pressure has also been on the higher side systolically running 160-180.  Patient reports some mild cough and shortness of breath that is chronic in nature.  He denies  "any fevers.  He denies abdominal pain, nausea, vomiting, or diarrhea.  No dysuria, frequency or urgency.  He was brought to the emergency department for evaluation.    Here in the hospital temperature is 97.9, heart rate 126, blood pressure 143/103, respiratory 18 and oxygen saturation 97% on room air.  Laboratory work shows sodium 127, chloride 91, white blood cells 13.2, hemoglobin 11.3.  Lactic acid level was 3.2.  Remainder of the basic metabolic panel, CBC, lipase, and troponin are normal.  Blood sugar elevated to 306.  Chest x-ray is clear.  CT of the abdomen shows minimal patchy groundglass opacity of the right lower lobe and some other less pertinent and likely incidental findings.  Patient was given IV fluids and Zosyn and will be admitted for further management.            Past Medical History:     Past Medical History:   Diagnosis Date     Depression      Diabetes mellitus      h/o Cocaine abuse      Homeless      Hypertension      Latent tuberculosis      Proliferative diabetic retinopathy      Schizophrenia              Past Surgical History:   No past surgical history on file.          Social History:     Social History     Tobacco Use     Smoking status: Unknown If Ever Smoked     Smokeless tobacco: Not on file   Substance Use Topics     Alcohol use: Yes     Comment: \"few beers\"             Family History:   The family history was fully reviewed and non-contributory in this case.         Allergies:     Allergies   Allergen Reactions     Alprazolam      Other reaction(s): *Unknown States \"I break out\"     Oxycodone-Acetaminophen      Other reaction(s): *Unknown, States \"I break out\"             Medications:     Prior to Admission medications    Medication Sig Last Dose Taking? Auth Provider   atorvastatin (LIPITOR) 20 MG tablet Take 20 mg by mouth At Bedtime   Unknown, Entered By History   cholecalciferol (VITAMIN D-1000 MAX ST) 1000 units TABS Take 1,000 Units by mouth daily   Paul Andrews, " MD   diphenhydrAMINE (BENADRYL) 50 MG capsule Take 50 mg by mouth At Bedtime   Unknown, Entered By History   exenatide ER (BYDUREON) 2 MG recon vial kit susp for weekly inj Inject 2 mg Subcutaneous once a week DO not administer until he is seen by his PCP.   Benton Whitman MD   ferrous sulfate (FEROSUL) 325 (65 Fe) MG tablet Take 1 tablet (325 mg) by mouth daily   Maile Bragg PA-C   glucose (BD GLUCOSE) 4 g chewable tablet Take 4 tablets by mouth as needed for low blood sugar   Unknown, Entered By History   hydrALAZINE (APRESOLINE) 25 MG tablet Take 1 tablet (25 mg) by mouth every 6 hours as needed (HTN; SBP > 160)   Hawk Bueno MD   hydrochlorothiazide (HYDRODIURIL) 25 MG tablet Take 1 tablet (25 mg) by mouth daily DO not resume until Na is back to normal or PCP orders   Benton Whitman MD   hypromellose (GENTEAL SEVERE) ophthalmic gel 0.3% Place 1 drop into both eyes 2 times daily   Unknown, Entered By History   insulin glargine (LANTUS PEN) 100 UNIT/ML pen Inject 21 Units Subcutaneous At Bedtime   Unknown, Entered By History   lisinopril (ZESTRIL) 40 MG tablet Take 40 mg by mouth daily   Unknown, Entered By History   metFORMIN (GLUCOPHAGE) 1000 MG tablet Take 1 tablet (1,000 mg) by mouth 2 times daily (with meals)   Paul Andrews MD   pantoprazole (PROTONIX) 40 MG EC tablet Take 1 tablet (40 mg) by mouth 2 times daily   Benton Whitman MD   risperiDONE microspheres ER (RISPERDAL CONSTA) 50 MG injection Inject 50 mg into the muscle every 14 days   Unknown, Entered By History   tamsulosin (FLOMAX) 0.4 MG capsule Take 1 capsule (0.4 mg) by mouth daily   Garth Gutierrez MD             Review of Systems:     A Comprehensive greater than 10 system review of systems was carried out.  Pertinent positives and negatives are noted above.  Otherwise negative for contributory information.           Physical Exam:   Blood pressure (!) 142/79, pulse (!) 123, temperature 97.8  F (36.6  C),  temperature source Oral, resp. rate 18, weight 74.8 kg (165 lb), SpO2 95 %.  Wt Readings from Last 1 Encounters:   04/19/22 74.8 kg (165 lb)     Exam:  GENERAL: No apparent distress. Awake, alert, and fully oriented.  HEENT: Normocephalic, atraumatic. Extraocular movements intact.  CARDIOVASCULAR: Regular rate and rhythm without murmurs or rubs. No S3.  PULMONARY: Clear to auscultation bilaterally.  ABDOMINAL: Soft, non-tender, non-distended. Bowel sounds normoactive.   EXTREMITIES: No cyanosis or clubbing. No appreciable edema.  NEUROLOGICAL: CN 2-12 grossly intact, no focal neurological deficits.  DERMATOLOGICAL: No rash, ulcer, bruising, nor jaundice.          Data:   EKG:  Personally reviewed.     Laboratory:  Recent Labs   Lab 04/19/22 0042   WBC 13.2*   HGB 11.3*   HCT 34.5*   MCV 88        Recent Labs   Lab 04/19/22  0239 04/19/22 0042 04/19/22  0033   NA  --  127*  --    POTASSIUM  --  4.1  --    CHLORIDE  --  91*  --    CO2  --  27  --    ANIONGAP  --  9  --    * 306* 349*   BUN  --  11  --    CR  --  1.17  --    GFRESTIMATED  --  73  --    MITCHEL  --  9.8  --      Recent Labs   Lab 04/19/22 0042   AST 12   ALT 21   ALKPHOS 70   BILITOTAL 0.2     Recent Labs   Lab 04/19/22  0237   LACT 3.2*     Recent Labs   Lab 04/19/22 0042   LIPASE 69*     Recent Labs   Lab 04/19/22 0042   TROPONINIS 9     Recent Labs   Lab 04/19/22  0129   COLOR Light Yellow   APPEARANCE Clear   URINEGLC >=1000*   URINEBILI Negative   URINEKETONE Negative   SG 1.013   UBLD Negative   URINEPH 6.0   PROTEIN Negative   NITRITE Negative   LEUKEST Negative   RBCU 2   WBCU 1     No results for input(s): CULT in the last 168 hours.    Imaging:  Recent Results (from the past 24 hour(s))   XR Chest Port 1 View    Narrative    EXAM: XR CHEST PORT 1 VIEW  LOCATION: [facility]  DATE/TIME: 4/19/2022 1:29 AM    INDICATION: Cough.  COMPARISON: 1/14/2022.      Impression    IMPRESSION: Negative chest.   Abd/pelvis  CT no contrast - Stone Protocol    Narrative    EXAM: CT ABDOMEN PELVIS W/O CONTRAST  LOCATION: [facility]  DATE/TIME: 4/19/2022 2:09 AM    INDICATION: Nausea/vomiting  COMPARISON: 1/13/2022, 12/27/2021, and 10/4/2020.  TECHNIQUE: CT scan of the abdomen and pelvis was performed without IV contrast. Multiplanar reformats were obtained. Dose reduction techniques were used.  CONTRAST: None.    FINDINGS:   LOWER CHEST: Minimal patchy groundglass opacities in the right lower lobe with a mild or low-grade pneumonitis not excluded. Clinical correlation.    HEPATOBILIARY: Liver is negative. Small amount of sludge or stone material layering dependently within the gallbladder. No biliary dilatation.    PANCREAS: Normal.    SPLEEN: Normal.    ADRENAL GLANDS: Normal.    KIDNEYS/BLADDER: There are 2 tiny nonobstructing stones in the right kidney measuring 2 mm in size. Small benign right renal cyst with no specific follow-up needed. Nonspecific bilateral perinephric stranding and edema. No ureteral stones or   hydronephrosis. Bladder is grossly negative.    BOWEL: Bowel is normal in caliber with no evidence for obstruction. There is a small anterior abdominal wall hernia in the midline just above the umbilicus containing some mesenteric fat and a short segment of nonobstructed small bowel. Previously this   hernia only contained mesenteric fat. Clinical correlation. Distal appendix shows some fluid distention measuring up to 1.5 cm in diameter. However, there are no surrounding inflammatory changes to suggest appendicitis. Similar appearance to the appendix   on prior studies. Large amount of stool in the colon. No definite acute bowel findings.    LYMPH NODES: Normal.    VASCULATURE: Unremarkable.    PELVIC ORGANS: Normal.  MUSCULOSKELETAL: Benign rounded area of sclerosis in the left iliac bone compatible with a benign bone island and unchanged from previous.      Impression    IMPRESSION:   1.  Minimal  patchy groundglass opacities in the right lower lobe. Clinical correlation for signs of a mild or low-grade pneumonitis.    2.  Cholelithiasis. No biliary dilatation.    3.  Tiny nonobstructing stones in the right kidney measuring 2 mm in size. No ureteral stones or hydronephrosis.    4.  Nonspecific bilateral perinephric stranding and edema. This is also been present previously and can be seen with renal insufficiency but is nonspecific.    5.  There is a small anterior abdominal wall hernia in the midline just above the umbilicus containing some mesenteric fat and a short segment of nonobstructed small bowel. Previously, this hernia only contained mesenteric fat. Clinical correlation.    6.  Large amount of stool throughout the colon. No evidence for bowel obstruction and no definite acute bowel findings.    7.  Distal appendix is fluid distended measuring up to 1.5 cm in diameter. However, there are no surrounding inflammatory changes to suggest acute appendicitis. Appendix had a similar appearance on previous studies as well.         Reagan Bonilla DO MPH  [facility] Hospitalist  [address] [place] [address]  04/19/2022

## 2022-04-19 NOTE — PROGRESS NOTES
57 y/o presents for evaluation for hyperglycemia and hypertension. Now being treated for possibly early sepsis in the setting of tacycardia, lactic acidosis, and leukocytosis.     Possible sepsis source penumonia, with cough. Procalcitonin is normal? I will change IV antibiotics to ceftriaxone and azithromycin  for CAP. I will add echocardiogram given persistent sinus tachycardia, along with TSH. Will order a viral PCR.

## 2022-04-19 NOTE — PROGRESS NOTES
Care Management Follow Up    Length of Stay (days): 0    Additional Information:  Contacts:   Cleopatra Andujar- Psych Provider at [facility] [place] [facility]- [phone]  Db Puentes- PCP- [facility] [place] [facility]- [phone]  Jinny Zhu- - [facility] ([place]-[phone] Fax: [phone]  Fkdqx-Epjri-[phone]  [facility] - [phone]. Fax- [phone]    [facility] [phone]  [facility] ( Sp)     CTS team aware that pt has been on a committment but do not believe this is an issues for d.c planning.   SW left VM message for RN from  setting [place] [phone] to clarify if there are any concerns or needs.     Anticipate pt will be able to return to  setting once medically cleared       Corinne C. White, CHAVOW

## 2022-04-19 NOTE — ED TRIAGE NOTES
Pt arrives via EMS from group home. Staff noted that pt was hypertensive in the 160s/80s. EMS check BG and found to be 388. EMS started 1000 mL bolus. PT has hx of diabetes and BG have been this high in the past. Denies increased urination, thirst, dizziness, lightheadedness or pain. Pt is legally Blind. Hypertensive and tachycardic. GCS 15. A&Ox4

## 2022-04-19 NOTE — PHARMACY-ADMISSION MEDICATION HISTORY
Admission medication history interview status for this patient is complete. See [facility] admission navigator for allergy information, prior to admission medications and immunization status.     Medication history interview done, indicate source(s):  Caregiver  Medication history resources (including written lists, pill bottles, clinic record): MAR from [facility]  Pharmacy:      Changes made to PTA medication list:  Added: Acetaminophen, ibuprofen, ondansetron prn  Changed: 1. Exenatide ER 2 mg once weekly --> every 2 weeks        2. Metformin 1000 mg --> 500 mg BID meals  Reported as Not Taking: none  Removed: none    Actions taken by pharmacist (provider contacted, etc):None     Additional medication history information: Per RN at [facility], MAR states to take Exenatide ER once weekly but patient has been receiving every 2 weeks per primary care provider instructions.    Medication reconciliation/reorder completed by provider prior to medication history?  No   (Y/N)     For patients on insulin therapy:   Do you use sliding scale insulin based on blood sugars? No  What is your pre-meal insulin coverage?  Lantus  Do you typically eat three meals a day? yes  How many times do you check your blood glucose per day? 1/2  How many episodes of hypoglycemia do you typically have per month? n/a  Do you have a Continuous Glucose Monitor (CGM)?      Prior to Admission medications    Medication Sig Last Dose Taking? Auth Provider   acetaminophen (TYLENOL) 325 MG tablet Take 650 mg by mouth every 4 hours as needed for mild pain More than a month at Unknown time Yes Unknown, Entered By History   atorvastatin (LIPITOR) 20 MG tablet Take 20 mg by mouth At Bedtime 4/18/2022 at Unknown time Yes Unknown, Entered By History   cholecalciferol (VITAMIN D-1000 MAX ST) 1000 units TABS Take 1,000 Units by mouth daily 4/18/2022 at Unknown time Yes Paul Andrews MD   diphenhydrAMINE (BENADRYL) 25 MG tablet Take 50 mg by mouth every 6  hours as needed for itching or allergies More than a month at Unknown time Yes Unknown, Entered By History   diphenhydrAMINE (BENADRYL) 50 MG capsule Take 50 mg by mouth At Bedtime 4/18/2022 at Unknown time Yes Unknown, Entered By History   ferrous sulfate (FEROSUL) 325 (65 Fe) MG tablet Take 1 tablet (325 mg) by mouth daily 4/18/2022 at Unknown time Yes Maile Bragg PA-C   glucose (BD GLUCOSE) 4 g chewable tablet Take 4 tablets by mouth as needed for low blood sugar More than a month at Unknown time Yes Unknown, Entered By History   hydrALAZINE (APRESOLINE) 25 MG tablet Take 1 tablet (25 mg) by mouth every 6 hours as needed (HTN; SBP > 160) More than a month at Unknown time Yes Hawk Bueno MD   hydrochlorothiazide (HYDRODIURIL) 25 MG tablet Take 1 tablet (25 mg) by mouth daily DO not resume until Na is back to normal or PCP orders 4/18/2022 at Unknown time Yes Benton Whitman MD   hypromellose (GENTEAL SEVERE) ophthalmic gel 0.3% Place 1 drop into both eyes 2 times daily 4/18/2022 at Unknown time Yes Unknown, Entered By History   ibuprofen (ADVIL/MOTRIN) 200 MG tablet Take 200 mg by mouth every 6 hours as needed for mild pain More than a month at Unknown time Yes Unknown, Entered By History   insulin glargine (LANTUS PEN) 100 UNIT/ML pen Inject 21 Units Subcutaneous At Bedtime 4/18/2022 at pm Yes Unknown, Entered By History   lisinopril (ZESTRIL) 40 MG tablet Take 40 mg by mouth daily 4/18/2022 at Unknown time Yes Unknown, Entered By History   metFORMIN (GLUCOPHAGE) 1000 MG tablet Take 1 tablet (1,000 mg) by mouth 2 times daily (with meals)  Patient taking differently: Take 500 mg by mouth 2 times daily (with meals) 4/18/2022 at Unknown time Yes Paul Andrews MD   ondansetron (ZOFRAN) 4 MG tablet Take 4 mg by mouth every 8 hours as needed for nausea More than a month at Unknown time Yes Unknown, Entered By History   pantoprazole (PROTONIX) 40 MG EC tablet Take 1 tablet (40 mg) by mouth  2 times daily 4/18/2022 at Unknown time Yes Benton Whitman MD   tamsulosin (FLOMAX) 0.4 MG capsule Take 1 capsule (0.4 mg) by mouth daily 4/18/2022 at Unknown time Yes Garth Gutierrez MD   exenatide ER (BYDUREON) 2 MG recon vial kit susp for weekly inj Inject 2 mg Subcutaneous every 14 days DO not administer until he is seen by his PCP. 4/14/2022  Benton Whitman MD   risperiDONE microspheres ER (RISPERDAL CONSTA) 50 MG injection Inject 50 mg into the muscle every 14 days 4/14/2022  Unknown, Entered By History       Nunu Limon, PharmD

## 2022-04-19 NOTE — ED PROVIDER NOTES
History   Chief Complaint:  Hyperglycemia and Hypertension       The history is provided by the patient.      Benja Duong is a 56 year old male with history of type 2 diabetes mellitus and hypertension who presents with hyperglycemia and hypertension. Earlier tonight, the staff at his group home noticed that his blood pressure was 160's/80's. His blood glucose level was checked then and it was 388. He notes that his blood sugars have been in the 200's lately. They called EMS and he was brought to the ED.  Other symptoms mentioned at bedside include some nausea, chills, and cough over the past couple of days. He is not aware of any sick contacts and is vaccinated for Covid-19. He denies current alcohol use. He denies fever, chest pain, dyspnea, abdominal pain, vomiting, diarrhea, dysuria or other symptoms.  He is legally blind.     Review of Systems   Constitutional: Positive for chills. Negative for fever.   Respiratory: Positive for cough.    Cardiovascular: Negative for chest pain.   Gastrointestinal: Positive for nausea. Negative for abdominal pain, diarrhea and vomiting.   Endocrine: Negative for polydipsia and polyuria.   Genitourinary: Negative for dysuria.   Musculoskeletal: Negative for myalgias.   All other systems reviewed and are negative.    Allergies:  Alprazolam  Oxycodone-Acetaminophen    Medications:  Lipitor   Benadryl   Exenadtide   Glucose   Apresoline   Hydrochlorothiazide   Insulin Glargine   Lisinopril   Metformin   Protonix   Risperdal Consta   Flomax     Past Medical History:     Depression   Type 2 DM   Homeless   HTN   Latent TB   Proliferative diabetic retinopathy   Schizophrenia   Psychosis   Hyponatremia   Hyperglycemia   UTI w/o hematuria   Sepsis   Acute kidney injury  Vomiting and diarrhea   Volume depletion   Acute renal failure, unspecified acute renal failure type   Intractable vomiting w/ nausea   Hypokalemia     Cataract   Hypovitaminosis D   Edentulism   Diabetic ulcer  of toe of R foot   Umbilical hernia without obstruction or gangrene   Alcohol use disorder, moderate, dependence   Methamphetamine use disorder, severe   Cocaine use disorder, severe, dependence   Cannabis use disorder, severe, dependence   Intellectual disability   GERD  Dey gangrene   Body lice infestation   Personality disorder with antisocial features   Peritonsillar abscess   Dysphagia   Odynophagia  Muscular deconditioning  Deviated septum   Legally blind     Past Surgical History:    Tonsillectomy    [place] teeth extraction   Posterior vitrectomy     Family History:    The patient denies past family history.     Social History:  Patient presents to the ED alone via EMS  Hx of alcohol use, tobacco use (current smoker), marijuana use, and polysubstance abuse   Hx of homelessness   Lives in group home   Legally blind     Physical Exam     Patient Vitals for the past 24 hrs:   BP Temp Temp src Pulse Resp SpO2 Weight   04/19/22 0345 (!) 154/106 -- -- 118 -- 100 % --   04/19/22 0330 (!) 143/77 -- -- 120 -- 95 % --   04/19/22 0315 (!) 147/82 -- -- (!) 122 -- 98 % --   04/19/22 0300 (!) 153/88 -- -- 117 -- 100 % --   04/19/22 0245 (!) 142/79 -- -- (!) 123 -- 95 % --   04/19/22 0244 -- -- -- -- -- -- 74.8 kg (165 lb)   04/19/22 0223 -- 97.8  F (36.6  C) Oral -- -- -- --   04/19/22 0200 (!) 176/103 -- -- (!) 124 -- 95 % --   04/19/22 0130 (!) 181/98 -- -- (!) 123 -- -- --   04/19/22 0115 (!) 167/98 -- -- (!) 123 -- -- --   04/19/22 0100 (!) 154/99 -- -- -- -- -- --   04/19/22 0035 (!) 143/103 97.9  F (36.6  C) -- (!) 126 18 97 % --   04/19/22 0034 (!) 143/101 97.9  F (36.6  C) Oral (!) 126 22 100 % --       Physical Exam  Nursing note and vitals reviewed.  Constitutional: Well nourished.  Eyes: Patient blind at baseline  ENT: Nose normal. Mucous membranes pink and dry. No posterior oropharyngeal erythema/exudate  Neck: Normal range of motion.  CVS: Sinus tachycardia.  Normal heart sounds.  No murmur.  Pulmonary: Lungs  diminished bilaterally  GI: Abdomen soft. Nontender, nondistended. No rigidity or guarding.    : No testicular tenderness or masses. Normal penis, no penile discharge.  Chaperone RN Kyra MCCULLOUGHK: No calf tenderness or swelling.  Neuro: Alert. Follows simple commands.  Skin: Skin is warm and dry. No rash noted.   Psychiatric: Flat affect      Emergency Department Course   Imaging:  Abd/pelvis CT no contrast - Stone Protocol  Final Result  IMPRESSION:   1.  Minimal patchy groundglass opacities in the right lower lobe. Clinical correlation for signs of a mild or low-grade pneumonitis.  2.  Cholelithiasis. No biliary dilatation.  3.  Tiny nonobstructing stones in the right kidney measuring 2 mm in size. No ureteral stones or hydronephrosis.  4.  Nonspecific bilateral perinephric stranding and edema. This is also been present previously and can be seen with renal insufficiency but is nonspecific.  5.  There is a small anterior abdominal wall hernia in the midline just above the umbilicus containing some mesenteric fat and a short segment of nonobstructed small bowel. Previously, this hernia only contained mesenteric fat. Clinical correlation.  6.  Large amount of stool throughout the colon. No evidence for bowel obstruction and no definite acute bowel findings.  7.  Distal appendix is fluid distended measuring up to 1.5 cm in diameter. However, there are no surrounding inflammatory changes to suggest acute appendicitis. Appendix had a similar appearance on previous studies as well.    XR Chest Port 1 View  Final Result  IMPRESSION: Negative chest.    Report per radiology    Laboratory:  Labs Ordered and Resulted from Time of ED Arrival to Time of ED Departure   GLUCOSE BY METER - Abnormal       Result Value    GLUCOSE BY METER POCT 349 (*)    COMPREHENSIVE METABOLIC PANEL - Abnormal    Sodium 127 (*)     Potassium 4.1      Chloride 91 (*)     Carbon Dioxide (CO2) 27      Anion Gap 9      Urea Nitrogen 11      Creatinine  1.17      Calcium 9.8      Glucose 306 (*)     Alkaline Phosphatase 70      AST 12      ALT 21      Protein Total 7.1      Albumin 3.8      Bilirubin Total 0.2      GFR Estimate 73     LIPASE - Abnormal    Lipase 69 (*)    ROUTINE UA WITH MICROSCOPIC REFLEX TO CULTURE - Abnormal    Color Urine Light Yellow      Appearance Urine Clear      Glucose Urine >=1000 (*)     Bilirubin Urine Negative      Ketones Urine Negative      Specific Gravity Urine 1.013      Blood Urine Negative      pH Urine 6.0      Protein Albumin Urine Negative      Urobilinogen Urine Normal      Nitrite Urine Negative      Leukocyte Esterase Urine Negative      Mucus Urine Present (*)     RBC Urine 2      WBC Urine 1      Squamous Epithelials Urine <1      Hyaline Casts Urine 28 (*)    CBC WITH PLATELETS AND DIFFERENTIAL - Abnormal    WBC Count 13.2 (*)     RBC Count 3.91 (*)     Hemoglobin 11.3 (*)     Hematocrit 34.5 (*)     MCV 88      MCH 28.9      MCHC 32.8      RDW 12.9      Platelet Count 263      % Neutrophils 65      % Lymphocytes 25      % Monocytes 6      % Eosinophils 2      % Basophils 1      % Immature Granulocytes 1      NRBCs per 100 WBC 0      Absolute Neutrophils 8.8 (*)     Absolute Lymphocytes 3.3      Absolute Monocytes 0.8      Absolute Eosinophils 0.3      Absolute Basophils 0.1      Absolute Immature Granulocytes 0.1      Absolute NRBCs 0.0     ISTAT GASES LACTATE VENOUS POCT - Abnormal    Lactic Acid POCT 3.2 (*)     Bicarbonate Venous POCT 27      O2 Sat, Venous POCT 38 (*)     pCO2V Venous POCT 42      pH Venous POCT 7.42      pO2 Venous POCT 22 (*)    GLUCOSE BY METER - Abnormal    GLUCOSE BY METER POCT 205 (*)    TROPONIN I - Normal    Troponin I High Sensitivity 9     INFLUENZA A/B & SARS-COV2 PCR MULTIPLEX - Normal    Influenza A PCR Negative      Influenza B PCR Negative      RSV PCR Negative      SARS CoV2 PCR Negative     KETONE BETA-HYDROXYBUTYRATE QUANTITATIVE, RAPID - Normal    Ketone (Beta-Hydroxybutyrate)  "Quantitative 0.1     PROCALCITONIN   GLUCOSE MONITOR NURSING POCT   BLOOD CULTURE   BLOOD CULTURE      ISTAT Lactate @ 0237: pH 7.423, pCO2 41.5, pO2 22, Bicarbonate 27.1, Lactic Acid 3.21, sO2% 38  iStat Lactic Acid 4.1 @ 0045  Emergency Department Course:    Reviewed:  I reviewed nursing notes, vitals and past medical history    Assessments:  0046 I obtained history and examined the patient as noted above.    I rechecked the patient and explained findings.     Consults:  2259 I spoke with Dr. Bonilla from [facility], who accepts the patient for admission.    Interventions:  0055 NS 1 L IV  0206 NS 1 L IV   0222 Zosyn 4.5 g IV     Disposition:  The patient was admitted to the hospital under the care of Dr. Bonilla.     Impression & Plan   CMS Diagnoses:   The patient has signs of Septic Shock  The patient has signs of septic shock as evidenced by:  1. Presence of Sepsis, AND  2. Lactic Acidosis with value greater than or equal to 4    Time septic shock diagnosis confirmed = 1242  04/19/22   as this was the time when Lactate was resulted and the level was greater than or equal to 4    3 Hour Septic Shock Bundle Completion:  1. Initial Lactic Acid Result:   Recent Labs   Lab Test 04/19/22  0237 01/13/22  1952 12/27/21  1452   LACT 3.2* 1.5 2.3*     2. Blood Cultures before Antibiotics: Yes  3. Broad Spectrum Antibiotics Administered:  yes       Anti-infectives (From admission through now)    Start     Dose/Rate Route Frequency Ordered Stop    04/19/22 0150  piperacillin-tazobactam (ZOSYN) intermittent infusion 4.5 g        Note to Pharmacy: For [facility], [facility] and [facility]: For Zosyn-naive patients, use the \"Zosyn initial dose + extended infusion\" order panel.    4.5 g  200 mL/hr over 30 Minutes Intravenous ONCE 04/19/22 0146 04/19/22 0253          4. IF 30 mL/kg bolus criteria met based on:  -Lactate > 4  OR  -Initial Hypotension:  Definition:  2 low BP readings (SBP <90, MAP <65, or decrease > 40 from baseline due to " infection) within 3 hrs of each other during the time period of 6 hrs before and 3 hrs  after time zero  THEN: Fluid volume administered in ED:  Full 30 mL/kg bolus given (see amount below).    BMI Readings from Last 1 Encounters:   04/19/22 25.84 kg/m      30 mL/kg fluids based on weight: 2,240 mL  30 mL/kg fluids based on IBW (must be >= 60 inches tall): 1,980 mL    Septic Shock reassessment:  1. Repeat Lactic Acid Level: 3.2  2. MAP>65 after initial IVF bolus, will continue to monitor fluid status and vital signs    I attest to having performed a repeat sepsis exam and assessment of perfusion at 0245 and the results demonstrate improved perfusion.     Medical Decision Making  Patient is a 56-year-old male presenting with reported chills, nausea and cough.  He presents from a group home given concerns for tachycardia and hypertension.  He underwent an extensive work-up during his time in the ED.  Initial lactate resulted greater than 4 though did not carry into epic so RN wrote separate note.  30 cc/kg IV fluid bolus given and patient was empirically covered with IV Zosyn, blood culture sent given concern for underlying sepsis.  UA without infection.  CT does show concerns for possible pneumonia.  He is currently not requiring oxygen support.  The remainder of his labs are reviewed, noted hyponatremia, I suspect more hypovolemia related.  He also was noted to be hyperglycemic though no evidence to suggest DKA at this time.  EKG without STEMI.  High-sensitivity screening troponin negative.  He denies any active chest pain.  Patient did remain tachycardic during his time in the ED though again I suspect this is more secondary to underlying infectious process and I am hesitant to provide any medications to lower his heart rate.  He is mentating appropriately and is excepted by hospitalist for admission.    Diagnosis:    ICD-10-CM    1. Hyponatremia  E87.1    2. Lactic acidosis  E87.2    3. Hyperglycemia  R73.9    4.  Pneumonia due to infectious organism, unspecified laterality, unspecified part of lung  J18.9    5. Septic shock (H)  A41.9     R65.21      Critical Care time was 30 minutes for this patient excluding procedures.    Scribe Disclosure:  I, Ari Liang, am serving as a scribe at 12:30 AM on 4/19/2022 to document services personally performed by Teagan Rojas DO based on my observations and the provider's statements to me.        Teagan Rojas DO  04/19/22 0358

## 2022-04-19 NOTE — ED NOTES
"[facility]  ED Nurse Handoff Report    Benja Duong is a 56 year old male   ED Chief complaint: Hyperglycemia and Hypertension  . ED Diagnosis:   Final diagnoses:   Hyponatremia   Lactic acidosis   Hyperglycemia   Pneumonia due to infectious organism, unspecified laterality, unspecified part of lung     Allergies:   Allergies   Allergen Reactions     Alprazolam      Other reaction(s): *Unknown States \"I break out\"     Oxycodone-Acetaminophen      Other reaction(s): *Unknown, States \"I break out\"       Code Status: Full Code  Activity level - Baseline/Home:  Stand by Assist. Activity Level - Current:   Stand by Assist. Lift room needed: No. Bariatric: No   Needed: No   Isolation: No. Infection: Not Applicable.     Vital Signs:   Vitals:    04/19/22 0200 04/19/22 0223 04/19/22 0244 04/19/22 0245   BP: (!) 176/103   (!) 142/79   BP Location:       Patient Position:       Cuff Size:       Pulse: (!) 124   (!) 123   Resp:       Temp:  97.8  F (36.6  C)     TempSrc:  Oral     SpO2: 95%   95%   Weight:   74.8 kg (165 lb)        Cardiac Rhythm:  ,      Pain level:    Patient confused: Yes. Patient Falls Risk: Yes.   Elimination Status: Has voided   Patient Report - Initial Complaint: Hyperglycemia, Hypertension. Focused Assessment: Found to be hypertensive by group home staff. EMS found pt to be hyperglycemic with . Pt also c/o new cough, chills, and generalized weakness. Lactic acid elevated here, tachycardic and hypertensice.  Pt is A&Ox3 disoriented to situation. Pt is legally blind. Straight cath used to obtain urine for most accurate result but pt states that he usually urinates without assistance.   Tests Performed: Labs, EKG, xray, CT. Abnormal Results:   Labs Ordered and Resulted from Time of ED Arrival to Time of ED Departure   GLUCOSE BY METER - Abnormal       Result Value    GLUCOSE BY METER POCT 349 (*)    COMPREHENSIVE METABOLIC PANEL - Abnormal    Sodium 127 (*)     " Potassium 4.1      Chloride 91 (*)     Carbon Dioxide (CO2) 27      Anion Gap 9      Urea Nitrogen 11      Creatinine 1.17      Calcium 9.8      Glucose 306 (*)     Alkaline Phosphatase 70      AST 12      ALT 21      Protein Total 7.1      Albumin 3.8      Bilirubin Total 0.2      GFR Estimate 73     LIPASE - Abnormal    Lipase 69 (*)    ROUTINE UA WITH MICROSCOPIC REFLEX TO CULTURE - Abnormal    Color Urine Light Yellow      Appearance Urine Clear      Glucose Urine >=1000 (*)     Bilirubin Urine Negative      Ketones Urine Negative      Specific Gravity Urine 1.013      Blood Urine Negative      pH Urine 6.0      Protein Albumin Urine Negative      Urobilinogen Urine Normal      Nitrite Urine Negative      Leukocyte Esterase Urine Negative      Mucus Urine Present (*)     RBC Urine 2      WBC Urine 1      Squamous Epithelials Urine <1      Hyaline Casts Urine 28 (*)    CBC WITH PLATELETS AND DIFFERENTIAL - Abnormal    WBC Count 13.2 (*)     RBC Count 3.91 (*)     Hemoglobin 11.3 (*)     Hematocrit 34.5 (*)     MCV 88      MCH 28.9      MCHC 32.8      RDW 12.9      Platelet Count 263      % Neutrophils 65      % Lymphocytes 25      % Monocytes 6      % Eosinophils 2      % Basophils 1      % Immature Granulocytes 1      NRBCs per 100 WBC 0      Absolute Neutrophils 8.8 (*)     Absolute Lymphocytes 3.3      Absolute Monocytes 0.8      Absolute Eosinophils 0.3      Absolute Basophils 0.1      Absolute Immature Granulocytes 0.1      Absolute NRBCs 0.0     ISTAT GASES LACTATE VENOUS POCT - Abnormal    Lactic Acid POCT 3.2 (*)     Bicarbonate Venous POCT 27      O2 Sat, Venous POCT 38 (*)     pCO2V Venous POCT 42      pH Venous POCT 7.42      pO2 Venous POCT 22 (*)    GLUCOSE BY METER - Abnormal    GLUCOSE BY METER POCT 205 (*)    TROPONIN I - Normal    Troponin I High Sensitivity 9     INFLUENZA A/B & SARS-COV2 PCR MULTIPLEX - Normal    Influenza A PCR Negative      Influenza B PCR Negative      RSV PCR Negative       SARS CoV2 PCR Negative     KETONE BETA-HYDROXYBUTYRATE QUANTITATIVE, RAPID - Normal    Ketone (Beta-Hydroxybutyrate) Quantitative 0.1     PROCALCITONIN   GLUCOSE MONITOR NURSING POCT   BLOOD CULTURE   BLOOD CULTURE     Abd/pelvis CT no contrast - Stone Protocol   Preliminary Result   IMPRESSION:    1.  Minimal patchy groundglass opacities in the right lower lobe. Clinical correlation for signs of a mild or low-grade pneumonitis.      2.  Cholelithiasis. No biliary dilatation.      3.  Tiny nonobstructing stones in the right kidney measuring 2 mm in size. No ureteral stones or hydronephrosis.      4.  Nonspecific bilateral perinephric stranding and edema. This is also been present previously and can be seen with renal insufficiency but is nonspecific.      5.  There is a small anterior abdominal wall hernia in the midline just above the umbilicus containing some mesenteric fat and a short segment of nonobstructed small bowel. Previously, this hernia only contained mesenteric fat. Clinical correlation.      6.  Large amount of stool throughout the colon. No evidence for bowel obstruction and no definite acute bowel findings.      7.  Distal appendix is fluid distended measuring up to 1.5 cm in diameter. However, there are no surrounding inflammatory changes to suggest appendicitis. Appendix had a similar appearance on previous studies as well.         XR Chest Port 1 View   Final Result   IMPRESSION: Negative chest.        .   Treatments provided: See MAR  Family Comments: n/a  OBS brochure/video discussed/provided to patient:  Yes  ED Medications:   Medications   0.9% sodium chloride BOLUS (0 mLs Intravenous Stopped 4/19/22 0213)   0.9% sodium chloride BOLUS (0 mLs Intravenous Stopped 4/19/22 0222)   piperacillin-tazobactam (ZOSYN) intermittent infusion 4.5 g (0 g Intravenous Stopped 4/19/22 0253)     Drips infusing:  No  For the majority of the shift, the patient's behavior Green. Interventions performed were  n/a.    Sepsis treatment initiated: No    Patient tested for COVID 19 prior to admission: YES    ED Nurse Name/Phone Number: Kyra Gomez RN,   2:53 AM    RECEIVING UNIT ED HANDOFF REVIEW    Above ED Nurse Handoff Report was reviewed: Yes  Reviewed by: ARACELI MONROE, JOSE ALBERTO on April 19, 2022 at 11:16 AM

## 2022-04-19 NOTE — PLAN OF CARE
Pertinent assessments: VSS, Pt. A&Ox3, pt. Legally blind with stand by assist. Elevated Blood glucose.pt, have a productive cough with general weakness in lower extremities.    Major Shift Events uneventful    Treatment Plan: Monitor glucose and treat HTN.

## 2022-04-20 LAB
ANION GAP SERPL CALCULATED.3IONS-SCNC: 6 MMOL/L (ref 3–14)
BUN SERPL-MCNC: 8 MG/DL (ref 7–30)
CALCIUM SERPL-MCNC: 9.5 MG/DL (ref 8.5–10.1)
CHLORIDE BLD-SCNC: 101 MMOL/L (ref 94–109)
CO2 SERPL-SCNC: 28 MMOL/L (ref 20–32)
CREAT SERPL-MCNC: 0.7 MG/DL (ref 0.66–1.25)
ENTEROCOCCUS FAECALIS: NOT DETECTED
ENTEROCOCCUS FAECIUM: NOT DETECTED
ERYTHROCYTE [DISTWIDTH] IN BLOOD BY AUTOMATED COUNT: 12.6 % (ref 10–15)
GFR SERPL CREATININE-BSD FRML MDRD: >90 ML/MIN/1.73M2
GLUCOSE BLD-MCNC: 116 MG/DL (ref 70–99)
GLUCOSE BLDC GLUCOMTR-MCNC: 119 MG/DL (ref 70–99)
GLUCOSE BLDC GLUCOMTR-MCNC: 152 MG/DL (ref 70–99)
GLUCOSE BLDC GLUCOMTR-MCNC: 159 MG/DL (ref 70–99)
GLUCOSE BLDC GLUCOMTR-MCNC: 252 MG/DL (ref 70–99)
HCT VFR BLD AUTO: 30.7 % (ref 40–53)
HGB BLD-MCNC: 10.1 G/DL (ref 13.3–17.7)
LISTERIA SPECIES (DETECTED/NOT DETECTED): NOT DETECTED
MCH RBC QN AUTO: 28.9 PG (ref 26.5–33)
MCHC RBC AUTO-ENTMCNC: 32.9 G/DL (ref 31.5–36.5)
MCV RBC AUTO: 88 FL (ref 78–100)
PLATELET # BLD AUTO: 255 10E3/UL (ref 150–450)
POTASSIUM BLD-SCNC: 3.4 MMOL/L (ref 3.4–5.3)
RBC # BLD AUTO: 3.49 10E6/UL (ref 4.4–5.9)
SODIUM SERPL-SCNC: 135 MMOL/L (ref 133–144)
STAPHYLOCOCCUS AUREUS: NOT DETECTED
STAPHYLOCOCCUS EPIDERMIDIS: NOT DETECTED
STAPHYLOCOCCUS LUGDUNENSIS: NOT DETECTED
STAPHYLOCOCCUS SPECIES: DETECTED
STREPTOCOCCUS AGALACTIAE: NOT DETECTED
STREPTOCOCCUS ANGINOSUS GROUP: NOT DETECTED
STREPTOCOCCUS PNEUMONIAE: NOT DETECTED
STREPTOCOCCUS PYOGENES: NOT DETECTED
STREPTOCOCCUS SPECIES: NOT DETECTED
WBC # BLD AUTO: 9.3 10E3/UL (ref 4–11)

## 2022-04-20 PROCEDURE — 80048 BASIC METABOLIC PNL TOTAL CA: CPT | Performed by: HOSPITALIST

## 2022-04-20 PROCEDURE — 250N000012 HC RX MED GY IP 250 OP 636 PS 637: Performed by: HOSPITALIST

## 2022-04-20 PROCEDURE — 99232 SBSQ HOSP IP/OBS MODERATE 35: CPT | Performed by: HOSPITALIST

## 2022-04-20 PROCEDURE — 85027 COMPLETE CBC AUTOMATED: CPT | Performed by: HOSPITALIST

## 2022-04-20 PROCEDURE — 258N000003 HC RX IP 258 OP 636: Performed by: EMERGENCY MEDICINE

## 2022-04-20 PROCEDURE — 250N000011 HC RX IP 250 OP 636: Performed by: HOSPITALIST

## 2022-04-20 PROCEDURE — 120N000001 HC R&B MED SURG/OB

## 2022-04-20 PROCEDURE — 36415 COLL VENOUS BLD VENIPUNCTURE: CPT | Performed by: HOSPITALIST

## 2022-04-20 PROCEDURE — 250N000013 HC RX MED GY IP 250 OP 250 PS 637: Performed by: HOSPITALIST

## 2022-04-20 PROCEDURE — 250N000011 HC RX IP 250 OP 636: Performed by: EMERGENCY MEDICINE

## 2022-04-20 PROCEDURE — 999N000127 HC STATISTIC PERIPHERAL IV START W US GUIDANCE

## 2022-04-20 RX ORDER — INSULIN ASPART 100 [IU]/ML
INJECTION, SOLUTION INTRAVENOUS; SUBCUTANEOUS
Qty: 15 ML | Refills: 0 | Status: SHIPPED | OUTPATIENT
Start: 2022-04-20 | End: 2022-04-21

## 2022-04-20 RX ORDER — CEFTRIAXONE 2 G/1
2 INJECTION, POWDER, FOR SOLUTION INTRAMUSCULAR; INTRAVENOUS EVERY 24 HOURS
Status: DISCONTINUED | OUTPATIENT
Start: 2022-04-20 | End: 2022-04-21 | Stop reason: HOSPADM

## 2022-04-20 RX ORDER — LISINOPRIL 20 MG/1
20 TABLET ORAL DAILY
Status: DISCONTINUED | OUTPATIENT
Start: 2022-04-21 | End: 2022-04-21 | Stop reason: HOSPADM

## 2022-04-20 RX ORDER — LISINOPRIL 10 MG/1
10 TABLET ORAL ONCE
Status: COMPLETED | OUTPATIENT
Start: 2022-04-20 | End: 2022-04-20

## 2022-04-20 RX ADMIN — NICOTINE POLACRILEX 2 MG: 2 GUM, CHEWING BUCCAL at 00:54

## 2022-04-20 RX ADMIN — VANCOMYCIN HYDROCHLORIDE 1500 MG: 5 INJECTION, POWDER, LYOPHILIZED, FOR SOLUTION INTRAVENOUS at 00:42

## 2022-04-20 RX ADMIN — LISINOPRIL 10 MG: 10 TABLET ORAL at 16:06

## 2022-04-20 RX ADMIN — CEFTRIAXONE 2 G: 2 INJECTION, POWDER, FOR SOLUTION INTRAMUSCULAR; INTRAVENOUS at 12:35

## 2022-04-20 RX ADMIN — NICOTINE POLACRILEX 2 MG: 2 GUM, CHEWING BUCCAL at 02:40

## 2022-04-20 RX ADMIN — NICOTINE POLACRILEX 2 MG: 2 GUM, CHEWING BUCCAL at 22:42

## 2022-04-20 RX ADMIN — LISINOPRIL 10 MG: 10 TABLET ORAL at 09:49

## 2022-04-20 RX ADMIN — INSULIN GLARGINE 15 UNITS: 100 INJECTION, SOLUTION SUBCUTANEOUS at 11:47

## 2022-04-20 RX ADMIN — NICOTINE POLACRILEX 2 MG: 2 GUM, CHEWING BUCCAL at 09:49

## 2022-04-20 RX ADMIN — AZITHROMYCIN MONOHYDRATE 250 MG: 250 TABLET ORAL at 09:49

## 2022-04-20 ASSESSMENT — ACTIVITIES OF DAILY LIVING (ADL)
ADLS_ACUITY_SCORE: 12
ADLS_ACUITY_SCORE: 12
ADLS_ACUITY_SCORE: 6
ADLS_ACUITY_SCORE: 12
ADLS_ACUITY_SCORE: 6
ADLS_ACUITY_SCORE: 6
ADLS_ACUITY_SCORE: 12

## 2022-04-20 NOTE — PROVIDER NOTIFICATION
Received call from lab of Positive blood culture, Preliminary result showed Staph Species. Hospitalist updated.

## 2022-04-20 NOTE — PLAN OF CARE
Goal Outcome Evaluation:    End of Shift Summary  For vital signs and complete assessments, please see documentation flowsheets.     Pertinent assessments: Pt A&Ox4, VSS except for elevated BP's. Denied pain. Pt has an infreq, congested cough. LS diminished. , 119. IV abx. Nicotine gum given x1. Voiding adequately, urinal at bedside. Slept most of morning, attempted to wake up numerous times but was sound asleep. Ate breakfast around 1130.     Major Shift Events Positive blood culture showed staphylococcus species. PIV in right arm removed, and new one placed by vascular in left arm.     Treatment Plan: encourage self-care, IV Abx (Zithro, Rocephin, & Vanco), pending Viral swab    Julia Rader RN

## 2022-04-20 NOTE — PROGRESS NOTES
[facility]    Hospitalist Progress Note             Date of Admission:  4/19/2022                   Day of hospitalization: 1    Assessment and Plan:      Benja Duong is a 56 year old male with a history of depression, diabetes, hypertension, schizophrenia, polysubstance abuse but apparently now sober, GERD who presents with hyperglycemia and hypertension.  Patient resides in a group home.  Evidently staff has noticed his blood sugar has been fairly elevated in the 300-400 range.  His blood pressure has also been on the higher side systolically running 160-180.  Patient reports some mild cough and shortness of breath that is chronic in nature.  He denies any fevers.  He denies abdominal pain, nausea, vomiting, or diarrhea.  No dysuria, frequency or urgency.  He was brought to the emergency department for evaluation.     Here in the hospital temperature is 97.9, heart rate 126, blood pressure 143/103, respiratory 18 and oxygen saturation 97% on room air.  Laboratory work shows sodium 127, chloride 91, white blood cells 13.2, hemoglobin 11.3.  Lactic acid level was 3.2.  Remainder of the basic metabolic panel, CBC, lipase, and troponin are normal.  Blood sugar elevated to 306.  Chest x-ray is clear.  CT of the abdomen shows minimal patchy groundglass opacity of the right lower lobe and some other less pertinent and likely incidental findings.  Patient was given IV fluids and Zosyn and will be admitted for further management.     Staph homonous bacteremia possible contaminant?- Will get IDs input given early findings of possible sepsis, source unclear.  Suspected early sepsis. In the setting of tachycardia, leukocytosis, lactic acidosis  -Possible sepsis source penumonia, with cough. Procalcitonin is normal? continue antibiotics ceftriaxone and azithromycin  for CAP.  -CT abdomen pelvis did not show any acute findings other than patchy groundglass opacities in the right lower lobe, appendix  is enlarged but no evidence of appendicitis, large amount of stools in colon  - Blood cultures growing GPC, not staph aureus, but staphylococcal species, possible contaminant. ID consulted   -He did receive vancomycin one-time dose will discontinue further doses    Leukocytosis  -Resolved    Chronic normocytic anemia  -Monitor hemoglobin at 10.1 today    Hypovolemic hyponatremia  -Monitor resolved with IV fluids.  Likely SIADH    Diabetes myelitis  -Setting of his lactic acidosis will need to hold his metformin for discharge plan  -Sugars on the low end this morning at 116, change sliding scale to low-dose from medium dose, continue carb counting and Lantus 15 units every morning    Hypertension  -I will hold his HCTZ in the setting of hyponatremia and continue hist home lisinopril     Sinus tachycardia  -TSH normal, lactic acid normalized, echocardiogram no significant abnormalities.  Monitor, seems to be improving    ---------     # Code status: Full   # Anticipated discharge date and Disposition: In 1 to 2 days pending blood cultures, infectious work-up  # DVT: SCDs  # IVF:       none                  Ines Landis MD  Text Page (7am - 6pm, M-F)               Subjective   Chief Complaint:  Cough  Subjective: Patient laying in bed had covers over his head, does wake up.  States he is doing okay has minimal complaints no nausea no vomiting no fevers chills, he states he has chronic cough.  No abdominal pain no diarrhea.        Objective   /72 (BP Location: Left arm)   Pulse 97   Temp 98.7  F (37.1  C) (Oral)   Resp 18   Wt 78.9 kg (173 lb 14.4 oz)   SpO2 96%   BMI 27.24 kg/m       Physical Exam  General: Pt in NAD, normal appearance  HEENT: OP clear MMM, no JVD  Lungs: Clear to Auscultation Bilateral, normal breathing  without accessory muscle usage, no wheezing, rhonchi or crackles  Cardiac: +S1, S2, RRR, no MRG, no edema  Abdominal: normal bowel sounds, NT/ND, no hepatosplenomegaly  Skin: warm, dry,  normal turgor, no rash, feet has onychomycosis  Psyche: A& O x3, appropriate affect       Intake/Output Summary (Last 24 hours) at 4/20/2022 1142  Last data filed at 4/20/2022 0933  Gross per 24 hour   Intake 240 ml   Output 2900 ml   Net -2660 ml           Labs and Imaging Results:      Recent Labs   Lab 04/20/22  0748 04/19/22  1142   WBC 9.3 13.0*   HGB 10.1* 9.5*    232        Recent Labs   Lab 04/20/22  0748 04/19/22  0627    135   CO2 28 28   BUN 8 13      No results for input(s): INR, PTT in the last 168 hours.   No results for input(s): CKMB in the last 168 hours.    Invalid input(s): TROPONINT     Recent Labs   Lab 04/19/22  0042   ALBUMIN 3.8   AST 12   ALT 21   ALKPHOS 70        Micro:     Radio:  Echo Limited   Final Result      Abd/pelvis CT no contrast - Stone Protocol   Final Result   IMPRESSION:    1.  Minimal patchy groundglass opacities in the right lower lobe. Clinical correlation for signs of a mild or low-grade pneumonitis.      2.  Cholelithiasis. No biliary dilatation.      3.  Tiny nonobstructing stones in the right kidney measuring 2 mm in size. No ureteral stones or hydronephrosis.      4.  Nonspecific bilateral perinephric stranding and edema. This is also been present previously and can be seen with renal insufficiency but is nonspecific.      5.  There is a small anterior abdominal wall hernia in the midline just above the umbilicus containing some mesenteric fat and a short segment of nonobstructed small bowel. Previously, this hernia only contained mesenteric fat. Clinical correlation.      6.  Large amount of stool throughout the colon. No evidence for bowel obstruction and no definite acute bowel findings.      7.  Distal appendix is fluid distended measuring up to 1.5 cm in diameter. However, there are no surrounding inflammatory changes to suggest acute appendicitis. Appendix had a similar appearance on previous studies as well.         XR Chest Port 1 View   Final  Result   IMPRESSION: Negative chest.              Medications:      Scheduled Meds:      azithromycin  250 mg Oral Daily     cefTRIAXone  2 g Intravenous Q24H     insulin aspart  1-7 Units Subcutaneous TID AC     insulin aspart  1-5 Units Subcutaneous At Bedtime     insulin aspart   Subcutaneous TID AC     insulin glargine  15 Units Subcutaneous QAM AC     lisinopril  10 mg Oral Daily     sodium chloride (PF)  3 mL Intracatheter Q8H     Continuous Infusions:    PRN Meds:  glucose **OR** dextrose **OR** glucagon, lidocaine 4%, lidocaine (buffered or not buffered), melatonin, nicotine, ondansetron **OR** ondansetron, sodium chloride (PF)

## 2022-04-20 NOTE — CONSULTS
Consult Date: 04/20/2022    INFECTIOUS DISEASE CONSULTATION    REFERRING PHYSICIAN:  Dr. Landis.    IMPRESSION:    1.  A 56-year-old male, vague presentation including hypertension, elevated glucose, found to have some markers of possible infection, maybe a slight cough, but no overt infection symptoms, no clear localizing infection found.  2.  Positive blood culture for Staphylococcus hominis in a single bottle.  This is virtually certainly a contaminant.  3.  Minimal respiratory symptoms.  Chest x-ray negative.  CAT scan suggesting some lower lobe ground-glass changes, but clinically, doubt pneumonia.  4.  History of polysubstance abuse, historically.  5.  Diabetes.    RECOMMENDATIONS:  No clear infection.  Definitely no treatment for the positive blood culture.  Currently being treated as possible pneumonia, may be not unreasonable to do a course of Ceftin and finish the Zithromax course, would not do bigger workup at the present time.    HISTORY OF PRESENT ILLNESS:  This 56-year-old male is seen in consultation, initially presented with hypertension and elevated glucose.  He denies any particular respiratory symptoms, but had a slight cough.  Did not have any fevers, chills or sweats.  No significant abdominal pain or other localizing symptoms.  At admission, had the hypertension, elevated glucose.  White count was slightly elevated at 13,000.  Cultures were obtained, and one blood culture growing Staphylococcus hominis.  He has been on antibiotics and imaging suggested a possible lower lobe infiltrate on CT scan, not seen at all on chest x-ray.  He has been on antibiotics and feels completely well.    PAST MEDICAL HISTORY:  Minimal infection problems historically.  History of polysubstance abuse, history of schizophrenia, history of hypertension and diabetes.    ALLERGIES:  NO ANTIBIOTIC ALLERGIES.    MEDICATIONS:  As listed.    SOCIAL AND FAMILY HISTORY:  No recent drug use.  No significant travel history or  exposures.  No one else he has been around has been ill.  No family history relevant to current illness.    REVIEW OF SYSTEMS:  No significant respiratory symptoms, maybe a slight cough.  Does not feel short of breath, up moving around okay.  No focal pain sites.  No new diarrhea, abdominal or GI symptoms.    PHYSICAL EXAMINATION:  GENERAL:  The patient appears his stated age, looks comfortable.  Vital signs are normal.  Not toxic or ill looking.  HEENT:  No thrush or oropharyngeal lesions.  HEART:  Regular rhythm, no major murmur.  LUNGS:  Clear bilaterally.  ABDOMEN:  Nontender.  EXTREMITIES:  No major rash or skin lesions.    LABORATORY DATA:  Blood culture single bottle growing Staphylococcus hominis, almost certainly contaminant.  Chest x-ray without infiltrate.  CT scan with slight lower lobe reticular nodular changes.  Unclear whether acute or chronic.    Thank you very much for this consultation. I will follow patient with you.    Art Simpson MD        D: 2022   T: 2022   MT: LISA    Name:     YUDY TOBIASROHITH LOPEZ  MRN:      [id number]        Account:      [id number]   :      1965           Consult Date: 2022     Document: [id number]

## 2022-04-20 NOTE — PLAN OF CARE
End of Shift Summary  For vital signs and complete assessments, please see documentation flowsheets.     Pertinent assessments: VSS, BP slightly elevated A&O x4. Elevated BG, coverage given. Voiding spontaneously with adequate output. Denies pain.    Major Shift Events:  uneventful  Treatment Plan: encourage self-care, IV Abx, pending Viral swab

## 2022-04-20 NOTE — PROGRESS NOTES
Care Management Follow Up    Length of Stay (days): 1    Expected Discharge Date: 04/21/2022     Concerns to be Addressed:       Patient plan of care discussed at interdisciplinary rounds: Yes    Anticipated Discharge Disposition:      Anticipated Discharge Services: None  Anticipated Discharge DME: None    Additional Information:  [facility] [phone] fax [phone] RN [place] [phone] can provide transport tomorrow at 11AM if he is medically stable.  MD notified.    RN also requests parameters for blood sugars for high and low for Lantus.      Jinny Green RN BSN   Inpatient Care Coordination  [facility]  [phone]        Vianey Green, RN

## 2022-04-20 NOTE — CONSULTS
ID consult dictated IMP 1 55 yo male vague initial presentation ? Infection minimal focal sxs , no hypoxia CXR neg, BC 1 bottle staph hominis is contaminant    REc  No tx BC, likely [place] home ceftin 5 days and complete zpak, not clear infection but CT lower lobe changes

## 2022-04-20 NOTE — PLAN OF CARE
To Do:  End of Shift Summary  For vital signs and complete assessments, please see documentation flowsheets.     Pertinent assessments: Pt A&Ox4, VSS except for elevated BP's. Denied pain. Pt has an infreq, congested cough. LS diminished. . IV abx. Nicotine gum given x2. Voiding adequately, utilizes urinal.   Major Shift Events Positive blood culture showed staphylococcus species.   Treatment Plan: encourage self-care, IV Abx, pending Viral swab    Bedside Nurse: Dionne Johnston RN

## 2022-04-21 VITALS
TEMPERATURE: 98.6 F | HEART RATE: 99 BPM | OXYGEN SATURATION: 96 % | WEIGHT: 173.9 LBS | BODY MASS INDEX: 27.24 KG/M2 | RESPIRATION RATE: 20 BRPM | DIASTOLIC BLOOD PRESSURE: 62 MMHG | SYSTOLIC BLOOD PRESSURE: 123 MMHG

## 2022-04-21 LAB
GLUCOSE BLDC GLUCOMTR-MCNC: 113 MG/DL (ref 70–99)
GLUCOSE BLDC GLUCOMTR-MCNC: 168 MG/DL (ref 70–99)

## 2022-04-21 PROCEDURE — 99239 HOSP IP/OBS DSCHRG MGMT >30: CPT | Performed by: HOSPITALIST

## 2022-04-21 PROCEDURE — 250N000013 HC RX MED GY IP 250 OP 250 PS 637: Performed by: HOSPITALIST

## 2022-04-21 PROCEDURE — 250N000011 HC RX IP 250 OP 636: Performed by: HOSPITALIST

## 2022-04-21 RX ORDER — AZITHROMYCIN 250 MG/1
250 TABLET, FILM COATED ORAL DAILY
Qty: 3 TABLET | Refills: 0 | Status: SHIPPED | OUTPATIENT
Start: 2022-04-22 | End: 2024-03-17

## 2022-04-21 RX ORDER — CEFUROXIME AXETIL 500 MG/1
500 TABLET ORAL 2 TIMES DAILY
Qty: 10 TABLET | Refills: 0 | Status: SHIPPED | OUTPATIENT
Start: 2022-04-21 | End: 2024-03-17

## 2022-04-21 RX ORDER — CEFUROXIME AXETIL 500 MG/1
500 TABLET ORAL 2 TIMES DAILY
Qty: 10 TABLET | Refills: 0 | Status: SHIPPED | OUTPATIENT
Start: 2022-04-21 | End: 2022-04-21

## 2022-04-21 RX ORDER — AZITHROMYCIN 250 MG/1
250 TABLET, FILM COATED ORAL DAILY
Qty: 3 TABLET | Refills: 0 | Status: SHIPPED | OUTPATIENT
Start: 2022-04-22 | End: 2022-04-21

## 2022-04-21 RX ADMIN — CEFTRIAXONE 2 G: 2 INJECTION, POWDER, FOR SOLUTION INTRAMUSCULAR; INTRAVENOUS at 08:55

## 2022-04-21 RX ADMIN — AZITHROMYCIN MONOHYDRATE 250 MG: 250 TABLET ORAL at 08:54

## 2022-04-21 RX ADMIN — NICOTINE POLACRILEX 2 MG: 2 GUM, CHEWING BUCCAL at 03:44

## 2022-04-21 RX ADMIN — INSULIN GLARGINE 15 UNITS: 100 INJECTION, SOLUTION SUBCUTANEOUS at 09:02

## 2022-04-21 RX ADMIN — LISINOPRIL 20 MG: 20 TABLET ORAL at 08:54

## 2022-04-21 ASSESSMENT — ACTIVITIES OF DAILY LIVING (ADL)
ADLS_ACUITY_SCORE: 6

## 2022-04-21 NOTE — PROGRESS NOTES
Care Management Discharge Note    Discharge Date: 04/21/2022       Discharge Disposition:     Discharge Services: None    Discharge Transportation:   staff    Education Provided on the Discharge Plan:  Pt and GH  Persons Notified of Discharge Plans: Pt and   Patient/Family in Agreement with the Plan: yes    Additional Information:  [facility] [phone] fax [phone] JOSE ALBERTO Dubose [phone] can provide transport today at 11AM if he is medically stable.  MD notified.    ADDENDUM: MD needs to assess  Pt.  Transport placed on hold till if we decided if Pt medically ready.    9:35: Orders in .  MD paged to fill meds at [facility] pharmacy [place].   We will need to return novolog pen that was filled yesterday to [facility].  I left a vm with  to see about transport time.     10:12: Orders faxed to : Fax: [phone]. Faina ABRAMS will be out front at [facility] at 11AM to transport pt.      Jinny Green RN BSN   Inpatient Care Coordination  [facility]  [phone]      Vianey Green, RN

## 2022-04-21 NOTE — PLAN OF CARE
Goal Outcome Evaluation:        End of Shift Summary  For vital signs and complete assessments, please see documentation flowsheets.     Pertinent assessments: Pt A&OX4. VSS and BP elevated. Denied pain. LS diminished. No sob noted. Some infrequent non productive cough. Denied N/V. ,252. Had a nicotine gum x 1.Voiding with urinal at bedside.    Major Shift Events :None    Treatment Plan: IV Rocephin, po Zithromax. , pain management.

## 2022-04-21 NOTE — PLAN OF CARE
Pt. A&O. Pt.. was discharged to a group home at 1100. AVS completed signed, all belongings sent with pt. All questions answered. PIV removed per order. Medication sent to the preferred pharmacy.

## 2022-04-21 NOTE — DISCHARGE SUMMARY
"Hospitalist Discharge Summary  [employer]    Benja Duong MRN# [id number]   YOB: 1965 Age: 56 year old     Date of Admission:  4/19/2022  Date of Discharge:  4/21/2022  Admitting Physician:  Reagan Bonilla DO  Discharge Physician:  Reagan Bonilla DO  Discharging Service:  Hospitalist     Primary Provider: [facility], [facility]          Discharge Diagnosis:     1.  Sepsis secondary to community-acquired pneumonia.  2.  Staphylococcus hominis positive blood culture, likely contaminant.  3.  Hypovolemic, hyponatremia.  4.  Diabetes mellitus.  5.  Hypertension.  6.  Sinus tachycardia.  7.  Schizophrenia.  8.  Polysubstance abuse.  9.  Depression.  10.  Gastroesophageal reflux disease.             Discharge Disposition:     Discharged to home           Allergies:     Allergies   Allergen Reactions     Alprazolam      Other reaction(s): *Unknown States \"I break out\"     Oxycodone-Acetaminophen      Other reaction(s): *Unknown, States \"I break out\"              Discharge Medications:     Current Discharge Medication List      START taking these medications    Details   azithromycin (ZITHROMAX) 250 MG tablet Take 1 tablet (250 mg) by mouth daily  Qty: 3 tablet, Refills: 0    Associated Diagnoses: Sepsis, due to unspecified organism, unspecified whether acute organ dysfunction present (H)      cefuroxime (CEFTIN) 500 MG tablet Take 1 tablet (500 mg) by mouth 2 times daily  Qty: 10 tablet, Refills: 0    Associated Diagnoses: Sepsis, due to unspecified organism, unspecified whether acute organ dysfunction present (H)         CONTINUE these medications which have NOT CHANGED    Details   acetaminophen (TYLENOL) 325 MG tablet Take 650 mg by mouth every 4 hours as needed for mild pain      atorvastatin (LIPITOR) 20 MG tablet Take 20 mg by mouth At Bedtime      cholecalciferol (VITAMIN D-1000 MAX ST) 1000 units TABS Take 1,000 Units by mouth daily  Qty: 30 tablet, " Refills: 1      diphenhydrAMINE (BENADRYL) 25 MG tablet Take 50 mg by mouth every 6 hours as needed for itching or allergies      diphenhydrAMINE (BENADRYL) 50 MG capsule Take 50 mg by mouth At Bedtime      ferrous sulfate (FEROSUL) 325 (65 Fe) MG tablet Take 1 tablet (325 mg) by mouth daily  Qty: 30 tablet, Refills: 1    Associated Diagnoses: Iron deficiency anemia, unspecified iron deficiency anemia type      glucose (BD GLUCOSE) 4 g chewable tablet Take 4 tablets by mouth as needed for low blood sugar      hydrALAZINE (APRESOLINE) 25 MG tablet Take 1 tablet (25 mg) by mouth every 6 hours as needed (HTN; SBP > 160)  Qty: 30 tablet, Refills: 1    Associated Diagnoses: Essential hypertension      hypromellose (GENTEAL SEVERE) ophthalmic gel 0.3% Place 1 drop into both eyes 2 times daily      insulin glargine (LANTUS PEN) 100 UNIT/ML pen Inject 21 Units Subcutaneous At Bedtime      lisinopril (ZESTRIL) 40 MG tablet Take 40 mg by mouth daily      ondansetron (ZOFRAN) 4 MG tablet Take 4 mg by mouth every 8 hours as needed for nausea      pantoprazole (PROTONIX) 40 MG EC tablet Take 1 tablet (40 mg) by mouth 2 times daily      tamsulosin (FLOMAX) 0.4 MG capsule Take 1 capsule (0.4 mg) by mouth daily  Qty: 30 capsule, Refills: 3    Associated Diagnoses: Urinary retention      exenatide ER (BYDUREON) 2 MG recon vial kit susp for weekly inj Inject 2 mg Subcutaneous every 14 days DO not administer until he is seen by his PCP.  Qty:        risperiDONE microspheres ER (RISPERDAL CONSTA) 50 MG injection Inject 50 mg into the muscle every 14 days         STOP taking these medications       hydrochlorothiazide (HYDRODIURIL) 25 MG tablet Comments:   Reason for Stopping:         ibuprofen (ADVIL/MOTRIN) 200 MG tablet Comments:   Reason for Stopping:         metFORMIN (GLUCOPHAGE) 1000 MG tablet Comments:   Reason for Stopping:                      Condition on Discharge:     Discharge condition: Stable   Discharge vitals: Blood  pressure 123/62, pulse 99, temperature 98.6  F (37  C), temperature source Oral, resp. rate 20, weight 78.9 kg (173 lb 14.4 oz), SpO2 96 %.   Code status on discharge: Full Code      BASIC PHYSICAL EXAMINATION:  GENERAL: No apparent distress.  CARDIOVASCULAR: Regular rate and rhythm without murmurs.  PULMONARY: Clear to auscultation bilaterally.   GASTROINTESTINAL: Abdomen soft, non-tender.  EXTREMITIES: No edema, pulses intact.  NEUROLOGIC: No focal deficits.            History of Illness:   See detailed admission note for full details.               Procedures excluding imaging which is summarized below:     Please see details in the electronic medical record.           Consultations:     CARE MANAGEMENT / SOCIAL WORK IP CONSULT  PHARMACY TO DOSE VANCO  INFECTIOUS DISEASES IP CONSULT          Significant Results:     Results for orders placed or performed during the hospital encounter of 04/19/22   XR Chest Port 1 View    Narrative    EXAM: XR CHEST PORT 1 VIEW  LOCATION: [facility]  DATE/TIME: 4/19/2022 1:29 AM    INDICATION: Cough.  COMPARISON: 1/14/2022.      Impression    IMPRESSION: Negative chest.   Abd/pelvis CT no contrast - Stone Protocol    Narrative    EXAM: CT ABDOMEN PELVIS W/O CONTRAST  LOCATION: [facility]  DATE/TIME: 4/19/2022 2:09 AM    INDICATION: Nausea/vomiting  COMPARISON: 1/13/2022, 12/27/2021, and 10/4/2020.  TECHNIQUE: CT scan of the abdomen and pelvis was performed without IV contrast. Multiplanar reformats were obtained. Dose reduction techniques were used.  CONTRAST: None.    FINDINGS:   LOWER CHEST: Minimal patchy groundglass opacities in the right lower lobe with a mild or low-grade pneumonitis not excluded. Clinical correlation.    HEPATOBILIARY: Liver is negative. Small amount of sludge or stone material layering dependently within the gallbladder. No biliary dilatation.    PANCREAS: Normal.    SPLEEN: Normal.    ADRENAL GLANDS:  Normal.    KIDNEYS/BLADDER: There are 2 tiny nonobstructing stones in the right kidney measuring 2 mm in size. Small benign right renal cyst with no specific follow-up needed. Nonspecific bilateral perinephric stranding and edema. No ureteral stones or   hydronephrosis. Bladder is grossly negative.    BOWEL: Bowel is normal in caliber with no evidence for obstruction. There is a small anterior abdominal wall hernia in the midline just above the umbilicus containing some mesenteric fat and a short segment of nonobstructed small bowel. Previously this   hernia only contained mesenteric fat. Clinical correlation. Distal appendix shows some fluid distention measuring up to 1.5 cm in diameter. However, there are no surrounding inflammatory changes to suggest appendicitis. Similar appearance to the appendix   on prior studies. Large amount of stool in the colon. No definite acute bowel findings.    LYMPH NODES: Normal.    VASCULATURE: Unremarkable.    PELVIC ORGANS: Normal.  MUSCULOSKELETAL: Benign rounded area of sclerosis in the left iliac bone compatible with a benign bone island and unchanged from previous.      Impression    IMPRESSION:   1.  Minimal patchy groundglass opacities in the right lower lobe. Clinical correlation for signs of a mild or low-grade pneumonitis.    2.  Cholelithiasis. No biliary dilatation.    3.  Tiny nonobstructing stones in the right kidney measuring 2 mm in size. No ureteral stones or hydronephrosis.    4.  Nonspecific bilateral perinephric stranding and edema. This is also been present previously and can be seen with renal insufficiency but is nonspecific.    5.  There is a small anterior abdominal wall hernia in the midline just above the umbilicus containing some mesenteric fat and a short segment of nonobstructed small bowel. Previously, this hernia only contained mesenteric fat. Clinical correlation.    6.  Large amount of stool throughout the colon. No evidence for bowel obstruction  and no definite acute bowel findings.    7.  Distal appendix is fluid distended measuring up to 1.5 cm in diameter. However, there are no surrounding inflammatory changes to suggest acute appendicitis. Appendix had a similar appearance on previous studies as well.     Echo Limited    Narrative    [id number]  [id number]  [id number]  [id number]^GABY^BRANT^MIKY     [facility]  Echocardiography Laboratory  [address] [place] [address]     Name: JEANNE TOBIAS  MRN: [id number]  : 1965  Study Date: 2022 11:36 AM  Age: 56 yrs  Gender: Male  Patient Location: [facility]  Reason For Study: Tachycardia  Ordering Physician: BRANT GRIFFIN  Referring Physician: Clinic, [facility]  Performed By: Kim Tee RDCS     BSA: 1.9 m2  Height: 67 in  Weight: 165 lb  HR: 120  BP: 144/108 mmHg  ______________________________________________________________________________  Procedure  Limited Portable Echo Adult. (Emergent exam, abbreviated study performed).  ______________________________________________________________________________  Interpretation Summary     There is no comparison study available.  ______________________________________________________________________________  Left Ventricle  The left ventricle is normal in structure, function and size. Left ventricular  diastolic function is normal.     Right Ventricle  The right ventricle is normal in structure, function and size.     Atria  Normal left atrial size. Right atrial size is normal.     Mitral Valve  The mitral valve is normal in structure and function.     Tricuspid Valve  Normal tricuspid valve.     Aortic Valve  The aortic valve is normal in structure and function.     Pulmonic Valve  Normal pulmonic valve.     Vessels  The aortic root is normal size. Dilation of the inferior vena cava is present  with abnormal respiratory variation in diameter.     Pericardium  There is no pericardial effusion.      Rhythm  The rhythm was sinus tachycardia.  ______________________________________________________________________________  MMode/2D Measurements & Calculations  IVSd: 0.96 cm     LVIDd: 4.2 cm  LVIDs: 2.6 cm  LVPWd: 0.85 cm  IVC diam: 3.1 cm  FS: 38.3 %  LV mass(C)d: 122.2 grams  LV mass(C)dI: 65.6 grams/m2  LA dimension: 3.7 cm  LA Volume (BP): 51.0 ml  LA Volume Index (BP): 27.4 ml/m2  RWT: 0.40     ______________________________________________________________________________  Report approved by: Christin Joshi 04/19/2022 11:52 AM               Transthoracic Echocardiogram Results:  No results found for this or any previous visit (from the past 4320 hour(s)).             Pending Results:     Unresulted Labs Ordered in the Past 30 Days of this Admission     Date and Time Order Name Status Description    4/19/2022  2:07 AM Blood Culture Peripheral Blood Preliminary     4/19/2022  2:07 AM Blood Culture Peripheral Blood Preliminary                       Discharge Instructions and Follow-Up:     Discharge instructions and follow-up:   Discharge Procedure Orders   Reason for your hospital stay   Order Comments: Possible sepsis/sirs. Source unclear, possibly community acquired pneumonia. You were evaluated by ID. You should hold your metformin     Activity   Order Comments: Your activity upon discharge: activity as tolerated     Order Specific Question Answer Comments   Is discharge order? Yes      Follow-up and recommended labs and tests    Order Comments: Follow up with primary care provider, [facility], within 7 days for hospital follow- up.  Check CBC, BMP, hemoglobin A1c, and lactate.  Metformin held due to lactic acidosis, could resume at follow-up with PCP vs restarting a different DM medication vs no change pending BG control.  Hold your lantus and aspart if blood sugars <100, and call MD if your blood glucose >400.  Hold hydrochlorothiazide, defer to PCP if this should be resumed.   Complete antibiotics.     Diet   Order Comments: Follow this diet upon discharge: Orders Placed This Encounter      Combination Diet Regular Diet Adult     Order Specific Question Answer Comments   Is discharge order? Yes              Hospital Course:     This is a 56-year-old male with a history of depression, diabetes, hypertension, schizophrenia, polysubstance abuse, apparently now sober, and GERD, brought to the Emergency Department from his group home with hyperglycemia and hypertension.  Staff at the group home noticed his blood sugar in the 300-400 range and his blood pressure systolically in the range of 160-180.  The patient reported a mild cough, as well as some shortness of breath that is more chronic in nature.  He denied any fever.    In the Emergency Department, he was found to have tachycardia with stable blood pressure and no fever.  Oxygen saturation was normal on room air.  Sodium was low at 127 and he had a mild leukocytosis of 13.2.  Lactic acid was initially in the 4.5 range and trended down to 3.2 with IV fluids.  Chest x-ray was clear, but CT of the abdomen showed a minimal patchy ground glass opacity in the right lower lobe concerning for pneumonia.  The patient was started on Zosyn and admitted to the hospital.    One of 2 blood cultures returned with Staphylococcus hominis.  Infectious Disease was consulted and thought that this was likely a contaminant and of no clinical significance.  The patient's antibiotics were changed to ceftriaxone and azithromycin.  He clinically improved quite quickly.  He did receive 1 dose of vancomycin due to his blood culture.  He will continue Ceftin and azithromycin for community-acquired pneumonia coverage.    He had sinus tachycardia on admission.  TSH was normal.  Echocardiogram shows no significant abnormalities.  Hemoglobin is higher than prior baseline levels.  Heart rate has improved.    Due to his lactic acidosis, his metformin was discontinued.  His  Lantus dose was adjusted.  Blood sugars are reasonably well controlled.  He can resume his exenatide and Lantus on discharge, but we will hold his metformin until he follows up next week.  Lactic acid level should be rechecked at that time and if normal, could consider resuming metformin.  Alternatively, metformin could be replaced with a different agent or if glycemic control remains reasonable, he might be okay with no other additions to his regimen.      It appears as though his hydrochlorothiazide is already on hold by his PCP until his sodium has normalized, which is currently the case.  Nevertheless, blood pressure is well controlled, so we will keep this on hold and continue his [id number] lisinopril at 40 mg daily.  Hydralazine can be used as needed as it has been at his group home in the past.  The patient currently feels well and appears suitable for discharge home.      The patient was seen, examined, and counseled on this day. The hospitalization and plan of care was reviewed with the patient extensively. All questions were addressed and the patient agreed to follow-up as noted above.      Total time spent in face to face contact with the patient and coordinating discharge was:  35 Minutes    Reagan Bonilla DO, MPH  [facility]ist  [address] [place] [address]  2022    D: 2022   T: 2022   MT: MURRAY    Name:     JEANNE TOBIASAbiodun  MRN:      [id number]        Account:      [id number]   :      1965           Service Date: 2022       Document: [id number]

## 2022-04-21 NOTE — PLAN OF CARE
Goal Outcome Evaluation:    Plan of Care Reviewed With: patient     Overall Patient Progress: no change         Pt resting comfortably in bed. Oriented x4. Legally blind. Requested nicotine gum x1. On RA. MISTI saline lock to LFA. Voids spontaneously via urinal with assistance. Movement and sensation to extremities. Assist x1.     Plan- cont Abx, poss. Discharge today.

## 2022-04-22 LAB
BACTERIA BLD CULT: ABNORMAL
BACTERIA BLD CULT: ABNORMAL

## 2022-04-24 LAB — BACTERIA BLD CULT: NO GROWTH

## 2023-04-27 ENCOUNTER — APPOINTMENT (OUTPATIENT)
Dept: CT IMAGING | Facility: CLINIC | Age: 58
End: 2023-04-27
Attending: PHYSICIAN ASSISTANT
Payer: COMMERCIAL

## 2023-04-27 ENCOUNTER — HOSPITAL ENCOUNTER (EMERGENCY)
Facility: CLINIC | Age: 58
Discharge: HOME | End: 2023-04-27
Attending: PHYSICIAN ASSISTANT | Admitting: PHYSICIAN ASSISTANT
Payer: COMMERCIAL

## 2023-04-27 VITALS
DIASTOLIC BLOOD PRESSURE: 71 MMHG | HEIGHT: 68 IN | WEIGHT: 180 LBS | OXYGEN SATURATION: 100 % | HEART RATE: 114 BPM | SYSTOLIC BLOOD PRESSURE: 134 MMHG | RESPIRATION RATE: 15 BRPM | BODY MASS INDEX: 27.28 KG/M2 | TEMPERATURE: 97.9 F

## 2023-04-27 DIAGNOSIS — R11.10 VOMITING: ICD-10-CM

## 2023-04-27 DIAGNOSIS — R07.9 CHEST PAIN: ICD-10-CM

## 2023-04-27 DIAGNOSIS — R06.02 SHORTNESS OF BREATH: ICD-10-CM

## 2023-04-27 LAB
ALBUMIN SERPL BCG-MCNC: 4.2 G/DL (ref 3.5–5.2)
ALP SERPL-CCNC: 84 U/L (ref 40–129)
ALT SERPL W P-5'-P-CCNC: 11 U/L (ref 10–50)
ANION GAP SERPL CALCULATED.3IONS-SCNC: 11 MMOL/L (ref 7–15)
AST SERPL W P-5'-P-CCNC: 16 U/L (ref 10–50)
ATRIAL RATE - MUSE: 120 BPM
BASOPHILS # BLD AUTO: 0.1 10E3/UL (ref 0–0.2)
BASOPHILS NFR BLD AUTO: 1 %
BILIRUB DIRECT SERPL-MCNC: <0.2 MG/DL (ref 0–0.3)
BILIRUB SERPL-MCNC: 0.3 MG/DL
BUN SERPL-MCNC: 7.8 MG/DL (ref 6–20)
CALCIUM SERPL-MCNC: 10.8 MG/DL (ref 8.6–10)
CHLORIDE SERPL-SCNC: 88 MMOL/L (ref 98–107)
CREAT SERPL-MCNC: 0.8 MG/DL (ref 0.67–1.17)
D DIMER PPP FEU-MCNC: 8.28 UG/ML FEU (ref 0–0.5)
DEPRECATED HCO3 PLAS-SCNC: 27 MMOL/L (ref 22–29)
DIASTOLIC BLOOD PRESSURE - MUSE: NORMAL MMHG
EOSINOPHIL # BLD AUTO: 0.1 10E3/UL (ref 0–0.7)
EOSINOPHIL NFR BLD AUTO: 1 %
ERYTHROCYTE [DISTWIDTH] IN BLOOD BY AUTOMATED COUNT: 12.4 % (ref 10–15)
GFR SERPL CREATININE-BSD FRML MDRD: >90 ML/MIN/1.73M2
GLUCOSE SERPL-MCNC: 336 MG/DL (ref 70–99)
HCO3 BLDV-SCNC: 32 MMOL/L (ref 21–28)
HCT VFR BLD AUTO: 37.5 % (ref 40–53)
HGB BLD-MCNC: 13.1 G/DL (ref 13.3–17.7)
HOLD SPECIMEN: NORMAL
HOLD SPECIMEN: NORMAL
IMM GRANULOCYTES # BLD: 0.1 10E3/UL
IMM GRANULOCYTES NFR BLD: 1 %
INTERPRETATION ECG - MUSE: NORMAL
LACTATE BLD-SCNC: 0.9 MMOL/L
LIPASE SERPL-CCNC: 25 U/L (ref 13–60)
LYMPHOCYTES # BLD AUTO: 2.3 10E3/UL (ref 0.8–5.3)
LYMPHOCYTES NFR BLD AUTO: 19 %
MCH RBC QN AUTO: 30.6 PG (ref 26.5–33)
MCHC RBC AUTO-ENTMCNC: 34.9 G/DL (ref 31.5–36.5)
MCV RBC AUTO: 88 FL (ref 78–100)
MONOCYTES # BLD AUTO: 0.7 10E3/UL (ref 0–1.3)
MONOCYTES NFR BLD AUTO: 6 %
NEUTROPHILS # BLD AUTO: 8.6 10E3/UL (ref 1.6–8.3)
NEUTROPHILS NFR BLD AUTO: 72 %
NRBC # BLD AUTO: 0 10E3/UL
NRBC BLD AUTO-RTO: 0 /100
P AXIS - MUSE: 50 DEGREES
PCO2 BLDV: 48 MM HG (ref 40–50)
PH BLDV: 7.43 [PH] (ref 7.32–7.43)
PLATELET # BLD AUTO: 275 10E3/UL (ref 150–450)
PO2 BLDV: 37 MM HG (ref 25–47)
POTASSIUM SERPL-SCNC: 3.9 MMOL/L (ref 3.4–5.3)
PR INTERVAL - MUSE: 128 MS
PROT SERPL-MCNC: 6.9 G/DL (ref 6.4–8.3)
QRS DURATION - MUSE: 90 MS
QT - MUSE: 332 MS
QTC - MUSE: 469 MS
R AXIS - MUSE: 24 DEGREES
RBC # BLD AUTO: 4.28 10E6/UL (ref 4.4–5.9)
SAO2 % BLDV: 71 % (ref 94–100)
SODIUM SERPL-SCNC: 126 MMOL/L (ref 136–145)
SYSTOLIC BLOOD PRESSURE - MUSE: NORMAL MMHG
T AXIS - MUSE: 52 DEGREES
TROPONIN T SERPL HS-MCNC: 21 NG/L
TROPONIN T SERPL HS-MCNC: 21 NG/L
VENTRICULAR RATE- MUSE: 120 BPM
WBC # BLD AUTO: 11.8 10E3/UL (ref 4–11)

## 2023-04-27 PROCEDURE — 84484 ASSAY OF TROPONIN QUANT: CPT | Performed by: PHYSICIAN ASSISTANT

## 2023-04-27 PROCEDURE — 250N000011 HC RX IP 250 OP 636: Performed by: PHYSICIAN ASSISTANT

## 2023-04-27 PROCEDURE — 99285 EMERGENCY DEPT VISIT HI MDM: CPT | Mod: 25

## 2023-04-27 PROCEDURE — 36415 COLL VENOUS BLD VENIPUNCTURE: CPT | Performed by: EMERGENCY MEDICINE

## 2023-04-27 PROCEDURE — 85025 COMPLETE CBC W/AUTO DIFF WBC: CPT | Performed by: EMERGENCY MEDICINE

## 2023-04-27 PROCEDURE — 85025 COMPLETE CBC W/AUTO DIFF WBC: CPT | Performed by: PHYSICIAN ASSISTANT

## 2023-04-27 PROCEDURE — 80053 COMPREHEN METABOLIC PANEL: CPT | Performed by: PHYSICIAN ASSISTANT

## 2023-04-27 PROCEDURE — 82248 BILIRUBIN DIRECT: CPT | Performed by: PHYSICIAN ASSISTANT

## 2023-04-27 PROCEDURE — 85379 FIBRIN DEGRADATION QUANT: CPT | Performed by: PHYSICIAN ASSISTANT

## 2023-04-27 PROCEDURE — 250N000009 HC RX 250: Performed by: PHYSICIAN ASSISTANT

## 2023-04-27 PROCEDURE — 82803 BLOOD GASES ANY COMBINATION: CPT

## 2023-04-27 PROCEDURE — 93005 ELECTROCARDIOGRAM TRACING: CPT

## 2023-04-27 PROCEDURE — 36415 COLL VENOUS BLD VENIPUNCTURE: CPT | Performed by: PHYSICIAN ASSISTANT

## 2023-04-27 PROCEDURE — 71275 CT ANGIOGRAPHY CHEST: CPT

## 2023-04-27 PROCEDURE — 83605 ASSAY OF LACTIC ACID: CPT

## 2023-04-27 PROCEDURE — 83690 ASSAY OF LIPASE: CPT | Performed by: PHYSICIAN ASSISTANT

## 2023-04-27 RX ORDER — ONDANSETRON 4 MG/1
4 TABLET, ORALLY DISINTEGRATING ORAL EVERY 8 HOURS PRN
Qty: 10 TABLET | Refills: 0 | Status: SHIPPED | OUTPATIENT
Start: 2023-04-27 | End: 2023-04-30

## 2023-04-27 RX ORDER — IOPAMIDOL 755 MG/ML
500 INJECTION, SOLUTION INTRAVASCULAR ONCE
Status: COMPLETED | OUTPATIENT
Start: 2023-04-27 | End: 2023-04-27

## 2023-04-27 RX ORDER — NITROGLYCERIN 0.4 MG/1
0.4 TABLET SUBLINGUAL
Status: DISCONTINUED | OUTPATIENT
Start: 2023-04-27 | End: 2023-04-27 | Stop reason: HOSPADM

## 2023-04-27 RX ADMIN — SODIUM CHLORIDE 92 ML: 9 INJECTION, SOLUTION INTRAVENOUS at 12:40

## 2023-04-27 RX ADMIN — IOPAMIDOL 73 ML: 755 INJECTION, SOLUTION INTRAVENOUS at 12:40

## 2023-04-27 ASSESSMENT — ENCOUNTER SYMPTOMS
SHORTNESS OF BREATH: 1
VOMITING: 1
ABDOMINAL PAIN: 1
FEVER: 0
CHILLS: 0
COUGH: 1

## 2023-04-27 ASSESSMENT — ACTIVITIES OF DAILY LIVING (ADL)
ADLS_ACUITY_SCORE: 35

## 2023-04-27 NOTE — ED TRIAGE NOTES
Discussion/Summary  Appears pt being followed up at [facility], called her and confirmed and so pt will follow up for chf there too, task Cosmo at [facility] to confirm      Signatures   Electronically signed by : TASHI RODRIGUEZ R.N.; Nov 20 2017  3:13PM CST     Pt arrives via EMS from retirement for evaluation of generalized chest pain that began this morning. Pt also reports he felt nauseous and reports some SOB. EMS gave 325 mg aspirin and two nitroglycerin. Pt states chest pain got better after one dose nitroglycerin, but then worse after 2nd. Pt also diabetic,  for EMS. Pt states he takes insulin, but did not take medications this morning. Pt tachycardic. ABCs intact. A&OX4.      Triage Assessment     Row Name 04/27/23 1048       Triage Assessment (Adult)    Airway WDL WDL       Respiratory WDL    Respiratory WDL X;rhythm/pattern    Rhythm/Pattern, Respiratory shortness of breath       Skin Circulation/Temperature WDL    Skin Circulation/Temperature WDL WDL       Cardiac WDL    Cardiac WDL X;chest pain;rhythm    Pulse Rate & Regularity tachycardic    Cardiac Rhythm ST       Chest Pain Assessment    Chest Pain Location midsternal    Character pressure    Chest Pain Intervention cardiac biomarkers drawn;cardiac monitoring continued;cardiac monitor placed;12-lead ECG obtained;aspirin given;nitroglycerin SL given       Peripheral/Neurovascular WDL    Peripheral Neurovascular WDL WDL       Cognitive/Neuro/Behavioral WDL    Cognitive/Neuro/Behavioral WDL WDL

## 2023-04-27 NOTE — ED PROVIDER NOTES
History     Chief Complaint:  Chest Pain       The history is provided by the patient.      Benja Duong is a 57 year old male with a history of diabetes mellitus who presents with chest pain. He reports sudden onset diffuse chest pain beginning last night around 8:00PM while sitting in his room. Pain has been constant since onset, with no exacerbating or alleviating factors. The patient also notes shortness of breath. He reports 8 episodes of vomiting last night, with abdominal pain earlier that has since resolved. He notes a long-standing cough, no hemoptysis. No fever, chills, and leg swelling. The patient denies cardiac or lung history, hypertension, hyperlipidemia. Of note, EMS gave 325mg aspirin and 2x nitroglycerin en route, which slightly improved symptoms. EMS noted hyperglycemia with .    Independent Historian:   None - Patient Only    Review of External Notes:   None    ROS:  Review of Systems   Constitutional: Negative for chills and fever.   Respiratory: Positive for cough and shortness of breath.         (-) hemoptysis    Cardiovascular: Positive for chest pain. Negative for leg swelling.   Gastrointestinal: Positive for abdominal pain (resolved) and vomiting.   All other systems reviewed and are negative.      Allergies:  Alprazolam  Oxycodone-Acetaminophen     Medications:    Atorvastatin  Benadryl  Bydureon  Ferosul  Glucose  Apresoline  Lantus pen  Lisinopril  Protonix  Flomax  Carvedilol   Metformin     Past Medical History:    Diabetes mellitus  Hypertension  Hyperlipidemia   Depression   Cocaine abuse  Latent tuberculosis  Schizophrenia  Diabetic retinopathy  Psychosis  Anemia  Sepsis  Acute kidney injury  Lactic acidosis  Pneumonia   Pulmonary nodule   Umbilical hernia  GERD     Past Surgical History:    Tonsillectomy  Right eye vitrectomy   I&D peritonsillar abscess     Family History:    Diabetes mellitus     Social History:  The patient presents alone.   He arrived via  "ambulance.   PCP: [facility], [facility]     Physical Exam     Patient Vitals for the past 24 hrs:   BP Temp Temp src Pulse Resp SpO2 Height Weight   04/27/23 1330 (!) 178/90 -- -- 111 15 98 % -- --   04/27/23 1300 (!) 197/119 -- -- 115 17 100 % -- --   04/27/23 1230 (!) 207/109 -- -- 116 16 98 % -- --   04/27/23 1210 (!) 195/97 -- -- 113 15 -- -- --   04/27/23 1100 (!) 174/102 -- -- 116 20 96 % -- --   04/27/23 1050 (!) 166/86 -- -- 120 17 94 % -- --   04/27/23 1049 136/78 97.9  F (36.6  C) Temporal (!) 122 18 95 % 1.727 m (5' 8\") 81.6 kg (180 lb)        Physical Exam  Constitutional: Pleasant. Cooperative.   Eyes: Pupils equally round and reactive  HENT: Head is normal in appearance. Oropharynx is normal with moist mucus membranes.  Cardiovascular/Chest: Tachycardic. Regular rhythm,  without murmurs. TTP to left midsternal border.  Respiratory: Normal respiratory effort, lungs are clear bilaterally.  GI: Abdomen is soft, non-tender, non-distended. No guarding, rebound, or rigidity.  Musculoskeletal: No asymmetry of the lower extremities, no tenderness to palpation.  Skin: Normal, without rash.  Neurologic: Cranial nerves grossly intact, normal cognition, no focal deficits. Alert and oriented x 3.   Psychiatric: Normal affect.  Nursing notes and vital signs reviewed.      Emergency Department Course   ECG  ECG results from 04/27/23   EKG 12-lead, tracing only     Value    Systolic Blood Pressure     Diastolic Blood Pressure     Ventricular Rate 120    Atrial Rate 120    PA Interval 128    QRS Duration 90        QTc 469    P Axis 50    R AXIS 24    T Axis 52    Interpretation ECG      Sinus tachycardia  Otherwise normal ECG       Imaging:  CT Chest Pulmonary Embolism w Contrast   Final Result   IMPRESSION:   1.  No evidence of pulmonary embolus.   2.  Mild dependent edema or atelectasis. Lungs are otherwise clear.       MADIE DAWSON MD            SYSTEM ID:  [id number]      Report per " radiology    Laboratory:  Labs Ordered and Resulted from Time of ED Arrival to Time of ED Departure   BASIC METABOLIC PANEL - Abnormal       Result Value    Sodium 126 (*)     Potassium 3.9      Chloride 88 (*)     Carbon Dioxide (CO2) 27      Anion Gap 11      Urea Nitrogen 7.8      Creatinine 0.80      Calcium 10.8 (*)     Glucose 336 (*)     GFR Estimate >90     CBC WITH PLATELETS AND DIFFERENTIAL - Abnormal    WBC Count 11.8 (*)     RBC Count 4.28 (*)     Hemoglobin 13.1 (*)     Hematocrit 37.5 (*)     MCV 88      MCH 30.6      MCHC 34.9      RDW 12.4      Platelet Count 275      % Neutrophils 72      % Lymphocytes 19      % Monocytes 6      % Eosinophils 1      % Basophils 1      % Immature Granulocytes 1      NRBCs per 100 WBC 0      Absolute Neutrophils 8.6 (*)     Absolute Lymphocytes 2.3      Absolute Monocytes 0.7      Absolute Eosinophils 0.1      Absolute Basophils 0.1      Absolute Immature Granulocytes 0.1      Absolute NRBCs 0.0     ISTAT GASES LACTATE VENOUS POCT - Abnormal    Lactic Acid POCT 0.9      Bicarbonate Venous POCT 32 (*)     O2 Sat, Venous POCT 71 (*)     pCO2V Venous POCT 48      pH Venous POCT 7.43      pO2 Venous POCT 37     D DIMER QUANTITATIVE - Abnormal    D-Dimer Quantitative 8.28 (*)    TROPONIN T, HIGH SENSITIVITY - Normal    Troponin T, High Sensitivity 21     LIPASE - Normal    Lipase 25     HEPATIC FUNCTION PANEL - Normal    Protein Total 6.9      Albumin 4.2      Bilirubin Total 0.3      Alkaline Phosphatase 84      AST 16      ALT 11      Bilirubin Direct <0.20     TROPONIN T, HIGH SENSITIVITY - Normal    Troponin T, High Sensitivity 21     ISTAT GASES LACTATE VENOUS POCT        Emergency Department Course & Assessments:       Interventions:  Medications   nitroGLYcerin (NITROSTAT) sublingual tablet 0.4 mg (has no administration in time range)   iopamidol (ISOVUE-370) solution 500 mL (73 mLs Intravenous $Given 4/27/23 1240)   CT scan flush use (92 mLs As instructed $Given  4/27/23 1240)        Assessments:  1115 I obtained history and examined the patient as noted above.   1450 I rechecked the patient and explained findings. .At this point I feel that the patient is safe for discharge, and the patient agrees.     Independent Interpretation (X-rays, CTs, rhythm strip):  None    Consultations/Discussion of Management or Tests:  None    Social Determinants of Health affecting care:   None    Disposition:  The patient was discharged to home.     Impression & Plan      HEART Score  Background  Calculates the overall risk of adverse event in patient's presenting with chest pain.  Based on 5 criteria (each assigned 0-2 points) including suspiciousness of history, EKG, age, risk factors and troponin.    Data  57 year old male  has Psychosis (H); Hyponatremia; Anemia; Hyperglycemia; Urinary tract infection without hematuria, site unspecified; Sepsis, due to unspecified organism, unspecified whether acute organ dysfunction present (H); Acute kidney injury (H); Vomiting and diarrhea; Chest pain, unspecified type; Volume depletion; Acute renal failure, unspecified acute renal failure type (H); Intractable vomiting with nausea; Hypokalemia; Lactic acidosis; and Pneumonia due to infectious organism, unspecified laterality, unspecified part of lung on their problem list.   has no history on file for tobacco use.  family history is not on file.  Lab Results   Component Value Date    TROPI <0.015 10/01/2020     Criteria   0-2 points for each of 5 items (maximum of 10 points):  Score 0- History slightly suspicious for coronary syndrome  Score 0- EKG Normal  Score 1- Age 45 to 65 years old  Score 2- Three or more risk factors for or history of atherosclerotic disease  Score 0- Within normal limits for troponin levels  Interpretation  Risk of adverse outcome  Heart Score: 3  Total Score 0-3- Adverse Outcome Risk 2.5% - Supports early discharge with appropriate follow-up    Medical Decision  Making:  Benja Duong is a 57 year old male who presents to ED for evaluation of chest pain and dyspnea.  See HPI as above for additional details.  Vitals and physical exam as above.  Differentials broad include ACS, dissection, PE, pneumothorax, GERD, MSK, pneumonia, pericarditis, amongst others.  Work-up obtained as above.  Discussed with patient unclear etiology of his symptoms at this time.  No evidence for acute nefarious pathology identified based upon the above work-up.  Patient has heart score of 3, supporting outpatient evaluation.  Suspect MSK sources pain is replicated with tenderness to palpation.  Discussed conservative cares.  We will send with Zofran for home. Discussed reasons to return. All questions answered. Patient discharged to home in stable condition.    Diagnosis:    ICD-10-CM    1. Chest pain  R07.9       2. Shortness of breath  R06.02       3. Vomiting  R11.10            Discharge Medications:  New Prescriptions    ONDANSETRON (ZOFRAN ODT) 4 MG ODT TAB    Take 1 tablet (4 mg) by mouth every 8 hours as needed for nausea        Scribe Disclosure:  I, Celsa Szymanski, am serving as a scribe at 11:12 AM on 4/27/2023 to document services personally performed by Tahir Huffman PA-C based on my observations and the provider's statements to me.     4/27/2023   Tahir Huffman PA-C     This record was created at least in part using electronic voice recognition software, so please excuse any typographical errors.       Tahir Huffman PA-C  04/27/23 8700

## 2023-04-27 NOTE — ED NOTES
[facility] contacted and provided an update, they were advised that the patient has been discharged and is ready to be picked up from the lobby. They advised that they will send a staff member shortly

## 2024-01-21 NOTE — CONSULTS
Care Transition Initial Assessment - RN        Met with: Patient and talked with  over phone.  DATA   UTI     Cognitive Status: awake and alert.        Contact information and PCP information verified: Yes        Insurance concerns: No Insurance issues identified  ASSESSMENT  Patient currently receives the following services:    Pt lives at [facility] [place]. Contacted  ([phone]) spoke with Sedrick RN. He verified that patient receives medication administration, requires encouragement & queing for hygiene cares. There is no restriction to return time but staff will need to contact  to verify they can transport patient back home. He can return on weekend if needed. They would like medication changes faxed to [facility]# [phone].    Identified issues/concerns regarding health management: Pt is being admitted for a UTI.  I did update  staff.  Pt is his own decision maker.  He follows with Cleopatra ACOSTA at [facility] [phone]. Face sheet updated.  He also follows with Marli José with [place] [facility] [phone].  CM will follow along for needs.     PLAN  Patient given options and choices for discharge yes .  Patient/family is agreeable to the plan?  Yes:   Patient anticipates discharging to  at discharge .        Patient anticipates needs for home equipment: No  Transportation/person available to transport on day of discharge  is  staff and they are aware.   Plan/Disposition:    Appointments: TBD      Care  (CTS) will continue to follow as needed.    Jinny Green RN BSN   Inpatient Care Coordination  [facility]  [phone]              
Care Transition Initial Assessment - SW     Met with: Spoke with  staff, [phone].  Active Problems:    Anemia       DATA  Lives With: facility resident      Quality of Family Relationships: unable to assess  Identified issues/concerns regarding health management: Pt admitted under observation status 8/6 with anemia. Pt resides at [facility], services verified by RN CC 8/6. Pt receives assistance with med admin, cues for hygiene, meals, IADL's. Requested that meds be sent to [facility] in [place] to be filled.      Quality of Family Relationships: unable to assess     ASSESSMENT  Cognitive Status:  Awake and alert.  Concerns to be addressed: Discharge planning.     PLAN  Patient anticipates discharging to:  Return to  today. Discharge orders to be sent with pt, no need to be faxed. Spoke to  staff who plan to transport pt home at 12/12:30pm today. SW will remain available as needed.     MADDIE Sequeiar, [facility]   Inpatient Care Coordination  [facility]   [phone]    
done

## 2024-03-16 ENCOUNTER — HOSPITAL ENCOUNTER (OUTPATIENT)
Facility: CLINIC | Age: 59
Setting detail: OBSERVATION
Discharge: HOME HEALTH | End: 2024-03-17
Attending: EMERGENCY MEDICINE | Admitting: STUDENT IN AN ORGANIZED HEALTH CARE EDUCATION/TRAINING PROGRAM
Payer: COMMERCIAL

## 2024-03-16 ENCOUNTER — APPOINTMENT (OUTPATIENT)
Dept: CT IMAGING | Facility: CLINIC | Age: 59
End: 2024-03-16
Attending: EMERGENCY MEDICINE
Payer: COMMERCIAL

## 2024-03-16 DIAGNOSIS — R11.2 NAUSEA AND VOMITING, UNSPECIFIED VOMITING TYPE: ICD-10-CM

## 2024-03-16 DIAGNOSIS — E87.1 HYPONATREMIA: ICD-10-CM

## 2024-03-16 DIAGNOSIS — R07.9 NONSPECIFIC CHEST PAIN: ICD-10-CM

## 2024-03-16 DIAGNOSIS — R00.0 SINUS TACHYCARDIA: ICD-10-CM

## 2024-03-16 DIAGNOSIS — R73.9 HYPERGLYCEMIA: ICD-10-CM

## 2024-03-16 DIAGNOSIS — E83.42 HYPOMAGNESEMIA: ICD-10-CM

## 2024-03-16 LAB
ABO/RH(D): NORMAL
ALBUMIN SERPL BCG-MCNC: 4.1 G/DL (ref 3.5–5.2)
ALBUMIN SERPL BCG-MCNC: 5 G/DL (ref 3.5–5.2)
ALP SERPL-CCNC: 80 U/L (ref 40–150)
ALT SERPL W P-5'-P-CCNC: 15 U/L (ref 0–70)
ANION GAP SERPL CALCULATED.3IONS-SCNC: 14 MMOL/L (ref 7–15)
ANTIBODY SCREEN: NEGATIVE
AST SERPL W P-5'-P-CCNC: 17 U/L (ref 0–45)
BASOPHILS # BLD AUTO: 0 10E3/UL (ref 0–0.2)
BASOPHILS NFR BLD AUTO: 0 %
BILIRUB SERPL-MCNC: 0.4 MG/DL
BUN SERPL-MCNC: 14.4 MG/DL (ref 8–23)
CALCIUM SERPL-MCNC: 10.6 MG/DL (ref 8.6–10)
CHLORIDE SERPL-SCNC: 76 MMOL/L (ref 98–107)
CREAT SERPL-MCNC: 1.06 MG/DL (ref 0.67–1.17)
D DIMER PPP FEU-MCNC: 1.08 UG/ML FEU (ref 0–0.5)
DEPRECATED HCO3 PLAS-SCNC: 35 MMOL/L (ref 22–29)
EGFRCR SERPLBLD CKD-EPI 2021: 81 ML/MIN/1.73M2
EOSINOPHIL # BLD AUTO: 0.1 10E3/UL (ref 0–0.7)
EOSINOPHIL NFR BLD AUTO: 0 %
ERYTHROCYTE [DISTWIDTH] IN BLOOD BY AUTOMATED COUNT: 11.8 % (ref 10–15)
FLUAV RNA SPEC QL NAA+PROBE: NEGATIVE
FLUBV RNA RESP QL NAA+PROBE: NEGATIVE
GLUCOSE BLDC GLUCOMTR-MCNC: 197 MG/DL (ref 70–99)
GLUCOSE BLDC GLUCOMTR-MCNC: 221 MG/DL (ref 70–99)
GLUCOSE BLDC GLUCOMTR-MCNC: 310 MG/DL (ref 70–99)
GLUCOSE SERPL-MCNC: 328 MG/DL (ref 70–99)
HBA1C MFR BLD: 7.6 %
HCO3 BLDV-SCNC: 40 MMOL/L (ref 21–28)
HCT VFR BLD AUTO: 41.2 % (ref 40–53)
HGB BLD-MCNC: 14.3 G/DL (ref 13.3–17.7)
HOLD SPECIMEN: NORMAL
IMM GRANULOCYTES # BLD: 0 10E3/UL
IMM GRANULOCYTES NFR BLD: 0 %
LACTATE BLD-SCNC: 1.5 MMOL/L
LIPASE SERPL-CCNC: 21 U/L (ref 13–60)
LYMPHOCYTES # BLD AUTO: 1.8 10E3/UL (ref 0.8–5.3)
LYMPHOCYTES NFR BLD AUTO: 15 %
MAGNESIUM SERPL-MCNC: 1.2 MG/DL (ref 1.7–2.3)
MAGNESIUM SERPL-MCNC: 1.6 MG/DL (ref 1.7–2.3)
MCH RBC QN AUTO: 29.8 PG (ref 26.5–33)
MCHC RBC AUTO-ENTMCNC: 34.7 G/DL (ref 31.5–36.5)
MCV RBC AUTO: 86 FL (ref 78–100)
MONOCYTES # BLD AUTO: 0.9 10E3/UL (ref 0–1.3)
MONOCYTES NFR BLD AUTO: 7 %
NEUTROPHILS # BLD AUTO: 9.4 10E3/UL (ref 1.6–8.3)
NEUTROPHILS NFR BLD AUTO: 78 %
NRBC # BLD AUTO: 0 10E3/UL
NRBC BLD AUTO-RTO: 0 /100
PCO2 BLDV: 61 MM HG (ref 40–50)
PH BLDV: 7.42 [PH] (ref 7.32–7.43)
PHOSPHATE SERPL-MCNC: 3 MG/DL (ref 2.5–4.5)
PLATELET # BLD AUTO: 270 10E3/UL (ref 150–450)
PO2 BLDV: 19 MM HG (ref 25–47)
POTASSIUM SERPL-SCNC: 3.9 MMOL/L (ref 3.4–5.3)
PROT SERPL-MCNC: 8.3 G/DL (ref 6.4–8.3)
RBC # BLD AUTO: 4.8 10E6/UL (ref 4.4–5.9)
RSV RNA SPEC NAA+PROBE: NEGATIVE
SAO2 % BLDV: 30 % (ref 70–75)
SARS-COV-2 RNA RESP QL NAA+PROBE: NEGATIVE
SODIUM SERPL-SCNC: 125 MMOL/L (ref 135–145)
SODIUM SERPL-SCNC: 129 MMOL/L (ref 135–145)
SPECIMEN EXPIRATION DATE: NORMAL
TROPONIN T SERPL HS-MCNC: 22 NG/L
TROPONIN T SERPL HS-MCNC: 26 NG/L
TROPONIN T SERPL HS-MCNC: 30 NG/L
TSH SERPL DL<=0.005 MIU/L-ACNC: 0.69 UIU/ML (ref 0.3–4.2)
WBC # BLD AUTO: 12.2 10E3/UL (ref 4–11)

## 2024-03-16 PROCEDURE — 250N000011 HC RX IP 250 OP 636: Performed by: EMERGENCY MEDICINE

## 2024-03-16 PROCEDURE — 83735 ASSAY OF MAGNESIUM: CPT | Performed by: EMERGENCY MEDICINE

## 2024-03-16 PROCEDURE — 96375 TX/PRO/DX INJ NEW DRUG ADDON: CPT | Mod: 59

## 2024-03-16 PROCEDURE — 83036 HEMOGLOBIN GLYCOSYLATED A1C: CPT | Performed by: STUDENT IN AN ORGANIZED HEALTH CARE EDUCATION/TRAINING PROGRAM

## 2024-03-16 PROCEDURE — 85379 FIBRIN DEGRADATION QUANT: CPT | Performed by: EMERGENCY MEDICINE

## 2024-03-16 PROCEDURE — 86900 BLOOD TYPING SEROLOGIC ABO: CPT | Performed by: EMERGENCY MEDICINE

## 2024-03-16 PROCEDURE — 82962 GLUCOSE BLOOD TEST: CPT

## 2024-03-16 PROCEDURE — 36415 COLL VENOUS BLD VENIPUNCTURE: CPT | Performed by: EMERGENCY MEDICINE

## 2024-03-16 PROCEDURE — 250N000013 HC RX MED GY IP 250 OP 250 PS 637: Performed by: STUDENT IN AN ORGANIZED HEALTH CARE EDUCATION/TRAINING PROGRAM

## 2024-03-16 PROCEDURE — 83735 ASSAY OF MAGNESIUM: CPT | Performed by: STUDENT IN AN ORGANIZED HEALTH CARE EDUCATION/TRAINING PROGRAM

## 2024-03-16 PROCEDURE — 84484 ASSAY OF TROPONIN QUANT: CPT | Performed by: EMERGENCY MEDICINE

## 2024-03-16 PROCEDURE — 96365 THER/PROPH/DIAG IV INF INIT: CPT | Mod: 59

## 2024-03-16 PROCEDURE — 82803 BLOOD GASES ANY COMBINATION: CPT

## 2024-03-16 PROCEDURE — 99223 1ST HOSP IP/OBS HIGH 75: CPT | Mod: AI | Performed by: STUDENT IN AN ORGANIZED HEALTH CARE EDUCATION/TRAINING PROGRAM

## 2024-03-16 PROCEDURE — 84100 ASSAY OF PHOSPHORUS: CPT | Performed by: STUDENT IN AN ORGANIZED HEALTH CARE EDUCATION/TRAINING PROGRAM

## 2024-03-16 PROCEDURE — G0378 HOSPITAL OBSERVATION PER HR: HCPCS

## 2024-03-16 PROCEDURE — 96366 THER/PROPH/DIAG IV INF ADDON: CPT

## 2024-03-16 PROCEDURE — 84443 ASSAY THYROID STIM HORMONE: CPT | Performed by: EMERGENCY MEDICINE

## 2024-03-16 PROCEDURE — 87040 BLOOD CULTURE FOR BACTERIA: CPT | Performed by: EMERGENCY MEDICINE

## 2024-03-16 PROCEDURE — 250N000009 HC RX 250: Performed by: EMERGENCY MEDICINE

## 2024-03-16 PROCEDURE — 84295 ASSAY OF SERUM SODIUM: CPT | Performed by: STUDENT IN AN ORGANIZED HEALTH CARE EDUCATION/TRAINING PROGRAM

## 2024-03-16 PROCEDURE — 258N000003 HC RX IP 258 OP 636: Performed by: EMERGENCY MEDICINE

## 2024-03-16 PROCEDURE — 84484 ASSAY OF TROPONIN QUANT: CPT | Performed by: STUDENT IN AN ORGANIZED HEALTH CARE EDUCATION/TRAINING PROGRAM

## 2024-03-16 PROCEDURE — 93005 ELECTROCARDIOGRAM TRACING: CPT

## 2024-03-16 PROCEDURE — 83690 ASSAY OF LIPASE: CPT | Performed by: EMERGENCY MEDICINE

## 2024-03-16 PROCEDURE — 85004 AUTOMATED DIFF WBC COUNT: CPT | Performed by: EMERGENCY MEDICINE

## 2024-03-16 PROCEDURE — 36415 COLL VENOUS BLD VENIPUNCTURE: CPT | Performed by: STUDENT IN AN ORGANIZED HEALTH CARE EDUCATION/TRAINING PROGRAM

## 2024-03-16 PROCEDURE — 99285 EMERGENCY DEPT VISIT HI MDM: CPT | Mod: 25

## 2024-03-16 PROCEDURE — 80053 COMPREHEN METABOLIC PANEL: CPT | Performed by: EMERGENCY MEDICINE

## 2024-03-16 PROCEDURE — 258N000003 HC RX IP 258 OP 636: Performed by: STUDENT IN AN ORGANIZED HEALTH CARE EDUCATION/TRAINING PROGRAM

## 2024-03-16 PROCEDURE — C9113 INJ PANTOPRAZOLE SODIUM, VIA: HCPCS | Performed by: EMERGENCY MEDICINE

## 2024-03-16 PROCEDURE — 96361 HYDRATE IV INFUSION ADD-ON: CPT

## 2024-03-16 PROCEDURE — 87637 SARSCOV2&INF A&B&RSV AMP PRB: CPT | Performed by: EMERGENCY MEDICINE

## 2024-03-16 PROCEDURE — 71275 CT ANGIOGRAPHY CHEST: CPT

## 2024-03-16 PROCEDURE — 250N000011 HC RX IP 250 OP 636: Performed by: STUDENT IN AN ORGANIZED HEALTH CARE EDUCATION/TRAINING PROGRAM

## 2024-03-16 RX ORDER — NICOTINE POLACRILEX 4 MG
15-30 LOZENGE BUCCAL
Status: DISCONTINUED | OUTPATIENT
Start: 2024-03-16 | End: 2024-03-17 | Stop reason: HOSPADM

## 2024-03-16 RX ORDER — ONDANSETRON 4 MG/1
4 TABLET, ORALLY DISINTEGRATING ORAL EVERY 6 HOURS PRN
Status: DISCONTINUED | OUTPATIENT
Start: 2024-03-16 | End: 2024-03-17 | Stop reason: HOSPADM

## 2024-03-16 RX ORDER — ONDANSETRON 2 MG/ML
4 INJECTION INTRAMUSCULAR; INTRAVENOUS EVERY 30 MIN PRN
Status: DISCONTINUED | OUTPATIENT
Start: 2024-03-16 | End: 2024-03-17 | Stop reason: HOSPADM

## 2024-03-16 RX ORDER — MAGNESIUM HYDROXIDE/ALUMINUM HYDROXICE/SIMETHICONE 120; 1200; 1200 MG/30ML; MG/30ML; MG/30ML
15 SUSPENSION ORAL 3 TIMES DAILY PRN
Status: DISCONTINUED | OUTPATIENT
Start: 2024-03-16 | End: 2024-03-17 | Stop reason: HOSPADM

## 2024-03-16 RX ORDER — AMOXICILLIN 250 MG
1 CAPSULE ORAL 2 TIMES DAILY PRN
Status: DISCONTINUED | OUTPATIENT
Start: 2024-03-16 | End: 2024-03-17 | Stop reason: HOSPADM

## 2024-03-16 RX ORDER — AMOXICILLIN 250 MG
2 CAPSULE ORAL 2 TIMES DAILY PRN
Status: DISCONTINUED | OUTPATIENT
Start: 2024-03-16 | End: 2024-03-17 | Stop reason: HOSPADM

## 2024-03-16 RX ORDER — HYDRALAZINE HYDROCHLORIDE 20 MG/ML
10 INJECTION INTRAMUSCULAR; INTRAVENOUS EVERY 6 HOURS PRN
Status: DISCONTINUED | OUTPATIENT
Start: 2024-03-16 | End: 2024-03-17 | Stop reason: HOSPADM

## 2024-03-16 RX ORDER — SODIUM CHLORIDE 9 MG/ML
INJECTION, SOLUTION INTRAVENOUS CONTINUOUS
Status: DISCONTINUED | OUTPATIENT
Start: 2024-03-16 | End: 2024-03-17

## 2024-03-16 RX ORDER — LISINOPRIL 10 MG/1
40 TABLET ORAL DAILY
Status: DISCONTINUED | OUTPATIENT
Start: 2024-03-16 | End: 2024-03-17 | Stop reason: HOSPADM

## 2024-03-16 RX ORDER — MAGNESIUM SULFATE HEPTAHYDRATE 40 MG/ML
2 INJECTION, SOLUTION INTRAVENOUS ONCE
Status: COMPLETED | OUTPATIENT
Start: 2024-03-16 | End: 2024-03-16

## 2024-03-16 RX ORDER — PROCHLORPERAZINE 25 MG
25 SUPPOSITORY, RECTAL RECTAL EVERY 12 HOURS PRN
Status: DISCONTINUED | OUTPATIENT
Start: 2024-03-16 | End: 2024-03-17 | Stop reason: HOSPADM

## 2024-03-16 RX ORDER — ONDANSETRON 2 MG/ML
4 INJECTION INTRAMUSCULAR; INTRAVENOUS EVERY 6 HOURS PRN
Status: DISCONTINUED | OUTPATIENT
Start: 2024-03-16 | End: 2024-03-17 | Stop reason: HOSPADM

## 2024-03-16 RX ORDER — PROCHLORPERAZINE MALEATE 10 MG
10 TABLET ORAL EVERY 6 HOURS PRN
Status: DISCONTINUED | OUTPATIENT
Start: 2024-03-16 | End: 2024-03-17 | Stop reason: HOSPADM

## 2024-03-16 RX ORDER — IOPAMIDOL 755 MG/ML
500 INJECTION, SOLUTION INTRAVASCULAR ONCE
Status: COMPLETED | OUTPATIENT
Start: 2024-03-16 | End: 2024-03-16

## 2024-03-16 RX ORDER — MORPHINE SULFATE 2 MG/ML
2 INJECTION, SOLUTION INTRAMUSCULAR; INTRAVENOUS ONCE
Status: COMPLETED | OUTPATIENT
Start: 2024-03-16 | End: 2024-03-16

## 2024-03-16 RX ORDER — DEXTROSE MONOHYDRATE 25 G/50ML
25-50 INJECTION, SOLUTION INTRAVENOUS
Status: DISCONTINUED | OUTPATIENT
Start: 2024-03-16 | End: 2024-03-17 | Stop reason: HOSPADM

## 2024-03-16 RX ADMIN — SODIUM CHLORIDE 1000 ML: 9 INJECTION, SOLUTION INTRAVENOUS at 12:30

## 2024-03-16 RX ADMIN — ONDANSETRON 4 MG: 2 INJECTION INTRAMUSCULAR; INTRAVENOUS at 12:30

## 2024-03-16 RX ADMIN — LISINOPRIL 40 MG: 10 TABLET ORAL at 19:12

## 2024-03-16 RX ADMIN — IOPAMIDOL 85 ML: 755 INJECTION, SOLUTION INTRAVENOUS at 14:27

## 2024-03-16 RX ADMIN — MAGNESIUM SULFATE HEPTAHYDRATE 2 G: 2 INJECTION, SOLUTION INTRAVENOUS at 14:08

## 2024-03-16 RX ADMIN — SODIUM CHLORIDE: 9 INJECTION, SOLUTION INTRAVENOUS at 21:31

## 2024-03-16 RX ADMIN — PANTOPRAZOLE SODIUM 80 MG: 40 INJECTION, POWDER, FOR SOLUTION INTRAVENOUS at 12:31

## 2024-03-16 RX ADMIN — MORPHINE SULFATE 2 MG: 2 INJECTION, SOLUTION INTRAMUSCULAR; INTRAVENOUS at 12:30

## 2024-03-16 RX ADMIN — SODIUM CHLORIDE 1000 ML: 9 INJECTION, SOLUTION INTRAVENOUS at 16:34

## 2024-03-16 RX ADMIN — MAGNESIUM SULFATE HEPTAHYDRATE 2 G: 2 INJECTION, SOLUTION INTRAVENOUS at 19:13

## 2024-03-16 RX ADMIN — SODIUM CHLORIDE 93 ML: 9 INJECTION, SOLUTION INTRAVENOUS at 14:27

## 2024-03-16 RX ADMIN — ONDANSETRON 4 MG: 2 INJECTION INTRAMUSCULAR; INTRAVENOUS at 14:52

## 2024-03-16 ASSESSMENT — ACTIVITIES OF DAILY LIVING (ADL)
ADLS_ACUITY_SCORE: 38

## 2024-03-16 ASSESSMENT — COLUMBIA-SUICIDE SEVERITY RATING SCALE - C-SSRS
2. HAVE YOU ACTUALLY HAD ANY THOUGHTS OF KILLING YOURSELF IN THE PAST MONTH?: NO
1. IN THE PAST MONTH, HAVE YOU WISHED YOU WERE DEAD OR WISHED YOU COULD GO TO SLEEP AND NOT WAKE UP?: NO
6. HAVE YOU EVER DONE ANYTHING, STARTED TO DO ANYTHING, OR PREPARED TO DO ANYTHING TO END YOUR LIFE?: NO

## 2024-03-16 NOTE — ED NOTES
Pt. was inducing self vomiting. Provided education to patient about why not to do this. Let pt. Know meds and fluids should help with the nausea and discomfort.

## 2024-03-16 NOTE — ED TRIAGE NOTES
Pt complains of dizziness, vomiting for the past couple of days. Pt complains of chest pain. Pt is a diabetic.   Of note pt is blind.

## 2024-03-16 NOTE — ED PROVIDER NOTES
History     Chief Complaint:  No chief complaint on file.       The history is provided by the patient.      Benja Dunog is a 58 year old male with history of diabetes mellitus, substance abuse, and hypertension who presents to the ED with chest pain. Patient is blind. Patient is not anticoagulated. Patient reports experiencing chest pain 2 days ago that was followed by nausea, vomiting, shortness of breath, dry coughing, and abdominal pain. He states his chest pain is pressure like and has not improved. Denies diarrhea, fever, back pain, and exposure to sick people. Group home staff states he is not on O2 at baseline.     Independent Historian:   Group Home Staff - They report as noted above.     Review of External Notes:   Reviewed discharge summary from 4/21/2020.  At that time patient was admitted for community-acquired pneumonia and sepsis and was hypovolemic and hyponatremic and tachycardic.    Medications:    Flomax  Protonix  Zofran  Zestril  Apresoline  Ferosul  Bydureon  Benadryl  Ceftin  Zithromax  Lipitor   Tylenol     Past Medical History:    Depression   Diabetes mellitus   Cocaine abuse  Homeless   Hypertension   Latent tuberculosis   Proliferative diabetic retinopathy   Schizophrenia   Gastroesophageal reflux disease   Parapharyngeal abscess   Deviated nasal septum   Crack cocaine overdose  Dyslipidemia  Iron deficiency anemia  Alcohol dependence   Anorexia   Asthma   Dry gangrene  Refuses treatment     Past Surgical History:    I & D peritonsillar abscess  Tonsillectomy  Ehrenberg teeth extraction    Vitrectomy     Physical Exam   Patient Vitals for the past 24 hrs:   BP Temp Temp src Pulse Resp SpO2 Height Weight   03/16/24 1545 (!) 155/82 -- -- -- -- 97 % -- --   03/16/24 1530 -- -- -- (!) 122 -- 97 % -- --   03/16/24 1500 (!) 164/88 -- -- (!) 124 -- 93 % -- --   03/16/24 1455 -- -- -- (!) 128 -- 98 % -- --   03/16/24 1324 118/63 -- -- (!) 124 -- 93 % -- --   03/16/24 1244 (!) 174/102 -- --  "112 -- 98 % -- --   03/16/24 1224 (!) 206/118 -- -- 118 -- 95 % -- --   03/16/24 1159 (!) 194/118 -- -- 113 -- 100 % -- --   03/16/24 1144 (!) 206/117 -- -- 112 -- 99 % -- --   03/16/24 1143 -- -- -- -- -- 96 % -- --   03/16/24 1117 (!) 171/102 97.8  F (36.6  C) Oral 117 17 (!) 84 % 1.727 m (5' 8\") 77.4 kg (170 lb 10.2 oz)        Physical Exam  Vitals and nursing note reviewed.   Constitutional:       General: He is not in acute distress.     Appearance: He is ill-appearing. He is not toxic-appearing.   HENT:      Head: Normocephalic and atraumatic.      Right Ear: External ear normal.      Left Ear: External ear normal.      Nose: Nose normal.   Eyes:      Conjunctiva/sclera: Conjunctivae normal.   Cardiovascular:      Rate and Rhythm: Regular rhythm. Tachycardia present.      Heart sounds: No murmur heard.  Pulmonary:      Effort: Pulmonary effort is normal. No respiratory distress.      Breath sounds: No wheezing, rhonchi or rales.   Abdominal:      General: Abdomen is flat. There is no distension.      Palpations: Abdomen is soft.      Tenderness: There is abdominal tenderness (mild) in the epigastric area. There is no guarding or rebound.   Musculoskeletal:         General: No swelling or deformity.      Cervical back: Normal range of motion and neck supple.   Skin:     General: Skin is warm and dry.      Findings: No rash.   Neurological:      Mental Status: He is alert and oriented to person, place, and time.   Psychiatric:         Behavior: Behavior normal.           Emergency Department Course   ECG  ECG taken at 1130, ECG read at 1136  Sinus tachycardia   Septal infarct, age undetermined   No significant change as compared to prior, dated 4/27/23.  Rate 114 bpm. MD interval 136 ms. QRS duration 92 ms. QT/QTc 328/452 ms. P-R-T axes 47 14 51.     Imaging:  CT Chest (PE) Abdomen Pelvis w Contrast   Final Result   IMPRESSION:   1.  No pulmonary embolism, acute aortic pathology, or other acute cardiopulmonary " abnormality.   2.  Mild wall thickening of the ascending and transverse colon, which can be seen with colitis.   3.  Esophageal wall thickening is similar to 04/27/2023, nonspecific but can be seen in the setting of esophagitis.   4.  Dilated and thick-walled appendix without periappendiceal inflammation is similar to multiple priors, possibly sequela of chronic inflammation.   5.  Additional details in the findings.         Laboratory:  Labs Ordered and Resulted from Time of ED Arrival to Time of ED Departure   GLUCOSE BY METER - Abnormal       Result Value    GLUCOSE BY METER POCT 310 (*)    COMPREHENSIVE METABOLIC PANEL - Abnormal    Sodium 125 (*)     Potassium 3.9      Carbon Dioxide (CO2) 35 (*)     Anion Gap 14      Urea Nitrogen 14.4      Creatinine 1.06      GFR Estimate 81      Calcium 10.6 (*)     Chloride 76 (*)     Glucose 328 (*)     Alkaline Phosphatase 80      AST 17      ALT 15      Protein Total 8.3      Albumin 5.0      Bilirubin Total 0.4     CBC WITH PLATELETS AND DIFFERENTIAL - Abnormal    WBC Count 12.2 (*)     RBC Count 4.80      Hemoglobin 14.3      Hematocrit 41.2      MCV 86      MCH 29.8      MCHC 34.7      RDW 11.8      Platelet Count 270      % Neutrophils 78      % Lymphocytes 15      % Monocytes 7      % Eosinophils 0      % Basophils 0      % Immature Granulocytes 0      NRBCs per 100 WBC 0      Absolute Neutrophils 9.4 (*)     Absolute Lymphocytes 1.8      Absolute Monocytes 0.9      Absolute Eosinophils 0.1      Absolute Basophils 0.0      Absolute Immature Granulocytes 0.0      Absolute NRBCs 0.0     D DIMER QUANTITATIVE - Abnormal    D-Dimer Quantitative 1.08 (*)    TROPONIN T, HIGH SENSITIVITY - Abnormal    Troponin T, High Sensitivity 26 (*)    MAGNESIUM - Abnormal    Magnesium 1.2 (*)    ISTAT GASES LACTATE VENOUS POCT - Abnormal    Lactic Acid POCT 1.5      Bicarbonate Venous POCT 40 (*)     O2 Sat, Venous POCT 30 (*)     pCO2 Venous POCT 61 (*)     pH Venous POCT 7.42       pO2 Venous POCT 19 (*)    LIPASE - Normal    Lipase 21     INFLUENZA A/B, RSV, & SARS-COV2 PCR - Normal    Influenza A PCR Negative      Influenza B PCR Negative      RSV PCR Negative      SARS CoV2 PCR Negative     TROPONIN T, HIGH SENSITIVITY - Normal    Troponin T, High Sensitivity 22     TSH WITH FREE T4 REFLEX - Normal    TSH 0.69     ROUTINE UA WITH MICROSCOPIC REFLEX TO CULTURE   TYPE AND SCREEN, ADULT    ABO/RH(D) AB POS      Antibody Screen Negative      SPECIMEN EXPIRATION DATE [id number]     BLOOD CULTURE   BLOOD CULTURE   ABO/RH TYPE AND SCREEN        Procedures   None    Emergency Department Course & Assessments:    Interventions:  Medications   ondansetron (ZOFRAN) injection 4 mg (4 mg Intravenous $Given 3/16/24 1452)   sodium chloride 0.9% BOLUS 1,000 mL (1,000 mLs Intravenous $New Bag 3/16/24 1634)   sodium chloride 0.9% BOLUS 1,000 mL (0 mLs Intravenous Stopped 3/16/24 1405)   pantoprazole (PROTONIX) IV push injection 80 mg (80 mg Intravenous $Given 3/16/24 1231)   morphine (PF) injection 2 mg (2 mg Intravenous $Given 3/16/24 1230)   magnesium sulfate 2 g in 50 mL sterile water intermittent infusion (0 g Intravenous Stopped 3/16/24 1505)   CT Scan Flush (93 mLs Intravenous $Given 3/16/24 1427)   iopamidol (ISOVUE-370) solution 500 mL (85 mLs Intravenous $Given 3/16/24 1427)        Assessments:  1208 I obtained the history and examined the patient as noted above.    Independent Interpretation (X-rays, CTs, rhythm strip):  None    Consultations/Discussion of Management or Tests:  1644 discussed with Dr. Escalera with the hospitalist team       Social Determinants of Health affecting care:   Education/Literacy    Disposition:  The patient was admitted to the hospital under the care of Dr. Escalera.     Impression & Plan    CMS Diagnoses: None    MIPS (If applicable):  CT for PE was ordered because the patient had an abnormal d-dimer.     Medical Decision Makin-year-old male presenting  with chest pain, shortness of breath, vomiting.  He arrives tachycardic and was apparently slightly hypoxic initially.  He also vomited shortly after arrival to the ED and there appears to be dark brown possibly bloody vomit.  Differential diagnosis includes upper GI bleed from PUD, gastritis, esophagitis, ACS, PE, aortic dissection, pancreatitis, etc.  Initial workup as noted above shows mild leukocytosis and hyperglycemia with hyponatremia.  LFTs and lipase are normal.  D-dimer is elevated so CTA of the chest was done and abdomen/pelvis were also included given the epigastric pain and vomiting.  These showed some nonspecific findings including possible colitis and esophagitis but no acute findings to explain the pain.  There is no PE or dissection.  His troponin was mildly elevated but remained stable on recheck.  His magnesium was low and replaced.  He was given IV fluids, antiemetics, pain medications with some relief of his symptoms but he remained quite tachycardic on reevaluation.  He did not desaturate on room air.  Given the persistent tachycardia, we will plan to admit for observation for additional hydration and monitoring of vital signs.  HEART score is 4, making him moderate risk for MACE.        Diagnosis:    ICD-10-CM    1. Nonspecific chest pain  R07.9       2. Nausea and vomiting, unspecified vomiting type  R11.2       3. Sinus tachycardia  R00.0       4. Hyponatremia  E87.1       5. Hypomagnesemia  E83.42       6. Hyperglycemia  R73.9            Discharge Medications:  New Prescriptions    No medications on file      Scribe Disclosure:  Leodan MCCAULEY, am serving as a scribe at 1:21 PM on 3/16/2024 to document services personally performed by Bryce Wright MD based on my observations and the provider's statements to me.     3/16/2024   Bryce Wright MD Goodwin, Shaun M, MD  03/16/24 0000

## 2024-03-16 NOTE — ED NOTES
"[facility]  ED Nurse Handoff Report    ED Chief complaint: No chief complaint on file.  . ED Diagnosis:   Final diagnoses:   None       Allergies:   Allergies   Allergen Reactions    Alprazolam      Other reaction(s): *Unknown States \"I break out\"    Oxycodone-Acetaminophen      Other reaction(s): *Unknown, States \"I break out\"    Oxycodone-Acetaminophen Other (See Comments)     Other reaction(s): *Unknown, States \"I break out\"       Code Status: Full Code    Activity level - Baseline/Home:  standby.  Activity Level - Current:   in bed.   Lift room needed: No.   Bariatric: No   Needed: No   Isolation: No.   Infection: Not Applicable.     Respiratory status: Room air    Vital Signs (within 30 minutes):   Vitals:    03/16/24 1455 03/16/24 1500 03/16/24 1530 03/16/24 1545   BP:  (!) 164/88  (!) 155/82   Pulse: (!) 128 (!) 124 (!) 122    Resp:       Temp:       TempSrc:       SpO2: 98% 93% 97% 97%   Weight:       Height:           Cardiac Rhythm:  ,      Pain level:    Patient confused: No.   Patient Falls Risk: nonskid shoes/slippers when out of bed, patient and family education, and activity supervised.   Elimination Status: Has voided     Patient Report - Initial Complaint: Nausea, vomiting.   Focused Assessment:    GastrointestinalGastrointestinal WDL: .WDL except; GI symptomsGI Signs/Symptoms: abdominal discomfort; nausea; vomiting (Pt states that for the past couple days has been having periumbilical abdominal pain along with nausea and vomiting. Denies diarrhea. No blood in vomit or stool. States h/o hernia. Periumbilical hernia present on exam, tender over that area.)Abdominal Palpation: PeriumbilicalPeriumbilical Abdominal Palpation: tender CT       Abnormal Results:   Labs Ordered and Resulted from Time of ED Arrival to Time of ED Departure   GLUCOSE BY METER - Abnormal       Result Value    GLUCOSE BY METER POCT 310 (*)    COMPREHENSIVE METABOLIC PANEL - Abnormal    Sodium " 125 (*)     Potassium 3.9      Carbon Dioxide (CO2) 35 (*)     Anion Gap 14      Urea Nitrogen 14.4      Creatinine 1.06      GFR Estimate 81      Calcium 10.6 (*)     Chloride 76 (*)     Glucose 328 (*)     Alkaline Phosphatase 80      AST 17      ALT 15      Protein Total 8.3      Albumin 5.0      Bilirubin Total 0.4     CBC WITH PLATELETS AND DIFFERENTIAL - Abnormal    WBC Count 12.2 (*)     RBC Count 4.80      Hemoglobin 14.3      Hematocrit 41.2      MCV 86      MCH 29.8      MCHC 34.7      RDW 11.8      Platelet Count 270      % Neutrophils 78      % Lymphocytes 15      % Monocytes 7      % Eosinophils 0      % Basophils 0      % Immature Granulocytes 0      NRBCs per 100 WBC 0      Absolute Neutrophils 9.4 (*)     Absolute Lymphocytes 1.8      Absolute Monocytes 0.9      Absolute Eosinophils 0.1      Absolute Basophils 0.0      Absolute Immature Granulocytes 0.0      Absolute NRBCs 0.0     D DIMER QUANTITATIVE - Abnormal    D-Dimer Quantitative 1.08 (*)    TROPONIN T, HIGH SENSITIVITY - Abnormal    Troponin T, High Sensitivity 26 (*)    MAGNESIUM - Abnormal    Magnesium 1.2 (*)    ISTAT GASES LACTATE VENOUS POCT - Abnormal    Lactic Acid POCT 1.5      Bicarbonate Venous POCT 40 (*)     O2 Sat, Venous POCT 30 (*)     pCO2 Venous POCT 61 (*)     pH Venous POCT 7.42      pO2 Venous POCT 19 (*)    LIPASE - Normal    Lipase 21     INFLUENZA A/B, RSV, & SARS-COV2 PCR - Normal    Influenza A PCR Negative      Influenza B PCR Negative      RSV PCR Negative      SARS CoV2 PCR Negative     TROPONIN T, HIGH SENSITIVITY - Normal    Troponin T, High Sensitivity 22     ROUTINE UA WITH MICROSCOPIC REFLEX TO CULTURE   TSH WITH FREE T4 REFLEX   TYPE AND SCREEN, ADULT    ABO/RH(D) AB POS      Antibody Screen Negative      SPECIMEN EXPIRATION DATE [id number]     BLOOD CULTURE   BLOOD CULTURE   ABO/RH TYPE AND SCREEN        CT Chest (PE) Abdomen Pelvis w Contrast   Final Result   IMPRESSION:   1.  No pulmonary embolism,  acute aortic pathology, or other acute cardiopulmonary abnormality.   2.  Mild wall thickening of the ascending and transverse colon, which can be seen with colitis.   3.  Esophageal wall thickening is similar to 04/27/2023, nonspecific but can be seen in the setting of esophagitis.   4.  Dilated and thick-walled appendix without periappendiceal inflammation is similar to multiple priors, possibly sequela of chronic inflammation.   5.  Additional details in the findings.          Treatments provided: NS bolus, magnesium sulfate, Zofran, morphine, protonix  Family Comments: staff member present earlier  OBS brochure/video discussed/provided to patient:  N/A  ED Medications:   Medications   ondansetron (ZOFRAN) injection 4 mg (4 mg Intravenous $Given 3/16/24 1452)   sodium chloride 0.9% BOLUS 1,000 mL (0 mLs Intravenous Stopped 3/16/24 1405)   pantoprazole (PROTONIX) IV push injection 80 mg (80 mg Intravenous $Given 3/16/24 1231)   morphine (PF) injection 2 mg (2 mg Intravenous $Given 3/16/24 1230)   magnesium sulfate 2 g in 50 mL sterile water intermittent infusion (0 g Intravenous Stopped 3/16/24 1505)   CT Scan Flush (93 mLs Intravenous $Given 3/16/24 1427)   iopamidol (ISOVUE-370) solution 500 mL (85 mLs Intravenous $Given 3/16/24 1427)       Drips infusing:  No  For the majority of the shift this patient was Green.   Interventions performed were N/A.    Sepsis treatment initiated: No    Cares/treatment/interventions/medications to be completed following ED care: Patient is blind.     ED Nurse Name: Geovanna Metz RN  4:20 PM

## 2024-03-16 NOTE — H&P
[facility]  History and Physical - Hospitalist Service  Date of Admission:  3/16/2024    Assessment & Plan     Mr. Benja Duong is a 58 year old male with a history of DM2, schizoaffective disorder/schizophrenia, substance abuse, HLD, admitted on 3/16/2024 with nausea and vomiting, found to have persistent tachycardia and HTN. Compaints of chest pain x 1-2 days prior to admission however no evidence of active ischemia, emeseis in ED, found to be hyponatremic. Admitted under OBS for ongoing monitoring, normalization of electrolytes and heart rate in addition to symptom control.     Hyponatremia, suspect hypovolemic likely related to below, improving    Nausea and vomiting, improved   Leukocytosis, mild, suspect reactive   Colitis and esophagitis   Patient with complaints of nausea and vomiting. CT imaging with Mild wall thickening of the ascending and transverse colon, which can be seen with colitis and Esophageal wall thickening is similar to 04/27/2023, nonspecific but can be seen in the setting of esophagitis. Dilated and thick-walled appendix without periappendiceal inflammation is similar to multiple priors, possibly sequela of chronic inflammation, however there is no periappendiceal stranding or inflammation. This is similar dating back to 01/13/2022. No free fluid or free air suggesting against appendicitis. Possibly gastroenteritis - infectious, possibly withdrawal as patient has a history of substance use, otherwise PUD, pancreatitis among others remain on differential. Plan to treat symptomatically as below.   - fluids 75 ml/hr, antiemetics PRN  - blood cultures pending   - UA pending   - CT findings as above   - can add GI cocktail PRN if needed however symptoms better controlled now    Tachycardia , persistent but improving   Ddx: hypovolemia, hypoalbuminemia, vs substance related   - cardiac monitoring , repeat TTE to ensure no cardiac abnormalities - can cancel 3/17/24 if  symptoms resolved   - albumin added on   - UDS added on given history - possible symptoms related to withdrawal   - TSH added on for ongoing tachycardia     HTN   - resume PTA lisinopril  - PRN hydralazine     Troponin elevation, suspect demand ischemia related to above   EKG WNL, no evidence of ischemia.   - repeat troponin   - STAT EKG with any chest pain     Compensated respiratory acidosis   Hypoxia, resolved   Elevated d-dimer   - CT PE w/o evidence of PE , no findings consistent with pneumonia     Hyperglycemia   Hx of Type 2 diabetes mellitus:  No lactic acidosis - low concern for DKA   -Low-dose sliding scale insulin   - Resume PTA regimen once tolerating PO intake      Schizoaffective disorder / Schizophrenia   Receives subcutaneous risperidone and exenatide as an outpatient.  I am not sure when he last received this dose.  We will await pharmacy reconciliation.      Hyperlipidemia- Continue PTA statin once verified by pharmacy     Blindness - noted and complicates care       Hypomagnesemia - replace per protocol        Observation Goals: -diagnostic tests and consults completed and resulted, -vital signs normal or at patient baseline, Nurse to notify provider when observation goals have been met and patient is ready for discharge.  Diet: Regular Diet Adult  DVT Prophylaxis: Pneumatic Compression Devices  [place] Catheter: Not present  Lines: None     Cardiac Monitoring: ACTIVE order. Indication: Tachyarrhythmias, acute (48 hours)  Code Status: Full Code    Clinically Significant Risk Factors Present on Admission         # Hyponatremia: Lowest Na = 125 mmol/L in last 2 days, will monitor as appropriate   # Hypercalcemia: Highest Ca = 10.6 mg/dL in last 2 days, will monitor as appropriate  # Hypomagnesemia: Lowest Mg = 1.2 mg/dL in last 2 days, will replace as needed       # Hypertension: Home medication list includes antihypertensive(s)     # DMII: A1C = 7.6 % (Ref range: <5.7 %) within past 6 months    #  "Overweight: Estimated body mass index is 25.95 kg/m  as calculated from the following:    Height as of this encounter: 1.727 m (5' 8\").    Weight as of this encounter: 77.4 kg (170 lb 10.2 oz).              Disposition Plan      Expected Discharge Date: 03/17/2024                  Oksana Escalera DO  Hospitalist Service  [facility]  Securely message with [employer] (more info)  Text page via [facility] Paging/Directory     ______________________________________________________________________    Chief Complaint   Chest pain , nausea and vomiting     History is obtained from the patient, electronic health record, and emergency department physician    History of Present Illness   Mr. Benja Duong is a 58 year old male with a history of DM2, schizoaffective disorder/schizophrenia, substance abuse, HLD, admitted on 3/16/2024 with nausea and vomiting, found to have persistent tachycardia and HTN. Compaints of chest pain x 1-2 days prior to admission however no evidence of active ischemia, emeseis in ED, found to be hyponatremic. Admitted under OBS for ongoing monitoring, normalization of electrolytes and heart rate in addition to symptom control.     Patient reports experiencing chest pain 2 days ago that was followed by nausea, vomiting, shortness of breath, dry coughing, and abdominal pain. He states his chest pain is pressure like and has not improved. Denies diarrhea, fever, back pain, and exposure to sick people. Group home staff states he is not on O2 at baseline.      On admission, reports that nausea has improved however he is still having abdominal discomfort. No chest pain or pressure. No shortness of breath. No fever or chills reported. No other new concerns or complaints at time of interview.     Past Medical History    Past Medical History:   Diagnosis Date    Depression     Diabetes mellitus     h/o Cocaine abuse     Homeless     Hypertension     Latent tuberculosis     Proliferative " diabetic retinopathy     Schizophrenia        Past Surgical History   No past surgical history on file.    Prior to Admission Medications   Prior to Admission Medications   Prescriptions Last Dose Informant Patient Reported? Taking?   acetaminophen (TYLENOL) 325 MG tablet   Yes No   Sig: Take 650 mg by mouth every 4 hours as needed for mild pain   atorvastatin (LIPITOR) 20 MG tablet   Yes No   Sig: Take 20 mg by mouth At Bedtime   azithromycin (ZITHROMAX) 250 MG tablet   No No   Sig: Take 1 tablet (250 mg) by mouth daily   cefuroxime (CEFTIN) 500 MG tablet   No No   Sig: Take 1 tablet (500 mg) by mouth 2 times daily   cholecalciferol (VITAMIN D-1000 MAX ST) 1000 units TABS   No No   Sig: Take 1,000 Units by mouth daily   diphenhydrAMINE (BENADRYL) 25 MG tablet   Yes No   Sig: Take 50 mg by mouth every 6 hours as needed for itching or allergies   diphenhydrAMINE (BENADRYL) 50 MG capsule   Yes No   Sig: Take 50 mg by mouth At Bedtime   exenatide ER (BYDUREON) 2 MG recon vial kit susp for weekly inj   Yes No   Sig: Inject 2 mg Subcutaneous every 14 days DO not administer until he is seen by his PCP.   ferrous sulfate (FEROSUL) 325 (65 Fe) MG tablet   No No   Sig: Take 1 tablet (325 mg) by mouth daily   glucose (BD GLUCOSE) 4 g chewable tablet   Yes No   Sig: Take 4 tablets by mouth as needed for low blood sugar   hydrALAZINE (APRESOLINE) 25 MG tablet   No No   Sig: Take 1 tablet (25 mg) by mouth every 6 hours as needed (HTN; SBP > 160)   hypromellose (GENTEAL SEVERE) ophthalmic gel 0.3%   Yes No   Sig: Place 1 drop into both eyes 2 times daily   insulin glargine (LANTUS PEN) 100 UNIT/ML pen   Yes No   Sig: Inject 21 Units Subcutaneous At Bedtime   lisinopril (ZESTRIL) 40 MG tablet   Yes No   Sig: Take 40 mg by mouth daily   ondansetron (ZOFRAN) 4 MG tablet   Yes No   Sig: Take 4 mg by mouth every 8 hours as needed for nausea   pantoprazole (PROTONIX) 40 MG EC tablet   Yes No   Sig: Take 1 tablet (40 mg) by mouth 2  times daily   risperiDONE microspheres ER (RISPERDAL CONSTA) 50 MG injection   Yes No   Sig: Inject 50 mg into the muscle every 14 days   tamsulosin (FLOMAX) 0.4 MG capsule   No No   Sig: Take 1 capsule (0.4 mg) by mouth daily      Facility-Administered Medications: None         Physical Exam   Vital Signs: Temp: 97.8  F (36.6  C) Temp src: Oral BP: (!) 181/101 Pulse: 118   Resp: 17 SpO2: 97 % O2 Device: Nasal cannula Oxygen Delivery: 5 LPM  Weight: 170 lbs 10.18 oz    Constitutional: awake, alert, cooperative, no apparent distress, and appears stated age  HEENT: Normocephalic, atraumatic   Respiratory: Normal work of breathing, good air exchange, clear to auscultation bilaterally, no crackles or wheezing noted   Cardiovascular: tachycardic rate and regular rhythm, no murmurs appreciated  GI: Soft and minimally tender to palpation, nondistended, no rebound or guarding  Skin: normal skin color, texture, turgor  Musculoskeletal: No deformities, no edema present  Neurologic: Alert and oriented, no focal deficits   Neuropsychiatric: General: normal, calm and normal eye contact , flat affect       Medical Decision Making       75 MINUTES SPENT BY ME on the date of service doing chart review, history, exam, documentation & further activities per the note.      Data   ------------------------- PAST 24 HR DATA REVIEWED -----------------------------------------------    I have personally reviewed the following data over the past 24 hrs:    12.2 (H)  \   14.3   / 270     129 (L) 76 (L) 14.4 /  328 (H)   3.9 35 (H) 1.06 \     ALT: 15 AST: 17 AP: 80 TBILI: 0.4   ALB: 5.0 TOT PROTEIN: 8.3 LIPASE: 21     Trop: 22 BNP: N/A     TSH: 0.69 T4: N/A A1C: 7.6 (H)     Procal: N/A CRP: N/A Lactic Acid: 1.5       INR:  N/A PTT:  N/A   D-dimer:  1.08 (H) Fibrinogen:  N/A       Imaging results reviewed over the past 24 hrs:   Recent Results (from the past 24 hour(s))   CT Chest (PE) Abdomen Pelvis w Contrast    Narrative    EXAM: CT CHEST PE  ABDOMEN PELVIS W CONTRAST  LOCATION: [facility]  DATE: 3/16/2024    INDICATION: chest pain, sob, elevate ddimer  COMPARISON: CTA chest 04/27/2023, CT abdomen pelvis 04/19/2022, 01/13/2022  TECHNIQUE: CT chest pulmonary angiogram and routine CT abdomen pelvis with IV contrast. Arterial phase through the chest and venous phase through the abdomen and pelvis. Multiplanar reformats and MIP reconstructions were performed. Dose reduction   techniques were used.   CONTRAST: 85mL Isovue 370    FINDINGS:  ANGIOGRAM CHEST: Pulmonary arteries are normal caliber and negative for pulmonary emboli. Thoracic aorta is negative for dissection. No CT evidence of right heart strain.     LUNGS AND PLEURA: Central airways are patent. Mild paraseptal emphysema. Dependent atelectasis. 9 mm right upper lobe nodule (7/65) previously measured 11 mm, suggesting benign etiology. No pleural effusion or pneumothorax.    MEDIASTINUM/AXILLAE: Unchanged left ventricular hypertrophy. No pericardial effusion. No thoracic lymphadenopathy. Unchanged mild esophageal wall thickening.    CORONARY ARTERY CALCIFICATION: Moderate.    HEPATOBILIARY: Normal liver contours. No worrisome liver lesions. Mildly distended gallbladder without radiopaque gallstones or pericholecystic inflammation. No biliary ductal dilation.    PANCREAS: Normal.    SPLEEN: Normal.    ADRENAL GLANDS: Normal.    KIDNEYS/BLADDER: Kidneys enhance symmetrically with bilateral benign cysts (which require no follow-up). Punctate nonobstructing stone in the upper pole the right kidney. No hydronephrosis. Normal bladder contours.    BOWEL: No bowel obstruction. There is mild wall thickening of the ascending and transverse colon. As before, the appendix is dilated up to 17 mm and appears thick-walled, however there is no periappendiceal stranding or inflammation. This is similar   dating back to 01/13/2022. No free fluid or free air.    LYMPH NODES: No  lymphadenopathy.    VASCULATURE: Unremarkable.    PELVIC ORGANS: Mild prostatomegaly.    MUSCULOSKELETAL: Degenerative changes throughout the visualized spine.  Unchanged sclerotic lesion in the left iliac bone, suggesting benign etiology. No destructive osseous lesions. Unchanged fat-containing supraumbilical ventral wall hernia with mild   inflammatory stranding. Fat-containing umbilical hernia.      Impression    IMPRESSION:  1.  No pulmonary embolism, acute aortic pathology, or other acute cardiopulmonary abnormality.  2.  Mild wall thickening of the ascending and transverse colon, which can be seen with colitis.  3.  Esophageal wall thickening is similar to 04/27/2023, nonspecific but can be seen in the setting of esophagitis.  4.  Dilated and thick-walled appendix without periappendiceal inflammation is similar to multiple priors, possibly sequela of chronic inflammation.  5.  Additional details in the findings.

## 2024-03-17 ENCOUNTER — APPOINTMENT (OUTPATIENT)
Dept: CARDIOLOGY | Facility: CLINIC | Age: 59
End: 2024-03-17
Attending: STUDENT IN AN ORGANIZED HEALTH CARE EDUCATION/TRAINING PROGRAM
Payer: COMMERCIAL

## 2024-03-17 VITALS
DIASTOLIC BLOOD PRESSURE: 83 MMHG | TEMPERATURE: 97.6 F | OXYGEN SATURATION: 100 % | SYSTOLIC BLOOD PRESSURE: 158 MMHG | HEART RATE: 112 BPM | WEIGHT: 170.64 LBS | RESPIRATION RATE: 18 BRPM | HEIGHT: 68 IN | BODY MASS INDEX: 25.86 KG/M2

## 2024-03-17 LAB
ALBUMIN UR-MCNC: 50 MG/DL
AMPHETAMINES UR QL SCN: ABNORMAL
ANION GAP SERPL CALCULATED.3IONS-SCNC: 12 MMOL/L (ref 7–15)
APPEARANCE UR: CLEAR
BARBITURATES UR QL SCN: ABNORMAL
BENZODIAZ UR QL SCN: ABNORMAL
BILIRUB UR QL STRIP: NEGATIVE
BUN SERPL-MCNC: 9.9 MG/DL (ref 8–23)
BZE UR QL SCN: ABNORMAL
CALCIUM SERPL-MCNC: 8.8 MG/DL (ref 8.6–10)
CANNABINOIDS UR QL SCN: ABNORMAL
CHLORIDE SERPL-SCNC: 91 MMOL/L (ref 98–107)
COLOR UR AUTO: ABNORMAL
CREAT SERPL-MCNC: 0.82 MG/DL (ref 0.67–1.17)
CREAT UR-MCNC: 121 MG/DL
DEPRECATED HCO3 PLAS-SCNC: 26 MMOL/L (ref 22–29)
EGFRCR SERPLBLD CKD-EPI 2021: >90 ML/MIN/1.73M2
ERYTHROCYTE [DISTWIDTH] IN BLOOD BY AUTOMATED COUNT: 11.7 % (ref 10–15)
FENTANYL UR QL: ABNORMAL
GLUCOSE BLDC GLUCOMTR-MCNC: 186 MG/DL (ref 70–99)
GLUCOSE BLDC GLUCOMTR-MCNC: 201 MG/DL (ref 70–99)
GLUCOSE BLDC GLUCOMTR-MCNC: 220 MG/DL (ref 70–99)
GLUCOSE SERPL-MCNC: 231 MG/DL (ref 70–99)
GLUCOSE UR STRIP-MCNC: 500 MG/DL
HCT VFR BLD AUTO: 34.5 % (ref 40–53)
HGB BLD-MCNC: 12 G/DL (ref 13.3–17.7)
HGB UR QL STRIP: ABNORMAL
KETONES UR STRIP-MCNC: 20 MG/DL
LEUKOCYTE ESTERASE UR QL STRIP: NEGATIVE
LVEF ECHO: NORMAL
MAGNESIUM SERPL-MCNC: 1.4 MG/DL (ref 1.7–2.3)
MCH RBC QN AUTO: 30.5 PG (ref 26.5–33)
MCHC RBC AUTO-ENTMCNC: 34.8 G/DL (ref 31.5–36.5)
MCV RBC AUTO: 88 FL (ref 78–100)
NITRATE UR QL: NEGATIVE
OPIATES UR QL SCN: ABNORMAL
PCP QUAL URINE (ROCHE): ABNORMAL
PH UR STRIP: 6.5 [PH] (ref 5–7)
PHOSPHATE SERPL-MCNC: 2.4 MG/DL (ref 2.5–4.5)
PLATELET # BLD AUTO: 220 10E3/UL (ref 150–450)
POTASSIUM SERPL-SCNC: 3.8 MMOL/L (ref 3.4–5.3)
RBC # BLD AUTO: 3.93 10E6/UL (ref 4.4–5.9)
RBC URINE: 6 /HPF
SODIUM SERPL-SCNC: 129 MMOL/L (ref 135–145)
SP GR UR STRIP: 1.01 (ref 1–1.03)
SQUAMOUS EPITHELIAL: <1 /HPF
UROBILINOGEN UR STRIP-MCNC: NORMAL MG/DL
WBC # BLD AUTO: 10.8 10E3/UL (ref 4–11)
WBC URINE: <1 /HPF

## 2024-03-17 PROCEDURE — 250N000012 HC RX MED GY IP 250 OP 636 PS 637: Performed by: STUDENT IN AN ORGANIZED HEALTH CARE EDUCATION/TRAINING PROGRAM

## 2024-03-17 PROCEDURE — 250N000011 HC RX IP 250 OP 636: Performed by: STUDENT IN AN ORGANIZED HEALTH CARE EDUCATION/TRAINING PROGRAM

## 2024-03-17 PROCEDURE — 81001 URINALYSIS AUTO W/SCOPE: CPT | Mod: XU | Performed by: EMERGENCY MEDICINE

## 2024-03-17 PROCEDURE — 85027 COMPLETE CBC AUTOMATED: CPT | Performed by: STUDENT IN AN ORGANIZED HEALTH CARE EDUCATION/TRAINING PROGRAM

## 2024-03-17 PROCEDURE — 250N000013 HC RX MED GY IP 250 OP 250 PS 637: Performed by: STUDENT IN AN ORGANIZED HEALTH CARE EDUCATION/TRAINING PROGRAM

## 2024-03-17 PROCEDURE — 999N000147 HC STATISTIC PT IP EVAL DEFER

## 2024-03-17 PROCEDURE — 83735 ASSAY OF MAGNESIUM: CPT | Performed by: STUDENT IN AN ORGANIZED HEALTH CARE EDUCATION/TRAINING PROGRAM

## 2024-03-17 PROCEDURE — 80365 DRUG SCREENING OXYCODONE: CPT | Performed by: STUDENT IN AN ORGANIZED HEALTH CARE EDUCATION/TRAINING PROGRAM

## 2024-03-17 PROCEDURE — 258N000003 HC RX IP 258 OP 636: Performed by: STUDENT IN AN ORGANIZED HEALTH CARE EDUCATION/TRAINING PROGRAM

## 2024-03-17 PROCEDURE — 250N000011 HC RX IP 250 OP 636: Performed by: INTERNAL MEDICINE

## 2024-03-17 PROCEDURE — 80349 CANNABINOIDS NATURAL: CPT | Performed by: STUDENT IN AN ORGANIZED HEALTH CARE EDUCATION/TRAINING PROGRAM

## 2024-03-17 PROCEDURE — 80360 METHYLPHENIDATE: CPT | Performed by: STUDENT IN AN ORGANIZED HEALTH CARE EDUCATION/TRAINING PROGRAM

## 2024-03-17 PROCEDURE — 250N000013 HC RX MED GY IP 250 OP 250 PS 637: Performed by: INTERNAL MEDICINE

## 2024-03-17 PROCEDURE — 36415 COLL VENOUS BLD VENIPUNCTURE: CPT | Performed by: STUDENT IN AN ORGANIZED HEALTH CARE EDUCATION/TRAINING PROGRAM

## 2024-03-17 PROCEDURE — 80048 BASIC METABOLIC PNL TOTAL CA: CPT | Performed by: STUDENT IN AN ORGANIZED HEALTH CARE EDUCATION/TRAINING PROGRAM

## 2024-03-17 PROCEDURE — G0378 HOSPITAL OBSERVATION PER HR: HCPCS

## 2024-03-17 PROCEDURE — G0480 DRUG TEST DEF 1-7 CLASSES: HCPCS | Performed by: STUDENT IN AN ORGANIZED HEALTH CARE EDUCATION/TRAINING PROGRAM

## 2024-03-17 PROCEDURE — 99239 HOSP IP/OBS DSCHRG MGMT >30: CPT | Performed by: INTERNAL MEDICINE

## 2024-03-17 PROCEDURE — 84100 ASSAY OF PHOSPHORUS: CPT | Performed by: STUDENT IN AN ORGANIZED HEALTH CARE EDUCATION/TRAINING PROGRAM

## 2024-03-17 PROCEDURE — 93306 TTE W/DOPPLER COMPLETE: CPT | Mod: 26 | Performed by: INTERNAL MEDICINE

## 2024-03-17 PROCEDURE — 93306 TTE W/DOPPLER COMPLETE: CPT

## 2024-03-17 PROCEDURE — 80307 DRUG TEST PRSMV CHEM ANLYZR: CPT | Performed by: STUDENT IN AN ORGANIZED HEALTH CARE EDUCATION/TRAINING PROGRAM

## 2024-03-17 PROCEDURE — 96375 TX/PRO/DX INJ NEW DRUG ADDON: CPT

## 2024-03-17 PROCEDURE — 96366 THER/PROPH/DIAG IV INF ADDON: CPT

## 2024-03-17 PROCEDURE — 96361 HYDRATE IV INFUSION ADD-ON: CPT

## 2024-03-17 PROCEDURE — 82962 GLUCOSE BLOOD TEST: CPT

## 2024-03-17 PROCEDURE — 80367 DRUG SCREENING PROPOXYPHENE: CPT | Performed by: STUDENT IN AN ORGANIZED HEALTH CARE EDUCATION/TRAINING PROGRAM

## 2024-03-17 RX ORDER — HYDRALAZINE HYDROCHLORIDE 25 MG/1
25 TABLET, FILM COATED ORAL 3 TIMES DAILY
COMMUNITY

## 2024-03-17 RX ORDER — DIPHENHYDRAMINE HCL 50 MG
50 CAPSULE ORAL EVERY 4 HOURS PRN
COMMUNITY

## 2024-03-17 RX ORDER — CARVEDILOL 12.5 MG/1
12.5 TABLET ORAL 2 TIMES DAILY WITH MEALS
COMMUNITY

## 2024-03-17 RX ORDER — CARVEDILOL 25 MG/1
25 TABLET ORAL 2 TIMES DAILY WITH MEALS
Status: DISCONTINUED | OUTPATIENT
Start: 2024-03-17 | End: 2024-03-17

## 2024-03-17 RX ORDER — CARVEDILOL 25 MG/1
25 TABLET ORAL 2 TIMES DAILY WITH MEALS
COMMUNITY
End: 2024-03-17

## 2024-03-17 RX ORDER — MAGNESIUM SULFATE HEPTAHYDRATE 40 MG/ML
4 INJECTION, SOLUTION INTRAVENOUS ONCE
Status: COMPLETED | OUTPATIENT
Start: 2024-03-17 | End: 2024-03-17

## 2024-03-17 RX ORDER — PANTOPRAZOLE SODIUM 40 MG/1
40 TABLET, DELAYED RELEASE ORAL DAILY
COMMUNITY

## 2024-03-17 RX ORDER — CARVEDILOL 6.25 MG/1
12.5 TABLET ORAL 2 TIMES DAILY WITH MEALS
Status: DISCONTINUED | OUTPATIENT
Start: 2024-03-17 | End: 2024-03-17 | Stop reason: HOSPADM

## 2024-03-17 RX ORDER — TRAZODONE HYDROCHLORIDE 50 MG/1
50 TABLET, FILM COATED ORAL
COMMUNITY

## 2024-03-17 RX ORDER — HYDRALAZINE HYDROCHLORIDE 25 MG/1
25 TABLET, FILM COATED ORAL EVERY 8 HOURS SCHEDULED
Status: DISCONTINUED | OUTPATIENT
Start: 2024-03-17 | End: 2024-03-17 | Stop reason: HOSPADM

## 2024-03-17 RX ADMIN — LISINOPRIL 40 MG: 10 TABLET ORAL at 08:32

## 2024-03-17 RX ADMIN — INSULIN ASPART 1 UNITS: 100 INJECTION, SOLUTION INTRAVENOUS; SUBCUTANEOUS at 12:09

## 2024-03-17 RX ADMIN — ONDANSETRON 4 MG: 4 TABLET, ORALLY DISINTEGRATING ORAL at 12:00

## 2024-03-17 RX ADMIN — POTASSIUM & SODIUM PHOSPHATES POWDER PACK 280-160-250 MG 1 PACKET: 280-160-250 PACK at 12:00

## 2024-03-17 RX ADMIN — CARVEDILOL 12.5 MG: 12.5 TABLET, FILM COATED ORAL at 14:45

## 2024-03-17 RX ADMIN — HYDRALAZINE HYDROCHLORIDE 25 MG: 25 TABLET, FILM COATED ORAL at 14:45

## 2024-03-17 RX ADMIN — INSULIN ASPART 1 UNITS: 100 INJECTION, SOLUTION INTRAVENOUS; SUBCUTANEOUS at 08:32

## 2024-03-17 RX ADMIN — HYDRALAZINE HYDROCHLORIDE 10 MG: 20 INJECTION INTRAMUSCULAR; INTRAVENOUS at 06:59

## 2024-03-17 RX ADMIN — SODIUM CHLORIDE: 9 INJECTION, SOLUTION INTRAVENOUS at 10:55

## 2024-03-17 RX ADMIN — MAGNESIUM SULFATE HEPTAHYDRATE 4 G: 4 INJECTION, SOLUTION INTRAVENOUS at 12:01

## 2024-03-17 ASSESSMENT — ACTIVITIES OF DAILY LIVING (ADL)
ADLS_ACUITY_SCORE: 39
ADLS_ACUITY_SCORE: 38
ADLS_ACUITY_SCORE: 39
ADLS_ACUITY_SCORE: 38
ADLS_ACUITY_SCORE: 38
ADLS_ACUITY_SCORE: 39
ADLS_ACUITY_SCORE: 38
ADLS_ACUITY_SCORE: 39
ADLS_ACUITY_SCORE: 38
ADLS_ACUITY_SCORE: 38
ADLS_ACUITY_SCORE: 39
ADLS_ACUITY_SCORE: 38

## 2024-03-17 NOTE — PLAN OF CARE
Physical Therapy: Orders received. Chart reviewed and discussed with care team.? Physical Therapy not indicated due to pt being at baseline level of mobility, no PT needs.? Defer discharge recommendations to medical team.? Will complete orders.

## 2024-03-17 NOTE — PROGRESS NOTES
Provider called and requested new BP meds to be administered STAT, sent message for Pharm to verify ASAP.     Provider stated once BP improves and patient is able to eat without emesis, Ok'd to discharge.

## 2024-03-17 NOTE — DISCHARGE SUMMARY
Writer called [facility] RN at [phone], spoke with RN Sedrick Moura and confirmed discharge.    Sedrick stated they will be able to get here in the next 15-25min. Patient was becoming agitated and yelling, asking to leave now. Patient wanting to leave room, wants to get ready. Assisted patient in getting dressed and ready.     Patient's After Visit Summary was reviewed with patient.  Patient verbalized understanding of After Visit Summary, recommended follow up and was given an opportunity to ask questions.     Reviewed AVS and discharge with JOSE ALBERTO Dubose from [facility]. Nurse verbalized understanding, writer answered all questions asked. Patient was discharged with Nurse from [facility].

## 2024-03-17 NOTE — PROGRESS NOTES
"PRIMARY DIAGNOSIS: Nausea and Vomiting   OUTPATIENT/OBSERVATION GOALS TO BE MET BEFORE DISCHARGE:  ADLs back to baseline: No    Activity and level of assistance: Up with standby assistance.    Pain status: Pain free.    Return to near baseline physical activity: No     BP (!) 181/94 (BP Location: Left arm, Patient Position: Supine, Cuff Size: Adult Regular)   Pulse (!) 122   Temp 98.5  F (36.9  C) (Oral)   Resp 16   Ht 1.727 m (5' 8\")   Wt 77.4 kg (170 lb 10.2 oz)   SpO2 90%   BMI 25.95 kg/m        Pt A&O x4. Patient denies any pain. Intermittent episodes of gagging with little to no emesis. Ambulation: SBA with direction/instructions. Patient is blind. Voiding: via urinal bedside. Patient is BG checks. Patient is on TELE and Mag and Phos protocol. Mag this a morning was 1.4, Phos was 2.4- replacement ordered, provider notified. Tolerates regular diet. Plan: PT consult and waiting for placement    Discharge Planner Nurse   Safe discharge environment identified: Yes  Barriers to discharge: Yes       Entered by: Shauna Voss RN 03/17/2024      Please review provider order for any additional goals.   Nurse to notify provider when observation goals have been met and patient is ready for discharge.  "

## 2024-03-17 NOTE — PHARMACY-ADMISSION MEDICATION HISTORY
Pharmacy Intern Admission Medication History    Admission medication history is complete. The information provided in this note is only as accurate as the sources available at the time of the update.    Information Source(s): Facility (U/NH/) medication list/MAR and CareEverywhere/SureScripts via N/A    Pertinent Information: Medication list sent from [facility]. Verified last doses with Sedrick RN from facility.     Changes made to PTA medication list:  Added: Carvedilol, Invega Injection, Melatonin, Trazodone  Deleted: Azithromycin, Cefuroxime, Risperidone, Ferrous Sulfate  Changed: Diphenhydramine Q6 hours as needed -> Diphenhydramine Q4 hours as needed; Hydralazine as needed -> Hydralazine three times daily; Lantus 21 units at bedtime -> Lantus 16 units at bedtime; Pantoprazole twice daily -> Pantoprazole once daily    Allergies reviewed with patient and updates made in EHR: unable to assess    Medication History Completed By: Marvin Maldonado 3/17/2024 1:17 PM    PTA Med List   Medication Sig Last Dose    acetaminophen (TYLENOL) 325 MG tablet Take 650 mg by mouth every 4 hours as needed for mild pain Unknown at prn    atorvastatin (LIPITOR) 20 MG tablet Take 20 mg by mouth At Bedtime 3/15/2024 at Bedtime    carvedilol (COREG) 25 MG tablet Take 25 mg by mouth 2 times daily (with meals) 3/16/2024 at AM    cholecalciferol (VITAMIN D-1000 MAX ST) 1000 units TABS Take 1,000 Units by mouth daily 3/16/2024 at AM    diphenhydrAMINE (BENADRYL) 50 MG capsule Take 50 mg by mouth every 4 hours as needed for itching or allergies Unknown at prn    diphenhydrAMINE (BENADRYL) 50 MG capsule Take 50 mg by mouth At Bedtime 3/15/2024 at Bedtime    exenatide ER (BYDUREON) 2 MG recon vial kit susp for weekly inj Inject 2 mg Subcutaneous every 14 days DO not administer until he is seen by his PCP. 3/12/2024    glucose (BD GLUCOSE) 4 g chewable tablet Take 4 tablets by mouth as needed for low blood sugar Unknown at prn     hydrALAZINE (APRESOLINE) 25 MG tablet Take 25 mg by mouth 3 times daily Hold for SBP <120 3/16/2024 at AM x1 dose    hypromellose (GENTEAL SEVERE) ophthalmic gel 0.3% Place 1 drop into both eyes 2 times daily Unknown at prn    insulin glargine (LANTUS PEN) 100 UNIT/ML pen Inject 16 Units Subcutaneous at bedtime 3/15/2024 at Bedtime    lisinopril (ZESTRIL) 40 MG tablet Take 40 mg by mouth daily 3/16/2024 at AM    melatonin 5 MG tablet Take 5 mg by mouth daily as needed for sleep Unknown at prn    ondansetron (ZOFRAN) 4 MG tablet Take 4 mg by mouth every 8 hours as needed for nausea Unknown at prn    paliperidone (INVEGA SUSTENNA) 156 MG/ML PABLO injection Inject 156 mg into the muscle every 28 days 3/5/2024    pantoprazole (PROTONIX) 40 MG EC tablet Take 40 mg by mouth daily 3/16/2024 at AM    tamsulosin (FLOMAX) 0.4 MG capsule Take 1 capsule (0.4 mg) by mouth daily 3/16/2024 at AM    traZODone (DESYREL) 50 MG tablet Take 50 mg by mouth nightly as needed for sleep Unknown at prn

## 2024-03-17 NOTE — PROVIDER NOTIFICATION
BP rechecked, see flowsheets. Provider notified of patient's status.     Provider ok'd for discharge.

## 2024-03-17 NOTE — PLAN OF CARE
[facility]    ED Boarding Nurse Handoff Addendum Report:    Date/time: 3/17/2024, 6:27 AM    Activity Level: SBA    Fall Risk: Yes:  activity supervised    Active Infusions: NS 75 mL/hr    Current Meds Due: None    Current care needs: Monitor pain and N&V    Oxygen requirements (liters/min and/or FiO2): NA    Respiratory status: Room air    Vital signs (within last 30 minutes):    Vitals:    03/16/24 2100 03/17/24 0029 03/17/24 0349 03/17/24 0615   BP: (!) 157/91 (!) 168/93 (!) 153/91 (!) 193/107   BP Location:   Left arm Left arm   Pulse: 116 112 112 104   Resp:  18 18 18   Temp:  97.9  F (36.6  C) 99.8  F (37.7  C) 98.5  F (36.9  C)   TempSrc:  Oral Oral Oral   SpO2:  98% 96% 90%   Weight:       Height:           Focused assessment within last 30 minutes:    AOx4, blind, uses urinal at bedside, denies SOB, denies needing pain intervention,  no coverage given, TELE ST with inverted T, BP Hypertensive pt asymptomatic, paged MD for parameters for PRN Hydralazine, administered PRN Hydralazine.       ED Boarding Nurse name: Sherlyn Beltran RN

## 2024-03-17 NOTE — DISCHARGE SUMMARY
[facility]  Discharge Summary  Hospitalist      Date of Admission:  3/16/2024  Date of Discharge:  3/17/2024  Provider:  Gustavo Bowden MD. [facility]  Date of Service (when I last saw the patient): 03/17/24      Primary Provider: [facility]          Discharge Diagnosis:     Discharge Diagnoses     Hyponatremia improved  Nausea and vomiting  Leukocytosis resolved  Colitis and Esophagitis on imaging chronic changes present on prior scans  Tachycardia chronic   Hypertension   Schizoaffective disorder/schizophrenia    Other medical issues:  Past Medical History:   Diagnosis Date    Depression     Diabetes mellitus     h/o Cocaine abuse     Homeless     Hypertension     Latent tuberculosis     Proliferative diabetic retinopathy     Schizophrenia          Please see the admission history and physical for full details.     Hospital Course     Benja Duong was admitted on 3/16/2024.  The following problems were addressed during his hospitalization:  58-year-old male with a history of DM2, schizoaffective disorder/schizophrenia, substance abuse, and HLD was admitted on 3/16/2024 with nausea, vomiting, persistent tachycardia, and HTN. CT imaging revealed mild wall thickening of the ascending and transverse colon, suggestive of colitis, and esophageal wall thickening consistent with esophagitis. He also presented with troponin elevation suspected to be demand ischemia. Cardiac monitoring was initiated, and a repeat TTE was scheduled to rule out cardiac abnormalities.  Echocardiogram showed ejection fraction above 70% with no wall motion abnormality.    Hyponatremia was noted, likely hypovolemic, with plans for fluid management and symptom control did not improve overnight, needs follow-up primary care clinic in the next 2 to 3 days.   Respiratory acidosis was compensated, and hypoxia resolved.   The patient's DM2 was managed with a low-dose sliding scale insulin regimen.   Will need to resume his long-acting Lantus.    Symptoms significantly improved, was able to tolerate diet prior to discharge, magnesium and phosphorus were replaced.    Needs to continue his Proton pump inhibitor and follow-up with his primary care and gastroenterology as outpatient for possible EGD.  Polysubstance abuse patient had cocaine opiate and cannabinoid in his urine likely contributing if not the etiology of the acute presentation of electrolyte abnormality and nausea.  Counseled on cessation of substance use and there is other than his medications that are prescribed.  Patient blood pressure was elevated during the day he will not receive his lisinopril once we give him his Coreg and hydralazine blood pressure started improving.  Note he has been persistently tachycardic going back many years and almost every single visit, likely secondary to antipsychotic which can give tachycardia as a side effect  He ate his lunch without any issue no nausea no vomiting and requested to be discharged, currently he is on observation and was still in the emergency room boarding because there were no beds.  I have no concern with him leaving since he tolerated his p.o. intake and his sodium is increasing, I told him he needs a repeat in the next couple of days in his clinic which he understands.      Pending Results   Unresulted Labs Ordered in the Past 30 Days of this Admission       Date and Time Order Name Status Description    3/17/2024  2:20 AM Urine Drug Confirmation Panel In process     3/17/2024  2:20 AM THC Confirmation Quantitative Urine In process     3/16/2024  4:12 PM Blood Culture Hand, Right Preliminary     3/16/2024  4:12 PM Blood Culture Arm, Right Preliminary             Discharge Orders      Reason for your hospital stay    Hyponatremia nausea polysubstance abuse     Follow-up and recommended labs and tests     Follow-up with primary care physician in 2 to 3 days with repeat basic metabolic panel and  magnesium     Activity    Your activity upon discharge: activity as tolerated     Diet    Follow this diet upon discharge: Orders Placed This Encounter  Diabetic diet moderate carbohydrate       Code Status   Full Code       Primary Care Physician   [facility]    Physical Exam   Temp: 98.5  F (36.9  C) Temp src: Oral BP: (!) 171/92  Pulse: 116   Resp: 16 SpO2: 96 % O2 Device: None (Room air)    Vitals:    03/16/24 1117   Weight: 77.4 kg (170 lb 10.2 oz)     Vital Signs with Ranges  Temp:  [97.9  F (36.6  C)-99.8  F (37.7  C)] 98.5  F (36.9  C)  Pulse:  [104-128] 118  Resp:  [16-18] 16  BP: (153-195)/() 195/101  SpO2:  [90 %-98 %] 96 %  I/O last 3 completed shifts:  In: 647.5 [I.V.:647.5]  Out: 350 [Urine:350]    Constitutional:  alert, cooperative, no apparent distress  Respiratory: No increased work of breathing, good air exchange, no crackles or wheezing.  Cardiovascular: apical impulse,normal S1 and S2  GI: bowel sounds present, soft, non-distended, non-tender      Discharge Disposition   Discharged to home    Consultations This Hospital Stay   PHYSICAL THERAPY ADULT IP CONSULT    Time Spent on this Encounter   I, Gustavo Bowden MD, personally saw the patient today and spent greater than 30 minutes discharging this patient.      Discharge Medications   Current Discharge Medication List        CONTINUE these medications which have NOT CHANGED    Details   acetaminophen (TYLENOL) 325 MG tablet Take 650 mg by mouth every 4 hours as needed for mild pain      atorvastatin (LIPITOR) 20 MG tablet Take 20 mg by mouth At Bedtime      carvedilol (COREG) 25 MG tablet Take 25 mg by mouth 2 times daily (with meals)      cholecalciferol (VITAMIN D-1000 MAX ST) 1000 units TABS Take 1,000 Units by mouth daily  Qty: 30 tablet, Refills: 1      !! diphenhydrAMINE (BENADRYL) 50 MG capsule Take 50 mg by mouth every 4 hours as needed for itching or allergies      !! diphenhydrAMINE (BENADRYL) 50  "MG capsule Take 50 mg by mouth At Bedtime      exenatide ER (BYDUREON) 2 MG recon vial kit susp for weekly inj Inject 2 mg Subcutaneous every 14 days DO not administer until he is seen by his PCP.  Qty:        glucose (BD GLUCOSE) 4 g chewable tablet Take 4 tablets by mouth as needed for low blood sugar      hydrALAZINE (APRESOLINE) 25 MG tablet Take 25 mg by mouth 3 times daily Hold for SBP <120      hypromellose (GENTEAL SEVERE) ophthalmic gel 0.3% Place 1 drop into both eyes 2 times daily      insulin glargine (LANTUS PEN) 100 UNIT/ML pen Inject 16 Units Subcutaneous at bedtime      lisinopril (ZESTRIL) 40 MG tablet Take 40 mg by mouth daily      melatonin 5 MG tablet Take 5 mg by mouth daily as needed for sleep      ondansetron (ZOFRAN) 4 MG tablet Take 4 mg by mouth every 8 hours as needed for nausea      paliperidone (INVEGA SUSTENNA) 156 MG/ML PABLO injection Inject 156 mg into the muscle every 28 days      pantoprazole (PROTONIX) 40 MG EC tablet Take 40 mg by mouth daily      tamsulosin (FLOMAX) 0.4 MG capsule Take 1 capsule (0.4 mg) by mouth daily  Qty: 30 capsule, Refills: 3    Associated Diagnoses: Urinary retention      traZODone (DESYREL) 50 MG tablet Take 50 mg by mouth nightly as needed for sleep       !! - Potential duplicate medications found. Please discuss with provider.        Allergies   Allergies   Allergen Reactions    Alprazolam      Other reaction(s): *Unknown States \"I break out\"    Oxycodone-Acetaminophen      Other reaction(s): *Unknown, States \"I break out\"    Oxycodone-Acetaminophen Other (See Comments)     Other reaction(s): *Unknown, States \"I break out\"     Data   Most Recent 3 CBC's:  Recent Labs   Lab Test 03/17/24  0950 03/16/24  1136 04/27/23  1052   WBC 10.8 12.2* 11.8*   HGB 12.0* 14.3 13.1*   MCV 88 86 88    270 275      Most Recent 3 BMP's:  Recent Labs   Lab Test 03/17/24  1145 03/17/24  0950 03/17/24  0758 03/16/24  1911 03/16/24  1741 03/16/24  1136 03/16/24  1127 " 04/27/23  1052   NA  --  129*  --   --  129* 125*  --  126*   POTASSIUM  --  3.8  --   --   --  3.9  --  3.9   CHLORIDE  --  91*  --   --   --  76*  --  88*   CO2  --  26  --   --   --  35*  --  27   BUN  --  9.9  --   --   --  14.4  --  7.8   CR  --  0.82  --   --   --  1.06  --  0.80   ANIONGAP  --  12  --   --   --  14  --  11   MITCHEL  --  8.8  --   --   --  10.6*  --  10.8*   * 231* 201*   < >  --  328*   < > 336*    < > = values in this interval not displayed.     Most Recent 2 LFT's:  Recent Labs   Lab Test 03/16/24  1136 04/27/23  1052   AST 17 16   ALT 15 11   ALKPHOS 80 84   BILITOTAL 0.4 0.3     Most Recent INR's and Anticoagulation Dosing History:  Anticoagulation Dose History           No data to display              Most Recent 3 Troponin's:  Recent Labs   Lab Test 10/01/20  1835 07/22/20  0219 07/21/20  2246   TROPI <0.015 <0.015 <0.015     Most Recent Cholesterol Panel:  Recent Labs   Lab Test 07/26/18  0751   CHOL 157   LDL 60   HDL 36*   TRIG 305*           Results for orders placed or performed during the hospital encounter of 03/16/24   CT Chest (PE) Abdomen Pelvis w Contrast    Narrative    EXAM: CT CHEST PE ABDOMEN PELVIS W CONTRAST  LOCATION: [facility]  DATE: 3/16/2024    INDICATION: chest pain, sob, elevate ddimer  COMPARISON: CTA chest 04/27/2023, CT abdomen pelvis 04/19/2022, 01/13/2022  TECHNIQUE: CT chest pulmonary angiogram and routine CT abdomen pelvis with IV contrast. Arterial phase through the chest and venous phase through the abdomen and pelvis. Multiplanar reformats and MIP reconstructions were performed. Dose reduction   techniques were used.   CONTRAST: 85mL Isovue 370    FINDINGS:  ANGIOGRAM CHEST: Pulmonary arteries are normal caliber and negative for pulmonary emboli. Thoracic aorta is negative for dissection. No CT evidence of right heart strain.     LUNGS AND PLEURA: Central airways are patent. Mild paraseptal emphysema. Dependent atelectasis. 9  mm right upper lobe nodule (7/65) previously measured 11 mm, suggesting benign etiology. No pleural effusion or pneumothorax.    MEDIASTINUM/AXILLAE: Unchanged left ventricular hypertrophy. No pericardial effusion. No thoracic lymphadenopathy. Unchanged mild esophageal wall thickening.    CORONARY ARTERY CALCIFICATION: Moderate.    HEPATOBILIARY: Normal liver contours. No worrisome liver lesions. Mildly distended gallbladder without radiopaque gallstones or pericholecystic inflammation. No biliary ductal dilation.    PANCREAS: Normal.    SPLEEN: Normal.    ADRENAL GLANDS: Normal.    KIDNEYS/BLADDER: Kidneys enhance symmetrically with bilateral benign cysts (which require no follow-up). Punctate nonobstructing stone in the upper pole the right kidney. No hydronephrosis. Normal bladder contours.    BOWEL: No bowel obstruction. There is mild wall thickening of the ascending and transverse colon. As before, the appendix is dilated up to 17 mm and appears thick-walled, however there is no periappendiceal stranding or inflammation. This is similar   dating back to 01/13/2022. No free fluid or free air.    LYMPH NODES: No lymphadenopathy.    VASCULATURE: Unremarkable.    PELVIC ORGANS: Mild prostatomegaly.    MUSCULOSKELETAL: Degenerative changes throughout the visualized spine.  Unchanged sclerotic lesion in the left iliac bone, suggesting benign etiology. No destructive osseous lesions. Unchanged fat-containing supraumbilical ventral wall hernia with mild   inflammatory stranding. Fat-containing umbilical hernia.      Impression    IMPRESSION:  1.  No pulmonary embolism, acute aortic pathology, or other acute cardiopulmonary abnormality.  2.  Mild wall thickening of the ascending and transverse colon, which can be seen with colitis.  3.  Esophageal wall thickening is similar to 04/27/2023, nonspecific but can be seen in the setting of esophagitis.  4.  Dilated and thick-walled appendix without periappendiceal  inflammation is similar to multiple priors, possibly sequela of chronic inflammation.  5.  Additional details in the findings.   Echocardiogram Complete     Value    LVEF  >70%    [facility]    [id number]  [id number]  [id number]  [id number]^AUNG^MARY     [facility]  Echocardiography Laboratory  [address] [place] [address]     Name: JEANNE TOBIAS  MRN: [id number]  : 1965  Study Date: 2024 08:30 AM  Age: 58 yrs  Gender: Male  Patient Location: [facility]  Reason For Study: Tachycardia  Ordering Physician: MARY HORAN  Referring Physician: Clinic, [facility]  Performed By: Kim Tee RDCS     BSA: 1.9 m2  Height: 68 in  Weight: 170 lb  HR: 121  BP: 181/94 mmHg  ______________________________________________________________________________  Procedure  Limited Portable Echo Adult. (Emergent exam, abbreviated study performed).  ______________________________________________________________________________  Interpretation Summary     Hyperdynamic left ventricular function. The visual ejection fraction is >70%.  No regional wall motion abnormalities noted.  The right ventricle is normal in structure, function and size. The right  ventricle is not well visualized.  No hemodynamically significant valvular disease.  Small inferior vena cava size consistent with hypovolemia.  ______________________________________________________________________________  Left Ventricle  The left ventricle is normal in structure, function and size. Hyperdynamic  left ventricular function. The visual ejection fraction is >70%. No regional  wall motion abnormalities noted.     Right Ventricle  The right ventricle is normal in structure, function and size. The right  ventricle is not well visualized.     Atria  Normal left atrial size. Right atrial size is normal.     Mitral Valve  The mitral valve is normal in structure and function. There is trace mitral  regurgitation.      Tricuspid Valve  Normal tricuspid valve. There is trace tricuspid regurgitation.     Aortic Valve  The aortic valve is normal in structure and function. No aortic stenosis is  present.     Pulmonic Valve  Normal pulmonic valve.     Vessels  The aortic root is normal size. Small inferior vena cava size consistent with  hypovolemia.     Pericardium  There is no pericardial effusion.     Rhythm  The rhythm was sinus tachycardia.  ______________________________________________________________________________  MMode/2D Measurements & Calculations  IVSd: 1.2 cm     LVIDd: 3.3 cm  LVIDs: 2.3 cm  LVPWd: 1.1 cm  IVC diam: 1.1 cm  FS: 29.3 %  LV mass(C)d: 119.6 grams  LV mass(C)dI: 62.7 grams/m2  Ao root diam: 2.9 cm  asc Aorta Diam: 2.9 cm  LVOT diam: 2.0 cm  LVOT area: 3.1 cm2  Ao root diam index Ht(cm/m): 1.7  Ao root diam index BSA (cm/m2): 1.5  Asc Ao diam index BSA (cm/m2): 1.5  Asc Ao diam index Ht(cm/m): 1.7  LA Volume (BP): 38.4 ml     LA Volume Index (BP): 20.1 ml/m2  RWT: 0.69  TAPSE: 2.2 cm     Doppler Measurements & Calculations  MV E max grace: 84.9 cm/sec  MV A max grace: 120.5 cm/sec  MV E/A: 0.70  MV dec time: 0.13 sec  Ao V2 max: 131.7 cm/sec  Ao max P.9 mmHg  E/E' av.8  Lateral E/e': 9.4  Medial E/e': 14.2     ______________________________________________________________________________  Report approved by: Christin Lara 2024 10:59 AM                 Disclaimer: This note consists of symbols derived from keyboarding, dictation and/or voice recognition software. As a result, there may be errors in the script that have gone undetected. Please consider this when interpreting information found in this chart.

## 2024-03-17 NOTE — PHARMACY-ADMISSION MEDICATION HISTORY
Pharmacy Intern Admission Medication History    Admission medication history is complete. The information provided in this note is only as accurate as the sources available at the time of the update.    Information Source(s): Facility (U/NH/) medication list/MAR and CareEverywhere/SureScripts via N/A    Pertinent Information:  Medication list sent from [facility]. Verified last doses with Sedrick RN from facility.      Changes made to PTA medication list:  Added: Carvedilol, Invega Injection, Melatonin, Trazodone, Metformin  Deleted: Azithromycin, Cefuroxime, Risperidone, Ferrous Sulfate   Changed: Diphenhydramine Q6 hours as needed -> Diphenhydramine Q4 hours as needed; Hydralazine as needed -> Hydralazine three times daily; Lantus 21 units at bedtime -> Lantus 16 units at bedtime; Pantoprazole twice daily -> Pantoprazole once daily; Hypromellose twice daily -> Hypromellose twice daily as needed    Allergies reviewed with patient and updates made in EHR: unable to assess    Medication History Completed By: Marvin Maldonado 3/17/2024 2:31 PM    PTA Med List   Medication Sig Last Dose    acetaminophen (TYLENOL) 325 MG tablet Take 650 mg by mouth every 4 hours as needed for mild pain Unknown at prn    atorvastatin (LIPITOR) 20 MG tablet Take 20 mg by mouth At Bedtime 3/15/2024 at Bedtime    carvedilol (COREG) 12.5 MG tablet Take 12.5 mg by mouth 2 times daily (with meals) 3/16/2024 at AM    cholecalciferol (VITAMIN D-1000 MAX ST) 1000 units TABS Take 1,000 Units by mouth daily 3/16/2024 at AM    diphenhydrAMINE (BENADRYL) 50 MG capsule Take 50 mg by mouth every 4 hours as needed for itching or allergies Unknown at prn    diphenhydrAMINE (BENADRYL) 50 MG capsule Take 50 mg by mouth At Bedtime 3/15/2024 at Bedtime    exenatide ER (BYDUREON) 2 MG recon vial kit susp for weekly inj Inject 2 mg Subcutaneous every 14 days DO not administer until he is seen by his PCP. 3/12/2024    glucose (BD GLUCOSE) 4 g  chewable tablet Take 4 tablets by mouth as needed for low blood sugar Unknown at prn    hydrALAZINE (APRESOLINE) 25 MG tablet Take 25 mg by mouth 3 times daily Hold for SBP <120 3/16/2024 at AM x1 dose    hypromellose (GENTEAL SEVERE) ophthalmic gel 0.3% Place 1 drop into both eyes 2 times daily as needed for dry eyes Unknown at prn    insulin glargine (LANTUS PEN) 100 UNIT/ML pen Inject 16 Units Subcutaneous at bedtime 3/15/2024 at Bedtime    lisinopril (ZESTRIL) 40 MG tablet Take 40 mg by mouth daily 3/16/2024 at AM    melatonin 5 MG tablet Take 5 mg by mouth daily as needed for sleep Unknown at prn    metFORMIN (GLUCOPHAGE) 1000 MG tablet Take 2,000 mg by mouth every morning 3/16/2024 at AM    ondansetron (ZOFRAN) 4 MG tablet Take 4 mg by mouth every 8 hours as needed for nausea Unknown at prn    paliperidone (INVEGA SUSTENNA) 156 MG/ML PABLO injection Inject 156 mg into the muscle every 28 days 3/5/2024    pantoprazole (PROTONIX) 40 MG EC tablet Take 40 mg by mouth daily 3/16/2024 at AM    tamsulosin (FLOMAX) 0.4 MG capsule Take 1 capsule (0.4 mg) by mouth daily 3/16/2024 at AM    traZODone (DESYREL) 50 MG tablet Take 50 mg by mouth nightly as needed for sleep Unknown at prn

## 2024-03-17 NOTE — PROGRESS NOTES
"PRIMARY DIAGNOSIS: Nausea and Vomiting   OUTPATIENT/OBSERVATION GOALS TO BE MET BEFORE DISCHARGE:  ADLs back to baseline: No    Activity and level of assistance: Up with standby assistance.    Pain status: Pain free.    Return to near baseline physical activity: No     BP (!) 171/92 (BP Location: Right arm, Patient Position: Supine, Cuff Size: Adult Regular)   Pulse 116   Temp 98.5  F (36.9  C) (Oral)   Resp 16   Ht 1.727 m (5' 8\")   Wt 77.4 kg (170 lb 10.2 oz)   SpO2 100%   BMI 25.95 kg/m        Pt A&O x4. Patient denies any pain. Ambulation: SBA with direction/instructions. Patient is blind. Voiding: via urinal bedside. Patient is BG checks. Patient is on TELE and Mag and Phos protocol. Mag this morning was 1.4, Phos was 2.4- replacement has been provided. Tolerates regular diet. Plan: waiting on decrease in BP after meds and ability to eat without emesis to be discharged, per provider,.     Discharge Planner Nurse   Safe discharge environment identified: Yes  Barriers to discharge: Yes       Entered by: Shauna Voss RN 03/17/2024      Please review provider order for any additional goals.   Nurse to notify provider when observation goals have been met and patient is ready for discharge.  "

## 2024-03-17 NOTE — PROGRESS NOTES
Call received from provider regarding STAT labs, requesting them done ASAP.     Writer called LAB and requested STAT draw, lab stated they are short right now and will try as soon as they are able.

## 2024-03-18 LAB
ATRIAL RATE - MUSE: 114 BPM
DIASTOLIC BLOOD PRESSURE - MUSE: NORMAL MMHG
INTERPRETATION ECG - MUSE: NORMAL
P AXIS - MUSE: 47 DEGREES
PR INTERVAL - MUSE: 136 MS
QRS DURATION - MUSE: 92 MS
QT - MUSE: 328 MS
QTC - MUSE: 452 MS
R AXIS - MUSE: 14 DEGREES
SYSTOLIC BLOOD PRESSURE - MUSE: NORMAL MMHG
T AXIS - MUSE: 51 DEGREES
VENTRICULAR RATE- MUSE: 114 BPM

## 2024-03-19 LAB
BZE UR CFM-MCNC: 142 NG/ML
BZE/CREAT UR: 117 NG/MG {CREAT}
MORPHINE UR CFM-MCNC: 772 NG/ML
MORPHINE/CREAT UR: 638 NG/MG {CREAT}

## 2024-03-20 LAB
CANNABINOIDS UR CFM-MCNC: 195 NG/ML
CARBOXYTHC/CREAT UR: 161 NG/MG CREAT

## 2024-03-21 LAB
BACTERIA BLD CULT: NO GROWTH
BACTERIA BLD CULT: NO GROWTH

## 2024-06-23 ENCOUNTER — HOSPITAL ENCOUNTER (EMERGENCY)
Facility: CLINIC | Age: 59
Discharge: HOME | End: 2024-06-23
Attending: EMERGENCY MEDICINE | Admitting: EMERGENCY MEDICINE
Payer: COMMERCIAL

## 2024-06-23 VITALS
BODY MASS INDEX: 24.29 KG/M2 | TEMPERATURE: 97.2 F | OXYGEN SATURATION: 100 % | DIASTOLIC BLOOD PRESSURE: 70 MMHG | WEIGHT: 160.27 LBS | HEART RATE: 106 BPM | HEIGHT: 68 IN | SYSTOLIC BLOOD PRESSURE: 161 MMHG | RESPIRATION RATE: 18 BRPM

## 2024-06-23 DIAGNOSIS — R11.2 NAUSEA AND VOMITING, UNSPECIFIED VOMITING TYPE: ICD-10-CM

## 2024-06-23 LAB
ALBUMIN SERPL BCG-MCNC: 4.6 G/DL (ref 3.5–5.2)
ALP SERPL-CCNC: 83 U/L (ref 40–150)
ALT SERPL W P-5'-P-CCNC: 11 U/L (ref 0–70)
ANION GAP SERPL CALCULATED.3IONS-SCNC: 17 MMOL/L (ref 7–15)
AST SERPL W P-5'-P-CCNC: 15 U/L (ref 0–45)
BASOPHILS # BLD AUTO: 0.1 10E3/UL (ref 0–0.2)
BASOPHILS NFR BLD AUTO: 1 %
BILIRUB SERPL-MCNC: 0.3 MG/DL
BUN SERPL-MCNC: 11 MG/DL (ref 8–23)
CALCIUM SERPL-MCNC: 10 MG/DL (ref 8.8–10.2)
CHLORIDE SERPL-SCNC: 90 MMOL/L (ref 98–107)
CREAT SERPL-MCNC: 0.83 MG/DL (ref 0.67–1.17)
DEPRECATED HCO3 PLAS-SCNC: 23 MMOL/L (ref 22–29)
EGFRCR SERPLBLD CKD-EPI 2021: >90 ML/MIN/1.73M2
EOSINOPHIL # BLD AUTO: 0.4 10E3/UL (ref 0–0.7)
EOSINOPHIL NFR BLD AUTO: 3 %
ERYTHROCYTE [DISTWIDTH] IN BLOOD BY AUTOMATED COUNT: 12.3 % (ref 10–15)
GLUCOSE SERPL-MCNC: 141 MG/DL (ref 70–99)
HCT VFR BLD AUTO: 38.3 % (ref 40–53)
HGB BLD-MCNC: 13.1 G/DL (ref 13.3–17.7)
HOLD SPECIMEN: NORMAL
HOLD SPECIMEN: NORMAL
IMM GRANULOCYTES # BLD: 0.1 10E3/UL
IMM GRANULOCYTES NFR BLD: 1 %
LIPASE SERPL-CCNC: 23 U/L (ref 13–60)
LYMPHOCYTES # BLD AUTO: 2.3 10E3/UL (ref 0.8–5.3)
LYMPHOCYTES NFR BLD AUTO: 19 %
MCH RBC QN AUTO: 29.6 PG (ref 26.5–33)
MCHC RBC AUTO-ENTMCNC: 34.2 G/DL (ref 31.5–36.5)
MCV RBC AUTO: 87 FL (ref 78–100)
MONOCYTES # BLD AUTO: 0.6 10E3/UL (ref 0–1.3)
MONOCYTES NFR BLD AUTO: 5 %
NEUTROPHILS # BLD AUTO: 8.6 10E3/UL (ref 1.6–8.3)
NEUTROPHILS NFR BLD AUTO: 71 %
NRBC # BLD AUTO: 0 10E3/UL
NRBC BLD AUTO-RTO: 0 /100
PLATELET # BLD AUTO: 261 10E3/UL (ref 150–450)
POTASSIUM SERPL-SCNC: 4.2 MMOL/L (ref 3.4–5.3)
PROT SERPL-MCNC: 7.8 G/DL (ref 6.4–8.3)
RBC # BLD AUTO: 4.42 10E6/UL (ref 4.4–5.9)
SODIUM SERPL-SCNC: 130 MMOL/L (ref 135–145)
TROPONIN T SERPL HS-MCNC: 20 NG/L
TROPONIN T SERPL HS-MCNC: 25 NG/L
WBC # BLD AUTO: 12 10E3/UL (ref 4–11)

## 2024-06-23 PROCEDURE — 250N000011 HC RX IP 250 OP 636: Performed by: EMERGENCY MEDICINE

## 2024-06-23 PROCEDURE — 250N000013 HC RX MED GY IP 250 OP 250 PS 637: Performed by: EMERGENCY MEDICINE

## 2024-06-23 PROCEDURE — 83690 ASSAY OF LIPASE: CPT | Performed by: EMERGENCY MEDICINE

## 2024-06-23 PROCEDURE — 258N000003 HC RX IP 258 OP 636: Mod: JZ | Performed by: EMERGENCY MEDICINE

## 2024-06-23 PROCEDURE — 96361 HYDRATE IV INFUSION ADD-ON: CPT

## 2024-06-23 PROCEDURE — 96375 TX/PRO/DX INJ NEW DRUG ADDON: CPT

## 2024-06-23 PROCEDURE — 99284 EMERGENCY DEPT VISIT MOD MDM: CPT | Mod: 25

## 2024-06-23 PROCEDURE — 80053 COMPREHEN METABOLIC PANEL: CPT | Performed by: EMERGENCY MEDICINE

## 2024-06-23 PROCEDURE — 84484 ASSAY OF TROPONIN QUANT: CPT | Performed by: EMERGENCY MEDICINE

## 2024-06-23 PROCEDURE — 36415 COLL VENOUS BLD VENIPUNCTURE: CPT | Performed by: EMERGENCY MEDICINE

## 2024-06-23 PROCEDURE — 93005 ELECTROCARDIOGRAM TRACING: CPT

## 2024-06-23 PROCEDURE — 96374 THER/PROPH/DIAG INJ IV PUSH: CPT

## 2024-06-23 PROCEDURE — 85025 COMPLETE CBC W/AUTO DIFF WBC: CPT | Performed by: EMERGENCY MEDICINE

## 2024-06-23 RX ORDER — DIPHENHYDRAMINE HYDROCHLORIDE 50 MG/ML
25 INJECTION INTRAMUSCULAR; INTRAVENOUS ONCE
Status: COMPLETED | OUTPATIENT
Start: 2024-06-23 | End: 2024-06-23

## 2024-06-23 RX ORDER — LISINOPRIL 10 MG/1
40 TABLET ORAL ONCE
Status: COMPLETED | OUTPATIENT
Start: 2024-06-23 | End: 2024-06-23

## 2024-06-23 RX ORDER — ONDANSETRON 4 MG/1
4 TABLET, ORALLY DISINTEGRATING ORAL EVERY 8 HOURS PRN
Qty: 10 TABLET | Refills: 0 | Status: SHIPPED | OUTPATIENT
Start: 2024-06-23 | End: 2024-06-26

## 2024-06-23 RX ORDER — ONDANSETRON 2 MG/ML
4 INJECTION INTRAMUSCULAR; INTRAVENOUS ONCE
Status: COMPLETED | OUTPATIENT
Start: 2024-06-23 | End: 2024-06-23

## 2024-06-23 RX ORDER — CARVEDILOL 6.25 MG/1
12.5 TABLET ORAL ONCE
Status: COMPLETED | OUTPATIENT
Start: 2024-06-23 | End: 2024-06-23

## 2024-06-23 RX ADMIN — SODIUM CHLORIDE 1000 ML: 9 INJECTION, SOLUTION INTRAVENOUS at 09:03

## 2024-06-23 RX ADMIN — SODIUM CHLORIDE 1000 ML: 9 INJECTION, SOLUTION INTRAVENOUS at 11:44

## 2024-06-23 RX ADMIN — PROCHLORPERAZINE EDISYLATE 10 MG: 5 INJECTION INTRAMUSCULAR; INTRAVENOUS at 10:29

## 2024-06-23 RX ADMIN — DIPHENHYDRAMINE HYDROCHLORIDE 25 MG: 50 INJECTION, SOLUTION INTRAMUSCULAR; INTRAVENOUS at 10:27

## 2024-06-23 RX ADMIN — ONDANSETRON 4 MG: 2 INJECTION INTRAMUSCULAR; INTRAVENOUS at 09:03

## 2024-06-23 RX ADMIN — LISINOPRIL 40 MG: 10 TABLET ORAL at 09:51

## 2024-06-23 RX ADMIN — CARVEDILOL 12.5 MG: 6.25 TABLET, FILM COATED ORAL at 09:51

## 2024-06-23 ASSESSMENT — ACTIVITIES OF DAILY LIVING (ADL)
ADLS_ACUITY_SCORE: 38

## 2024-06-23 NOTE — ED PROVIDER NOTES
"  Emergency Department Note      History of Present Illness     Chief Complaint  Alcohol Problem    HPI  Benja Duong is a 59 year old male with a history of polysubstance abuse, schizophrenia, hypertension, latent tuberculosis, and diabetes mellitus who presents to the ED for the evaluation of an alcohol problem. The patient states that he drank \"2 cups of Eliecer Snyder\" last night. He reports nausea, vomiting, and pain in his chest, waist, and throughout his abdomen.  No blood in his vomit.  He denies taking any blood pressure medications and states he does not normally drink alcohol. He also reports having taken a nausea pill today.    Independent Historian  None    Review of External Notes  Reviewed patient's office visit from 4/18/2024, patient has had hospitalizations with colitis, esophagitis, and electrolyte derangements.  Patient was admitted on 3/16/2024 with nausea, vomiting, persistent tachycardia, and hypertension.  At that time patient also had a troponin elevation that was suspected to be demand ischemia.  He also had CT findings of colitis and esophagitis.  Past Medical History   Medical History and Problem List  Depression  Type 2 Diabetes mellitus  h/o Cocaine abuse  Homeless  Hypertension  Latent tuberculosis  Proliferative diabetic retinopathy  Schizophrenia  Hyperlipidemia  Intellectual disability  Gastroesophageal reflux disease  Sepsis   Anorexia   Personality disorder w/ antisocial features     Medications  Atorvastatin   Carvedilol   Exenatide  Hydralazine   Lisinopril  Metformin   Ondansetron  Pantoprazole  Tamsulosin    Trazodone     Surgical History   Tonsillectomy  R Vitrectomy  Lensectomy  Physical Exam   Patient Vitals for the past 24 hrs:   BP Temp Temp src Pulse Resp SpO2 Height Weight   06/23/24 1206 (!) 168/94 -- -- -- -- 98 % -- --   06/23/24 1127 (!) 176/103 -- -- -- -- 98 % -- --   06/23/24 1123 -- -- -- -- -- 99 % -- --   06/23/24 1100 (!) 184/88 -- -- 105 -- 98 % -- -- " "  06/23/24 1030 (!) 176/91 -- -- 105 -- 99 % -- --   06/23/24 1000 (!) 210/102 -- -- 103 -- -- -- --   06/23/24 0954 -- -- -- -- -- 98 % -- --   06/23/24 0951 (!) 179/97 -- -- 106 -- -- -- --   06/23/24 0900 (!) 202/103 -- -- 114 -- 100 % -- --   06/23/24 0845 (!) 193/96 -- -- 101 -- 100 % -- --   06/23/24 0805 (!) 207/118 97.2  F (36.2  C) Temporal 111 18 96 % 1.727 m (5' 8\") 72.7 kg (160 lb 4.4 oz)   06/23/24 0802 (!) 217/119 -- -- -- -- -- -- --     Physical Exam  General: Well-nourished, resting comfortably when I enter the room  Eyes: Pupils equal, conjunctivae pink no scleral icterus or conjunctival injection  ENT:  Moist mucus membranes  Respiratory:  Lungs clear to auscultation bilaterally, no crackles/rubs/wheezes.  Good air movement  CV: Normal rate and rhythm, no murmurs  GI:  Abdomen soft and non-distended.  No guarding or rebound.  Mild diffuse tenderness to palpation.  Skin: Warm, dry.  No rashes or petechiae  Musculoskeletal: No peripheral edema or calf tenderness  Neuro: Alert and oriented to person/place/time  Psychiatric: Normal affect     Diagnostics   Lab Results   Labs Ordered and Resulted from Time of ED Arrival to Time of ED Departure   COMPREHENSIVE METABOLIC PANEL - Abnormal       Result Value    Sodium 130 (*)     Potassium 4.2      Carbon Dioxide (CO2) 23      Anion Gap 17 (*)     Urea Nitrogen 11.0      Creatinine 0.83      GFR Estimate >90      Calcium 10.0      Chloride 90 (*)     Glucose 141 (*)     Alkaline Phosphatase 83      AST 15      ALT 11      Protein Total 7.8      Albumin 4.6      Bilirubin Total 0.3     TROPONIN T, HIGH SENSITIVITY - Abnormal    Troponin T, High Sensitivity 25 (*)    CBC WITH PLATELETS AND DIFFERENTIAL - Abnormal    WBC Count 12.0 (*)     RBC Count 4.42      Hemoglobin 13.1 (*)     Hematocrit 38.3 (*)     MCV 87      MCH 29.6      MCHC 34.2      RDW 12.3      Platelet Count 261      % Neutrophils 71      % Lymphocytes 19      % Monocytes 5      % Eosinophils " 3      % Basophils 1      % Immature Granulocytes 1      NRBCs per 100 WBC 0      Absolute Neutrophils 8.6 (*)     Absolute Lymphocytes 2.3      Absolute Monocytes 0.6      Absolute Eosinophils 0.4      Absolute Basophils 0.1      Absolute Immature Granulocytes 0.1      Absolute NRBCs 0.0     LIPASE - Normal    Lipase 23     TROPONIN T, HIGH SENSITIVITY - Normal    Troponin T, High Sensitivity 20       Imaging  No orders to display     EKG   ECG taken at 0833, ECG read at 0831  Sinus tachycardia  Otherwise normal ECG   Rate 102 bpm. IA interval 148 ms. QRS duration 92 ms. QT/QTc 340/443 ms. P-R-T axes 60 55 52.    Independent Interpretation  None  ED Course    Medications Administered  Medications   sodium chloride 0.9% BOLUS 1,000 mL (1,000 mLs Intravenous $New Bag 6/23/24 0903)   ondansetron (ZOFRAN) injection 4 mg (4 mg Intravenous $Given 6/23/24 0903)   lisinopril (ZESTRIL) tablet 40 mg (40 mg Oral $Given 6/23/24 0951)   carvedilol (COREG) tablet 12.5 mg (12.5 mg Oral $Given 6/23/24 0951)   prochlorperazine (COMPAZINE) injection 10 mg (10 mg Intravenous $Given 6/23/24 1029)   diphenhydrAMINE (BENADRYL) injection 25 mg (25 mg Intravenous $Given 6/23/24 1027)   sodium chloride 0.9% BOLUS 1,000 mL (1,000 mLs Intravenous $New Bag 6/23/24 1144)     Procedures  None     Discussion of Management  None    Social Determinants of Health adding to complexity of care  Hx of polysubstance abuse    ED Course  ED Course as of 06/23/24 1334   Sun Jun 23, 2024   0824 I obtained history and examined the patient as noted above.   1201 I rechecked the patient and updated.     1240 I rechecked the patient and updated.     1330 I rechecked the patient and explained findings. I discussed plan for discharge home.     1331 .     Medical Decision Making / Diagnosis   CMS Diagnoses: None    MIPS     None    [facility]  Benja Duong is a 59 year old male with a history of diabetes, schizoaffective disorder, hyperlipidemia, hypertension  presents to the emergency department with a complaint of uncontrollable nausea and vomiting.  Patient reports that he was fine last night.  He did drink 2 cups of Eliecer Snyder, and this morning started having uncontrollable nausea and vomiting.  He is also complaining of diffuse abdominal pain and chest pain after the vomiting started.  On exam patient is well-appearing, he is slightly tachycardic.  He is also hypertensive, and has not taken his daily medications for hypertension today.  Workup is overall reassuring.  Troponins are at his baseline.  Lipase does not show any signs of pancreatitis.  Sodium is a little bit low at 130 with a slight anion gap of 17.  Patient is given fluids.  He is not anemic.  I expect that his anion gap and sodium is secondary to the vomiting.  Patient is given Zofran and tolerates his blood pressure medications.  On reevaluation, the patient does start dry heaving again.  He is given Compazine and Benadryl.  On reevaluation, patient is resting in the room.  His heart rate and blood pressure has improved.  Patient does drink water and holds it down.  I did ask the patient to eat some crackers before leaving, and he refuses.  On reevaluation, the patient states he is feeling much better.  He states he is ready to go home.    Patient is discharged to group home with a prescription for Zofran.  Patient is advised not to drink any alcohol.    Disposition  The patient was discharged.     ICD-10 Codes:    ICD-10-CM    1. Nausea and vomiting, unspecified vomiting type  R11.2            Scribe Disclosure:  We, Ari Liang and Cleopatra Lynne, are serving as scribes at 9:00 AM on 6/23/2024 to document services personally performed by Julia Velasco MD based on our observations and the provider's statements to us.       Julia Velasco MD  06/23/24 3783

## 2024-06-23 NOTE — ED NOTES
Pt is from [facility], Zhang is  staff who dropped pt off. Call her at [phone] when pt is ready for discharge. Staff report pt drank 2 cups of Eliecer Snyder. Pt reports he was bored and couldn't sleep. Post alcohol pt states he started throwing up and had abdominal and chest pain.

## 2024-06-23 NOTE — ED TRIAGE NOTES
"Patient arrives with staff member from [facility] ([facility]).  He reports N/V after drinking \"2 cups of Eliecer Snyder last night\".  History HTN, vision impairment.  ABCs intact, A&Ox4.      [facility] number [phone]   Triage Assessment (Adult)       Row Name 06/23/24 0802          Triage Assessment    Airway WDL WDL        Respiratory WDL    Respiratory WDL WDL        Skin Circulation/Temperature WDL    Skin Circulation/Temperature WDL WDL        Cardiac WDL    Cardiac WDL WDL        Peripheral/Neurovascular WDL    Peripheral Neurovascular WDL WDL        Cognitive/Neuro/Behavioral WDL    Cognitive/Neuro/Behavioral WDL WDL                     "

## 2024-06-24 LAB
ATRIAL RATE - MUSE: 102 BPM
DIASTOLIC BLOOD PRESSURE - MUSE: NORMAL MMHG
INTERPRETATION ECG - MUSE: NORMAL
P AXIS - MUSE: 60 DEGREES
PR INTERVAL - MUSE: 148 MS
QRS DURATION - MUSE: 92 MS
QT - MUSE: 340 MS
QTC - MUSE: 443 MS
R AXIS - MUSE: 55 DEGREES
SYSTOLIC BLOOD PRESSURE - MUSE: NORMAL MMHG
T AXIS - MUSE: 52 DEGREES
VENTRICULAR RATE- MUSE: 102 BPM

## 2024-08-04 ENCOUNTER — HOSPITAL ENCOUNTER (INPATIENT)
Facility: CLINIC | Age: 59
LOS: 3 days | Discharge: NURSING, REHAB OR LONG-TERM CARE | DRG: 871 | End: 2024-08-07
Attending: EMERGENCY MEDICINE | Admitting: HOSPITALIST
Payer: COMMERCIAL

## 2024-08-04 ENCOUNTER — APPOINTMENT (OUTPATIENT)
Dept: CARDIOLOGY | Facility: CLINIC | Age: 59
DRG: 871 | End: 2024-08-04
Attending: HOSPITALIST
Payer: COMMERCIAL

## 2024-08-04 ENCOUNTER — APPOINTMENT (OUTPATIENT)
Dept: GENERAL RADIOLOGY | Facility: CLINIC | Age: 59
DRG: 871 | End: 2024-08-04
Attending: ANESTHESIOLOGY
Payer: COMMERCIAL

## 2024-08-04 ENCOUNTER — APPOINTMENT (OUTPATIENT)
Dept: CT IMAGING | Facility: CLINIC | Age: 59
DRG: 871 | End: 2024-08-04
Attending: EMERGENCY MEDICINE
Payer: COMMERCIAL

## 2024-08-04 DIAGNOSIS — R57.9 SHOCK (H): ICD-10-CM

## 2024-08-04 DIAGNOSIS — F19.10 POLYSUBSTANCE ABUSE (H): ICD-10-CM

## 2024-08-04 DIAGNOSIS — R19.7 NAUSEA VOMITING AND DIARRHEA: Primary | ICD-10-CM

## 2024-08-04 DIAGNOSIS — R79.89 ELEVATED TROPONIN: ICD-10-CM

## 2024-08-04 DIAGNOSIS — R11.2 NAUSEA VOMITING AND DIARRHEA: Primary | ICD-10-CM

## 2024-08-04 DIAGNOSIS — R06.00 DYSPNEA, UNSPECIFIED TYPE: ICD-10-CM

## 2024-08-04 PROBLEM — R07.9 CHEST PAIN, UNSPECIFIED TYPE: Status: ACTIVE | Noted: 2021-12-27

## 2024-08-04 PROBLEM — A41.9 SEVERE SEPSIS (H): Status: ACTIVE | Noted: 2024-08-04

## 2024-08-04 PROBLEM — R65.20 SEVERE SEPSIS (H): Status: ACTIVE | Noted: 2024-08-04

## 2024-08-04 LAB
ADV 40+41 DNA STL QL NAA+NON-PROBE: NEGATIVE
ALBUMIN SERPL BCG-MCNC: 2.9 G/DL (ref 3.5–5.2)
ALBUMIN SERPL BCG-MCNC: 3.8 G/DL (ref 3.5–5.2)
ALBUMIN UR-MCNC: NEGATIVE MG/DL
ALP SERPL-CCNC: 86 U/L (ref 40–150)
ALT SERPL W P-5'-P-CCNC: 11 U/L (ref 0–70)
AMPHETAMINES UR QL SCN: ABNORMAL
ANION GAP SERPL CALCULATED.3IONS-SCNC: 25 MMOL/L (ref 7–15)
ANION GAP SERPL CALCULATED.3IONS-SCNC: 26 MMOL/L (ref 7–15)
ANION GAP SERPL CALCULATED.3IONS-SCNC: 31 MMOL/L (ref 7–15)
ANION GAP SERPL CALCULATED.3IONS-SCNC: 38 MMOL/L (ref 7–15)
ANION GAP SERPL CALCULATED.3IONS-SCNC: 45 MMOL/L (ref 7–15)
ANION GAP SERPL CALCULATED.3IONS-SCNC: 45 MMOL/L (ref 7–15)
APAP SERPL-MCNC: <5 UG/ML (ref 10–30)
APPEARANCE UR: CLEAR
AST SERPL W P-5'-P-CCNC: 10 U/L (ref 0–45)
ASTRO TYP 1-8 RNA STL QL NAA+NON-PROBE: NEGATIVE
B-OH-BUTYR SERPL-SCNC: 2.09 MMOL/L
B-OH-BUTYR SERPL-SCNC: 5.91 MMOL/L
B-OH-BUTYR SERPL-SCNC: 6.17 MMOL/L
BARBITURATES UR QL SCN: ABNORMAL
BASE EXCESS BLDV CALC-SCNC: -20.3 MMOL/L (ref -3–3)
BASE EXCESS BLDV CALC-SCNC: -21.9 MMOL/L (ref -3–3)
BASE EXCESS BLDV CALC-SCNC: -22 MMOL/L (ref -3–3)
BASE EXCESS BLDV CALC-SCNC: -7.5 MMOL/L (ref -3–3)
BASOPHILS # BLD AUTO: 0 10E3/UL (ref 0–0.2)
BASOPHILS NFR BLD AUTO: 0 %
BENZODIAZ UR QL SCN: ABNORMAL
BILIRUB SERPL-MCNC: 0.2 MG/DL
BILIRUB UR QL STRIP: NEGATIVE
BUN SERPL-MCNC: 34.4 MG/DL (ref 8–23)
BUN SERPL-MCNC: 35 MG/DL (ref 8–23)
BUN SERPL-MCNC: 37 MG/DL (ref 8–23)
BUN SERPL-MCNC: 39.9 MG/DL (ref 8–23)
BUN SERPL-MCNC: 40.1 MG/DL (ref 8–23)
BUN SERPL-MCNC: 40.8 MG/DL (ref 8–23)
BZE UR QL SCN: ABNORMAL
C CAYETANENSIS DNA STL QL NAA+NON-PROBE: NEGATIVE
C DIFF TOX B STL QL: NEGATIVE
CA-I BLD-MCNC: 4.4 MG/DL (ref 4.4–5.2)
CA-I BLD-MCNC: 4.7 MG/DL (ref 4.4–5.2)
CALCIUM SERPL-MCNC: 7.7 MG/DL (ref 8.8–10.4)
CALCIUM SERPL-MCNC: 8 MG/DL (ref 8.8–10.4)
CALCIUM SERPL-MCNC: 8.3 MG/DL (ref 8.8–10.4)
CALCIUM SERPL-MCNC: 8.4 MG/DL (ref 8.8–10.4)
CALCIUM SERPL-MCNC: 9.3 MG/DL (ref 8.8–10.4)
CALCIUM SERPL-MCNC: 9.7 MG/DL (ref 8.8–10.4)
CAMPYLOBACTER DNA SPEC NAA+PROBE: NEGATIVE
CANNABINOIDS UR QL SCN: ABNORMAL
CHLORIDE SERPL-SCNC: 79 MMOL/L (ref 98–107)
CHLORIDE SERPL-SCNC: 82 MMOL/L (ref 98–107)
CHLORIDE SERPL-SCNC: 89 MMOL/L (ref 98–107)
CHLORIDE SERPL-SCNC: 94 MMOL/L (ref 98–107)
CHLORIDE SERPL-SCNC: 96 MMOL/L (ref 98–107)
CHLORIDE SERPL-SCNC: 97 MMOL/L (ref 98–107)
COLOR UR AUTO: ABNORMAL
CREAT SERPL-MCNC: 1.94 MG/DL (ref 0.67–1.17)
CREAT SERPL-MCNC: 1.94 MG/DL (ref 0.67–1.17)
CREAT SERPL-MCNC: 2.19 MG/DL (ref 0.67–1.17)
CREAT SERPL-MCNC: 2.44 MG/DL (ref 0.67–1.17)
CREAT SERPL-MCNC: 2.88 MG/DL (ref 0.67–1.17)
CREAT SERPL-MCNC: 2.95 MG/DL (ref 0.67–1.17)
CRYPTOSP DNA STL QL NAA+NON-PROBE: NEGATIVE
D DIMER PPP FEU-MCNC: 0.96 UG/ML FEU (ref 0–0.5)
E COLI O157 DNA STL QL NAA+NON-PROBE: NORMAL
E HISTOLYT DNA STL QL NAA+NON-PROBE: NEGATIVE
EAEC ASTA GENE ISLT QL NAA+PROBE: NEGATIVE
EC STX1+STX2 GENES STL QL NAA+NON-PROBE: NEGATIVE
EGFRCR SERPLBLD CKD-EPI 2021: 24 ML/MIN/1.73M2
EGFRCR SERPLBLD CKD-EPI 2021: 24 ML/MIN/1.73M2
EGFRCR SERPLBLD CKD-EPI 2021: 30 ML/MIN/1.73M2
EGFRCR SERPLBLD CKD-EPI 2021: 34 ML/MIN/1.73M2
EGFRCR SERPLBLD CKD-EPI 2021: 39 ML/MIN/1.73M2
EGFRCR SERPLBLD CKD-EPI 2021: 39 ML/MIN/1.73M2
EOSINOPHIL # BLD AUTO: 0 10E3/UL (ref 0–0.7)
EOSINOPHIL NFR BLD AUTO: 0 %
EPEC EAE GENE STL QL NAA+NON-PROBE: NEGATIVE
ERYTHROCYTE [DISTWIDTH] IN BLOOD BY AUTOMATED COUNT: 13.6 % (ref 10–15)
ERYTHROCYTE [DISTWIDTH] IN BLOOD BY AUTOMATED COUNT: 13.7 % (ref 10–15)
ERYTHROCYTE [DISTWIDTH] IN BLOOD BY AUTOMATED COUNT: 13.7 % (ref 10–15)
ETEC LTA+ST1A+ST1B TOX ST NAA+NON-PROBE: NEGATIVE
ETHANOL SERPL-MCNC: <0.01 G/DL
FENTANYL UR QL: ABNORMAL
FLUAV RNA SPEC QL NAA+PROBE: NEGATIVE
FLUBV RNA RESP QL NAA+PROBE: NEGATIVE
G LAMBLIA DNA STL QL NAA+NON-PROBE: NEGATIVE
GLUCOSE BLDC GLUCOMTR-MCNC: 123 MG/DL (ref 70–99)
GLUCOSE BLDC GLUCOMTR-MCNC: 124 MG/DL (ref 70–99)
GLUCOSE BLDC GLUCOMTR-MCNC: 133 MG/DL (ref 70–99)
GLUCOSE BLDC GLUCOMTR-MCNC: 157 MG/DL (ref 70–99)
GLUCOSE BLDC GLUCOMTR-MCNC: 160 MG/DL (ref 70–99)
GLUCOSE BLDC GLUCOMTR-MCNC: 174 MG/DL (ref 70–99)
GLUCOSE BLDC GLUCOMTR-MCNC: 213 MG/DL (ref 70–99)
GLUCOSE BLDC GLUCOMTR-MCNC: 252 MG/DL (ref 70–99)
GLUCOSE BLDC GLUCOMTR-MCNC: 258 MG/DL (ref 70–99)
GLUCOSE BLDC GLUCOMTR-MCNC: 258 MG/DL (ref 70–99)
GLUCOSE BLDC GLUCOMTR-MCNC: 267 MG/DL (ref 70–99)
GLUCOSE BLDC GLUCOMTR-MCNC: 282 MG/DL (ref 70–99)
GLUCOSE BLDC GLUCOMTR-MCNC: 285 MG/DL (ref 70–99)
GLUCOSE BLDC GLUCOMTR-MCNC: 87 MG/DL (ref 70–99)
GLUCOSE SERPL-MCNC: 132 MG/DL (ref 70–99)
GLUCOSE SERPL-MCNC: 132 MG/DL (ref 70–99)
GLUCOSE SERPL-MCNC: 222 MG/DL (ref 70–99)
GLUCOSE SERPL-MCNC: 282 MG/DL (ref 70–99)
GLUCOSE SERPL-MCNC: 74 MG/DL (ref 70–99)
GLUCOSE SERPL-MCNC: 76 MG/DL (ref 70–99)
GLUCOSE UR STRIP-MCNC: NEGATIVE MG/DL
HBA1C MFR BLD: 7.2 %
HCO3 BLDV-SCNC: 10 MMOL/L (ref 21–28)
HCO3 BLDV-SCNC: 18 MMOL/L (ref 21–28)
HCO3 BLDV-SCNC: 8 MMOL/L (ref 21–28)
HCO3 BLDV-SCNC: 8 MMOL/L (ref 21–28)
HCO3 BLDV-SCNC: <11 MMOL/L (ref 21–28)
HCO3 BLDV-SCNC: <11 MMOL/L (ref 21–28)
HCO3 SERPL-SCNC: 10 MMOL/L (ref 22–29)
HCO3 SERPL-SCNC: 12 MMOL/L (ref 22–29)
HCO3 SERPL-SCNC: 16 MMOL/L (ref 22–29)
HCO3 SERPL-SCNC: 16 MMOL/L (ref 22–29)
HCO3 SERPL-SCNC: 6 MMOL/L (ref 22–29)
HCO3 SERPL-SCNC: 8 MMOL/L (ref 22–29)
HCT VFR BLD AUTO: 28.6 % (ref 40–53)
HCT VFR BLD AUTO: 30.5 % (ref 40–53)
HCT VFR BLD AUTO: 35.8 % (ref 40–53)
HGB BLD-MCNC: 11 G/DL (ref 13.3–17.7)
HGB BLD-MCNC: 8.8 G/DL (ref 13.3–17.7)
HGB BLD-MCNC: 8.9 G/DL (ref 13.3–17.7)
HGB UR QL STRIP: NEGATIVE
HYALINE CASTS: 10 /LPF
IMM GRANULOCYTES # BLD: 0.1 10E3/UL
IMM GRANULOCYTES NFR BLD: 1 %
IRON BINDING CAPACITY (ROCHE): 304 UG/DL (ref 240–430)
IRON SATN MFR SERPL: 20 % (ref 15–46)
IRON SERPL-MCNC: 61 UG/DL (ref 61–157)
KETONES UR STRIP-MCNC: 20 MG/DL
LACTATE BLD-SCNC: 18.2 MMOL/L
LACTATE BLD-SCNC: 18.5 MMOL/L
LACTATE SERPL-SCNC: 12.6 MMOL/L (ref 0.7–2)
LACTATE SERPL-SCNC: 12.6 MMOL/L (ref 0.7–2)
LACTATE SERPL-SCNC: 16 MMOL/L (ref 0.7–2)
LACTATE SERPL-SCNC: 17 MMOL/L (ref 0.7–2)
LACTATE SERPL-SCNC: 19 MMOL/L (ref 0.7–2)
LACTATE SERPL-SCNC: 21 MMOL/L (ref 0.7–2)
LACTATE SERPL-SCNC: 21 MMOL/L (ref 0.7–2)
LEUKOCYTE ESTERASE UR QL STRIP: NEGATIVE
LVEF ECHO: NORMAL
LYMPHOCYTES # BLD AUTO: 2.4 10E3/UL (ref 0.8–5.3)
LYMPHOCYTES NFR BLD AUTO: 17 %
MAGNESIUM SERPL-MCNC: 1.6 MG/DL (ref 1.7–2.3)
MAGNESIUM SERPL-MCNC: 1.7 MG/DL (ref 1.7–2.3)
MCH RBC QN AUTO: 27.8 PG (ref 26.5–33)
MCH RBC QN AUTO: 28.4 PG (ref 26.5–33)
MCH RBC QN AUTO: 28.5 PG (ref 26.5–33)
MCHC RBC AUTO-ENTMCNC: 29.2 G/DL (ref 31.5–36.5)
MCHC RBC AUTO-ENTMCNC: 30.7 G/DL (ref 31.5–36.5)
MCHC RBC AUTO-ENTMCNC: 30.8 G/DL (ref 31.5–36.5)
MCV RBC AUTO: 93 FL (ref 78–100)
MCV RBC AUTO: 93 FL (ref 78–100)
MCV RBC AUTO: 95 FL (ref 78–100)
MONOCYTES # BLD AUTO: 0.8 10E3/UL (ref 0–1.3)
MONOCYTES NFR BLD AUTO: 5 %
MRSA DNA SPEC QL NAA+PROBE: NEGATIVE
MUCOUS THREADS #/AREA URNS LPF: PRESENT /LPF
NEUTROPHILS # BLD AUTO: 11 10E3/UL (ref 1.6–8.3)
NEUTROPHILS NFR BLD AUTO: 77 %
NITRATE UR QL: NEGATIVE
NOROVIRUS GI+II RNA STL QL NAA+NON-PROBE: NEGATIVE
NRBC # BLD AUTO: 0 10E3/UL
NRBC BLD AUTO-RTO: 0 /100
NT-PROBNP SERPL-MCNC: 509 PG/ML (ref 0–900)
O2/TOTAL GAS SETTING VFR VENT: 0 %
O2/TOTAL GAS SETTING VFR VENT: 21 %
OPIATES UR QL SCN: ABNORMAL
OXYHGB MFR BLDV: 75 % (ref 70–75)
OXYHGB MFR BLDV: 80 % (ref 70–75)
OXYHGB MFR BLDV: 81 % (ref 70–75)
OXYHGB MFR BLDV: 83 % (ref 70–75)
P SHIGELLOIDES DNA STL QL NAA+NON-PROBE: NEGATIVE
PCO2 BLDV: 25 MM HG (ref 40–50)
PCO2 BLDV: 25 MM HG (ref 40–50)
PCO2 BLDV: 27 MM HG (ref 40–50)
PCO2 BLDV: 28 MM HG (ref 40–50)
PCO2 BLDV: 29 MM HG (ref 40–50)
PCO2 BLDV: 35 MM HG (ref 40–50)
PCP QUAL URINE (ROCHE): ABNORMAL
PH BLDV: 7.03 [PH] (ref 7.32–7.43)
PH BLDV: 7.03 [PH] (ref 7.32–7.43)
PH BLDV: 7.08 [PH] (ref 7.32–7.43)
PH BLDV: 7.12 [PH] (ref 7.32–7.43)
PH BLDV: 7.21 [PH] (ref 7.32–7.43)
PH BLDV: 7.32 [PH] (ref 7.32–7.43)
PH UR STRIP: 5.5 [PH] (ref 5–7)
PHOSPHATE SERPL-MCNC: 3.1 MG/DL (ref 2.5–4.5)
PHOSPHATE SERPL-MCNC: 4.5 MG/DL (ref 2.5–4.5)
PLATELET # BLD AUTO: 265 10E3/UL (ref 150–450)
PLATELET # BLD AUTO: 307 10E3/UL (ref 150–450)
PLATELET # BLD AUTO: 359 10E3/UL (ref 150–450)
PO2 BLDV: 42 MM HG (ref 25–47)
PO2 BLDV: 48 MM HG (ref 25–47)
PO2 BLDV: 65 MM HG (ref 25–47)
PO2 BLDV: 66 MM HG (ref 25–47)
PO2 BLDV: 70 MM HG (ref 25–47)
PO2 BLDV: 74 MM HG (ref 25–47)
POTASSIUM SERPL-SCNC: 4.2 MMOL/L (ref 3.4–5.3)
POTASSIUM SERPL-SCNC: 4.2 MMOL/L (ref 3.4–5.3)
POTASSIUM SERPL-SCNC: 4.3 MMOL/L (ref 3.4–5.3)
POTASSIUM SERPL-SCNC: 5.3 MMOL/L (ref 3.4–5.3)
POTASSIUM SERPL-SCNC: 5.4 MMOL/L (ref 3.4–5.3)
POTASSIUM SERPL-SCNC: 5.6 MMOL/L (ref 3.4–5.3)
PROCALCITONIN SERPL IA-MCNC: 0.1 NG/ML
PROT SERPL-MCNC: 6.2 G/DL (ref 6.4–8.3)
RBC # BLD AUTO: 3.09 10E6/UL (ref 4.4–5.9)
RBC # BLD AUTO: 3.2 10E6/UL (ref 4.4–5.9)
RBC # BLD AUTO: 3.87 10E6/UL (ref 4.4–5.9)
RBC URINE: <1 /HPF
RSV RNA SPEC NAA+PROBE: NEGATIVE
RVA RNA STL QL NAA+NON-PROBE: NEGATIVE
SA TARGET DNA: NEGATIVE
SALICYLATES SERPL-MCNC: <0.3 MG/DL
SALMONELLA SP RPOD STL QL NAA+PROBE: NEGATIVE
SAO2 % BLDV: 68 % (ref 70–75)
SAO2 % BLDV: 75.8 % (ref 70–75)
SAO2 % BLDV: 81.3 % (ref 70–75)
SAO2 % BLDV: 82 % (ref 70–75)
SAO2 % BLDV: 84.8 % (ref 70–75)
SAO2 % BLDV: 85 % (ref 70–75)
SAPO I+II+IV+V RNA STL QL NAA+NON-PROBE: NEGATIVE
SARS-COV-2 RNA RESP QL NAA+PROBE: NEGATIVE
SHIGELLA SP+EIEC IPAH ST NAA+NON-PROBE: NEGATIVE
SODIUM SERPL-SCNC: 133 MMOL/L (ref 135–145)
SODIUM SERPL-SCNC: 134 MMOL/L (ref 135–145)
SODIUM SERPL-SCNC: 135 MMOL/L (ref 135–145)
SODIUM SERPL-SCNC: 137 MMOL/L (ref 135–145)
SODIUM SERPL-SCNC: 138 MMOL/L (ref 135–145)
SODIUM SERPL-SCNC: 138 MMOL/L (ref 135–145)
SP GR UR STRIP: 1.02 (ref 1–1.03)
SQUAMOUS EPITHELIAL: <1 /HPF
TROPONIN T SERPL HS-MCNC: 66 NG/L
TROPONIN T SERPL HS-MCNC: 67 NG/L
TROPONIN T SERPL HS-MCNC: 69 NG/L
TROPONIN T SERPL HS-MCNC: 77 NG/L
TSH SERPL DL<=0.005 MIU/L-ACNC: 0.32 UIU/ML (ref 0.3–4.2)
UROBILINOGEN UR STRIP-MCNC: NORMAL MG/DL
V CHOLERAE DNA SPEC QL NAA+PROBE: NEGATIVE
VIBRIO DNA SPEC NAA+PROBE: NEGATIVE
WBC # BLD AUTO: 13.6 10E3/UL (ref 4–11)
WBC # BLD AUTO: 14.3 10E3/UL (ref 4–11)
WBC # BLD AUTO: 15.6 10E3/UL (ref 4–11)
WBC URINE: 1 /HPF
Y ENTEROCOL DNA STL QL NAA+PROBE: NEGATIVE

## 2024-08-04 PROCEDURE — 250N000009 HC RX 250: Performed by: EMERGENCY MEDICINE

## 2024-08-04 PROCEDURE — 71045 X-RAY EXAM CHEST 1 VIEW: CPT

## 2024-08-04 PROCEDURE — 87640 STAPH A DNA AMP PROBE: CPT | Performed by: INTERNAL MEDICINE

## 2024-08-04 PROCEDURE — 36556 INSERT NON-TUNNEL CV CATH: CPT

## 2024-08-04 PROCEDURE — 82010 KETONE BODYS QUAN: CPT | Performed by: INTERNAL MEDICINE

## 2024-08-04 PROCEDURE — 82962 GLUCOSE BLOOD TEST: CPT

## 2024-08-04 PROCEDURE — 82565 ASSAY OF CREATININE: CPT | Performed by: ANESTHESIOLOGY

## 2024-08-04 PROCEDURE — 83605 ASSAY OF LACTIC ACID: CPT | Performed by: EMERGENCY MEDICINE

## 2024-08-04 PROCEDURE — 87186 SC STD MICRODIL/AGAR DIL: CPT | Performed by: EMERGENCY MEDICINE

## 2024-08-04 PROCEDURE — 36415 COLL VENOUS BLD VENIPUNCTURE: CPT | Performed by: EMERGENCY MEDICINE

## 2024-08-04 PROCEDURE — 250N000011 HC RX IP 250 OP 636: Performed by: HOSPITALIST

## 2024-08-04 PROCEDURE — 87493 C DIFF AMPLIFIED PROBE: CPT | Performed by: INTERNAL MEDICINE

## 2024-08-04 PROCEDURE — 82803 BLOOD GASES ANY COMBINATION: CPT

## 2024-08-04 PROCEDURE — 99254 IP/OBS CNSLTJ NEW/EST MOD 60: CPT | Performed by: SURGERY

## 2024-08-04 PROCEDURE — 74177 CT ABD & PELVIS W/CONTRAST: CPT

## 2024-08-04 PROCEDURE — 258N000003 HC RX IP 258 OP 636: Performed by: INTERNAL MEDICINE

## 2024-08-04 PROCEDURE — 80053 COMPREHEN METABOLIC PANEL: CPT | Performed by: EMERGENCY MEDICINE

## 2024-08-04 PROCEDURE — 93306 TTE W/DOPPLER COMPLETE: CPT

## 2024-08-04 PROCEDURE — 83605 ASSAY OF LACTIC ACID: CPT | Performed by: INTERNAL MEDICINE

## 2024-08-04 PROCEDURE — 5A1D90Z PERFORMANCE OF URINARY FILTRATION, CONTINUOUS, GREATER THAN 18 HOURS PER DAY: ICD-10-PCS | Performed by: INTERNAL MEDICINE

## 2024-08-04 PROCEDURE — 83605 ASSAY OF LACTIC ACID: CPT

## 2024-08-04 PROCEDURE — 250N000009 HC RX 250: Performed by: INTERNAL MEDICINE

## 2024-08-04 PROCEDURE — 250N000011 HC RX IP 250 OP 636: Performed by: EMERGENCY MEDICINE

## 2024-08-04 PROCEDURE — 80320 DRUG SCREEN QUANTALCOHOLS: CPT | Performed by: EMERGENCY MEDICINE

## 2024-08-04 PROCEDURE — 85027 COMPLETE CBC AUTOMATED: CPT | Performed by: INTERNAL MEDICINE

## 2024-08-04 PROCEDURE — 250N000009 HC RX 250: Performed by: ANESTHESIOLOGY

## 2024-08-04 PROCEDURE — 36415 COLL VENOUS BLD VENIPUNCTURE: CPT

## 2024-08-04 PROCEDURE — 80048 BASIC METABOLIC PNL TOTAL CA: CPT | Performed by: INTERNAL MEDICINE

## 2024-08-04 PROCEDURE — 93306 TTE W/DOPPLER COMPLETE: CPT | Mod: 26 | Performed by: INTERNAL MEDICINE

## 2024-08-04 PROCEDURE — 96365 THER/PROPH/DIAG IV INF INIT: CPT | Mod: 59

## 2024-08-04 PROCEDURE — 96375 TX/PRO/DX INJ NEW DRUG ADDON: CPT

## 2024-08-04 PROCEDURE — 82805 BLOOD GASES W/O2 SATURATION: CPT | Performed by: INTERNAL MEDICINE

## 2024-08-04 PROCEDURE — 99291 CRITICAL CARE FIRST HOUR: CPT | Mod: 25

## 2024-08-04 PROCEDURE — 258N000003 HC RX IP 258 OP 636: Performed by: HOSPITALIST

## 2024-08-04 PROCEDURE — 83735 ASSAY OF MAGNESIUM: CPT | Performed by: INTERNAL MEDICINE

## 2024-08-04 PROCEDURE — 93005 ELECTROCARDIOGRAM TRACING: CPT

## 2024-08-04 PROCEDURE — 83880 ASSAY OF NATRIURETIC PEPTIDE: CPT | Performed by: EMERGENCY MEDICINE

## 2024-08-04 PROCEDURE — 85379 FIBRIN DEGRADATION QUANT: CPT | Performed by: EMERGENCY MEDICINE

## 2024-08-04 PROCEDURE — 99255 IP/OBS CONSLTJ NEW/EST HI 80: CPT | Performed by: INTERNAL MEDICINE

## 2024-08-04 PROCEDURE — 84484 ASSAY OF TROPONIN QUANT: CPT | Performed by: HOSPITALIST

## 2024-08-04 PROCEDURE — 80143 DRUG ASSAY ACETAMINOPHEN: CPT | Performed by: HOSPITALIST

## 2024-08-04 PROCEDURE — 82010 KETONE BODYS QUAN: CPT | Performed by: ANESTHESIOLOGY

## 2024-08-04 PROCEDURE — 85025 COMPLETE CBC W/AUTO DIFF WBC: CPT | Performed by: EMERGENCY MEDICINE

## 2024-08-04 PROCEDURE — 84100 ASSAY OF PHOSPHORUS: CPT | Performed by: INTERNAL MEDICINE

## 2024-08-04 PROCEDURE — 84443 ASSAY THYROID STIM HORMONE: CPT | Performed by: HOSPITALIST

## 2024-08-04 PROCEDURE — 90947 DIALYSIS REPEATED EVAL: CPT

## 2024-08-04 PROCEDURE — 3E043XZ INTRODUCTION OF VASOPRESSOR INTO CENTRAL VEIN, PERCUTANEOUS APPROACH: ICD-10-PCS | Performed by: STUDENT IN AN ORGANIZED HEALTH CARE EDUCATION/TRAINING PROGRAM

## 2024-08-04 PROCEDURE — 258N000003 HC RX IP 258 OP 636: Performed by: EMERGENCY MEDICINE

## 2024-08-04 PROCEDURE — 99291 CRITICAL CARE FIRST HOUR: CPT | Mod: 25 | Performed by: ANESTHESIOLOGY

## 2024-08-04 PROCEDURE — 250N000011 HC RX IP 250 OP 636: Performed by: INTERNAL MEDICINE

## 2024-08-04 PROCEDURE — 82330 ASSAY OF CALCIUM: CPT | Performed by: INTERNAL MEDICINE

## 2024-08-04 PROCEDURE — 83605 ASSAY OF LACTIC ACID: CPT | Performed by: ANESTHESIOLOGY

## 2024-08-04 PROCEDURE — 82805 BLOOD GASES W/O2 SATURATION: CPT | Performed by: ANESTHESIOLOGY

## 2024-08-04 PROCEDURE — 87637 SARSCOV2&INF A&B&RSV AMP PRB: CPT | Performed by: EMERGENCY MEDICINE

## 2024-08-04 PROCEDURE — 85027 COMPLETE CBC AUTOMATED: CPT | Performed by: ANESTHESIOLOGY

## 2024-08-04 PROCEDURE — 87507 IADNA-DNA/RNA PROBE TQ 12-25: CPT | Performed by: INTERNAL MEDICINE

## 2024-08-04 PROCEDURE — 81001 URINALYSIS AUTO W/SCOPE: CPT | Performed by: EMERGENCY MEDICINE

## 2024-08-04 PROCEDURE — 36415 COLL VENOUS BLD VENIPUNCTURE: CPT | Performed by: HOSPITALIST

## 2024-08-04 PROCEDURE — 250N000009 HC RX 250: Performed by: HOSPITALIST

## 2024-08-04 PROCEDURE — 84484 ASSAY OF TROPONIN QUANT: CPT | Performed by: EMERGENCY MEDICINE

## 2024-08-04 PROCEDURE — 80307 DRUG TEST PRSMV CHEM ANLYZR: CPT | Performed by: EMERGENCY MEDICINE

## 2024-08-04 PROCEDURE — 82077 ASSAY SPEC XCP UR&BREATH IA: CPT | Performed by: EMERGENCY MEDICINE

## 2024-08-04 PROCEDURE — 80179 DRUG ASSAY SALICYLATE: CPT | Performed by: HOSPITALIST

## 2024-08-04 PROCEDURE — 82310 ASSAY OF CALCIUM: CPT | Performed by: EMERGENCY MEDICINE

## 2024-08-04 PROCEDURE — 83036 HEMOGLOBIN GLYCOSYLATED A1C: CPT | Performed by: HOSPITALIST

## 2024-08-04 PROCEDURE — 96361 HYDRATE IV INFUSION ADD-ON: CPT

## 2024-08-04 PROCEDURE — 200N000001 HC R&B ICU

## 2024-08-04 PROCEDURE — 258N000003 HC RX IP 258 OP 636: Performed by: ANESTHESIOLOGY

## 2024-08-04 PROCEDURE — 83605 ASSAY OF LACTIC ACID: CPT | Performed by: HOSPITALIST

## 2024-08-04 PROCEDURE — 83550 IRON BINDING TEST: CPT | Performed by: HOSPITALIST

## 2024-08-04 PROCEDURE — 82010 KETONE BODYS QUAN: CPT | Performed by: EMERGENCY MEDICINE

## 2024-08-04 PROCEDURE — 87641 MR-STAPH DNA AMP PROBE: CPT | Performed by: INTERNAL MEDICINE

## 2024-08-04 PROCEDURE — 99223 1ST HOSP IP/OBS HIGH 75: CPT | Mod: AI | Performed by: HOSPITALIST

## 2024-08-04 PROCEDURE — 84145 PROCALCITONIN (PCT): CPT | Performed by: HOSPITALIST

## 2024-08-04 PROCEDURE — 99291 CRITICAL CARE FIRST HOUR: CPT | Performed by: INTERNAL MEDICINE

## 2024-08-04 PROCEDURE — 82805 BLOOD GASES W/O2 SATURATION: CPT | Performed by: HOSPITALIST

## 2024-08-04 PROCEDURE — 87149 DNA/RNA DIRECT PROBE: CPT | Performed by: EMERGENCY MEDICINE

## 2024-08-04 PROCEDURE — 83735 ASSAY OF MAGNESIUM: CPT | Performed by: EMERGENCY MEDICINE

## 2024-08-04 RX ORDER — ONDANSETRON 2 MG/ML
4 INJECTION INTRAMUSCULAR; INTRAVENOUS EVERY 6 HOURS PRN
Status: DISCONTINUED | OUTPATIENT
Start: 2024-08-04 | End: 2024-08-07 | Stop reason: HOSPADM

## 2024-08-04 RX ORDER — CALCIUM GLUCONATE 20 MG/ML
2 INJECTION, SOLUTION INTRAVENOUS EVERY 8 HOURS PRN
Status: DISCONTINUED | OUTPATIENT
Start: 2024-08-04 | End: 2024-08-06

## 2024-08-04 RX ORDER — AMOXICILLIN 250 MG
1 CAPSULE ORAL 2 TIMES DAILY PRN
Status: DISCONTINUED | OUTPATIENT
Start: 2024-08-04 | End: 2024-08-07 | Stop reason: HOSPADM

## 2024-08-04 RX ORDER — POTASSIUM CHLORIDE 29.8 MG/ML
20 INJECTION INTRAVENOUS EVERY 8 HOURS PRN
Status: DISCONTINUED | OUTPATIENT
Start: 2024-08-04 | End: 2024-08-06

## 2024-08-04 RX ORDER — AMOXICILLIN 250 MG
1 CAPSULE ORAL 2 TIMES DAILY PRN
COMMUNITY
Start: 2024-07-23

## 2024-08-04 RX ORDER — POLYETHYLENE GLYCOL 400 AND PROPYLENE GLYCOL 4; 3 MG/ML; MG/ML
1 SOLUTION/ DROPS OPHTHALMIC 2 TIMES DAILY PRN
Status: ON HOLD | COMMUNITY
End: 2024-08-04

## 2024-08-04 RX ORDER — NICOTINE POLACRILEX 4 MG
15-30 LOZENGE BUCCAL
Status: DISCONTINUED | OUTPATIENT
Start: 2024-08-04 | End: 2024-08-04

## 2024-08-04 RX ORDER — DEXTROSE MONOHYDRATE 25 G/50ML
25-50 INJECTION, SOLUTION INTRAVENOUS
Status: DISCONTINUED | OUTPATIENT
Start: 2024-08-04 | End: 2024-08-04

## 2024-08-04 RX ORDER — CALCIUM CHLORIDE, MAGNESIUM CHLORIDE, DEXTROSE MONOHYDRATE, LACTIC ACID, SODIUM CHLORIDE, SODIUM BICARBONATE AND POTASSIUM CHLORIDE 5.15; 2.03; 22; 5.4; 6.46; 3.09; .157 G/L; G/L; G/L; G/L; G/L; G/L; G/L
INJECTION INTRAVENOUS CONTINUOUS
Status: DISCONTINUED | OUTPATIENT
Start: 2024-08-04 | End: 2024-08-05

## 2024-08-04 RX ORDER — PIPERACILLIN SODIUM, TAZOBACTAM SODIUM 3; .375 G/15ML; G/15ML
3.38 INJECTION, POWDER, LYOPHILIZED, FOR SOLUTION INTRAVENOUS ONCE
Status: COMPLETED | OUTPATIENT
Start: 2024-08-04 | End: 2024-08-04

## 2024-08-04 RX ORDER — ONDANSETRON 2 MG/ML
4 INJECTION INTRAMUSCULAR; INTRAVENOUS ONCE
Status: COMPLETED | OUTPATIENT
Start: 2024-08-04 | End: 2024-08-04

## 2024-08-04 RX ORDER — IOPAMIDOL 755 MG/ML
500 INJECTION, SOLUTION INTRAVASCULAR ONCE
Status: COMPLETED | OUTPATIENT
Start: 2024-08-04 | End: 2024-08-04

## 2024-08-04 RX ORDER — MAGNESIUM SULFATE HEPTAHYDRATE 40 MG/ML
2 INJECTION, SOLUTION INTRAVENOUS EVERY 8 HOURS PRN
Status: DISCONTINUED | OUTPATIENT
Start: 2024-08-04 | End: 2024-08-06

## 2024-08-04 RX ORDER — CALCIUM CHLORIDE, MAGNESIUM CHLORIDE, DEXTROSE MONOHYDRATE, LACTIC ACID, SODIUM CHLORIDE, SODIUM BICARBONATE AND POTASSIUM CHLORIDE 5.15; 2.03; 22; 5.4; 6.46; 3.09; .157 G/L; G/L; G/L; G/L; G/L; G/L; G/L
INJECTION INTRAVENOUS CONTINUOUS
Status: DISCONTINUED | OUTPATIENT
Start: 2024-08-04 | End: 2024-08-04

## 2024-08-04 RX ORDER — VANCOMYCIN 1.75 GRAM/500 ML IN 0.9 % SODIUM CHLORIDE INTRAVENOUS
1750 ONCE
Status: COMPLETED | OUTPATIENT
Start: 2024-08-04 | End: 2024-08-04

## 2024-08-04 RX ORDER — SODIUM CHLORIDE, SODIUM LACTATE, POTASSIUM CHLORIDE, CALCIUM CHLORIDE 600; 310; 30; 20 MG/100ML; MG/100ML; MG/100ML; MG/100ML
INJECTION, SOLUTION INTRAVENOUS CONTINUOUS
Status: DISCONTINUED | OUTPATIENT
Start: 2024-08-04 | End: 2024-08-04

## 2024-08-04 RX ORDER — LANOLIN ALCOHOL/MO/W.PET/CERES
CREAM (GRAM) TOPICAL 2 TIMES DAILY PRN
COMMUNITY

## 2024-08-04 RX ORDER — BISACODYL 10 MG
10 SUPPOSITORY, RECTAL RECTAL DAILY PRN
Status: DISCONTINUED | OUTPATIENT
Start: 2024-08-04 | End: 2024-08-07 | Stop reason: HOSPADM

## 2024-08-04 RX ORDER — NICOTINE POLACRILEX 4 MG
15-30 LOZENGE BUCCAL
Status: DISCONTINUED | OUTPATIENT
Start: 2024-08-04 | End: 2024-08-07 | Stop reason: HOSPADM

## 2024-08-04 RX ORDER — PIPERACILLIN SODIUM, TAZOBACTAM SODIUM 4; .5 G/20ML; G/20ML
4.5 INJECTION, POWDER, LYOPHILIZED, FOR SOLUTION INTRAVENOUS EVERY 6 HOURS
Status: DISCONTINUED | OUTPATIENT
Start: 2024-08-04 | End: 2024-08-05

## 2024-08-04 RX ORDER — DEXTROSE MONOHYDRATE 100 MG/ML
INJECTION, SOLUTION INTRAVENOUS CONTINUOUS PRN
Status: DISCONTINUED | OUTPATIENT
Start: 2024-08-04 | End: 2024-08-06

## 2024-08-04 RX ORDER — NOREPINEPHRINE BITARTRATE 0.02 MG/ML
.01-.6 INJECTION, SOLUTION INTRAVENOUS CONTINUOUS
Status: DISCONTINUED | OUTPATIENT
Start: 2024-08-04 | End: 2024-08-06

## 2024-08-04 RX ORDER — POLYETHYLENE GLYCOL 3350 17 G/17G
17 POWDER, FOR SOLUTION ORAL DAILY PRN
Status: DISCONTINUED | OUTPATIENT
Start: 2024-08-04 | End: 2024-08-07 | Stop reason: HOSPADM

## 2024-08-04 RX ORDER — ONDANSETRON 4 MG/1
4 TABLET, ORALLY DISINTEGRATING ORAL EVERY 6 HOURS PRN
Status: DISCONTINUED | OUTPATIENT
Start: 2024-08-04 | End: 2024-08-07 | Stop reason: HOSPADM

## 2024-08-04 RX ORDER — DEXTROSE MONOHYDRATE AND SODIUM CHLORIDE 5; .9 G/100ML; G/100ML
INJECTION, SOLUTION INTRAVENOUS CONTINUOUS
Status: DISCONTINUED | OUTPATIENT
Start: 2024-08-04 | End: 2024-08-07 | Stop reason: HOSPADM

## 2024-08-04 RX ORDER — AMOXICILLIN 250 MG
2 CAPSULE ORAL 2 TIMES DAILY PRN
Status: DISCONTINUED | OUTPATIENT
Start: 2024-08-04 | End: 2024-08-07 | Stop reason: HOSPADM

## 2024-08-04 RX ORDER — PIPERACILLIN SODIUM, TAZOBACTAM SODIUM 3; .375 G/15ML; G/15ML
3.38 INJECTION, POWDER, LYOPHILIZED, FOR SOLUTION INTRAVENOUS EVERY 6 HOURS
Status: DISCONTINUED | OUTPATIENT
Start: 2024-08-04 | End: 2024-08-04

## 2024-08-04 RX ORDER — ONDANSETRON 4 MG/1
4 TABLET, ORALLY DISINTEGRATING ORAL EVERY 8 HOURS PRN
COMMUNITY

## 2024-08-04 RX ORDER — MAGNESIUM OXIDE 400 MG/1
400 TABLET ORAL DAILY
COMMUNITY

## 2024-08-04 RX ORDER — DEXTROSE MONOHYDRATE 25 G/50ML
25-50 INJECTION, SOLUTION INTRAVENOUS
Status: DISCONTINUED | OUTPATIENT
Start: 2024-08-04 | End: 2024-08-07 | Stop reason: HOSPADM

## 2024-08-04 RX ADMIN — PIPERACILLIN AND TAZOBACTAM 3.38 G: 3; .375 INJECTION, POWDER, FOR SOLUTION INTRAVENOUS at 10:44

## 2024-08-04 RX ADMIN — SODIUM BICARBONATE 50 MEQ: 84 INJECTION INTRAVENOUS at 16:22

## 2024-08-04 RX ADMIN — SODIUM CHLORIDE 1200 ML: 9 INJECTION, SOLUTION INTRAVENOUS at 04:33

## 2024-08-04 RX ADMIN — SODIUM BICARBONATE 25 MEQ: 84 INJECTION INTRAVENOUS at 10:42

## 2024-08-04 RX ADMIN — ONDANSETRON 4 MG: 2 INJECTION INTRAMUSCULAR; INTRAVENOUS at 03:00

## 2024-08-04 RX ADMIN — CALCIUM CHLORIDE, MAGNESIUM CHLORIDE, DEXTROSE MONOHYDRATE, LACTIC ACID, SODIUM CHLORIDE, SODIUM BICARBONATE AND POTASSIUM CHLORIDE 5000 ML: 5.15; 2.03; 22; 5.4; 6.46; 3.09; .157 INJECTION INTRAVENOUS at 18:29

## 2024-08-04 RX ADMIN — DEXTROSE AND SODIUM CHLORIDE: 5; 900 INJECTION, SOLUTION INTRAVENOUS at 06:03

## 2024-08-04 RX ADMIN — INSULIN HUMAN 2 UNITS/HR: 1 INJECTION, SOLUTION INTRAVENOUS at 23:43

## 2024-08-04 RX ADMIN — ONDANSETRON 4 MG: 2 INJECTION INTRAMUSCULAR; INTRAVENOUS at 06:17

## 2024-08-04 RX ADMIN — NOREPINEPHRINE BITARTRATE 0.1 MCG/KG/MIN: 0.02 INJECTION, SOLUTION INTRAVENOUS at 17:09

## 2024-08-04 RX ADMIN — PIPERACILLIN AND TAZOBACTAM 4.5 G: 4; .5 INJECTION, POWDER, FOR SOLUTION INTRAVENOUS at 22:11

## 2024-08-04 RX ADMIN — INSULIN HUMAN 3 UNITS/HR: 1 INJECTION, SOLUTION INTRAVENOUS at 12:12

## 2024-08-04 RX ADMIN — SODIUM CHLORIDE 1000 ML: 9 INJECTION, SOLUTION INTRAVENOUS at 06:11

## 2024-08-04 RX ADMIN — PIPERACILLIN AND TAZOBACTAM 4.5 G: 4; .5 INJECTION, POWDER, FOR SOLUTION INTRAVENOUS at 16:22

## 2024-08-04 RX ADMIN — VANCOMYCIN HYDROCHLORIDE 1750 MG: 10 INJECTION, POWDER, LYOPHILIZED, FOR SOLUTION INTRAVENOUS at 05:56

## 2024-08-04 RX ADMIN — PIPERACILLIN AND TAZOBACTAM 3.38 G: 3; .375 INJECTION, POWDER, FOR SOLUTION INTRAVENOUS at 04:34

## 2024-08-04 RX ADMIN — SODIUM BICARBONATE: 84 INJECTION, SOLUTION INTRAVENOUS at 20:48

## 2024-08-04 RX ADMIN — SODIUM CHLORIDE 1000 ML: 9 INJECTION, SOLUTION INTRAVENOUS at 03:00

## 2024-08-04 RX ADMIN — PANTOPRAZOLE SODIUM 40 MG: 40 INJECTION, POWDER, FOR SOLUTION INTRAVENOUS at 19:14

## 2024-08-04 RX ADMIN — IOPAMIDOL 70 ML: 755 INJECTION, SOLUTION INTRAVENOUS at 04:52

## 2024-08-04 RX ADMIN — MAGNESIUM SULFATE HEPTAHYDRATE 2 G: 2 INJECTION, SOLUTION INTRAVENOUS at 19:15

## 2024-08-04 RX ADMIN — SODIUM BICARBONATE 50 MEQ: 84 INJECTION INTRAVENOUS at 13:02

## 2024-08-04 RX ADMIN — SODIUM CHLORIDE 1000 ML: 9 INJECTION, SOLUTION INTRAVENOUS at 09:21

## 2024-08-04 RX ADMIN — CALCIUM CHLORIDE, MAGNESIUM CHLORIDE, DEXTROSE MONOHYDRATE, LACTIC ACID, SODIUM CHLORIDE, SODIUM BICARBONATE AND POTASSIUM CHLORIDE 5000 ML: 5.15; 2.03; 22; 5.4; 6.46; 3.09; .157 INJECTION INTRAVENOUS at 14:51

## 2024-08-04 RX ADMIN — NOREPINEPHRINE BITARTRATE 0.09 MCG/KG/MIN: 0.02 INJECTION, SOLUTION INTRAVENOUS at 23:43

## 2024-08-04 RX ADMIN — CALCIUM CHLORIDE, MAGNESIUM CHLORIDE, DEXTROSE MONOHYDRATE, LACTIC ACID, SODIUM CHLORIDE, SODIUM BICARBONATE AND POTASSIUM CHLORIDE 5000 ML: 5.15; 2.03; 22; 5.4; 6.46; 3.09; .157 INJECTION INTRAVENOUS at 20:06

## 2024-08-04 RX ADMIN — SODIUM CHLORIDE 78 ML: 9 INJECTION, SOLUTION INTRAVENOUS at 04:52

## 2024-08-04 RX ADMIN — NOREPINEPHRINE BITARTRATE 0.03 MCG/KG/MIN: 0.02 INJECTION, SOLUTION INTRAVENOUS at 07:06

## 2024-08-04 RX ADMIN — PANTOPRAZOLE SODIUM 40 MG: 40 INJECTION, POWDER, FOR SOLUTION INTRAVENOUS at 09:21

## 2024-08-04 RX ADMIN — ONDANSETRON 4 MG: 2 INJECTION INTRAMUSCULAR; INTRAVENOUS at 09:21

## 2024-08-04 RX ADMIN — SODIUM BICARBONATE: 84 INJECTION, SOLUTION INTRAVENOUS at 10:50

## 2024-08-04 RX ADMIN — PANTOPRAZOLE SODIUM 40 MG: 40 INJECTION, POWDER, FOR SOLUTION INTRAVENOUS at 04:39

## 2024-08-04 ASSESSMENT — ACTIVITIES OF DAILY LIVING (ADL)
ADLS_ACUITY_SCORE: 51
FALL_HISTORY_WITHIN_LAST_SIX_MONTHS: NO
ADLS_ACUITY_SCORE: 47
ADLS_ACUITY_SCORE: 47
ADLS_ACUITY_SCORE: 51
ADLS_ACUITY_SCORE: 38
ADLS_ACUITY_SCORE: 51
ADLS_ACUITY_SCORE: 38
ADLS_ACUITY_SCORE: 38
ADLS_ACUITY_SCORE: 47
ADLS_ACUITY_SCORE: 38
ADLS_ACUITY_SCORE: 47
ADLS_ACUITY_SCORE: 51
ADLS_ACUITY_SCORE: 51
ADLS_ACUITY_SCORE: 38
ADLS_ACUITY_SCORE: 51
CHANGE_IN_FUNCTIONAL_STATUS_SINCE_ONSET_OF_CURRENT_ILLNESS/INJURY: NO
WALKING_OR_CLIMBING_STAIRS_DIFFICULTY: NO
ADLS_ACUITY_SCORE: 38
ADLS_ACUITY_SCORE: 47
TOILETING_ISSUES: NO
ADLS_ACUITY_SCORE: 47
ADLS_ACUITY_SCORE: 51
ADLS_ACUITY_SCORE: 38
DRESSING/BATHING_DIFFICULTY: YES
DRESSING/BATHING: DRESSING DIFFICULTY, ASSISTANCE 1 PERSON
ADLS_ACUITY_SCORE: 49
ADLS_ACUITY_SCORE: 51

## 2024-08-04 ASSESSMENT — COLUMBIA-SUICIDE SEVERITY RATING SCALE - C-SSRS
6. HAVE YOU EVER DONE ANYTHING, STARTED TO DO ANYTHING, OR PREPARED TO DO ANYTHING TO END YOUR LIFE?: NO
2. HAVE YOU ACTUALLY HAD ANY THOUGHTS OF KILLING YOURSELF IN THE PAST MONTH?: NO
1. IN THE PAST MONTH, HAVE YOU WISHED YOU WERE DEAD OR WISHED YOU COULD GO TO SLEEP AND NOT WAKE UP?: NO

## 2024-08-04 NOTE — ED NOTES
"[facility]  ED Nurse Handoff Report    ED Chief complaint: Nausea, Vomiting, & Diarrhea  . ED Diagnosis:   Final diagnoses:   None       Allergies:   Allergies   Allergen Reactions    Alprazolam      Other reaction(s): *Unknown States \"I break out\"    Oxycodone-Acetaminophen      Other reaction(s): *Unknown, States \"I break out\"    Oxycodone-Acetaminophen Other (See Comments)     Other reaction(s): *Unknown, States \"I break out\"       Code Status: Full Code    Activity level - Baseline/Home:  independent.  Activity Level - Current:   assist of 1.   Lift room needed: No.   Bariatric: No   Needed: No   Isolation: No.   Infection: Not Applicable.     Respiratory status: Room air    Vital Signs (within 30 minutes):   Vitals:    08/04/24 0424 08/04/24 0425 08/04/24 0428 08/04/24 0430   BP:    126/66   Pulse:    115   Resp:       Temp:       TempSrc:       SpO2: 100%  100%    Weight:  73 kg (160 lb 15 oz)         Cardiac Rhythm:  ,      Pain level:    Patient confused: Yes.   Patient Falls Risk: patient and family education.   Elimination Status: Has voided     Patient Report - Initial Complaint: N/V/D.   Focused Assessment: Chief Complaint   Nausea, Vomiting, & Diarrhea        HPI   Benja Duong is a 59 year old male with a history of schizophrenia, diabetes, intellectual disability presenting with nausea, vomiting and diarrhea.  Patient presents from a group home and reportedly has had symptoms for the past day.  He reports roughly 12 episodes of emesis.  He is legally blind so cannot comment if there was blood.  He denies any fever, cough, sore throat, chest pain, abdominal pain or urinary complaints.  He does however on arrival to the ED complain of mild dyspnea.  No known sick contacts, suspicious food intake or recent hospitalizations.        Abnormal Results:   Labs Ordered and Resulted from Time of ED Arrival to Time of ED Departure   COMPREHENSIVE METABOLIC PANEL - Abnormal "       Result Value    Sodium 134 (*)     Potassium 5.3      Carbon Dioxide (CO2) 10 (*)     Anion Gap 45 (*)     Urea Nitrogen 39.9 (*)     Creatinine 2.95 (*)     GFR Estimate 24 (*)     Calcium 9.7      Chloride 79 (*)     Glucose 76      Alkaline Phosphatase 86      AST 10      ALT 11      Protein Total 6.2 (*)     Albumin 3.8      Bilirubin Total 0.2     CBC WITH PLATELETS AND DIFFERENTIAL - Abnormal    WBC Count 14.3 (*)     RBC Count 3.87 (*)     Hemoglobin 11.0 (*)     Hematocrit 35.8 (*)     MCV 93      MCH 28.4      MCHC 30.7 (*)     RDW 13.7      Platelet Count 359      % Neutrophils 77      % Lymphocytes 17      % Monocytes 5      % Eosinophils 0      % Basophils 0      % Immature Granulocytes 1      NRBCs per 100 WBC 0      Absolute Neutrophils 11.0 (*)     Absolute Lymphocytes 2.4      Absolute Monocytes 0.8      Absolute Eosinophils 0.0      Absolute Basophils 0.0      Absolute Immature Granulocytes 0.1      Absolute NRBCs 0.0     D DIMER QUANTITATIVE - Abnormal    D-Dimer Quantitative 0.96 (*)    TROPONIN T, HIGH SENSITIVITY - Abnormal    Troponin T, High Sensitivity 77 (*)    LACTIC ACID WHOLE BLOOD - Abnormal    Lactic Acid 21.0 (*)    ROUTINE UA WITH MICROSCOPIC - Abnormal    Color Urine Light Yellow      Appearance Urine Clear      Glucose Urine Negative      Bilirubin Urine Negative      Ketones Urine 20 (*)     Specific Gravity Urine 1.016      Blood Urine Negative      pH Urine 5.5      Protein Albumin Urine Negative      Urobilinogen Urine Normal      Nitrite Urine Negative      Leukocyte Esterase Urine Negative      Mucus Urine Present (*)     RBC Urine <1      WBC Urine 1      Squamous Epithelials Urine <1      Hyaline Casts Urine 10 (*)    GLUCOSE BY METER - Normal    GLUCOSE BY METER POCT 87     INFLUENZA A/B, RSV, & SARS-COV2 PCR - Normal    Influenza A PCR Negative      Influenza B PCR Negative      RSV PCR Negative      SARS CoV2 PCR Negative     ETHYL ALCOHOL LEVEL - Normal    Alcohol  ethyl <0.01     KETONE BETA-HYDROXYBUTYRATE QUANTITATIVE, RAPID   VOLATILES SCREEN   ETHYLENE GLYCOL   TROPONIN T, HIGH SENSITIVITY   MAGNESIUM   BLOOD CULTURE        CT Chest (PE) Abdomen Pelvis w Contrast    (Results Pending)       Treatments provided: See MAR. Frequent monitoring of pt.  Family Comments: N/a  OBS brochure/video discussed/provided to patient:  No  ED Medications:   Medications   piperacillin-tazobactam (ZOSYN) 3.375 g vial to attach to  mL bag (3.375 g Intravenous $New Bag 8/4/24 0434)   sodium chloride 0.9% BOLUS 1,200 mL (1,200 mLs Intravenous $New Bag 8/4/24 0433)   sodium chloride 0.9% BOLUS 1,000 mL (1,000 mLs Intravenous $New Bag 8/4/24 0300)   ondansetron (ZOFRAN) injection 4 mg (4 mg Intravenous $Given 8/4/24 0300)   pantoprazole (PROTONIX) IV push injection 40 mg (40 mg Intravenous $Given 8/4/24 0439)       Drips infusing:  No  For the majority of the shift this patient was Green.   Interventions performed were N/a.    Sepsis treatment initiated: No    Cares/treatment/interventions/medications to be completed following ED care: N/a    ED Nurse Name: Mariposa Still RN  4:40 AM

## 2024-08-04 NOTE — CONSULTS
[facility]  General Surgery Consultation           Benja Duong MRN# [id number]   YOB: 1965 Age: 59 year old      Date of Admission:  8/4/2024  Date of Consult: 8/4/2024     Reason for consult:            Lactic acidosis     Requesting physician:            Kimberley Huber MD            Assessment and Plan:   Patient is a 59 year old male with a PMH of polysubstance abuse, paranoid schizophrenia admitted to the hospital with nausea, vomiting, diarrhea, and hypovolemia with lactic acidosis. CT showed possible transverse and descending colitis, but very mild if present. Abdominal exam is benign. Suspect lactic acidosis due to severe hypovolemia. Stool studies pending    PLAN:  No indication for surgery  Continue aggressive fluid resuscitation  Please call if concerns come up otherwise will follow peripherally          Chief Complaint:     Chief Complaint   Patient presents with    Nausea, Vomiting, & Diarrhea            History of Present Illness:   Benja Duong is a 59 year old male who presented with PMH of polysubstance abuse, paranoid schizophrenia presented with nausea, vomiting, diarrhea. In the ER hypovolemic with lactic acidosis, KAVITA . Pt denies abdominal pain at bedside         Past Medical History:     Past Medical History:   Diagnosis Date    Depression     Diabetes mellitus     h/o Cocaine abuse     Homeless     Hypertension     Latent tuberculosis     Proliferative diabetic retinopathy     Schizophrenia           Past Surgical History:   No past surgical history on file.       Current Medications:         Current Facility-Administered Medications   Medication Dose Route Frequency Provider Last Rate Last Admin    [Held by provider] insulin aspart (NovoLOG) injection (RAPID ACTING)  1-7 Units Subcutaneous Q4H Reagan Bonilla DO        pantoprazole (PROTONIX) IV push injection 40 mg  40 mg Intravenous BID Reagan Bonilla DO   40 mg at 08/04/24 0930     piperacillin-tazobactam (ZOSYN) 3.375 g vial to attach to  mL bag  3.375 g Intravenous Q6H Reagan Bonilla DO        sodium bicarbonate 8.4 % injection 25 mEq  25 mEq Intravenous Once Jessica Stern MD        sodium chloride 0.9% BOLUS 1,000 mL  1,000 mL Intravenous Once Dave Huber DO 1,000 mL/hr at 08/04/24 0921 1,000 mL at 08/04/24 0921     Current Facility-Administered Medications   Medication Dose Route Frequency Provider Last Rate Last Admin    bisacodyl (DULCOLAX) suppository 10 mg  10 mg Rectal Daily PRN Reagan Bonilla DO        glucose gel 15-30 g  15-30 g Oral Q15 Min PRN Reagan Bonilla DO        Or    dextrose 50 % injection 25-50 mL  25-50 mL Intravenous Q15 Min PRN Reagan Bonilla DO        Or    glucagon injection 1 mg  1 mg Subcutaneous Q15 Min PRN Reagan Bonilla DO        ondansetron (ZOFRAN ODT) ODT tab 4 mg  4 mg Oral Q6H PRN Reagan Bonilla DO        Or    ondansetron (ZOFRAN) injection 4 mg  4 mg Intravenous Q6H PRN Reagan Bonilla DO   4 mg at 08/04/24 0921    polyethylene glycol (MIRALAX) Packet 17 g  17 g Oral Daily PRN Reagan Bonilla DO        senna-docusate (SENOKOT-S/PERICOLACE) 8.6-50 MG per tablet 1 tablet  1 tablet Oral BID PRN Reagan Bonilla DO        Or    senna-docusate (SENOKOT-S/PERICOLACE) 8.6-50 MG per tablet 2 tablet  2 tablet Oral BID PRN Reagan Bonilla DO              Home Medications:     Prior to Admission medications    Medication Sig Last Dose Taking? Auth Provider Long Term End Date   acetaminophen (TYLENOL) 325 MG tablet Take 650 mg by mouth every 4 hours as needed for mild pain   Unknown, Entered By History     atorvastatin (LIPITOR) 20 MG tablet Take 20 mg by mouth At Bedtime   Unknown, Entered By History Yes    carvedilol (COREG) 12.5 MG tablet Take 12.5 mg by mouth 2 times daily (with meals)   Unknown, Entered By History No    cholecalciferol (VITAMIN D-1000 MAX ST) 1000 units TABS Take 1,000 Units by  mouth daily   Paul Andrews MD     diphenhydrAMINE (BENADRYL) 50 MG capsule Take 50 mg by mouth every 4 hours as needed for itching or allergies   Unknown, Entered By History     diphenhydrAMINE (BENADRYL) 50 MG capsule Take 50 mg by mouth At Bedtime   Unknown, Entered By History     exenatide ER (BYDUREON) 2 MG recon vial kit susp for weekly inj Inject 2 mg Subcutaneous every 14 days DO not administer until he is seen by his PCP.   Benton Whitman MD Yes    glucose (BD GLUCOSE) 4 g chewable tablet Take 4 tablets by mouth as needed for low blood sugar   Unknown, Entered By History     hydrALAZINE (APRESOLINE) 25 MG tablet Take 25 mg by mouth 3 times daily Hold for SBP <120   Unknown, Entered By History No    hypromellose (GENTEAL SEVERE) ophthalmic gel 0.3% Place 1 drop into both eyes 2 times daily as needed for dry eyes   Unknown, Entered By History     insulin glargine (LANTUS PEN) 100 UNIT/ML pen Inject 16 Units Subcutaneous at bedtime   Unknown, Entered By History No    lisinopril (ZESTRIL) 40 MG tablet Take 40 mg by mouth daily   Unknown, Entered By History No    melatonin 5 MG tablet Take 5 mg by mouth daily as needed for sleep   Unknown, Entered By History     metFORMIN (GLUCOPHAGE) 1000 MG tablet Take 2,000 mg by mouth every morning   Unknown, Entered By History Yes    ondansetron (ZOFRAN) 4 MG tablet Take 4 mg by mouth every 8 hours as needed for nausea   Unknown, Entered By History     paliperidone (INVEGA SUSTENNA) 156 MG/ML PABLO injection Inject 156 mg into the muscle every 28 days   Unknown, Entered By History Yes    pantoprazole (PROTONIX) 40 MG EC tablet Take 40 mg by mouth daily   Unknown, Entered By History     tamsulosin (FLOMAX) 0.4 MG capsule Take 1 capsule (0.4 mg) by mouth daily   Garth Gutierrez MD     traZODone (DESYREL) 50 MG tablet Take 50 mg by mouth nightly as needed for sleep   Unknown, Entered By History     hydrochlorothiazide (HYDRODIURIL) 25 MG tablet Take 1 tablet (25  "mg) by mouth daily DO not resume until Na is back to normal or PCP orders   Benton Whitman MD Yes 4/20/22   insulin aspart (NOVOLOG FLEXPEN) 100 UNIT/ML pen Novolog Flexpen 3units with breakfast 5 units with lunch 3units with dinner Hold for blood sugar <100, call MD when sugars>400   Ines Landis MD Yes 4/21/22          Allergies:     Allergies   Allergen Reactions    Alprazolam      Other reaction(s): *Unknown States \"I break out\"    Oxycodone-Acetaminophen      Other reaction(s): *Unknown, States \"I break out\"    Oxycodone-Acetaminophen Other (See Comments)     Other reaction(s): *Unknown, States \"I break out\"          Family History:   No family history on file.      Social History:   Benja Duong  reports current alcohol use. He reports current drug use. Drug: \"Crack\" cocaine.        Review of Systems:   The 10 point Review of Systems is negative other than noted in the HPI.            Physical Exam:   Blood pressure 108/46, pulse 106, temperature (!) 95.9  F (35.5  C), temperature source Temporal, resp. rate 12, height 1.702 m (5' 7\"), weight 73.3 kg (161 lb 9.6 oz), SpO2 99%.  161 lbs 9.55 oz Body mass index is 25.31 kg/m .  General: Generally appears well, laying on side, comfortable  Psych: Oriented  Neurological: non-focal, moves extremities symmetrically, grossly normal strength and sensation  Eyes: Sclera clear  Respiratory:  no dyspnea on RA  GI: Abdomen Non Distended Soft Non-Tender entire abdomen  No rebound or guarding  MSK: extremities without edema  Integumentary:  No rashes           Labs/Imaging   Labs: I have reviewed the encounter labs     Lab Results   Component Value Date    WBC 14.3 08/04/2024    WBC 11.3 10/04/2020     Lab Results   Component Value Date    HGB 11.0 08/04/2024    HGB 9.3 10/04/2020     Lab Results   Component Value Date     08/04/2024     10/04/2020     Last Basic Metabolic Panel:  Lab Results   Component Value Date     08/04/2024     " 10/04/2020      Lab Results   Component Value Date    POTASSIUM 5.4 08/04/2024    POTASSIUM 3.4 04/20/2022    POTASSIUM 3.5 10/04/2020     Lab Results   Component Value Date    CHLORIDE 82 08/04/2024    CHLORIDE 101 04/20/2022    CHLORIDE 104 10/04/2020     Lab Results   Component Value Date    MITCHEL 9.3 08/04/2024    MITCHEL 8.8 10/04/2020     Lab Results   Component Value Date    CO2 8 08/04/2024    CO2 28 04/20/2022    CO2 26 10/04/2020     Lab Results   Component Value Date    BUN 40.1 08/04/2024    BUN 8 04/20/2022    BUN 3 10/04/2020     Lab Results   Component Value Date    CR 2.88 08/04/2024    CR 0.64 10/04/2020     Lab Results   Component Value Date     08/04/2024    GLC 74 08/04/2024     04/20/2022     10/04/2020     Imaging Studies: I have personally reviewed the imaging  Results for orders placed or performed during the hospital encounter of 08/04/24   CT Chest (PE) Abdomen Pelvis w Contrast    Narrative    EXAM: CT CHEST PE, ABDOMEN AND PELVIS WITH CONTRAST  LOCATION: [facility]  DATE: 08/04/2024    INDICATION: Shortness of breath, abdominal pain, nausea, vomiting, diarrhea; lactate 20.  COMPARISON: Chest CTA on 04/27/2023.  TECHNIQUE: CT chest pulmonary angiogram and routine CT abdomen pelvis with IV contrast. Arterial phase through the chest and venous phase through the abdomen and pelvis. Multiplanar reformats and MIP reconstructions were performed. Dose reduction   techniques were used.   CONTRAST: 70 mL Isovue 370.    FINDINGS:  ANGIOGRAM CHEST: No evidence of pulmonary embolism. No evidence of thoracic aortic aneurysm or dissection. No evidence for right heart strain.     MEDIASTINUM/AXILLAE: No cardiomegaly or significant pericardial effusion. No significant mediastinal or hilar lymphadenopathy. Diffuse esophageal wall thickening predominantly of the mid and distal esophagus, worrisome for underlying esophagitis.   Esophagus appears fluid-filled.  Heterogenous thyroid parenchyma.    LUNGS AND PLEURA: No pleural effusion or pneumothorax. There is approximately 1.1 x 0.8 cm right upper lobe nodule (series 7 image 62), not significantly changed as compared to 04/27/2023 exam.    CORONARY ARTERY CALCIFICATION: Moderate.    ABDOMEN/PELVIS:    HEPATOBILIARY: No suspicious focal hepatic lesion. Distended gallbladder with layering gallstones.    PANCREAS: No main pancreatic ductal dilatation or definite solid pancreatic mass.    SPLEEN: No splenomegaly.    ADRENAL GLANDS: No adrenal nodules.    KIDNEYS/BLADDER: No radiodense kidney/ureteral stones or hydronephrosis in either kidney. A few small right kidney stones. No left kidney stone. No ureteral stone or hydronephrosis in either kidney. A few hypodense cystic foci in the kidneys, some can be   characterized as renal cysts including 1.7 cm cyst at the upper pole of the right kidney, 2 cm renal cyst at the mid/upper pole of the right kidney while a few others are subcentimeter and too small to characterize. Diffuse urinary bladder wall   thickening, nonspecific, can be seen with underdistention versus chronic cystitis.    BOWEL: Significant gastric distention. No abnormally dilated small bowel loops. The appendix is visualized and appears normal. Scattered colonic diverticulosis predominantly of the right colon. Mild apparent colonic wall thickening of the transverse and   descending colon, likely due to underdistention versus less likely underlying colitis.    PERITONEUM: No evidence of free fluid in the abdomen and pelvis. No free peritoneal or portal venous gas.    PELVIC ORGANS: Unremarkable.    VASCULATURE: Moderate atherosclerotic vascular calcification of the abdominal aorta and iliac arteries.    LYMPH NODES: Unremarkable.    MUSCULOSKELETAL: Moderate-sized fat-containing supraumbilical and small fat-containing umbilical hernia. Multilevel degenerative changes of the spine.      Impression    IMPRESSION:  1.   No evidence of pulmonary embolism.  2.  Diffuse esophageal wall thickening predominantly of the mid and distal esophagus, worrisome for esophagitis. The esophagus appears fluid-filled.  3.  Significant fluid distention of the stomach. No abnormally dilated small bowel loops.  4.  Distended gallbladder with layering gallstones. No other sonographic evidence of acute cholecystitis.  5.  Heterogenous thyroid gland, can be further evaluated with thyroid ultrasound if clinically warranted.  6.  Apparent colonic wall thickening of the transverse and descending colon, likely due to underdistention versus less likely underlying colitis.                         Oksana Morton MD  8/4/2024 9:59 AM      none

## 2024-08-04 NOTE — ED PROVIDER NOTES
Emergency Department Note      History of Present Illness     Chief Complaint   Nausea, Vomiting, & Diarrhea      HPI   Benja Duong is a 59 year old male with a history of schizophrenia, diabetes, intellectual disability presenting with nausea, vomiting and diarrhea.  Patient presents from a group home and reportedly has had symptoms for the past day.  He reports roughly 12 episodes of emesis.  He is legally blind so cannot comment if there was blood.  He denies any fever, cough, sore throat, chest pain, abdominal pain or urinary complaints.  He does however on arrival to the ED complain of mild dyspnea.  No known sick contacts, suspicious food intake or recent hospitalizations.  Patient admits to smoking meth earlier today though denies other drug use or alcohol.     Independent Historian   None    Review of External Notes   ED visit 6/23/24    Past Medical History     Medical History and Problem List   Type 2 diabetes  Alcohol use disorder  Proliferative diabetic retinopathy  Cataract (B)  Cocaine use disorder  Diabetic ulcer of right toe  Edentulism  HTN  GERD  Cannabis abuse  Opioid abuse  Phencyclidine abuse  HLD  Intellectual disability  Latent TB  Muscular deconditioning  Odynophagia  Tobacco use disorder  Umbilical hernia  Psychosis  Paranoid schizophrenia  Sepsis  KAVITA  Anemia  Pneumonia  Depression    Medications   Norco  Coreg  Bydureon  Metformin  Trazodone  Liptor  Hydralazine  Lantus  Lisinopril  Ondansetron  Paliperidone  Pantoprazole  Tamsulosin     Surgical History   Tonsillectomy   Vitrectomy (R)    Physical Exam     Patient Vitals for the past 24 hrs:   BP Temp Temp src Pulse Resp SpO2   08/04/24 0200 113/60 -- -- 116 -- 100 %   08/04/24 0159 113/60 (!) 96.6  F (35.9  C) Axillary -- -- --   08/04/24 0157 97/56 -- Oral 116 18 100 %     Physical Exam  Nursing note and vitals reviewed  Eyes: Legally blind  ENT: Nose normal. Mucous membranes pink and moist.    Neck: Normal range of  motion.  CVS: Sinus tachycardia.  Normal heart sounds.    Pulmonary: Lungs clear to auscultation bilaterally. No wheezes/rales/rhonchi.  GI: Abdomen soft. Nontender, nondistended. No rigidity or guarding.    MSK: Moves all extremities equally  Neuro: Alert. Follows simple commands.  Skin: Skin is warm and dry. No rash noted.   Psychiatric: Flat affect, slowed cognitive response      Diagnostics     Lab Results   Labs Ordered and Resulted from Time of ED Arrival to Time of ED Departure   COMPREHENSIVE METABOLIC PANEL - Abnormal       Result Value    Sodium 134 (*)     Potassium 5.3      Carbon Dioxide (CO2) 10 (*)     Anion Gap 45 (*)     Urea Nitrogen 39.9 (*)     Creatinine 2.95 (*)     GFR Estimate 24 (*)     Calcium 9.7      Chloride 79 (*)     Glucose 76      Alkaline Phosphatase 86      AST 10      ALT 11      Protein Total 6.2 (*)     Albumin 3.8      Bilirubin Total 0.2     CBC WITH PLATELETS AND DIFFERENTIAL - Abnormal    WBC Count 14.3 (*)     RBC Count 3.87 (*)     Hemoglobin 11.0 (*)     Hematocrit 35.8 (*)     MCV 93      MCH 28.4      MCHC 30.7 (*)     RDW 13.7      Platelet Count 359      % Neutrophils 77      % Lymphocytes 17      % Monocytes 5      % Eosinophils 0      % Basophils 0      % Immature Granulocytes 1      NRBCs per 100 WBC 0      Absolute Neutrophils 11.0 (*)     Absolute Lymphocytes 2.4      Absolute Monocytes 0.8      Absolute Eosinophils 0.0      Absolute Basophils 0.0      Absolute Immature Granulocytes 0.1      Absolute NRBCs 0.0     D DIMER QUANTITATIVE - Abnormal    D-Dimer Quantitative 0.96 (*)    TROPONIN T, HIGH SENSITIVITY - Abnormal    Troponin T, High Sensitivity 77 (*)    LACTIC ACID WHOLE BLOOD - Abnormal    Lactic Acid 21.0 (*)    ROUTINE UA WITH MICROSCOPIC - Abnormal    Color Urine Light Yellow      Appearance Urine Clear      Glucose Urine Negative      Bilirubin Urine Negative      Ketones Urine 20 (*)     Specific Gravity Urine 1.016      Blood Urine Negative      pH  Urine 5.5      Protein Albumin Urine Negative      Urobilinogen Urine Normal      Nitrite Urine Negative      Leukocyte Esterase Urine Negative      Mucus Urine Present (*)     RBC Urine <1      WBC Urine 1      Squamous Epithelials Urine <1      Hyaline Casts Urine 10 (*)    TROPONIN T, HIGH SENSITIVITY - Abnormal    Troponin T, High Sensitivity 67 (*)    URINE DRUG SCREEN PANEL - Abnormal    Amphetamines Urine Screen Positive (*)     Barbituates Urine Screen Negative      Benzodiazepine Urine Screen Negative      Cannabinoids Urine Screen Positive (*)     Cocaine Urine Screen Positive (*)     Fentanyl Qual Urine Screen Negative      Opiates Urine Screen Negative      PCP Urine Screen Negative     ISTAT GASES LACTATE VENOUS POCT - Abnormal    Lactic Acid POCT 18.5 (*)     Bicarbonate Venous POCT 10 (*)     O2 Sat, Venous POCT 68 (*)     pCO2 Venous POCT 25 (*)     pH Venous POCT 7.21 (*)     pO2 Venous POCT 42     GLUCOSE BY METER - Normal    GLUCOSE BY METER POCT 87     INFLUENZA A/B, RSV, & SARS-COV2 PCR - Normal    Influenza A PCR Negative      Influenza B PCR Negative      RSV PCR Negative      SARS CoV2 PCR Negative     ETHYL ALCOHOL LEVEL - Normal    Alcohol ethyl <0.01     MAGNESIUM - Normal    Magnesium 1.7     KETONE BETA-HYDROXYBUTYRATE QUANTITATIVE, RAPID   VOLATILES SCREEN   ETHYLENE GLYCOL   BASIC METABOLIC PANEL   NT PROBNP INPATIENT   GLUCOSE MONITOR NURSING POCT   LACTIC ACID WHOLE BLOOD   TROPONIN T, HIGH SENSITIVITY   BLOOD GAS VENOUS   PROCALCITONIN   TSH   GLUCOSE MONITOR NURSING POCT   HEMOGLOBIN A1C   GLUCOSE MONITOR NURSING POCT   BLOOD CULTURE       Imaging   CT Chest (PE) Abdomen Pelvis w Contrast   Final Result   IMPRESSION:   1.  No evidence of pulmonary embolism.   2.  Diffuse esophageal wall thickening predominantly of the mid and distal esophagus, worrisome for esophagitis. The esophagus appears fluid-filled.   3.  Significant fluid distention of the stomach. No abnormally dilated  small bowel loops.   4.  Distended gallbladder with layering gallstones. No other sonographic evidence of acute cholecystitis.   5.  Heterogenous thyroid gland, can be further evaluated with thyroid ultrasound if clinically warranted.   6.  Apparent colonic wall thickening of the transverse and descending colon, likely due to underdistention versus less likely underlying colitis.            Echocardiogram Complete    (Results Pending)       EKG   ECG taken at 0259, ECG read at 0305  Sinus tachycardia   No significant change as compared to prior, dated 23/6/24.  Rate 114 bpm. TN interval 112 ms. QRS duration 88 ms. QT/QTc 356/490 ms. P-R-T axes 63 47 31.        ED Course      Medications Administered   Medications - No data to display    Procedures   Procedures     Central Line Placement     Procedure:  Central Venous Catheter Insertion     Indication: Vasopressor administration    Consent:  Verbal from Patient  Risk Discussed: bleeding or infection and pneumothorax    Universal Protocol: Universal protocol was followed and time out conducted just prior to starting procedure, confirming patient identity, site/side, procedure, patient position, and availability of correct equipment and implants.     Anesthesia/Sedation: Lidocaine - 1%    Procedure Detail:    Central line bundle elements were complete including preparatory hand leansing, donning full barrier precautions, use of a full body drape and chlorhxidine gluconate skin prep.   The Right internal femoral vein was selected as the optimal location for patient s condition.   Ultrasound was utilized for guidance: Yes, see separate procedural guidance POCUS note   A finder needle was used to enter the vein, through which a guidewire was inserted. The finder needle was then removed and the tract was dilated.   A triple lumen central venous catheter was inserted over the guidewire without difficulty. The guidewire was removed and the catheter was sutured in place and  "covered with an appropriate dressing.     Patient Status:  The patient tolerated the procedure well: Yes. There were no complications.     Discussion of Management   Admitting hospitalist, Dr. Reagan Bonilla    ED Course        Additional Documentation  None    Medical Decision Making / Diagnosis     CMS Diagnoses: The [facility][id number],,,1)@  Sepsis ED evaluation   The patient has signs of septic shock as evidenced by:  1. Presence of Sepsis, AND  2. Lactic Acidosis with value greater than or equal to 4       Time septic shock diagnosis confirmed = 0415 08/04/24   as this was the time when Lactate was resulted and the level was greater than or equal to 4    3 Hour Septic Shock Bundle Completion:  1. Initial Lactic Acid Result:   Recent Labs   Lab Test 08/04/24  0438 08/04/24  0415 03/16/24  1232   LACT 18.5* 21.0* 1.5     2. Blood Cultures before Antibiotics: Yes  Note: Due to a national blood culture bottle shortage, reduced blood cultures may have been drawn on this patient.  3. Broad Spectrum Antibiotics Administered:  yes       Anti-infectives (From admission through now)      Start     Dose/Rate Route Frequency Ordered Stop    08/04/24 0530  vancomycin (VANCOCIN) 1,750 mg in 0.9% NaCl 500 mL intermittent infusion         1,750 mg  250 mL/hr over 2 Hours Intravenous ONCE 08/04/24 0529      08/04/24 0425  piperacillin-tazobactam (ZOSYN) 3.375 g vial to attach to  mL bag        Note to Pharmacy: For [facility], [facility] and [facility]: For Zosyn-naive patients, use the \"Zosyn initial dose + extended infusion\" order panel.    3.375 g  over 30 Minutes Intravenous ONCE 08/04/24 0425 08/04/24 0520            4. IF 30 mL/kg bolus criteria met based on:  -Lactate > 4  OR  -Initial Hypotension:  Definition:  2 low BP readings (SBP <90, MAP <65, or decrease > 40 from baseline due to infection) within 3 hrs of each other during the time period of 6 hrs before and 3 hrs  after time zero  THEN: Fluid volume " administered in ED:  Full 30 mL/kg bolus given (see amount below).    BMI Readings from Last 1 Encounters:   08/04/24 24.47 kg/m      30 mL/kg fluids based on weight: 2,190 mL  30 mL/kg fluids based on IBW (must be >= 60 inches tall): 2,050 mL    Septic Shock reassessment:  Repeat Lactic Acid Level: ordered by reflex for 3 hours after initial lactic acid collection.  Pressor support initiated    I attest to having performed a repeat sepsis exam and assessment of perfusion at 0600 and the results demonstrate improved perfusion.            and None    MIPS       CT for PE was ordered because the patient had an abnormal d-dimer.    MONIE Duong is a 59 year old male presenting with predominant complaints of nausea, vomiting and diarrhea.  He also notes mild dyspnea. Patient is mildly tachycardic on arrival though overall nontoxic, in no significant distress.  His lactate resulted profoundly elevated, over 20 prompting concern for underlying sepsis in particular.  He is noted to have an elevated anion gap as well as acute renal failure.  Concern for possible underlying septic shock, he was given 30 cc/kg IV fluid bolus.  Blood cultures were sent.  He was broadly covered with IV Zosyn as well as vancomycin.  Remainder of labs reviewed. Troponin elevated though repeat downtrended. Likely elevated likely type II.  EKG without focal ischemia or underlying arrhythmia.  D-dimer elevated so he did undergo formal CT which fortunately is without evidence of PE, pneumonia, pneumothorax.  He was noted to have diffuse esophageal wall thickening and distended gallbladder though no evidence of cholecystitis.  No identifiable source of underlying sepsis, possible virus may be precipitating presentation. The patient's blood pressure downtrended during his time in the ED.  Central line was placed without complication and thereafter BP uptrended again so pressor support on hold at time of admission.  Given KAVITA as well as  elevated anion gap, I considered possible toxidrome particularly volatile alcohol ingestion.  Hospitalist spoke to poison control who recommends holding off on fomepizole at this point in time given clinical scenario and possibly all KAVITA is more prerenal.  I also considered possible metformin induced lactic acidosis which may be contributing to his presentation.  Patient is to be transferred to the ICU for further management at this time.    Diagnosis     ICD-10-CM    1. Nausea vomiting and diarrhea  R11.2     R19.7       2. Polysubstance abuse (H)  F19.10       3. Elevated troponin  R79.89       4. Dyspnea, unspecified type  R06.00       5. Shock (H)  R57.9          Critical Care time was 50 minutes for this patient excluding procedures.      Discharge Medications   New Prescriptions    No medications on file         DO Crystal Menjivar Lindsey E, DO  08/04/24 0688

## 2024-08-04 NOTE — PHARMACY-ADMISSION MEDICATION HISTORY
Pharmacy Intern Admission Medication History    Admission medication history is complete. The information provided in this note is only as accurate as the sources available at the time of the update.    Information Source(s): Facility (U/NH/) medication list/MAR via phone    Pertinent Information:   -Patient comes from [facility] (Group home) [phone]. PTA was verified over the phone.  -Per group home, patient's blood sugar dropped to 61 yesterday. They stated that this rarely occurs, as the patient usually has high blood sugar.  Changes made to PTA medication list:  Added: Mg Oxide, Senna S, EUCERIN  Deleted: None  Changed:   Lantus 16 units HS->8 units HS  Zofran 4 mg tab-> 4 mg ODT tab    Allergies reviewed with patient and updates made in EHR: yes    Medication History Completed By: Dami Hernadez 8/4/2024 10:04 AM    PTA Med List   Medication Sig Last Dose    acetaminophen (TYLENOL) 325 MG tablet Take 650 mg by mouth every 4 hours as needed for mild pain Unknown at PRN    atorvastatin (LIPITOR) 20 MG tablet Take 20 mg by mouth At Bedtime 8/3/2024 at PM    carvedilol (COREG) 12.5 MG tablet Take 12.5 mg by mouth 2 times daily (with meals) 8/3/2024 at PM    cholecalciferol (VITAMIN D-1000 MAX ST) 1000 units TABS Take 1,000 Units by mouth daily 8/3/2024 at AM    diphenhydrAMINE (BENADRYL) 50 MG capsule Take 50 mg by mouth every 4 hours as needed for itching or allergies Unknown at PRN    diphenhydrAMINE (BENADRYL) 50 MG capsule Take 50 mg by mouth At Bedtime 8/3/2024 at PM    exenatide ER (BYDUREON) 2 MG recon vial kit susp for weekly inj Inject 2 mg Subcutaneous every 14 days DO not administer until he is seen by his PCP. 7/30/2024    glucose (BD GLUCOSE) 4 g chewable tablet Take 4 tablets by mouth as needed for low blood sugar Unknown at PRN    hydrALAZINE (APRESOLINE) 25 MG tablet Take 25 mg by mouth 3 times daily Hold for SBP <120 8/3/2024 at PM    hypromellose (GENTEAL SEVERE) ophthalmic gel 0.3% Place 1  drop into both eyes 2 times daily as needed for dry eyes Unknown at PRN    insulin glargine (LANTUS PEN) 100 UNIT/ML pen Inject 8 Units subcutaneously at bedtime 8/3/2024 at PM    lisinopril (ZESTRIL) 40 MG tablet Take 40 mg by mouth daily 8/3/2024 at AM    magnesium oxide (MAG-OX) 400 MG tablet Take 400 mg by mouth daily 8/3/2024 at AM    melatonin 5 MG tablet Take 5 mg by mouth daily as needed for sleep Unknown at PRN    metFORMIN (GLUCOPHAGE) 1000 MG tablet Take 2,000 mg by mouth every morning 8/3/2024 at AM    mineral oil-white petrolatum (EUCERIN/MINERIN) cream Apply topically 2 times daily as needed for dry skin Unknown at PRN    ondansetron (ZOFRAN ODT) 4 MG ODT tab Take 4 mg by mouth every 8 hours as needed for nausea Unknown at PRN    paliperidone (INVEGA SUSTENNA) 156 MG/ML PABLO injection Inject 156 mg into the muscle every 28 days 7/5/2024    pantoprazole (PROTONIX) 40 MG EC tablet Take 40 mg by mouth daily 8/3/2024 at AM    senna-docusate (SENOKOT-S/PERICOLACE) 8.6-50 MG tablet Take 1 tablet by mouth 2 times daily as needed Unknown at PRN    tamsulosin (FLOMAX) 0.4 MG capsule Take 1 capsule (0.4 mg) by mouth daily 8/3/2024 at AM    traZODone (DESYREL) 50 MG tablet Take 50 mg by mouth nightly as needed for sleep Unknown at PRN

## 2024-08-04 NOTE — H&P
[facility]  Hospitalist H&P    Name: Benja Duong      MRN: [id number]  YOB: 1965    Age: 59 year old  Date of admission: 8/4/2024  Primary care provider: Rashid, [facility]            Assessment and Plan:     Benja Duong is a 59 year old male with a history of hypertension, diabetes mellitus, polysubstance abuse with cocaine, cognitive delay, methamphetamine, and opiates, tobacco use disorder, alcohol use disorder, paranoid schizophrenia, peptic ulcer disease and esophagitis, and depression who presents with intractable nausea, vomiting, and diarrhea.    Problem list:  Hypovolemic and/or septic shock: I suspect this is entirely hypovolemic in nature.  The patient presents with innumerable episodes of vomiting and diarrhea.  He is afebrile but does have a mild leukocytosis.  Initial blood pressure was relatively stable but did drop to 86/46.  Labs reveal a lactate of 21 and ketones of 5.9 with a bicarb of 10 and an anion gap of 45.  He received about 2 L of normal saline in the ED.  Norepinephrine has been ordered after a right femoral central line has been inserted but blood pressure has improved so we are holding off on vasopressor support.  Cultures have been drawn and we will continue Zosyn but I will hold off on further vancomycin given his acute kidney injury.  We will monitor his hemodynamics closely and adjust antibiotics based on any potentially positive cultures.  Lactic acidosis: Given his profound acid-base disorder I discussed the case with poison control.  His blood alcohol level is undetectable.  Volatile acid screens have been sent but are pending.  Overall there is relatively low suspicion for an alternative ingestion.  The patient adamantly denies any other ingestions.  The lactate is likely profoundly elevated due to his hypovolemic shock as well as metformin use in the context of his acute kidney injury.  Will continue to check  his lactate, VBG, and BMP every 4 hours.  Poison control indicates that if these begin normalizing with simply fluid support that volatile acid is likely not an issue but if the abnormalities persist then we should empirically load him with fomepizole.  Starvation ketosis: The patient's ketone level is elevated at 5.9.  Will continue aggressive fluid resuscitation with D5 NS.  Esophagitis: Seen on CT scan and likely related to his vomiting.  Start Protonix 40 mg IV twice daily.  Polysubstance abuse disorder: The patient has a history of abusing methamphetamine, cocaine, THC, tobacco and alcohol.  He admits to only methamphetamine use at this time although his toxicology screen also reveals cocaine and THC.  Diabetes mellitus: Blood sugars currently only 76.  His baseline insulin is likely being poorly cleared with his acute kidney injury.  Will continue dextrose containing fluids.  Hold sliding scale for now but continue routine Accu-Cheks.  Acute kidney injury: Creatinine is elevated to almost 3.0.  He does report continuing to make urine.  Likely primarily prerenal in nature with some contribution from his lisinopril.  Will continue fluid resuscitation and monitor urine output and renal function closely.  Paranoid schizophrenia: Await medication reconciliation of his mental health regimen.  He denies any suicidal ideation.  Hypertension: Given his shock we will hold his prior to admission hydralazine, lisinopril, and carvedilol.    Clinically Significant Risk Factors Present on Admission             # Anion Gap Metabolic Acidosis: Highest Anion Gap = 45 mmol/L in last 2 days, will monitor and treat as appropriate         # Acute Kidney Injury, unspecified: based on a >150% or 0.3 mg/dL increase in last creatinine compared to past 90 day average, will monitor renal function  # Hypertension: Home medication list includes antihypertensive(s)          # DMII: A1C = N/A within past 6 months                 Code status:  Full.  Admit to the ICU as an inpatient.  Prophylaxis: PCD's.  Disposition: Back to group home in 2 to 4 days.    80 MINUTES SPENT BY ME on the date of service doing chart review, history, exam, documentation & further activities per the note.          Chief Complaint:     Nausea, vomiting, and diarrhea.         History of Present Illness:   Benja Duong is a 59 year old male who presents with nausea, vomiting, and diarrhea.  History was obtained from my discussion with the patient at the bedside.  I also discussed the case with the ED provider.  The electronic medical record was also reviewed.    The patient resides at a group home.  He has a longstanding history of documented polysubstance abuse with cocaine, methamphetamine, THC, PCP, alcohol and tobacco.  He reports recently using methamphetamine.  He denies any other drug use or ingestions over the unusual compounds.  Over the past 12 hours he has had essentially constant nausea and innumerable episodes of nonbloody vomiting.  He denies any diarrhea.  He does report some diffuse abdominal pain, worse with retching.  He denies any coughing or aspiration.  He denies chest pain.  No neck or back pain.  He is blind.  He was brought to the hospital for evaluation.    In the ED his temperature is 96.6, heart rate 116, blood pressure 113/60, respirate 18 and oxygen saturation 100% on room air.  Labs show a sodium of 134, chloride 79, bicarb 10, BUN 39, creatinine 2.9, anion gap 45, ketone 5.9, troponin 77, lactate 21, , pH 7.21, white blood cells 14.3 and hemoglobin 11.  UA is unremarkable.  Viral respiratory testing is negative.  UDS positive for amphetamine, cocaine, and cannabis.  CT is outlined below.            Past Medical History:     Past Medical History:   Diagnosis Date    Depression     Diabetes mellitus     h/o Cocaine abuse     Homeless     Hypertension     Latent tuberculosis     Proliferative diabetic retinopathy     Schizophrenia             "  Past Surgical History:   No past surgical history on file.          Social History:     Social History     Tobacco Use    Smoking status: Unknown    Smokeless tobacco: Not on file   Substance Use Topics    Alcohol use: Yes     Comment: \"few beers\"             Family History:   The family history was fully reviewed and non-contributory in this case.         Allergies:     Allergies   Allergen Reactions    Alprazolam      Other reaction(s): *Unknown States \"I break out\"    Oxycodone-Acetaminophen      Other reaction(s): *Unknown, States \"I break out\"    Oxycodone-Acetaminophen Other (See Comments)     Other reaction(s): *Unknown, States \"I break out\"             Medications:     Prior to Admission medications    Medication Sig Last Dose Taking? Auth Provider Long Term End Date   acetaminophen (TYLENOL) 325 MG tablet Take 650 mg by mouth every 4 hours as needed for mild pain   Unknown, Entered By History     atorvastatin (LIPITOR) 20 MG tablet Take 20 mg by mouth At Bedtime   Unknown, Entered By History Yes    carvedilol (COREG) 12.5 MG tablet Take 12.5 mg by mouth 2 times daily (with meals)   Unknown, Entered By History No    cholecalciferol (VITAMIN D-1000 MAX ST) 1000 units TABS Take 1,000 Units by mouth daily   Paul Andrews MD     diphenhydrAMINE (BENADRYL) 50 MG capsule Take 50 mg by mouth every 4 hours as needed for itching or allergies   Unknown, Entered By History     diphenhydrAMINE (BENADRYL) 50 MG capsule Take 50 mg by mouth At Bedtime   Unknown, Entered By History     exenatide ER (BYDUREON) 2 MG recon vial kit susp for weekly inj Inject 2 mg Subcutaneous every 14 days DO not administer until he is seen by his PCP.   Benton Whitman MD Yes    glucose (BD GLUCOSE) 4 g chewable tablet Take 4 tablets by mouth as needed for low blood sugar   Unknown, Entered By History     hydrALAZINE (APRESOLINE) 25 MG tablet Take 25 mg by mouth 3 times daily Hold for SBP <120   Unknown, Entered By History No  "   hypromellose (GENTEAL SEVERE) ophthalmic gel 0.3% Place 1 drop into both eyes 2 times daily as needed for dry eyes   Unknown, Entered By History     insulin glargine (LANTUS PEN) 100 UNIT/ML pen Inject 16 Units Subcutaneous at bedtime   Unknown, Entered By History No    lisinopril (ZESTRIL) 40 MG tablet Take 40 mg by mouth daily   Unknown, Entered By History No    melatonin 5 MG tablet Take 5 mg by mouth daily as needed for sleep   Unknown, Entered By History     metFORMIN (GLUCOPHAGE) 1000 MG tablet Take 2,000 mg by mouth every morning   Unknown, Entered By History Yes    ondansetron (ZOFRAN) 4 MG tablet Take 4 mg by mouth every 8 hours as needed for nausea   Unknown, Entered By History     paliperidone (INVEGA SUSTENNA) 156 MG/ML PABLO injection Inject 156 mg into the muscle every 28 days   Unknown, Entered By History Yes    pantoprazole (PROTONIX) 40 MG EC tablet Take 40 mg by mouth daily   Unknown, Entered By History     tamsulosin (FLOMAX) 0.4 MG capsule Take 1 capsule (0.4 mg) by mouth daily   Garth Gutierrez MD     traZODone (DESYREL) 50 MG tablet Take 50 mg by mouth nightly as needed for sleep   Unknown, Entered By History     hydrochlorothiazide (HYDRODIURIL) 25 MG tablet Take 1 tablet (25 mg) by mouth daily DO not resume until Na is back to normal or PCP orders   Benton Whitman MD Yes 4/20/22   insulin aspart (NOVOLOG FLEXPEN) 100 UNIT/ML pen Novolog Flexpen 3units with breakfast 5 units with lunch 3units with dinner Hold for blood sugar <100, call MD when sugars>400   Ines Landis MD Yes 4/21/22             Review of Systems:     A Comprehensive greater than 10 system review of systems was carried out.  Pertinent positives and negatives are noted above.  Otherwise negative for contributory information.           Physical Exam:   Blood pressure (!) 86/46, pulse 108, temperature (!) 96.6  F (35.9  C), temperature source Axillary, resp. rate 18, weight 73 kg (160 lb 15 oz), SpO2 100%.  Wt  "Readings from Last 1 Encounters:   08/04/24 73 kg (160 lb 15 oz)     Exam:  GENERAL: No apparent distress. Awake, alert, and fully oriented.  HEENT: Normocephalic, atraumatic. Extraocular movements intact.  CARDIOVASCULAR: Regular rate and rhythm without murmurs or rubs. No S3.  PULMONARY: Clear to auscultation bilaterally.  ABDOMINAL: Soft, non-tender, non-distended. Bowel sounds normoactive.   EXTREMITIES: No cyanosis or clubbing. No appreciable edema.  NEUROLOGICAL: CN 2-12 grossly intact, no focal neurological deficits.  DERMATOLOGICAL: No rash, ulcer, bruising, nor jaundice.          Data:   EKG:  Personally reviewed.  Sinus tachycardia with a rate of 108 and a QTc of 511 with nonspecific ST abnormalities.    Laboratory:  Recent Labs   Lab 08/04/24  0301   WBC 14.3*   HGB 11.0*   HCT 35.8*   MCV 93        Recent Labs   Lab 08/04/24  0434 08/04/24  0301 08/04/24  0201    134*  --    POTASSIUM 5.4* 5.3  --    CHLORIDE 82* 79*  --    CO2 8* 10*  --    ANIONGAP 45* 45*  --    GLC 74 76 87   BUN 40.1* 39.9*  --    CR 2.88* 2.95*  --    GFRESTIMATED 24* 24*  --    MITCHEL 9.3 9.7  --      Recent Labs   Lab 08/04/24 0434   NTBNPI 509     Recent Labs   Lab 08/04/24  0301   AST 10   ALT 11   ALKPHOS 86   BILITOTAL 0.2     Recent Labs   Lab 08/04/24  0557 08/04/24  0438 08/04/24  0415   LACT 18.2* 18.5* 21.0*     No results for input(s): \"CULT\" in the last 168 hours.    Imaging:  Recent Results (from the past 24 hour(s))   CT Chest (PE) Abdomen Pelvis w Contrast    Narrative    EXAM: CT CHEST PE, ABDOMEN AND PELVIS WITH CONTRAST  LOCATION: [facility]  DATE: 08/04/2024    INDICATION: Shortness of breath, abdominal pain, nausea, vomiting, diarrhea; lactate 20.  COMPARISON: Chest CTA on 04/27/2023.  TECHNIQUE: CT chest pulmonary angiogram and routine CT abdomen pelvis with IV contrast. Arterial phase through the chest and venous phase through the abdomen and pelvis. Multiplanar reformats and " MIP reconstructions were performed. Dose reduction   techniques were used.   CONTRAST: 70 mL Isovue 370.    FINDINGS:  ANGIOGRAM CHEST: No evidence of pulmonary embolism. No evidence of thoracic aortic aneurysm or dissection. No evidence for right heart strain.     MEDIASTINUM/AXILLAE: No cardiomegaly or significant pericardial effusion. No significant mediastinal or hilar lymphadenopathy. Diffuse esophageal wall thickening predominantly of the mid and distal esophagus, worrisome for underlying esophagitis.   Esophagus appears fluid-filled. Heterogenous thyroid parenchyma.    LUNGS AND PLEURA: No pleural effusion or pneumothorax. There is approximately 1.1 x 0.8 cm right upper lobe nodule (series 7 image 62), not significantly changed as compared to 04/27/2023 exam.    CORONARY ARTERY CALCIFICATION: Moderate.    ABDOMEN/PELVIS:    HEPATOBILIARY: No suspicious focal hepatic lesion. Distended gallbladder with layering gallstones.    PANCREAS: No main pancreatic ductal dilatation or definite solid pancreatic mass.    SPLEEN: No splenomegaly.    ADRENAL GLANDS: No adrenal nodules.    KIDNEYS/BLADDER: No radiodense kidney/ureteral stones or hydronephrosis in either kidney. A few small right kidney stones. No left kidney stone. No ureteral stone or hydronephrosis in either kidney. A few hypodense cystic foci in the kidneys, some can be   characterized as renal cysts including 1.7 cm cyst at the upper pole of the right kidney, 2 cm renal cyst at the mid/upper pole of the right kidney while a few others are subcentimeter and too small to characterize. Diffuse urinary bladder wall   thickening, nonspecific, can be seen with underdistention versus chronic cystitis.    BOWEL: Significant gastric distention. No abnormally dilated small bowel loops. The appendix is visualized and appears normal. Scattered colonic diverticulosis predominantly of the right colon. Mild apparent colonic wall thickening of the transverse and    descending colon, likely due to underdistention versus less likely underlying colitis.    PERITONEUM: No evidence of free fluid in the abdomen and pelvis. No free peritoneal or portal venous gas.    PELVIC ORGANS: Unremarkable.    VASCULATURE: Moderate atherosclerotic vascular calcification of the abdominal aorta and iliac arteries.    LYMPH NODES: Unremarkable.    MUSCULOSKELETAL: Moderate-sized fat-containing supraumbilical and small fat-containing umbilical hernia. Multilevel degenerative changes of the spine.      Impression    IMPRESSION:  1.  No evidence of pulmonary embolism.  2.  Diffuse esophageal wall thickening predominantly of the mid and distal esophagus, worrisome for esophagitis. The esophagus appears fluid-filled.  3.  Significant fluid distention of the stomach. No abnormally dilated small bowel loops.  4.  Distended gallbladder with layering gallstones. No other sonographic evidence of acute cholecystitis.  5.  Heterogenous thyroid gland, can be further evaluated with thyroid ultrasound if clinically warranted.  6.  Apparent colonic wall thickening of the transverse and descending colon, likely due to underdistention versus less likely underlying colitis.           Reagan Bonilla DO MPH  [facility]  [address] [place] [address]  08/04/2024

## 2024-08-04 NOTE — CONSULTS
Nephrology Initial Consult  August 4, 2024      Benja Duong MRN:[id number] YOB: 1965  Date of Admission:8/4/2024  Primary care provider: [facility], [facility]  Requesting physician: Reagan Bonilla DO    ASSESSMENT AND RECOMMENDATIONS:   1 shock-likely hypovolemic shock in setting of intractable nausea, vomiting.  Received 2 L of saline followed by maintenance IV and now on low-dose Levophed.  -Septic workup pending    2 severe lactic acidosis-has not improved with volume resuscitation and improvement in blood pressure.  Concerning for metformin toxicity.  Patient is on 2 g daily at home.  -Volatile alcohol screen pending    3 acute renal failure-likely prerenal KAVITA -> possible ATN in setting of shock, prolonged prerenal state.  Received IV contrast in ED.  -Oliguric this morning.  -No hydronephrosis on CT scan.  -Urinalysis bland.  Ketones positive.    4 FEN-potassium is 5.4, bicarb of 8.  Sodium is okay.    5 starvation ketosis -on D5 NS    6 polysubstance abuse disorder    7 type 2 diabetes mellitus    Discussion/recommendation-  -concerning for metformin toxicity with persistently elevated lactate.  Combined with oliguric renal failure, mild confusion, abdominal symptoms-> would favor starting extracorporeal removal of metformin with dialysis.  -Discussed with ICU team  -Plan to initiate CRRT .  Continue for at least 24 hours and lactic acidosis has resolved.  -Change IV fluid to D5 with 150 mEq of bicarb in the meantime.  -Minimize vancomycin use unless absolutely necessary.        Thank you for the consult. Will continue to follow along with you .    Recommendations were communicated to primary team in person.    Critically ill patient with high risk of decompensation.      Tiff Cronin MD  [facility] - Nephrology   [phone]        REASON FOR CONSULT: Severe renal failure, severe acidosis    HISTORY OF PRESENT ILLNESS:  Benja Duong is a 59 year  old male with history of diabetes mellitus, hypertension, polysubstance abuse who presents with complaints of constant nausea and multiple episodes of nonbloody vomiting for 12 hours prior to presentation.  Found to be in shock.   Labs showed severe lactic acidosis with a lactate of 21 which has not improved much despite volume resuscitation.  Anion gap of 45, creatinine of 2.9,   Hemoglobin 11  UA unremarkable  Drug screen positive for amphetamine, cocaine, cannabis  CT abdomen showed some colonic wall thickening-likely due to under distention.  Evaluated by surgical team.  No indication for surgery.    Overnight, he was resuscitated with IV fluid.  He has remained oligoanuric.  Creatinine of 2.9.  Lactate has not improved, still 19.  On low-dose norepinephrine.  Bicarb of 8.  Venous pH of 7.03    On evaluation, he is awake.  Slow to respond.  Oriented to place but not to year or time.  No obvious distress.      PAST MEDICAL HISTORY:  Reviewed with patient on 08/04/2024  and is as listed in HPI.       MEDICATIONS:  PTA Meds  Prior to Admission medications    Medication Sig Last Dose Taking? Auth Provider Long Term End Date   acetaminophen (TYLENOL) 325 MG tablet Take 650 mg by mouth every 4 hours as needed for mild pain Unknown at PRN Yes Unknown, Entered By History     atorvastatin (LIPITOR) 20 MG tablet Take 20 mg by mouth At Bedtime 8/3/2024 at PM Yes Unknown, Entered By History Yes    carvedilol (COREG) 12.5 MG tablet Take 12.5 mg by mouth 2 times daily (with meals) 8/3/2024 at PM Yes Unknown, Entered By History No    cholecalciferol (VITAMIN D-1000 MAX ST) 1000 units TABS Take 1,000 Units by mouth daily 8/3/2024 at AM Yes Paul Andrews MD     diphenhydrAMINE (BENADRYL) 50 MG capsule Take 50 mg by mouth every 4 hours as needed for itching or allergies Unknown at PRN Yes Unknown, Entered By History     diphenhydrAMINE (BENADRYL) 50 MG capsule Take 50 mg by mouth At Bedtime 8/3/2024 at PM Yes  Unknown, Entered By History     exenatide ER (BYDUREON) 2 MG recon vial kit susp for weekly inj Inject 2 mg Subcutaneous every 14 days DO not administer until he is seen by his PCP. 7/30/2024 Yes Benton Whitman MD Yes    glucose (BD GLUCOSE) 4 g chewable tablet Take 4 tablets by mouth as needed for low blood sugar Unknown at PRN Yes Unknown, Entered By History     hydrALAZINE (APRESOLINE) 25 MG tablet Take 25 mg by mouth 3 times daily Hold for SBP <120 8/3/2024 at PM Yes Unknown, Entered By History No    hypromellose (GENTEAL SEVERE) ophthalmic gel 0.3% Place 1 drop into both eyes 2 times daily as needed for dry eyes Unknown at PRN Yes Unknown, Entered By History     insulin glargine (LANTUS PEN) 100 UNIT/ML pen Inject 8 Units subcutaneously at bedtime 8/3/2024 at PM Yes Unknown, Entered By History No    lisinopril (ZESTRIL) 40 MG tablet Take 40 mg by mouth daily 8/3/2024 at AM Yes Unknown, Entered By History No    magnesium oxide (MAG-OX) 400 MG tablet Take 400 mg by mouth daily 8/3/2024 at AM Yes Unknown, Entered By History     melatonin 5 MG tablet Take 5 mg by mouth daily as needed for sleep Unknown at PRN Yes Unknown, Entered By History     metFORMIN (GLUCOPHAGE) 1000 MG tablet Take 2,000 mg by mouth every morning 8/3/2024 at AM Yes Unknown, Entered By History Yes    mineral oil-white petrolatum (EUCERIN/MINERIN) cream Apply topically 2 times daily as needed for dry skin Unknown at PRN Yes Unknown, Entered By History     ondansetron (ZOFRAN ODT) 4 MG ODT tab Take 4 mg by mouth every 8 hours as needed for nausea Unknown at PRN Yes Unknown, Entered By History     paliperidone (INVEGA SUSTENNA) 156 MG/ML PABLO injection Inject 156 mg into the muscle every 28 days 7/5/2024 Yes Unknown, Entered By History Yes    pantoprazole (PROTONIX) 40 MG EC tablet Take 40 mg by mouth daily 8/3/2024 at AM Yes Unknown, Entered By History     senna-docusate (SENOKOT-S/PERICOLACE) 8.6-50 MG tablet Take 1 tablet by mouth 2 times  daily as needed Unknown at PRN Yes Unknown, Entered By History     tamsulosin (FLOMAX) 0.4 MG capsule Take 1 capsule (0.4 mg) by mouth daily 8/3/2024 at AM Yes Garth Gutierrez MD     traZODone (DESYREL) 50 MG tablet Take 50 mg by mouth nightly as needed for sleep Unknown at PRN Yes Unknown, Entered By History     hydrochlorothiazide (HYDRODIURIL) 25 MG tablet Take 1 tablet (25 mg) by mouth daily DO not resume until Na is back to normal or PCP orders   Benton Whitman MD Yes 4/20/22   insulin aspart (NOVOLOG FLEXPEN) 100 UNIT/ML pen Novolog Flexpen 3units with breakfast 5 units with lunch 3units with dinner Hold for blood sugar <100, call MD when sugars>400   Ines Landis MD Yes 4/21/22      Current Meds  Current Facility-Administered Medications   Medication Dose Route Frequency Provider Last Rate Last Admin    pantoprazole (PROTONIX) IV push injection 40 mg  40 mg Intravenous BID Reagan Bonilla DO   40 mg at 08/04/24 0921    piperacillin-tazobactam (ZOSYN) 3.375 g vial to attach to  mL bag  3.375 g Intravenous Q6H Reagan Bonilla DO   3.375 g at 08/04/24 1044     Infusion Meds  Current Facility-Administered Medications   Medication Dose Route Frequency Provider Last Rate Last Admin    dextrose 10% infusion   Intravenous Continuous PRN Dave Huber DO        dextrose 5% and 0.9% NaCl infusion   Intravenous Continuous Reagan Bonilla DO   Stopped at 08/04/24 1057    insulin regular (MYXREDLIN) 1 unit/mL infusion  0-24 Units/hr Intravenous Continuous Dave Huber DO        norepinephrine (LEVOPHED) 4 mg in  mL infusion PREMIX  0.01-0.6 mcg/kg/min Intravenous Continuous Reagan Bonilla DO 21.9 mL/hr at 08/04/24 0822 0.08 mcg/kg/min at 08/04/24 0822    sodium bicarbonate 150 mEq in D5W 1,000 mL infusion   Intravenous Continuous Jessica Stern  mL/hr at 08/04/24 1050 New Bag at 08/04/24 1050       ALLERGIES:    Allergies   Allergen Reactions     "Alprazolam      Other reaction(s): *Unknown States \"I break out\"    Oxycodone-Acetaminophen      Other reaction(s): *Unknown, States \"I break out\"    Oxycodone-Acetaminophen Other (See Comments)     Other reaction(s): *Unknown, States \"I break out\"       REVIEW OF SYSTEMS:  A comprehensive of systems was negative except as noted above.    SOCIAL HISTORY:   Reviewed with patient on 2024     FAMILY MEDICAL HISTORY:   No family history on file.  Reviewed with patient on 2024     PHYSICAL EXAM:   Temp  Av.3  F (35.7  C)  Min: 95.9  F (35.5  C)  Max: 96.6  F (35.9  C)      Pulse  Av.1  Min: 102  Max: 116 Resp  Av.6  Min: 10  Max: 30  SpO2  Av.6 %  Min: 93 %  Max: 100 %       /46 (BP Location: Left arm)   Pulse 106   Temp (!) 95.9  F (35.5  C) (Temporal)   Resp 12   Ht 1.702 m (5' 7\")   Wt 73.3 kg (161 lb 9.6 oz)   SpO2 99%   BMI 25.31 kg/m     Date 24 07 - 24 0659   Shift 7104-0994 1220-3793 7391-0265 24 Hour Total   INTAKE   I.V. 292.5   292.5   Shift Total(mL/kg) 292.5(3.99)   292.5(3.99)   OUTPUT   Shift Total(mL/kg)       Weight (kg) 73.3 73.3 73.3 73.3      Admit Weight: 73 kg (160 lb 15 oz)     GENERAL APPEARANCE: no distress,  awake  EYES: no scleral icterus, pupils equal  HENT: NC/AT,  mouth  without ulcers or lesions  Lymphatics: no cervical or supraclavicular LAD  Endo: no moon facies, no goiter  Pulmonary: lungs clear to auscultation with equal breath sounds bilaterally, no clubbing  CV: regular rhythm, normal rate, no rub   - JVP -   - Edema-  GI: soft, nontender,   MS: no evidence of inflammation in joints  : no mckeon  SKIN: no rash, warm, dry, no cyanosis  NEURO: face symmetric,grossly nonfocal     LABS:   CMP  Recent Labs   Lab 24  0809 24  0434 24  0301 24  0201   NA  --  135 134*  --    POTASSIUM  --  5.4* 5.3  --    CHLORIDE  --  82* 79*  --    CO2  --  8* 10*  --    ANIONGAP  --  45* 45*  --    * 74 76 87   BUN "  --  40.1* 39.9*  --    CR  --  2.88* 2.95*  --    GFRESTIMATED  --  24* 24*  --    MITCHEL  --  9.3 9.7  --    MAG  --   --  1.7  --    PROTTOTAL  --   --  6.2*  --    ALBUMIN  --   --  3.8  --    BILITOTAL  --   --  0.2  --    ALKPHOS  --   --  86  --    AST  --   --  10  --    ALT  --   --  11  --      CBC  Recent Labs   Lab 08/04/24  0301   HGB 11.0*   WBC 14.3*   RBC 3.87*   HCT 35.8*   MCV 93   MCH 28.4   MCHC 30.7*   RDW 13.7        INRNo lab results found in last 7 days.  ABG  Recent Labs   Lab 08/04/24  0914 08/04/24  0609   O2PER 0 0      URINE STUDIES  Recent Labs   Lab Test 08/04/24  0346 03/17/24  0028 04/19/22  0129 01/13/22  2236   COLOR Light Yellow Light Yellow Light Yellow Yellow   APPEARANCE Clear Clear Clear Clear   URINEGLC Negative 500* >=1000* Negative   URINEBILI Negative Negative Negative Negative   URINEKETONE 20* 20* Negative Negative   SG 1.016 1.010 1.013 1.021   UBLD Negative Trace* Negative Negative   URINEPH 5.5 6.5 6.0 6.5   PROTEIN Negative 50* Negative 30*   NITRITE Negative Negative Negative Negative   LEUKEST Negative Negative Negative Negative   RBCU <1 6* 2 4*   WBCU 1 <1 1 5     No lab results found.  PTH  No lab results found.  IRON STUDIES  Recent Labs   Lab Test 08/04/24  0434 08/06/20  1251   IRON 61 28*    189*   IRONSAT 20 15       IMAGING:  Personally reviewed the images and findings are as listed in HPI     Tiff Cronin MD

## 2024-08-04 NOTE — PLAN OF CARE
ICU End of Shift Summary. For vital signs, labs, and complete assessment please see Documentation Flowsheet    Pertinent Assessment:  A/O x3-4. Short answers and delayed response time  Blind, fixed pupils  No teeth, NPO. Nonproductive cough with thin fluids.   Lungs dim, sounds wet like pt not swallowing secretions fully  On RA, denies HIGH  Tele: ST  Abd couture irregular, hernia present, BM on arrival to floor and not again since. Cdiff neg  Mckeon placed with dwindling UOP r/t CRRT  Weak BLE pulses  Major Shift Events:  HD access placed  Xray done  CRRT started  Remains on pressors  Started insulin gtt  125 of HCO3 given plus infusion  Discharge/Transfer needs: TBD    Bedside shift Report Completed: Y  Bedside Safety Check Completed: Y   -------------------------------------------  Notified provider about indwelling mckeon catheter discussed removal or continued need.    Did provider choose to remove indwelling mckeon catheter? YES    Provider's mckeon indication for keeping indwelling mckeon catheter: Indication for continued use: Retention    Is there an order for indwelling mckeon catheter? YES    *If there is a plan to keep mckeon catheter in place at discharge daily notification with provider is not necessary, but please add a notation in the treatment team sticky note that the patient will be discharging with the catheter.       Problem: Adult Inpatient Plan of Care  Goal: Plan of Care Review  Description: The Plan of Care Review/Shift note should be completed every shift.  The Outcome Evaluation is a brief statement about your assessment that the patient is improving, declining, or no change.  This information will be displayed automatically on your shift  note.  Outcome: Progressing  Flowsheets (Taken 8/4/2024 1412)  Outcome Evaluation: See note  Plan of Care Reviewed With: patient  Overall Patient Progress: improving  Goal: Absence of Hospital-Acquired Illness or Injury  Intervention: Identify and Manage Fall  Risk  Recent Flowsheet Documentation  Taken 8/4/2024 0823 by Oksana Peterson RN  Safety Promotion/Fall Prevention: activity supervised  Intervention: Prevent Skin Injury  Recent Flowsheet Documentation  Taken 8/4/2024 0823 by Oksana Peterson RN  Skin Protection:   adhesive use limited   incontinence pads utilized   transparent dressing maintained   skin to skin areas padded  Device Skin Pressure Protection:   absorbent pad utilized/changed   positioning supports utilized   adhesive use limited   tubing/devices free from skin contact  Taken 8/4/2024 0800 by Oksana Peterson RN  Body Position:   turned   right   side-lying  Intervention: Prevent and Manage VTE (Venous Thromboembolism) Risk  Recent Flowsheet Documentation  Taken 8/4/2024 0823 by Oksana Peterson RN  VTE Prevention/Management: SCDs on (sequential compression devices)  Intervention: Prevent Infection  Recent Flowsheet Documentation  Taken 8/4/2024 0823 by Oksana Peterson RN  Infection Prevention:   single patient room provided   rest/sleep promoted  Goal: Optimal Comfort and Wellbeing  Intervention: Provide Person-Centered Care  Recent Flowsheet Documentation  Taken 8/4/2024 0823 by Oksana Peterson RN  Trust Relationship/Rapport:   care explained   emotional support provided   questions answered     Problem: Skin Injury Risk Increased  Goal: Skin Health and Integrity  Intervention: Plan: Nurse Driven Intervention: Moisture Management  Recent Flowsheet Documentation  Taken 8/4/2024 0823 by Oksana Peterson RN  Moisture Interventions: Incontinence pad  Taken 8/4/2024 0800 by Oksana Peterson RN  Bathing/Skin Care:   bath, chlorhexidine wipes   wipes, CHG   electrode patches/site rotation   incontinence care   linen changed  Intervention: Plan: Nurse Driven Intervention: Friction and Shear  Recent Flowsheet Documentation  Taken 8/4/2024 0823 by Oksana Peterson RN  Friction/Shear Interventions:   HOB 30  degrees or less   Repositioning device (TAP system, etc.)  Intervention: Optimize Skin Protection  Recent Flowsheet Documentation  Taken 8/4/2024 0823 by Oksana Peterson, RN  Skin Protection:   adhesive use limited   incontinence pads utilized   transparent dressing maintained   skin to skin areas padded  Taken 8/4/2024 0800 by Oksana Peterson, RN  Activity Management: bedrest  Head of Bed (HOB) Positioning: HOB at 30 degrees     Problem: Acute Kidney Injury/Impairment  Goal: Effective Renal Function  Intervention: Monitor and Support Renal Function  Recent Flowsheet Documentation  Taken 8/4/2024 0823 by Oksana Peterson, RN  Medication Review/Management: medications reviewed   Goal Outcome Evaluation:      Plan of Care Reviewed With: patient    Overall Patient Progress: improvingOverall Patient Progress: improving    Outcome Evaluation: See note

## 2024-08-04 NOTE — PROGRESS NOTES
[facility] Intensive Care Progress Note    Date of Service (when I saw the patient): 08/04/2024     Assessment & Plan   Benja Duong is a 59 year old male history of hypertension, diabetes mellitus, polysubstance abuse with cocaine, cognitive delay, methamphetamine, and opiates, tobacco use disorder, alcohol use disorder, paranoid schizophrenia, peptic ulcer disease and esophagitis who was admitted on 8/4/2024 with nausea, vomiting, and diarrhea, severe lactic metabolic acidosis, KAVITA, lactic acidosis likely secondary to metformin use in setting of dehydration and hypovolemic and vasoplegic shock.      Main Plans for Today   Place hemodialysis line, start CRRT, continue bicarb drip, give intermittent boluses, optimize hemodynamic and monitor lactic acid, wean norepinephrine as possible to MAP goal over 65 mmHg, start insulin drip in setting of ketoacidosis and hypoglycemia, continue broad-spectrum antibiotics and monitor culture results.  TTE to assess for the etiology of the shock.    The patient has a triple-lumen central line in the right femoral vein and a dialysis cath without in the right internal jugular. We will not place arterial line since the patient vasopressor requirement is stable, and a VBG can be checked from the central line, and there is no oxygenation problem.     Neurology:  Somnolent, but responsive and oriented to self and time and location.  History of paranoid schizophrenia cognitive delay, polysubstance abuse.  Plan: Monitor neuro-function.      Cardiovascular:  Vasoplegic versus hypovolemic versus cardiogenic shock most likely a combination of vasoplegic and hypovolemic shock secondary to severe lactic acidosis and GI losses.  The patient received fluid resuscitation . Plan: Continue norepinephrine to goal MAP over 65 mmHg, monitor lactic acidosis, treat acidosis with bicarb drip and CRRT, treat ketoacidosis.       Pulmonary:        No  pulmonary concerns, no clinical or paraclinical signs of pneumonia.  No supplemental oxygen needed.  Plan: Monitor closely respiratory function that can worsen because of compensation for severe metabolic acidosis.   -    Resp: 16      Renal  Oliguric KAVITA, severe lactic acidosis, ketoacidosis.  Agree with fluid resuscitation. Plan: Bicarb boluses and drip, start CRRT.  Appreciate renal medicine consult.     ID:         Suspicion of sepsis and septic shock  of unclear etiology, blood culture UA sent, the patient is on broad-spectrum antibiotics.  Plan: Continue Zosyn and pharmacy to dose vancomycin, MRSA swab,  CDiff testing in setting of severe lactic acidosis and recent diarrhea.     GI  Abdominal exam benign, no signs of acute abdomen.  Abdominal CT showed colonic wall thickening of the transverse and descending colon likely underlying colitis.  Plan: Agree with general surgery consult.  General surgery did not recommend any surgical exploration based on the clinical and biochemical findings.     Nutrition  Currently n.p.o. in setting of severe metabolic acidosis.  Plan: We will reassess his nutrition once severe metabolic acidosis is improved.     Endocrine:  History of type 2 diabetes mellitus, on metformin prior to this admission.  Hyperglycemia and ketoacidosis on admission.  Plan: Start insulin infusion for tight blood glucose control.     Heme/Onc:  Mild leukocytosis, and anemia.  No clear signs of bleeding.  Plan: Monitor CBC, and clinical signs of infection or bleeding.  No chemoprophylaxis for DVT because of mild anemia, until the etiology of shock is clearly understood.   -    DVT Prophylaxis: Pneumatic Compression Devices  GI Prophylaxis: PPI    Restraints: Restraints for medical healing needed: Not Applicable    Family update by me today: No    Jessica Stern MD    Time Spent on this Encounter   Billing:  I spent 72 minutes bedside and on the inpatient unit today managing the critical care of  Benja Duong in relation to the issues listed in this note.    Main Plans for Today   Place hemodialysis line, start CRRT, continue bicarb drip, give intermittent boluses, optimize hemodynamic and monitor lactic acid, wean norepinephrine as possible to MAP goal over 65 mmHg, start insulin drip in setting of ketoacidosis and hypoglycemia, continue broad-spectrum antibiotics and monitor culture results.       Physical Exam   Temp: (!) 95.9  F (35.5  C) Temp src: Temporal Temp  Min: 95.9  F (35.5  C)  Max: 96.6  F (35.9  C) BP: 104/44 Pulse: 104   Resp: 16 SpO2: 100 % O2 Device: None (Room air)    Vitals:    08/04/24 0425 08/04/24 0800   Weight: 73 kg (160 lb 15 oz) 73.3 kg (161 lb 9.6 oz)     No intake/output data recorded.    Neurologic: Fatigued, but alert and oriented, nonfocal exam.   Cardiovascular: RRR, no murmurs.   Respiratory: Clear breath sounds.   GI: Abdomen soft nontender nondistended.   Skin/Extremities: No edema or deformities  Lines: No erythema or discharge at entry site for Central Venous Catheter and Dialysis Catheter  Current lines are required for patient management    Medications   Current Facility-Administered Medications   Medication Dose Route Frequency Provider Last Rate Last Admin    dextrose 10% infusion   Intravenous Continuous PRN Dave Huber DO        dextrose 5% and 0.9% NaCl infusion   Intravenous Continuous Reagan Bonilla DO   Stopped at 08/04/24 1057    dialysate solution (PrismaSol BGK 2/3.5)   CRRT Continuous Tiff Cronin MD        insulin regular (MYXREDLIN) 1 unit/mL infusion  0-24 Units/hr Intravenous Continuous Dave Huber DO 3 mL/hr at 08/04/24 1212 3 Units/hr at 08/04/24 1212    No heparin required   Does not apply Continuous PRN Tiff Cronin MD        norepinephrine (LEVOPHED) 4 mg in  mL infusion PREMIX  0.01-0.6 mcg/kg/min Intravenous Continuous Reagan Bonilla DO 21.9 mL/hr at 08/04/24 1200 0.08 mcg/kg/min at 08/04/24 1200     Pre-Filter replacement solution (PRISMASOL BGK 2/3.5)   CRRT Continuous Tiff Cronin MD        sodium bicarbonate 150 mEq in D5W 1,000 mL infusion   Intravenous Continuous Tiff Cronin  mL/hr at 08/04/24 1050 New Bag at 08/04/24 1050     Current Facility-Administered Medications   Medication Dose Route Frequency Provider Last Rate Last Admin    sodium bicarbonate 8.4 % injection             pantoprazole (PROTONIX) IV push injection 40 mg  40 mg Intravenous BID Reagan Bonilla DO   40 mg at 08/04/24 0921    piperacillin-tazobactam (ZOSYN) 3.375 g vial to attach to  mL bag  3.375 g Intravenous Q6H Reagan Bonilla DO   3.375 g at 08/04/24 1044       Data   Recent Labs   Lab 08/04/24  1259 08/04/24  1230 08/04/24  0809 08/04/24  0434 08/04/24  0301   WBC  --  13.6*  --   --  14.3*   HGB  --  8.9*  --   --  11.0*   MCV  --  95  --   --  93   PLT  --  307  --   --  359   NA  --  133*  --  135 134*   POTASSIUM  --  5.6*  --  5.4* 5.3   CHLORIDE  --  89*  --  82* 79*   CO2  --  6*  --  8* 10*   BUN  --  40.8*  --  40.1* 39.9*   CR  --  2.44*  --  2.88* 2.95*   ANIONGAP  --  38*  --  45* 45*   MITCHEL  --  7.7*  --  9.3 9.7   * 282* 157* 74 76   ALBUMIN  --   --   --   --  3.8   PROTTOTAL  --   --   --   --  6.2*   BILITOTAL  --   --   --   --  0.2   ALKPHOS  --   --   --   --  86   ALT  --   --   --   --  11   AST  --   --   --   --  10     Recent Results (from the past 24 hour(s))   CT Chest (PE) Abdomen Pelvis w Contrast    Narrative    EXAM: CT CHEST PE, ABDOMEN AND PELVIS WITH CONTRAST  LOCATION: [facility]  DATE: 08/04/2024    INDICATION: Shortness of breath, abdominal pain, nausea, vomiting, diarrhea; lactate 20.  COMPARISON: Chest CTA on 04/27/2023.  TECHNIQUE: CT chest pulmonary angiogram and routine CT abdomen pelvis with IV contrast. Arterial phase through the chest and venous phase through the abdomen and pelvis. Multiplanar reformats and MIP reconstructions were  performed. Dose reduction   techniques were used.   CONTRAST: 70 mL Isovue 370.    FINDINGS:  ANGIOGRAM CHEST: No evidence of pulmonary embolism. No evidence of thoracic aortic aneurysm or dissection. No evidence for right heart strain.     MEDIASTINUM/AXILLAE: No cardiomegaly or significant pericardial effusion. No significant mediastinal or hilar lymphadenopathy. Diffuse esophageal wall thickening predominantly of the mid and distal esophagus, worrisome for underlying esophagitis.   Esophagus appears fluid-filled. Heterogenous thyroid parenchyma.    LUNGS AND PLEURA: No pleural effusion or pneumothorax. There is approximately 1.1 x 0.8 cm right upper lobe nodule (series 7 image 62), not significantly changed as compared to 04/27/2023 exam.    CORONARY ARTERY CALCIFICATION: Moderate.    ABDOMEN/PELVIS:    HEPATOBILIARY: No suspicious focal hepatic lesion. Distended gallbladder with layering gallstones.    PANCREAS: No main pancreatic ductal dilatation or definite solid pancreatic mass.    SPLEEN: No splenomegaly.    ADRENAL GLANDS: No adrenal nodules.    KIDNEYS/BLADDER: No radiodense kidney/ureteral stones or hydronephrosis in either kidney. A few small right kidney stones. No left kidney stone. No ureteral stone or hydronephrosis in either kidney. A few hypodense cystic foci in the kidneys, some can be   characterized as renal cysts including 1.7 cm cyst at the upper pole of the right kidney, 2 cm renal cyst at the mid/upper pole of the right kidney while a few others are subcentimeter and too small to characterize. Diffuse urinary bladder wall   thickening, nonspecific, can be seen with underdistention versus chronic cystitis.    BOWEL: Significant gastric distention. No abnormally dilated small bowel loops. The appendix is visualized and appears normal. Scattered colonic diverticulosis predominantly of the right colon. Mild apparent colonic wall thickening of the transverse and   descending colon, likely due to  underdistention versus less likely underlying colitis.    PERITONEUM: No evidence of free fluid in the abdomen and pelvis. No free peritoneal or portal venous gas.    PELVIC ORGANS: Unremarkable.    VASCULATURE: Moderate atherosclerotic vascular calcification of the abdominal aorta and iliac arteries.    LYMPH NODES: Unremarkable.    MUSCULOSKELETAL: Moderate-sized fat-containing supraumbilical and small fat-containing umbilical hernia. Multilevel degenerative changes of the spine.      Impression    IMPRESSION:  1.  No evidence of pulmonary embolism.  2.  Diffuse esophageal wall thickening predominantly of the mid and distal esophagus, worrisome for esophagitis. The esophagus appears fluid-filled.  3.  Significant fluid distention of the stomach. No abnormally dilated small bowel loops.  4.  Distended gallbladder with layering gallstones. No other sonographic evidence of acute cholecystitis.  5.  Heterogenous thyroid gland, can be further evaluated with thyroid ultrasound if clinically warranted.  6.  Apparent colonic wall thickening of the transverse and descending colon, likely due to underdistention versus less likely underlying colitis.          Jessica Stern MD  Anesthesiology Critical Care

## 2024-08-04 NOTE — ED TRIAGE NOTES
Pt BIBA from  d/t N/V x1 day. Pt endorses SOB, O2 sats 100%. Pt is legally blind. Hx of T2DM, schizophrenia. BG 98. VSS except tachy 110s, ABCs intact, A&Ox4

## 2024-08-04 NOTE — PROCEDURES
Right intrajugular vein hemodialysis line placement.    The site was prepped and draped in a sterile fashion.  An ultrasound and a Seldinger technique were used.  The guidewire was placed through the needle, and the position of the wire within ultrasound was verified.  The dilators were used prior to the hemodialysis catheter placement.  A 16 cm 12 Egyptian double-lumen hemodialysis line was placed over the guidewire and the guidewire was removed.     No complications were noted, and the position of the hemodialysis line was verified by x-ray.     Jessica Stern MD  Anesthesiology Critical Care

## 2024-08-04 NOTE — ED NOTES
Bed: [facility]  Expected date:   Expected time:   Means of arrival:   Comments:  LINDA-kush Triage please

## 2024-08-04 NOTE — PROGRESS NOTES
[facility]    Medicine Progress Note - Hospitalist Service    Date of Admission:  8/4/2024    Assessment & Plan     Benja Duong is a 59 year old male with a history of hypertension, diabetes mellitus, polysubstance abuse with cocaine, cognitive delay, methamphetamine, and opiates, tobacco use disorder, alcohol use disorder, paranoid schizophrenia, peptic ulcer disease and esophagitis, and depression who presents with intractable nausea, vomiting, and diarrhea.     Severe lactic acidosis.  -Etiology initially unclear.  -Suspected multifactorial due to dehydration, hypotension, and metformin use.  Infection and recent substance use possibly contributing.  -General surgery consult obtained to help rule out concern for ischemic bowel.  Appreciate surgery input.  -Nephrology consult obtained.    -Continue IV fluids.  -Initiating CRRT.    Acute kidney injury.  -Likely multifactorial.  -Appreciate nephrology input.  -Continuous IV fluids.  -Avoid nephrotoxins as able.   -Metformin discontinued.  -Hold lisinopril.  -Initiate CRRT.      Undifferentiated shock.  -Suspect multifactorial due to severe dehydration and continued antihypertensive use.  -Hold antihypertensives.    -Continuous IV fluids.  -Continue IV norepinephrine as needed.    Possible sepsis.  -Unclear source.    -Possible esophagitis and colitis on CT.  -Initially started on IV Zosyn and vancomycin in the ER.  -Check MRSA PCR.  -Check enteric virus and bacterial panel.  -Check C. difficile toxin.  -Continue IV Zosyn.  -Monitor blood culture result.    Diabetes mellitus type 2.  Ketoacidosis.  -Likely starvation ketoacidosis.    -Holding metformin  -Start continuous IV insulin infusion.    Substance use disorder.  -Would benefit from long-term cessation.    -Chemical dependency consult once more stable.    Hyperkalemia.    Hyponatremia.  Hypocalcemia.  -Planning to initiate CRRT.    -Continue to monitor electrolytes.    -Replace if  "needed.    GERD.  -Pantoprazole 40 mg IV twice a day.    Elevated troponin.    -No chest pain.  -Repeat troponin not elevating.  -Likely type II myocardial infarction due to demand ischemia.    -Monitor on telemetry.    -Check echocardiogram.    Hyperlipidemia.  -Hold atorvastatin.    Schizophrenia.  -Normally on intramuscular paliperidone every 28 days.            Diet: NPO for Medical/Clinical Reasons Except for: No Exceptions    DVT Prophylaxis: Pneumatic Compression Devices  [place] Catheter: PRESENT, indication:    Lines: PRESENT      CVC Triple Lumen Right Femoral-Site Assessment: WDL      Cardiac Monitoring: ACTIVE order. Indication: ICU  Code Status: Full Code      Clinically Significant Risk Factors Present on Admission        # Hyperkalemia: Highest K = 5.6 mmol/L in last 2 days, will monitor as appropriate   # Hypocalcemia: Lowest Ca = 7.7 mg/dL in last 2 days, will monitor and replace as appropriate    # Anion Gap Metabolic Acidosis: Highest Anion Gap = 45 mmol/L in last 2 days, will monitor and treat as appropriate      # Hypertension: Home medication list includes antihypertensive(s)   # Circulatory Shock: required vasopressors within past 24 hours     # Anemia: based on hgb <11      # DMII: A1C = 7.2 % (Ref range: <5.7 %) within past 6 months    # Overweight: Estimated body mass index is 25.31 kg/m  as calculated from the following:    Height as of this encounter: 1.702 m (5' 7\").    Weight as of this encounter: 73.3 kg (161 lb 9.6 oz).              Disposition Plan     Medically Ready for Discharge: Anticipated in 5+ Days             Dave Huber DO  Hospitalist Service  [facility]  Securely message with [employer] (more info)  Text page via [employer] Paging/Directory   ______________________________________________________________________    Interval History   Continues having diarrhea.  Nausea improved with medications.  Denies chest pain, shortness of breath, fevers, " chills.      Physical Exam   Vital Signs: Temp: (!) 95.9  F (35.5  C) Temp src: Temporal BP: 120/67 Pulse: 111   Resp: 15 SpO2: 100 % O2 Device: None (Room air)    Weight: 161 lbs 9.55 oz    Gen:  NAD, A&Ox3.  Eyes:  PERRL, sclera anicteric.  OP:  MMM, no lesions.  Neck:  Supple.  CV:  Regular, no loud murmurs.  Lung:  CTA b/l, normal effort.  Ab:  +BS, soft.  Skin:  Warm, dry to touch.  No rash.  Ext:  No pitting edema LE b/l.      Medical Decision Making       55 critical care MINUTES SPENT BY ME on the date of service doing chart review, history, exam, documentation & further activities per the note.      Data     I have personally reviewed the following data over the past 24 hrs:    13.6 (H)  \   8.9 (L)   / 307     133 (L) 89 (L) 40.8 (H) /  252 (H)   5.6 (H) 6 (LL) 2.44 (H) \     ALT: 11 AST: 10 AP: 86 TBILI: 0.2   ALB: 3.8 TOT PROTEIN: 6.2 (L) LIPASE: N/A     Trop: 69 (H) BNP: 509     TSH: 0.32 T4: N/A A1C: 7.2 (H)     Procal: 0.10 CRP: N/A Lactic Acid: 17.0 (HH)       INR:  N/A PTT:  N/A   D-dimer:  0.96 (H) Fibrinogen:  N/A       Imaging results reviewed over the past 24 hrs:   Recent Results (from the past 24 hour(s))   CT Chest (PE) Abdomen Pelvis w Contrast    Narrative    EXAM: CT CHEST PE, ABDOMEN AND PELVIS WITH CONTRAST  LOCATION: [facility]  DATE: 08/04/2024    INDICATION: Shortness of breath, abdominal pain, nausea, vomiting, diarrhea; lactate 20.  COMPARISON: Chest CTA on 04/27/2023.  TECHNIQUE: CT chest pulmonary angiogram and routine CT abdomen pelvis with IV contrast. Arterial phase through the chest and venous phase through the abdomen and pelvis. Multiplanar reformats and MIP reconstructions were performed. Dose reduction   techniques were used.   CONTRAST: 70 mL Isovue 370.    FINDINGS:  ANGIOGRAM CHEST: No evidence of pulmonary embolism. No evidence of thoracic aortic aneurysm or dissection. No evidence for right heart strain.     MEDIASTINUM/AXILLAE: No cardiomegaly  or significant pericardial effusion. No significant mediastinal or hilar lymphadenopathy. Diffuse esophageal wall thickening predominantly of the mid and distal esophagus, worrisome for underlying esophagitis.   Esophagus appears fluid-filled. Heterogenous thyroid parenchyma.    LUNGS AND PLEURA: No pleural effusion or pneumothorax. There is approximately 1.1 x 0.8 cm right upper lobe nodule (series 7 image 62), not significantly changed as compared to 04/27/2023 exam.    CORONARY ARTERY CALCIFICATION: Moderate.    ABDOMEN/PELVIS:    HEPATOBILIARY: No suspicious focal hepatic lesion. Distended gallbladder with layering gallstones.    PANCREAS: No main pancreatic ductal dilatation or definite solid pancreatic mass.    SPLEEN: No splenomegaly.    ADRENAL GLANDS: No adrenal nodules.    KIDNEYS/BLADDER: No radiodense kidney/ureteral stones or hydronephrosis in either kidney. A few small right kidney stones. No left kidney stone. No ureteral stone or hydronephrosis in either kidney. A few hypodense cystic foci in the kidneys, some can be   characterized as renal cysts including 1.7 cm cyst at the upper pole of the right kidney, 2 cm renal cyst at the mid/upper pole of the right kidney while a few others are subcentimeter and too small to characterize. Diffuse urinary bladder wall   thickening, nonspecific, can be seen with underdistention versus chronic cystitis.    BOWEL: Significant gastric distention. No abnormally dilated small bowel loops. The appendix is visualized and appears normal. Scattered colonic diverticulosis predominantly of the right colon. Mild apparent colonic wall thickening of the transverse and   descending colon, likely due to underdistention versus less likely underlying colitis.    PERITONEUM: No evidence of free fluid in the abdomen and pelvis. No free peritoneal or portal venous gas.    PELVIC ORGANS: Unremarkable.    VASCULATURE: Moderate atherosclerotic vascular calcification of the abdominal  aorta and iliac arteries.    LYMPH NODES: Unremarkable.    MUSCULOSKELETAL: Moderate-sized fat-containing supraumbilical and small fat-containing umbilical hernia. Multilevel degenerative changes of the spine.      Impression    IMPRESSION:  1.  No evidence of pulmonary embolism.  2.  Diffuse esophageal wall thickening predominantly of the mid and distal esophagus, worrisome for esophagitis. The esophagus appears fluid-filled.  3.  Significant fluid distention of the stomach. No abnormally dilated small bowel loops.  4.  Distended gallbladder with layering gallstones. No other sonographic evidence of acute cholecystitis.  5.  Heterogenous thyroid gland, can be further evaluated with thyroid ultrasound if clinically warranted.  6.  Apparent colonic wall thickening of the transverse and descending colon, likely due to underdistention versus less likely underlying colitis.       XR Chest Port 1 View    Narrative    EXAM: XR CHEST PORT 1 VIEW  LOCATION: [facility]  DATE: 8/4/2024    INDICATION: Dialysis access placed  COMPARISON: Same day CT.      Impression    IMPRESSION: Right IJ approach dialysis catheter with distal tip projecting at the superior cavoatrial junction. Patchy opacity of the medial right lung base may represent atelectasis or infection/aspiration. No pleural effusion or pneumothorax.

## 2024-08-05 LAB
ALBUMIN SERPL BCG-MCNC: 2.7 G/DL (ref 3.5–5.2)
ALBUMIN SERPL BCG-MCNC: 2.8 G/DL (ref 3.5–5.2)
ALBUMIN SERPL BCG-MCNC: 3.1 G/DL (ref 3.5–5.2)
ALP SERPL-CCNC: 57 U/L (ref 40–150)
ALP SERPL-CCNC: 57 U/L (ref 40–150)
ALT SERPL W P-5'-P-CCNC: 7 U/L (ref 0–70)
ALT SERPL W P-5'-P-CCNC: 7 U/L (ref 0–70)
ANION GAP SERPL CALCULATED.3IONS-SCNC: 12 MMOL/L (ref 7–15)
ANION GAP SERPL CALCULATED.3IONS-SCNC: 12 MMOL/L (ref 7–15)
ANION GAP SERPL CALCULATED.3IONS-SCNC: 13 MMOL/L (ref 7–15)
ANION GAP SERPL CALCULATED.3IONS-SCNC: 13 MMOL/L (ref 7–15)
AST SERPL W P-5'-P-CCNC: 16 U/L (ref 0–45)
AST SERPL W P-5'-P-CCNC: 18 U/L (ref 0–45)
ATRIAL RATE - MUSE: 108 BPM
ATRIAL RATE - MUSE: 114 BPM
B-OH-BUTYR SERPL-SCNC: 0.88 MMOL/L
B-OH-BUTYR SERPL-SCNC: 0.92 MMOL/L
B-OH-BUTYR SERPL-SCNC: 2.1 MMOL/L
B-OH-BUTYR SERPL-SCNC: 2.62 MMOL/L
BASE EXCESS BLDV CALC-SCNC: -1.7 MMOL/L (ref -3–3)
BASE EXCESS BLDV CALC-SCNC: 4.2 MMOL/L (ref -3–3)
BASE EXCESS BLDV CALC-SCNC: 4.6 MMOL/L (ref -3–3)
BILIRUB DIRECT SERPL-MCNC: <0.2 MG/DL (ref 0–0.3)
BILIRUB SERPL-MCNC: 0.2 MG/DL
BILIRUB SERPL-MCNC: 0.3 MG/DL
BUN SERPL-MCNC: 33.7 MG/DL (ref 8–23)
BUN SERPL-MCNC: 35.1 MG/DL (ref 8–23)
BUN SERPL-MCNC: 35.1 MG/DL (ref 8–23)
BUN SERPL-MCNC: 35.3 MG/DL (ref 8–23)
CA-I BLD-MCNC: 4.7 MG/DL (ref 4.4–5.2)
CA-I BLD-MCNC: 4.8 MG/DL (ref 4.4–5.2)
CALCIUM SERPL-MCNC: 8.3 MG/DL (ref 8.8–10.4)
CALCIUM SERPL-MCNC: 8.4 MG/DL (ref 8.8–10.4)
CALCIUM SERPL-MCNC: 8.4 MG/DL (ref 8.8–10.4)
CALCIUM SERPL-MCNC: 8.6 MG/DL (ref 8.8–10.4)
CHLORIDE SERPL-SCNC: 100 MMOL/L (ref 98–107)
CHLORIDE SERPL-SCNC: 101 MMOL/L (ref 98–107)
CREAT SERPL-MCNC: 1.6 MG/DL (ref 0.67–1.17)
CREAT SERPL-MCNC: 1.63 MG/DL (ref 0.67–1.17)
CREAT SERPL-MCNC: 1.63 MG/DL (ref 0.67–1.17)
CREAT SERPL-MCNC: 1.67 MG/DL (ref 0.67–1.17)
DIASTOLIC BLOOD PRESSURE - MUSE: NORMAL MMHG
DIASTOLIC BLOOD PRESSURE - MUSE: NORMAL MMHG
EGFRCR SERPLBLD CKD-EPI 2021: 47 ML/MIN/1.73M2
EGFRCR SERPLBLD CKD-EPI 2021: 48 ML/MIN/1.73M2
EGFRCR SERPLBLD CKD-EPI 2021: 48 ML/MIN/1.73M2
EGFRCR SERPLBLD CKD-EPI 2021: 49 ML/MIN/1.73M2
ERYTHROCYTE [DISTWIDTH] IN BLOOD BY AUTOMATED COUNT: 13.3 % (ref 10–15)
ERYTHROCYTE [DISTWIDTH] IN BLOOD BY AUTOMATED COUNT: 13.5 % (ref 10–15)
GLUCOSE BLDC GLUCOMTR-MCNC: 101 MG/DL (ref 70–99)
GLUCOSE BLDC GLUCOMTR-MCNC: 103 MG/DL (ref 70–99)
GLUCOSE BLDC GLUCOMTR-MCNC: 111 MG/DL (ref 70–99)
GLUCOSE BLDC GLUCOMTR-MCNC: 112 MG/DL (ref 70–99)
GLUCOSE BLDC GLUCOMTR-MCNC: 113 MG/DL (ref 70–99)
GLUCOSE BLDC GLUCOMTR-MCNC: 113 MG/DL (ref 70–99)
GLUCOSE BLDC GLUCOMTR-MCNC: 123 MG/DL (ref 70–99)
GLUCOSE BLDC GLUCOMTR-MCNC: 129 MG/DL (ref 70–99)
GLUCOSE BLDC GLUCOMTR-MCNC: 147 MG/DL (ref 70–99)
GLUCOSE BLDC GLUCOMTR-MCNC: 161 MG/DL (ref 70–99)
GLUCOSE BLDC GLUCOMTR-MCNC: 79 MG/DL (ref 70–99)
GLUCOSE BLDC GLUCOMTR-MCNC: 95 MG/DL (ref 70–99)
GLUCOSE BLDC GLUCOMTR-MCNC: 96 MG/DL (ref 70–99)
GLUCOSE BLDC GLUCOMTR-MCNC: 98 MG/DL (ref 70–99)
GLUCOSE BLDC GLUCOMTR-MCNC: 98 MG/DL (ref 70–99)
GLUCOSE SERPL-MCNC: 102 MG/DL (ref 70–99)
GLUCOSE SERPL-MCNC: 103 MG/DL (ref 70–99)
GLUCOSE SERPL-MCNC: 103 MG/DL (ref 70–99)
GLUCOSE SERPL-MCNC: 119 MG/DL (ref 70–99)
HCO3 BLDV-SCNC: 23 MMOL/L (ref 21–28)
HCO3 BLDV-SCNC: 29 MMOL/L (ref 21–28)
HCO3 BLDV-SCNC: 29 MMOL/L (ref 21–28)
HCO3 SERPL-SCNC: 24 MMOL/L (ref 22–29)
HCO3 SERPL-SCNC: 24 MMOL/L (ref 22–29)
HCO3 SERPL-SCNC: 25 MMOL/L (ref 22–29)
HCO3 SERPL-SCNC: 25 MMOL/L (ref 22–29)
HCT VFR BLD AUTO: 26.8 % (ref 40–53)
HCT VFR BLD AUTO: 27.4 % (ref 40–53)
HGB BLD-MCNC: 8.7 G/DL (ref 13.3–17.7)
HGB BLD-MCNC: 8.8 G/DL (ref 13.3–17.7)
INTERPRETATION ECG - MUSE: NORMAL
INTERPRETATION ECG - MUSE: NORMAL
LACTATE SERPL-SCNC: 1 MMOL/L (ref 0.7–2)
LACTATE SERPL-SCNC: 1 MMOL/L (ref 0.7–2)
LACTATE SERPL-SCNC: 3.8 MMOL/L (ref 0.7–2)
MAGNESIUM SERPL-MCNC: 1.8 MG/DL (ref 1.7–2.3)
MAGNESIUM SERPL-MCNC: 1.9 MG/DL (ref 1.7–2.3)
MAGNESIUM SERPL-MCNC: 2.4 MG/DL (ref 1.7–2.3)
MCH RBC QN AUTO: 27.8 PG (ref 26.5–33)
MCH RBC QN AUTO: 27.8 PG (ref 26.5–33)
MCHC RBC AUTO-ENTMCNC: 32.1 G/DL (ref 31.5–36.5)
MCHC RBC AUTO-ENTMCNC: 32.5 G/DL (ref 31.5–36.5)
MCV RBC AUTO: 86 FL (ref 78–100)
MCV RBC AUTO: 87 FL (ref 78–100)
O2/TOTAL GAS SETTING VFR VENT: 21 %
O2/TOTAL GAS SETTING VFR VENT: 21 %
O2/TOTAL GAS SETTING VFR VENT: 3 %
OXYHGB MFR BLDV: 77 % (ref 70–75)
OXYHGB MFR BLDV: 77 % (ref 70–75)
OXYHGB MFR BLDV: 79 % (ref 70–75)
P AXIS - MUSE: 55 DEGREES
P AXIS - MUSE: 63 DEGREES
PCO2 BLDV: 38 MM HG (ref 40–50)
PCO2 BLDV: 41 MM HG (ref 40–50)
PCO2 BLDV: 43 MM HG (ref 40–50)
PH BLDV: 7.4 [PH] (ref 7.32–7.43)
PH BLDV: 7.44 [PH] (ref 7.32–7.43)
PH BLDV: 7.45 [PH] (ref 7.32–7.43)
PHOSPHATE SERPL-MCNC: 2.3 MG/DL (ref 2.5–4.5)
PHOSPHATE SERPL-MCNC: 4.2 MG/DL (ref 2.5–4.5)
PLATELET # BLD AUTO: 203 10E3/UL (ref 150–450)
PLATELET # BLD AUTO: 226 10E3/UL (ref 150–450)
PO2 BLDV: 46 MM HG (ref 25–47)
PO2 BLDV: 47 MM HG (ref 25–47)
PO2 BLDV: 50 MM HG (ref 25–47)
POTASSIUM SERPL-SCNC: 3.6 MMOL/L (ref 3.4–5.3)
POTASSIUM SERPL-SCNC: 3.7 MMOL/L (ref 3.4–5.3)
PR INTERVAL - MUSE: 112 MS
PR INTERVAL - MUSE: 124 MS
PROT SERPL-MCNC: 4.1 G/DL (ref 6.4–8.3)
PROT SERPL-MCNC: 4.3 G/DL (ref 6.4–8.3)
QRS DURATION - MUSE: 88 MS
QRS DURATION - MUSE: 92 MS
QT - MUSE: 356 MS
QT - MUSE: 382 MS
QTC - MUSE: 490 MS
QTC - MUSE: 511 MS
R AXIS - MUSE: 42 DEGREES
R AXIS - MUSE: 47 DEGREES
RBC # BLD AUTO: 3.13 10E6/UL (ref 4.4–5.9)
RBC # BLD AUTO: 3.16 10E6/UL (ref 4.4–5.9)
SAO2 % BLDV: 78.2 % (ref 70–75)
SAO2 % BLDV: 78.5 % (ref 70–75)
SAO2 % BLDV: 81.1 % (ref 70–75)
SCANNED LAB RESULT: ABNORMAL
SCANNED LAB RESULT: NORMAL
SODIUM SERPL-SCNC: 137 MMOL/L (ref 135–145)
SODIUM SERPL-SCNC: 138 MMOL/L (ref 135–145)
SYSTOLIC BLOOD PRESSURE - MUSE: NORMAL MMHG
SYSTOLIC BLOOD PRESSURE - MUSE: NORMAL MMHG
T AXIS - MUSE: 31 DEGREES
T AXIS - MUSE: 52 DEGREES
VENTRICULAR RATE- MUSE: 108 BPM
VENTRICULAR RATE- MUSE: 114 BPM
WBC # BLD AUTO: 11.7 10E3/UL (ref 4–11)
WBC # BLD AUTO: 13.1 10E3/UL (ref 4–11)

## 2024-08-05 PROCEDURE — 99233 SBSQ HOSP IP/OBS HIGH 50: CPT | Performed by: INTERNAL MEDICINE

## 2024-08-05 PROCEDURE — 80048 BASIC METABOLIC PNL TOTAL CA: CPT | Performed by: ANESTHESIOLOGY

## 2024-08-05 PROCEDURE — 90947 DIALYSIS REPEATED EVAL: CPT

## 2024-08-05 PROCEDURE — 82330 ASSAY OF CALCIUM: CPT | Performed by: INTERNAL MEDICINE

## 2024-08-05 PROCEDURE — 250N000009 HC RX 250: Performed by: HOSPITALIST

## 2024-08-05 PROCEDURE — 84100 ASSAY OF PHOSPHORUS: CPT | Performed by: INTERNAL MEDICINE

## 2024-08-05 PROCEDURE — 82247 BILIRUBIN TOTAL: CPT | Performed by: INTERNAL MEDICINE

## 2024-08-05 PROCEDURE — 250N000011 HC RX IP 250 OP 636: Performed by: HOSPITALIST

## 2024-08-05 PROCEDURE — 82010 KETONE BODYS QUAN: CPT | Performed by: ANESTHESIOLOGY

## 2024-08-05 PROCEDURE — 250N000009 HC RX 250: Performed by: INTERNAL MEDICINE

## 2024-08-05 PROCEDURE — 84155 ASSAY OF PROTEIN SERUM: CPT | Performed by: INTERNAL MEDICINE

## 2024-08-05 PROCEDURE — 85027 COMPLETE CBC AUTOMATED: CPT | Performed by: INTERNAL MEDICINE

## 2024-08-05 PROCEDURE — 258N000003 HC RX IP 258 OP 636: Performed by: INTERNAL MEDICINE

## 2024-08-05 PROCEDURE — 82805 BLOOD GASES W/O2 SATURATION: CPT | Performed by: ANESTHESIOLOGY

## 2024-08-05 PROCEDURE — 83735 ASSAY OF MAGNESIUM: CPT | Performed by: INTERNAL MEDICINE

## 2024-08-05 PROCEDURE — 84450 TRANSFERASE (AST) (SGOT): CPT | Performed by: INTERNAL MEDICINE

## 2024-08-05 PROCEDURE — 82248 BILIRUBIN DIRECT: CPT | Performed by: INTERNAL MEDICINE

## 2024-08-05 PROCEDURE — 99232 SBSQ HOSP IP/OBS MODERATE 35: CPT | Performed by: INTERNAL MEDICINE

## 2024-08-05 PROCEDURE — 250N000011 HC RX IP 250 OP 636: Performed by: INTERNAL MEDICINE

## 2024-08-05 PROCEDURE — 200N000001 HC R&B ICU

## 2024-08-05 PROCEDURE — 84460 ALANINE AMINO (ALT) (SGPT): CPT | Performed by: INTERNAL MEDICINE

## 2024-08-05 PROCEDURE — 82010 KETONE BODYS QUAN: CPT | Performed by: INTERNAL MEDICINE

## 2024-08-05 PROCEDURE — 83605 ASSAY OF LACTIC ACID: CPT | Performed by: ANESTHESIOLOGY

## 2024-08-05 PROCEDURE — 83605 ASSAY OF LACTIC ACID: CPT | Performed by: INTERNAL MEDICINE

## 2024-08-05 PROCEDURE — 250N000013 HC RX MED GY IP 250 OP 250 PS 637: Performed by: INTERNAL MEDICINE

## 2024-08-05 RX ORDER — PANTOPRAZOLE SODIUM 40 MG/1
40 TABLET, DELAYED RELEASE ORAL
Status: DISCONTINUED | OUTPATIENT
Start: 2024-08-05 | End: 2024-08-07 | Stop reason: HOSPADM

## 2024-08-05 RX ORDER — CALCIUM CHLORIDE, MAGNESIUM CHLORIDE, DEXTROSE MONOHYDRATE, LACTIC ACID, SODIUM CHLORIDE, SODIUM BICARBONATE AND POTASSIUM CHLORIDE 5.15; 2.03; 22; 5.4; 6.46; 3.09; .157 G/L; G/L; G/L; G/L; G/L; G/L; G/L
INJECTION INTRAVENOUS CONTINUOUS
Status: DISCONTINUED | OUTPATIENT
Start: 2024-08-05 | End: 2024-08-05

## 2024-08-05 RX ORDER — DEXTROSE MONOHYDRATE 25 G/50ML
25-50 INJECTION, SOLUTION INTRAVENOUS
Status: DISCONTINUED | OUTPATIENT
Start: 2024-08-05 | End: 2024-08-05

## 2024-08-05 RX ORDER — PIPERACILLIN SODIUM, TAZOBACTAM SODIUM 3; .375 G/15ML; G/15ML
3.38 INJECTION, POWDER, LYOPHILIZED, FOR SOLUTION INTRAVENOUS EVERY 6 HOURS
Status: DISCONTINUED | OUTPATIENT
Start: 2024-08-05 | End: 2024-08-07 | Stop reason: HOSPADM

## 2024-08-05 RX ORDER — HALOPERIDOL 0.5 MG/1
1 TABLET ORAL EVERY 6 HOURS PRN
Status: DISCONTINUED | OUTPATIENT
Start: 2024-08-05 | End: 2024-08-07 | Stop reason: HOSPADM

## 2024-08-05 RX ORDER — HEPARIN SODIUM,PORCINE 10 UNIT/ML
5-20 VIAL (ML) INTRAVENOUS
Status: DISCONTINUED | OUTPATIENT
Start: 2024-08-05 | End: 2024-08-07 | Stop reason: HOSPADM

## 2024-08-05 RX ORDER — NICOTINE POLACRILEX 4 MG
15-30 LOZENGE BUCCAL
Status: DISCONTINUED | OUTPATIENT
Start: 2024-08-05 | End: 2024-08-05

## 2024-08-05 RX ORDER — HEPARIN SODIUM,PORCINE 10 UNIT/ML
5-20 VIAL (ML) INTRAVENOUS EVERY 24 HOURS
Status: DISCONTINUED | OUTPATIENT
Start: 2024-08-05 | End: 2024-08-07

## 2024-08-05 RX ADMIN — SODIUM BICARBONATE: 84 INJECTION, SOLUTION INTRAVENOUS at 00:43

## 2024-08-05 RX ADMIN — CALCIUM CHLORIDE, MAGNESIUM CHLORIDE, DEXTROSE MONOHYDRATE, LACTIC ACID, SODIUM CHLORIDE, SODIUM BICARBONATE AND POTASSIUM CHLORIDE 5000 ML: 5.15; 2.03; 22; 5.4; 6.46; 3.09; .157 INJECTION INTRAVENOUS at 01:10

## 2024-08-05 RX ADMIN — HALOPERIDOL 1 MG: 1 TABLET ORAL at 20:47

## 2024-08-05 RX ADMIN — CALCIUM CHLORIDE, MAGNESIUM CHLORIDE, DEXTROSE MONOHYDRATE, LACTIC ACID, SODIUM CHLORIDE, SODIUM BICARBONATE AND POTASSIUM CHLORIDE 5000 ML: 5.15; 2.03; 22; 5.4; 6.46; 3.09; .157 INJECTION INTRAVENOUS at 00:46

## 2024-08-05 RX ADMIN — MAGNESIUM SULFATE HEPTAHYDRATE 2 G: 2 INJECTION, SOLUTION INTRAVENOUS at 06:29

## 2024-08-05 RX ADMIN — PIPERACILLIN AND TAZOBACTAM 3.38 G: 3; .375 INJECTION, POWDER, FOR SOLUTION INTRAVENOUS at 14:16

## 2024-08-05 RX ADMIN — PIPERACILLIN AND TAZOBACTAM 4.5 G: 4; .5 INJECTION, POWDER, FOR SOLUTION INTRAVENOUS at 04:34

## 2024-08-05 RX ADMIN — HEPARIN, PORCINE (PF) 10 UNIT/ML INTRAVENOUS SYRINGE 5 ML: at 05:03

## 2024-08-05 RX ADMIN — HEPARIN, PORCINE (PF) 10 UNIT/ML INTRAVENOUS SYRINGE 5 ML: at 05:04

## 2024-08-05 RX ADMIN — PIPERACILLIN AND TAZOBACTAM 3.38 G: 3; .375 INJECTION, POWDER, FOR SOLUTION INTRAVENOUS at 20:47

## 2024-08-05 RX ADMIN — PANTOPRAZOLE SODIUM 40 MG: 40 INJECTION, POWDER, FOR SOLUTION INTRAVENOUS at 09:10

## 2024-08-05 RX ADMIN — SODIUM PHOSPHATE, MONOBASIC, MONOHYDRATE AND SODIUM PHOSPHATE, DIBASIC, ANHYDROUS 15 MMOL: 142; 276 INJECTION, SOLUTION INTRAVENOUS at 06:54

## 2024-08-05 ASSESSMENT — ACTIVITIES OF DAILY LIVING (ADL)
ADLS_ACUITY_SCORE: 47

## 2024-08-05 NOTE — PROGRESS NOTES
"CLINICAL NUTRITION SERVICES - ASSESSMENT NOTE     Nutrition Prescription    Malnutrition Status:    % Weight Loss:  Weight loss does not meet criteria for malnutrition- ~4% weight loss since April  % Intake:  Unable to assess  Subcutaneous Fat Loss:  Unable to assess d/t patient sleeping, dimmed room  Muscle Loss:  Unable to assess  Fluid Retention:  None noted    Malnutrition Diagnosis: Unable to determine due to insufficient information    Recommendations already ordered by Registered Dietitian (RD):  Medical food supplement therapy - Glucerna bid    Future/Additional Recommendations:  Monitor intake, obtain nutrition history and NFPE when able     REASON FOR ASSESSMENT  Benja Duogn is a/an 59 year old male assessed by the dietitian for MST score of 3: unsure for weight loss and positive  for poor oral intake related to decreased appetite    Pt admitted d/t likely hypovolemic shock 2/2 intractable nausea and vomiting, severe lactic acidosis, ARF likely 2/2 prerenal KAVITA initially requiring CRRT (now off). PMH significant for polysubstance abuse with cocaine, cognitive delay, methamphetamine, and opiates, tobacco use disorder, alcohol use disorder, paranoid schizophrenia, peptic ulcer disease and esophagitis, and depression     NUTRITION HISTORY  - Unable to obtain history--patient does not respond  - patient resides at   - Allergies: NFKA    CURRENT NUTRITION ORDERS  Diet: Full Liquid  Intake/Tolerance: none documented yet, diet initiated this morning    LABS  - P 2.3  - BUN 35.1  - Cr 1.63  - urinary ketones 20  - A1c 7.2    MEDICATIONS  - pantoprazole    ANTHROPOMETRICS  Height: 170.2 cm (5' 7\")  Most Recent Weight: 74.2 kg (163 lb 9.3 oz)    IBW: 67.3 kg  Body mass index is 25.62 kg/m .  BMI: Overweight BMI 25-29.9  Weight History: weights relatively stable over past few months, limited recor  Wt Readings from Last 20 Encounters:   08/05/24 74.2 kg (163 lb 9.3 oz)   06/23/24 72.7 kg (160 lb 4.4 oz) "   03/16/24 77.4 kg (170 lb 10.2 oz)   04/27/23 81.6 kg (180 lb)   04/20/22 78.9 kg (173 lb 14.4 oz)   01/15/22 74.9 kg (165 lb 1.6 oz)   12/27/21 79.8 kg (176 lb)   10/04/20 73.6 kg (162 lb 3.2 oz)   08/06/20 66.6 kg (146 lb 12.8 oz)   07/21/20 69.2 kg (152 lb 9.6 oz)   02/03/19 77.1 kg (170 lb)   11/29/18 84.8 kg (187 lb)   07/26/18 65.6 kg (144 lb 9.6 oz)     Per Care Everywhere:  04/18/24 78 kg (172 lb)  09/15/23 78.9 kg (174 lb)    ASSESSED NUTRITION NEEDS PER APPROVED PRACTICE GUIDELINES  Dosing Weight: 74.2 kg  Estimated Energy Needs: 4110-4459 kcals/day (25 - 30 kcals/kg)  Justification: Maintenance  Estimated Protein Needs: 74-89+ grams protein/day (1 - 1.2+ grams of pro/kg)  Justification: Maintenance, KAVITA currently but would likely benefit from repletion  Estimated Fluid Needs:  (1 mL/kcal)   Justification: Maintenance, or per provider pending fluid status    PHYSICAL FINDINGS  See malnutrition section above.    MALNUTRITION  % Weight Loss:  Weight loss does not meet criteria for malnutrition- ~4% weight loss since April  % Intake:  Unable to assess  Subcutaneous Fat Loss:  Unable to assess d/t patient sleeping, dimmed room  Muscle Loss:  Unable to assess  Fluid Retention:  None noted    Malnutrition Diagnosis: Unable to determine due to insufficient information    NUTRITION DIAGNOSIS  Inadequate oral intake related to acute illness as evidenced by no intake since admission, report of decreased intake PTA per screen      INTERVENTIONS  Implementation  Nutrition Education: Not appropriate at this time due to patient condition   Medical food supplement therapy - Glucerna bid  Collaboration and Referral of Nutrition care: Patient discussed this morning during IDT rounds    Goals  Patient to consume % of nutritionally adequate meal trays TID, or the equivalent with supplements/snacks.     Monitoring/Evaluation  Progress toward goals will be monitored and evaluated per protocol.  Maile Mar, RD, LD,  [facility]  Pager - 3rd floor/ICU: [phone]  Pager - All other floors: [phone]  Pager - Weekend/holiday: [phone]  Office: [phone]

## 2024-08-05 NOTE — PROGRESS NOTES
CRRT filter clotted at 2300 restarted with prescription changes from Dr Marvin, and clotted again at 0400.   Labs obtained at 0400 and after discussion with Dr Marvin, will not resume CRRT at this time.

## 2024-08-05 NOTE — PROGRESS NOTES
[facility]    Medicine Progress Note - Hospitalist Service    Date of Admission:  8/4/2024    Assessment & Plan     Benja Duong is a 59 year old male with a history of hypertension, diabetes mellitus, polysubstance abuse with cocaine, cognitive delay, methamphetamine, and opiates, tobacco use disorder, alcohol use disorder, paranoid schizophrenia, peptic ulcer disease and esophagitis, and depression who presents with intractable nausea, vomiting, and diarrhea.      Severe lactic acidosis.  -Etiology initially unclear.  -Suspected multifactorial due to dehydration, hypotension, and metformin use.  Infection and recent substance use possibly contributing.  -General surgery consult obtained to help rule out concern for ischemic bowel.    -Appreciate surgery input.  -Nephrology consult obtained.    -Continue IV fluids.  -Initiated on CRRT.  -Significantly improved by morning of 8/5.  -Appreciate continued nephrology input.  -Hold further CRRT for now.  -Continue to monitor labs.     Acute kidney injury.  -Likely multifactorial.  -Appreciate nephrology input.  -Continuous IV fluids.  -Avoid nephrotoxins as able.   -Metformin discontinued.  -Hold lisinopril.  -Initiated CRRT.  CRRT stopped the morning of 8/5.  -Improving.     Undifferentiated shock.  -Suspect multifactorial due to severe dehydration and continued antihypertensive use.  -Hold antihypertensives.    -Continuous IV fluids.  -Continue IV norepinephrine as needed.     Possible sepsis.  -Unclear source.    -Possible esophagitis and colitis on CT.  -Initially started on IV Zosyn and vancomycin in the ER.  -MRSA PCR negative.  -Enteric virus and bacterial panel negative.  -C. difficile toxin negative.  -Continue IV Zosyn today.  -Likely stop Zosyn in the next day or 2 if cultures remain negative.  -Monitor blood culture result.     Diabetes mellitus type 2.  Ketoacidosis.  -Suspect starvation ketoacidosis.    -Holding metformin.  -Stop  "continuous IV insulin infusion.  -Start aspart insulin sliding scale as needed.  -Start full liquid diet.     Substance use disorder.  -Would benefit from long-term cessation.    -Chemical dependency consult once more stable.     Hyperkalemia.    Hyponatremia.  Hypocalcemia.  -Improved with CRRT.  -Continue to monitor labs.     GERD.  -Change pantoprazole to 40 mg oral twice a day.     Elevated troponin.    -No chest pain.  -Repeat troponin not elevating.  -Likely type II myocardial infarction due to demand ischemia.    -Monitor on telemetry.    -Check echocardiogram.     Hyperlipidemia.  -Hold atorvastatin.     Schizophrenia.  -Normally on intramuscular paliperidone every 28 days.  -Reportedly due for intramuscular paliperidone.  -For now, haloperidol as needed.             Diet: Full Liquid Diet    DVT Prophylaxis: Pneumatic Compression Devices  Carranza Catheter: PRESENT, indication: Acute retention or obstruction  Lines: PRESENT      CVC Double Lumen Right Internal jugular Hemodialysis/CRRT-Site Assessment: WDL  CVC Triple Lumen Right Femoral-Site Assessment: WDL      Cardiac Monitoring: ACTIVE order. Indication: ICU  Code Status: Full Code      Clinically Significant Risk Factors        # Hyperkalemia: Highest K = 5.6 mmol/L in last 2 days, will monitor as appropriate    # Hypercalcemia: corrected calcium is >10.1, will monitor as appropriate  # Hypomagnesemia: Lowest Mg = 1.6 mg/dL in last 2 days, will replace as needed  # Anion Gap Metabolic Acidosis: Highest Anion Gap = 45 mmol/L in last 2 days, will monitor and treat as appropriate  # Hypoalbuminemia: Lowest albumin = 2.7 g/dL at 8/5/2024  7:40 AM, will monitor as appropriate               # DMII: A1C = 7.2 % (Ref range: <5.7 %) within past 6 months, PRESENT ON ADMISSION  # Overweight: Estimated body mass index is 25.62 kg/m  as calculated from the following:    Height as of this encounter: 1.702 m (5' 7\").    Weight as of this encounter: 74.2 kg (163 lb 9.3 " oz)., PRESENT ON ADMISSION            Disposition Plan     Medically Ready for Discharge: Anticipated in 2-4 Days             Dave Huber DO  Hospitalist Service  [facility]  Securely message with [employer] (more info)  Text page via [employer] Paging/Directory   ______________________________________________________________________    Interval History   Unusual affect.  Awake.  Is saying a few words.  Not really answering questions.    Physical Exam   Vital Signs: Temp: 98.2  F (36.8  C) Temp src: Temporal BP: 116/55 Pulse: 116   Resp: 14 SpO2: 94 % O2 Device: Nasal cannula    Weight: 163 lbs 9.3 oz    Gen:  NAD, Awake, difficult to evaluate mentation due to not really answering questions.  Eyes:  PERRL, sclera anicteric.  OP:  MMM, no lesions.  Neck:  Supple.  CV:  Regular, no loud murmur.  Lung:  CTA b/l, normal effort.  Ab:  +BS, soft.  Skin:  Warm, dry to touch.  No rash.  Ext:  No pitting edema LE b/l.      Medical Decision Making       51 MINUTES SPENT BY ME on the date of service doing chart review, history, exam, documentation & further activities per the note.      Data     I have personally reviewed the following data over the past 24 hrs:    11.7 (H)  \   8.7 (L)   / 203     137 100 35.3 (H) /  119 (H)   3.7 24 1.60 (H) \     ALT: 7 AST: 18 AP: 57 TBILI: 0.3   ALB: 2.8 (L) TOT PROTEIN: 4.1 (L) LIPASE: N/A     Procal: N/A CRP: N/A Lactic Acid: 1.0         Imaging results reviewed over the past 24 hrs:   No results found for this or any previous visit (from the past 24 hour(s)).

## 2024-08-05 NOTE — PLAN OF CARE
"Goal Outcome Evaluation:      Plan of Care Reviewed With: patient    Overall Patient Progress: improvingOverall Patient Progress: improving    Outcome Evaluation: Alert to self and time. Lethargic, sleeping most of day. CRRT dc'd. Lactic improved to WNL. Creat. 1.6.  Denies pain. LS dim, congested cough. Intermitently needing 1-2 LPM NC while sleeping. Tele: ST. BP stable off levophed. Insulin drip dc'd. Advanced to full liquids. Poor appetite. Continues on IV zosyn.  CVC to right femoral, Right IJ dialysis line. Remains off CRRT. Re-evaluate for dialysis tomorrow. Nephrology following.      Problem: Adult Inpatient Plan of Care  Goal: Plan of Care Review  Description: The Plan of Care Review/Shift note should be completed every shift.  The Outcome Evaluation is a brief statement about your assessment that the patient is improving, declining, or no change.  This information will be displayed automatically on your shift  note.  8/5/2024 1639 by Rachna Maya, RN  Outcome: Progressing  Flowsheets (Taken 8/5/2024 1639)  Outcome Evaluation: Alert to self and time. Lethargic, sleeping most of day. CRRT dc'd. Lactic improved to WNL. Creat. 1.6.  Denies pain. LS dim, congested cough. Intermitently needing 1-2 LPM NC while sleeping. Tele: ST. BP stable off levophed. Insulin drip dc'd. Advanced to full liquids. Poor appetite. Continues on IV zosyn.  CVC to right femoral, Right IJ dialysis line. Remains off CRRT. Re-evaluate for dialysis tomorrow. Nephrology following.  Plan of Care Reviewed With: patient  Overall Patient Progress: improving  8/5/2024 1508 by Rachna Maya, RN  Outcome: Progressing  Goal: Patient-Specific Goal (Individualized)  Description: You can add care plan individualizations to a care plan. Examples of Individualization might be:  \"Parent requests to be called daily at 9am for status\", \"I have a hard time hearing out of my right ear\", or \"Do not touch me to wake me up as it startles  me\".  8/5/2024 " 1639 by Jewell, Rachna E, RN  Outcome: Progressing  8/5/2024 1508 by Rachna Maya RN  Outcome: Progressing  Goal: Absence of Hospital-Acquired Illness or Injury  8/5/2024 1639 by Rachna Maya RN  Outcome: Progressing  8/5/2024 1508 by Rachna Maya RN  Outcome: Progressing  Intervention: Identify and Manage Fall Risk  Recent Flowsheet Documentation  Taken 8/5/2024 1600 by Rachna Maya RN  Safety Promotion/Fall Prevention: activity supervised  Taken 8/5/2024 1200 by Rachna Maya RN  Safety Promotion/Fall Prevention: activity supervised  Taken 8/5/2024 0800 by Rachna Maya RN  Safety Promotion/Fall Prevention: activity supervised  Intervention: Prevent Skin Injury  Recent Flowsheet Documentation  Taken 8/5/2024 1600 by Rachna Maya RN  Body Position:   turned   legs elevated   left  Skin Protection:   adhesive use limited   incontinence pads utilized   transparent dressing maintained   skin to skin areas padded  Device Skin Pressure Protection:   absorbent pad utilized/changed   positioning supports utilized   adhesive use limited   tubing/devices free from skin contact  Taken 8/5/2024 1200 by Rachna Maya RN  Body Position:   turned   legs elevated   left  Skin Protection:   adhesive use limited   incontinence pads utilized   transparent dressing maintained   skin to skin areas padded  Device Skin Pressure Protection:   absorbent pad utilized/changed   positioning supports utilized   adhesive use limited   tubing/devices free from skin contact  Taken 8/5/2024 0800 by Rachna Maya RN  Body Position:   turned   legs elevated   left  Skin Protection:   adhesive use limited   incontinence pads utilized   transparent dressing maintained   skin to skin areas padded  Device Skin Pressure Protection:   absorbent pad utilized/changed   positioning supports utilized   adhesive use limited   tubing/devices free from skin contact  Taken 8/5/2024 0745 by Rachna Maya RN  Body Position:  position changed independently  Intervention: Prevent and Manage VTE (Venous Thromboembolism) Risk  Recent Flowsheet Documentation  Taken 8/5/2024 1600 by Rachna Maya RN  VTE Prevention/Management: SCDs on (sequential compression devices)  Taken 8/5/2024 1200 by Rachna Maya RN  VTE Prevention/Management: SCDs on (sequential compression devices)  Taken 8/5/2024 0800 by Rachna Maya RN  VTE Prevention/Management: SCDs on (sequential compression devices)  Intervention: Prevent Infection  Recent Flowsheet Documentation  Taken 8/5/2024 1600 by Rachna Maya RN  Infection Prevention:   single patient room provided   rest/sleep promoted  Taken 8/5/2024 1509 by Rachna Maya RN  Infection Prevention:   single patient room provided   rest/sleep promoted  Taken 8/5/2024 1200 by Rachna Maya RN  Infection Prevention:   single patient room provided   rest/sleep promoted  Taken 8/5/2024 0800 by Rachna Maya RN  Infection Prevention:   single patient room provided   rest/sleep promoted  Goal: Optimal Comfort and Wellbeing  8/5/2024 1639 by Rachna Maya RN  Outcome: Progressing  8/5/2024 1508 by Rachna Maya RN  Outcome: Progressing  Intervention: Provide Person-Centered Care  Recent Flowsheet Documentation  Taken 8/5/2024 1600 by Rachna Maya RN  Trust Relationship/Rapport:   care explained   emotional support provided   questions answered  Taken 8/5/2024 1200 by Rachna Maya RN  Trust Relationship/Rapport:   care explained   emotional support provided   questions answered  Taken 8/5/2024 0800 by Rachna Maya RN  Trust Relationship/Rapport:   care explained   emotional support provided   questions answered  Goal: Readiness for Transition of Care  8/5/2024 1639 by Rachna Maya RN  Outcome: Progressing  8/5/2024 1508 by Rachna Maya RN  Outcome: Progressing

## 2024-08-05 NOTE — PLAN OF CARE
ICU End of Shift Summary.  For vital signs and complete assessments, please see documentation flowsheets.      Pertinent assessments:   Neuro: Sleepy but oriented. No c/o pain  Cardiac: ST, BP supported with norepi (weaned to nearly off at this writing)  Resp: RA, rattling non-productive cough, LS diminished  GI: multiple episodes of loose incontinent stool  : Carranza draining yellow   Skin: WDL  Lines: R internal jugular HD cath, R fem 3l CVC, PIVs  Drips: Levo, Insulin    Major Shift Events: CRRT running - filter clotted x2 and with improved labs Neph ordered stop after 2nd clotting episode.    Plan (Upcoming Events): TBD  Discharge/Transfer Needs: TBD     Bedside Shift Report Completed : y  Bedside Safety Check Completed: y       Problem: Adult Inpatient Plan of Care  Goal: Plan of Care Review  Description: The Plan of Care Review/Shift note should be completed every shift.  The Outcome Evaluation is a brief statement about your assessment that the patient is improving, declining, or no change.  This information will be displayed automatically on your shift  note.  Flowsheets (Taken 8/5/2024 0522)  Outcome Evaluation: Labs improved with CRRT  Plan of Care Reviewed With: patient  Overall Patient Progress: improving  Goal: Absence of Hospital-Acquired Illness or Injury  Intervention: Identify and Manage Fall Risk  Recent Flowsheet Documentation  Taken 8/5/2024 0400 by Robert Pearce RN  Safety Promotion/Fall Prevention: activity supervised  Taken 8/5/2024 0000 by Robert Pearce, RN  Safety Promotion/Fall Prevention: activity supervised  Taken 8/4/2024 2000 by Robert Pearce, RN  Safety Promotion/Fall Prevention: activity supervised  Intervention: Prevent Skin Injury  Recent Flowsheet Documentation  Taken 8/5/2024 0400 by Robert Pearce, RN  Body Position:   turned   legs elevated   left  Skin Protection:   adhesive use limited   incontinence pads utilized   transparent dressing maintained   skin to skin areas  padded  Device Skin Pressure Protection:   absorbent pad utilized/changed   positioning supports utilized   adhesive use limited   tubing/devices free from skin contact  Taken 8/5/2024 0200 by Robert Pearce RN  Body Position:   position changed independently   turned  Taken 8/5/2024 0000 by Robert Pearce RN  Body Position:   turned   right   legs elevated  Skin Protection:   adhesive use limited   incontinence pads utilized   transparent dressing maintained   skin to skin areas padded  Device Skin Pressure Protection:   absorbent pad utilized/changed   positioning supports utilized   adhesive use limited   tubing/devices free from skin contact  Taken 8/4/2024 2000 by Robert Pearce RN  Body Position: position changed independently  Skin Protection:   adhesive use limited   incontinence pads utilized   transparent dressing maintained   skin to skin areas padded  Device Skin Pressure Protection:   absorbent pad utilized/changed   positioning supports utilized   adhesive use limited   tubing/devices free from skin contact  Intervention: Prevent and Manage VTE (Venous Thromboembolism) Risk  Recent Flowsheet Documentation  Taken 8/5/2024 0400 by Robert Pearce RN  VTE Prevention/Management: SCDs on (sequential compression devices)  Taken 8/5/2024 0000 by Robert Pearce RN  VTE Prevention/Management: SCDs on (sequential compression devices)  Taken 8/4/2024 2000 by Robert Pearce RN  VTE Prevention/Management: SCDs on (sequential compression devices)  Intervention: Prevent Infection  Recent Flowsheet Documentation  Taken 8/5/2024 0400 by Robert Pearce RN  Infection Prevention:   single patient room provided   rest/sleep promoted  Taken 8/5/2024 0000 by oRbert Pearce RN  Infection Prevention:   single patient room provided   rest/sleep promoted  Taken 8/4/2024 2000 by Robert Pearce RN  Infection Prevention:   single patient room provided   rest/sleep promoted  Goal: Optimal Comfort and Wellbeing  Intervention: Provide  Person-Centered Care  Recent Flowsheet Documentation  Taken 8/5/2024 0400 by Robert Pearce, RN  Trust Relationship/Rapport:   care explained   emotional support provided   questions answered  Taken 8/5/2024 0000 by Robert Pearce, RN  Trust Relationship/Rapport:   care explained   emotional support provided   questions answered  Taken 8/4/2024 2000 by Robert Pearce, RN  Trust Relationship/Rapport:   care explained   emotional support provided   questions answered   Goal Outcome Evaluation:      Plan of Care Reviewed With: patient    Overall Patient Progress: improvingOverall Patient Progress: improving    Outcome Evaluation: Labs improved with CRRT

## 2024-08-05 NOTE — PROGRESS NOTES
[facility]     Renal Progress Note       SHORTHAND KEY FOR MY NOTES:  c = with, s = without, p = after, a = before, x = except, asx = asymptomatic, tx = transplant or treatment, sx = symptoms or symptomatic, cx = canceled or culture, rxn = reaction, yday = yesterday, nl = normal, abx = antibiotics, fxn = function, dx = diagnosis, dz = disease, m/h = melena/hematochezia, c/d/l/ha = cramping/dizziness/lightheadedness/headache, d/c = discharge or diarrhea/constipation, f/c/n/v = fevers/chills/nausea/vomiting, cp/sob = chest pain/shortness of breath, tbv = total body volume, rxn = reaction, tdc = tunneled dialysis catheter, pta = prior to admission, hd = hemodialysis, pd = peritoneal dialysis, hhd = home hemodialysis, edw = estimated dry wt         Assessment/Plan:     1.  KAVITA.  Pt's labs improved overnight while on CRRT, but he clotted the dialyser x 2 so it was stopped.  Today, labs and volume are ok so no need for dialysis right now.  Given that he is making a little more urine, we will monitor clinically for now.  A.  No CRRT or HD today.  B.  Monitor labs closely.  If the K rises, then med mgmt initially.  If it's really high, then we will dialyse him.    2.  Severe lactic acidosis c shock.  He is off pressors but remains on abx.  Procal was not high.  Lactate has normalized and remains so even p being off CRRT x 7h.  With metformin toxicity, it would prob rise again if there was still an issue.  He is still not properly interacting, so will need to follow lactate, clinically.  A.  Change Zosyn to 3.375 q 6h.  B.  Check lactate @ 1400.  C.  Follow mental status, clinically.    3.  Starvation ketosis.  His ketones paige p CRRT was stopped.  A.  Follow clinically.    4.  Anemia.  Hb is stable in the 8s.  A.  Follow hb daily.    5.  HTN.  Pt's BP is starting to rise and he is off his home meds.  A.  Use hydral prn.    6.  FEN.  Electrolytes are ok.    Case d/w Dr. Huber.        Interval  "History:     Hard to get much hx from pt as he is not that interactive.  Denies any cp/sob/abd pain right now.    Per RN, he is starting to make a little more urine - 100 ml over the past 2h and 35 ml / 2h prior to that.          Medications and Allergies:     Current Facility-Administered Medications   Medication Dose Route Frequency Provider Last Rate Last Admin    heparin lock flush 10 unit/mL injection 5-20 mL  5-20 mL Intracatheter Q24H Ghassan Holland DO   5 mL at 08/05/24 0504    insulin aspart (NovoLOG) injection (RAPID ACTING)  1-7 Units Subcutaneous TID AC Dave Huber DO        insulin aspart (NovoLOG) injection (RAPID ACTING)  1-5 Units Subcutaneous At Bedtime Dave Huber DO        pantoprazole (PROTONIX) EC tablet 40 mg  40 mg Oral BID AC Dave Huber DO        piperacillin-tazobactam (ZOSYN) 4.5 g vial to attach to  mL bag  4.5 g Intravenous Q6H Reagan Bonilla, DO   4.5 g at 08/05/24 0434    sodium chloride (PF) 0.9% PF flush 10-40 mL  10-40 mL Intracatheter Q8H Ghassan Holland, DO   10 mL at 08/05/24 0504     Allergies   Allergen Reactions    Alprazolam      Other reaction(s): *Unknown States \"I break out\"    Oxycodone-Acetaminophen      Other reaction(s): *Unknown, States \"I break out\"    Oxycodone-Acetaminophen Other (See Comments)     Other reaction(s): *Unknown, States \"I break out\"          Physical Exam:     Vitals were reviewed     , Blood pressure 116/55, pulse 116, temperature 98.2  F (36.8  C), temperature source Temporal, resp. rate 14, height 1.702 m (5' 7\"), weight 74.2 kg (163 lb 9.3 oz), SpO2 94%.  Wt Readings from Last 3 Encounters:   08/05/24 74.2 kg (163 lb 9.3 oz)   06/23/24 72.7 kg (160 lb 4.4 oz)   03/16/24 77.4 kg (170 lb 10.2 oz)     Intake/Output Summary (Last 24 hours) at 8/5/2024 1258  Last data filed at 8/5/2024 1143  Gross per 24 hour   Intake 3030.95 ml   Output 2388 ml   Net 642.95 ml     GENERAL APPEARANCE: lying in bed, NAD, not very " interactive  HEENT:  blindness  RESP: mostly CTA B c decent efforts  CV: RRR, nl S1/S2   ABDOMEN: o/s/nt/nd  EXTREMITIES/SKIN: no significant ble edema; no scrotal edema  OTHER: + mckeon, + RIJ temp HD catheter - non-tender         Data:     CBC RESULTS:     Recent Labs   Lab 08/05/24  1128 08/05/24  0415 08/04/24  1721 08/04/24  1230 08/04/24  0301   WBC 11.7* 13.1* 15.6* 13.6* 14.3*   RBC 3.13* 3.16* 3.09* 3.20* 3.87*   HGB 8.7* 8.8* 8.8* 8.9* 11.0*   HCT 26.8* 27.4* 28.6* 30.5* 35.8*    226 265 307 359     Basic Metabolic Panel:  Recent Labs   Lab 08/05/24  1128 08/05/24  1111 08/05/24  1002 08/05/24  0909 08/05/24  0758 08/05/24  0740 08/05/24  0458 08/05/24  0415 08/04/24  2103 08/04/24  2023 08/04/24  1806 08/04/24  1721 08/04/24  1259 08/04/24  1230     --   --   --   --  137  137  --  138  --  138  138  --  137  --  133*   POTASSIUM 3.7  --   --   --   --  3.6  3.6  --  3.6  --  4.2  4.2  --  4.3  --  5.6*   CHLORIDE 100  --   --   --   --  100  100  --  101  --  97*  96*  --  94*  --  89*   CO2 24  --   --   --   --  25  25  --  24  --  16*  16*  --  12*  --  6*   BUN 35.3*  --   --   --   --  35.1*  35.1*  --  33.7*  --  34.4*  35.0*  --  37.0*  --  40.8*   CR 1.60*  --   --   --   --  1.63*  1.63*  --  1.67*  --  1.94*  1.94*  --  2.19*  --  2.44*   * 113* 96 98 113* 103*  103*   < > 102*   < > 132*  132*   < > 222*   < > 282*   MITCHEL 8.3*  --   --   --   --  8.4*  8.4*  --  8.6*  --  8.3*  8.4*  --  8.0*  --  7.7*    < > = values in this interval not displayed.     INRNo lab results found in last 7 days.   Attestation:   I have reviewed today's relevant vital signs, notes, medications, labs and imaging.    Enoch Cardenas MD  [facility] - Nephrology  [phone]

## 2024-08-05 NOTE — PLAN OF CARE
Notified provider about indwelling mckeon catheter discussed removal or continued need.    Did provider choose to remove indwelling mckeon catheter? NO    Provider's mckeon indication for keeping indwelling mckeon catheter: Indication for continued use: Retention    Is there an order for indwelling mckeon catheter? YES    *If there is a plan to keep mckeon catheter in place at discharge daily notification with provider is not necessary, but please add a notation in the treatment team sticky note that the patient will be discharging with the catheter.

## 2024-08-06 LAB
ALBUMIN SERPL BCG-MCNC: 2.7 G/DL (ref 3.5–5.2)
ALP SERPL-CCNC: 52 U/L (ref 40–150)
ALT SERPL W P-5'-P-CCNC: 8 U/L (ref 0–70)
ANION GAP SERPL CALCULATED.3IONS-SCNC: 11 MMOL/L (ref 7–15)
AST SERPL W P-5'-P-CCNC: 16 U/L (ref 0–45)
B-OH-BUTYR SERPL-SCNC: 1.59 MMOL/L
BILIRUB SERPL-MCNC: 0.3 MG/DL
BUN SERPL-MCNC: 20.7 MG/DL (ref 8–23)
CALCIUM SERPL-MCNC: 8 MG/DL (ref 8.8–10.4)
CHLORIDE SERPL-SCNC: 99 MMOL/L (ref 98–107)
CREAT SERPL-MCNC: 1.11 MG/DL (ref 0.67–1.17)
EGFRCR SERPLBLD CKD-EPI 2021: 76 ML/MIN/1.73M2
ENTEROCOCCUS FAECALIS: NOT DETECTED
ENTEROCOCCUS FAECIUM: NOT DETECTED
ERYTHROCYTE [DISTWIDTH] IN BLOOD BY AUTOMATED COUNT: 13.3 % (ref 10–15)
GLUCOSE BLDC GLUCOMTR-MCNC: 155 MG/DL (ref 70–99)
GLUCOSE BLDC GLUCOMTR-MCNC: 323 MG/DL (ref 70–99)
GLUCOSE BLDC GLUCOMTR-MCNC: 343 MG/DL (ref 70–99)
GLUCOSE BLDC GLUCOMTR-MCNC: 356 MG/DL (ref 70–99)
GLUCOSE SERPL-MCNC: 158 MG/DL (ref 70–99)
HCO3 SERPL-SCNC: 26 MMOL/L (ref 22–29)
HCT VFR BLD AUTO: 23.5 % (ref 40–53)
HGB BLD-MCNC: 7.6 G/DL (ref 13.3–17.7)
LACTATE SERPL-SCNC: 0.8 MMOL/L (ref 0.7–2)
LISTERIA SPECIES (DETECTED/NOT DETECTED): NOT DETECTED
MAGNESIUM SERPL-MCNC: 1.9 MG/DL (ref 1.7–2.3)
MCH RBC QN AUTO: 27.7 PG (ref 26.5–33)
MCHC RBC AUTO-ENTMCNC: 32.3 G/DL (ref 31.5–36.5)
MCV RBC AUTO: 86 FL (ref 78–100)
PHOSPHATE SERPL-MCNC: 2.9 MG/DL (ref 2.5–4.5)
PLATELET # BLD AUTO: 167 10E3/UL (ref 150–450)
POTASSIUM SERPL-SCNC: 3.2 MMOL/L (ref 3.4–5.3)
POTASSIUM SERPL-SCNC: 3.3 MMOL/L (ref 3.4–5.3)
PROT SERPL-MCNC: 4.2 G/DL (ref 6.4–8.3)
RBC # BLD AUTO: 2.74 10E6/UL (ref 4.4–5.9)
SODIUM SERPL-SCNC: 136 MMOL/L (ref 135–145)
STAPHYLOCOCCUS AUREUS: NOT DETECTED
STAPHYLOCOCCUS EPIDERMIDIS: NOT DETECTED
STAPHYLOCOCCUS LUGDUNENSIS: NOT DETECTED
STAPHYLOCOCCUS SPECIES: DETECTED
STREPTOCOCCUS AGALACTIAE: NOT DETECTED
STREPTOCOCCUS ANGINOSUS GROUP: NOT DETECTED
STREPTOCOCCUS PNEUMONIAE: NOT DETECTED
STREPTOCOCCUS PYOGENES: NOT DETECTED
STREPTOCOCCUS SPECIES: NOT DETECTED
WBC # BLD AUTO: 6.5 10E3/UL (ref 4–11)

## 2024-08-06 PROCEDURE — 36415 COLL VENOUS BLD VENIPUNCTURE: CPT | Performed by: STUDENT IN AN ORGANIZED HEALTH CARE EDUCATION/TRAINING PROGRAM

## 2024-08-06 PROCEDURE — 999N000127 HC STATISTIC PERIPHERAL IV START W US GUIDANCE

## 2024-08-06 PROCEDURE — 83735 ASSAY OF MAGNESIUM: CPT | Performed by: INTERNAL MEDICINE

## 2024-08-06 PROCEDURE — 99231 SBSQ HOSP IP/OBS SF/LOW 25: CPT | Performed by: INTERNAL MEDICINE

## 2024-08-06 PROCEDURE — 250N000011 HC RX IP 250 OP 636: Performed by: HOSPITALIST

## 2024-08-06 PROCEDURE — 250N000012 HC RX MED GY IP 250 OP 636 PS 637: Performed by: INTERNAL MEDICINE

## 2024-08-06 PROCEDURE — 250N000011 HC RX IP 250 OP 636: Performed by: INTERNAL MEDICINE

## 2024-08-06 PROCEDURE — 250N000013 HC RX MED GY IP 250 OP 250 PS 637: Performed by: INTERNAL MEDICINE

## 2024-08-06 PROCEDURE — 84132 ASSAY OF SERUM POTASSIUM: CPT | Performed by: STUDENT IN AN ORGANIZED HEALTH CARE EDUCATION/TRAINING PROGRAM

## 2024-08-06 PROCEDURE — 250N000013 HC RX MED GY IP 250 OP 250 PS 637: Performed by: STUDENT IN AN ORGANIZED HEALTH CARE EDUCATION/TRAINING PROGRAM

## 2024-08-06 PROCEDURE — 82010 KETONE BODYS QUAN: CPT | Performed by: INTERNAL MEDICINE

## 2024-08-06 PROCEDURE — 99233 SBSQ HOSP IP/OBS HIGH 50: CPT | Performed by: STUDENT IN AN ORGANIZED HEALTH CARE EDUCATION/TRAINING PROGRAM

## 2024-08-06 PROCEDURE — 83605 ASSAY OF LACTIC ACID: CPT | Performed by: INTERNAL MEDICINE

## 2024-08-06 PROCEDURE — 80053 COMPREHEN METABOLIC PANEL: CPT | Performed by: INTERNAL MEDICINE

## 2024-08-06 PROCEDURE — 84100 ASSAY OF PHOSPHORUS: CPT | Performed by: INTERNAL MEDICINE

## 2024-08-06 PROCEDURE — 120N000001 HC R&B MED SURG/OB

## 2024-08-06 PROCEDURE — 87040 BLOOD CULTURE FOR BACTERIA: CPT | Performed by: STUDENT IN AN ORGANIZED HEALTH CARE EDUCATION/TRAINING PROGRAM

## 2024-08-06 PROCEDURE — 85027 COMPLETE CBC AUTOMATED: CPT | Performed by: INTERNAL MEDICINE

## 2024-08-06 RX ORDER — POTASSIUM CHLORIDE 1500 MG/1
40 TABLET, EXTENDED RELEASE ORAL ONCE
Status: COMPLETED | OUTPATIENT
Start: 2024-08-06 | End: 2024-08-06

## 2024-08-06 RX ADMIN — PIPERACILLIN AND TAZOBACTAM 3.38 G: 3; .375 INJECTION, POWDER, FOR SOLUTION INTRAVENOUS at 03:16

## 2024-08-06 RX ADMIN — HEPARIN, PORCINE (PF) 10 UNIT/ML INTRAVENOUS SYRINGE 5 ML: at 05:06

## 2024-08-06 RX ADMIN — INSULIN ASPART 4 UNITS: 100 INJECTION, SOLUTION INTRAVENOUS; SUBCUTANEOUS at 17:00

## 2024-08-06 RX ADMIN — PIPERACILLIN AND TAZOBACTAM 3.38 G: 3; .375 INJECTION, POWDER, FOR SOLUTION INTRAVENOUS at 08:42

## 2024-08-06 RX ADMIN — INSULIN ASPART 3 UNITS: 100 INJECTION, SOLUTION INTRAVENOUS; SUBCUTANEOUS at 12:04

## 2024-08-06 RX ADMIN — PANTOPRAZOLE SODIUM 40 MG: 40 TABLET, DELAYED RELEASE ORAL at 08:42

## 2024-08-06 RX ADMIN — NICOTINE POLACRILEX 2 MG: 2 GUM, CHEWING BUCCAL at 18:23

## 2024-08-06 RX ADMIN — PIPERACILLIN AND TAZOBACTAM 3.38 G: 3; .375 INJECTION, POWDER, FOR SOLUTION INTRAVENOUS at 20:29

## 2024-08-06 RX ADMIN — POTASSIUM CHLORIDE 40 MEQ: 1500 TABLET, EXTENDED RELEASE ORAL at 16:57

## 2024-08-06 RX ADMIN — NICOTINE POLACRILEX 2 MG: 2 GUM, CHEWING BUCCAL at 16:57

## 2024-08-06 RX ADMIN — POTASSIUM CHLORIDE 40 MEQ: 1500 TABLET, EXTENDED RELEASE ORAL at 22:14

## 2024-08-06 RX ADMIN — INSULIN ASPART 1 UNITS: 100 INJECTION, SOLUTION INTRAVENOUS; SUBCUTANEOUS at 08:43

## 2024-08-06 RX ADMIN — PANTOPRAZOLE SODIUM 40 MG: 40 TABLET, DELAYED RELEASE ORAL at 16:57

## 2024-08-06 RX ADMIN — PIPERACILLIN AND TAZOBACTAM 3.38 G: 3; .375 INJECTION, POWDER, FOR SOLUTION INTRAVENOUS at 13:33

## 2024-08-06 RX ADMIN — NICOTINE POLACRILEX 2 MG: 2 GUM, CHEWING BUCCAL at 21:09

## 2024-08-06 ASSESSMENT — ACTIVITIES OF DAILY LIVING (ADL)
ADLS_ACUITY_SCORE: 47
ADLS_ACUITY_SCORE: 51
ADLS_ACUITY_SCORE: 47
ADLS_ACUITY_SCORE: 51
ADLS_ACUITY_SCORE: 51
ADLS_ACUITY_SCORE: 47
ADLS_ACUITY_SCORE: 51
ADLS_ACUITY_SCORE: 51
ADLS_ACUITY_SCORE: 47
ADLS_ACUITY_SCORE: 51
ADLS_ACUITY_SCORE: 47

## 2024-08-06 NOTE — PLAN OF CARE
"Pertinent assessments: Pt up Ax2. Blind. Difficult to get straight answers from pt. Mckeon present, good output. Yoselyn reg diet, appetite fair, total feed, BG monitoring, denies nausea. Denies pain. IVF infusing. Infreq cough, c/o SOB.   Major Shift Events Admit  Treatment Plan: IVF, BG monitoring, mckeon, Zosyn, tele monitoring  Goal Outcome Evaluation:      Plan of Care Reviewed With: patient    Overall Patient Progress: improvingOverall Patient Progress: improving    Outcome Evaluation: Zosyn, IVF, tele, BG monitoring    Problem: Adult Inpatient Plan of Care  Goal: Plan of Care Review  Description: The Plan of Care Review/Shift note should be completed every shift.  The Outcome Evaluation is a brief statement about your assessment that the patient is improving, declining, or no change.  This information will be displayed automatically on your shift  note.  Outcome: Progressing  Flowsheets (Taken 8/6/2024 1800)  Outcome Evaluation: Zosyn, IVF, tele, BG monitoring  Plan of Care Reviewed With: patient  Overall Patient Progress: improving  Goal: Patient-Specific Goal (Individualized)  Description: You can add care plan individualizations to a care plan. Examples of Individualization might be:  \"Parent requests to be called daily at 9am for status\", \"I have a hard time hearing out of my right ear\", or \"Do not touch me to wake me up as it startles  me\".  Outcome: Progressing  Goal: Absence of Hospital-Acquired Illness or Injury  Outcome: Progressing  Intervention: Identify and Manage Fall Risk  Recent Flowsheet Documentation  Taken 8/6/2024 1700 by Mansi Middleton, RN  Safety Promotion/Fall Prevention:   activity supervised   nonskid shoes/slippers when out of bed  Intervention: Prevent Skin Injury  Recent Flowsheet Documentation  Taken 8/6/2024 1700 by Mansi Middleton, RN  Body Position: position changed independently  Intervention: Prevent and Manage VTE (Venous Thromboembolism) Risk  Recent Flowsheet Documentation  Taken " 8/6/2024 1700 by Mansi Middleton, RN  VTE Prevention/Management: SCDs off (sequential compression devices)  Goal: Optimal Comfort and Wellbeing  Outcome: Progressing  Goal: Readiness for Transition of Care  Outcome: Progressing

## 2024-08-06 NOTE — PROGRESS NOTES
[facility]    Medicine Progress Note - Hospitalist Service    Date of Admission:  8/4/2024    Assessment & Plan     Benja Duong is a 59 year old male with a history of hypertension, diabetes mellitus, polysubstance abuse with cocaine, cognitive delay, methamphetamine, and opiates, tobacco use disorder, alcohol use disorder, paranoid schizophrenia, peptic ulcer disease and esophagitis, and depression who presents with intractable nausea, vomiting, and diarrhea.      Severe lactic acidosis.  -Etiology initially unclear.  -Suspected multifactorial due to dehydration, hypotension, and metformin use.  Infection and recent substance use possibly contributing.  -General surgery consult obtained to help rule out concern for ischemic bowel.    -Appreciate surgery input.  -Nephrology consult obtained.    -Continue IV fluids.  -Initiated on CRRT.  -Significantly improved by morning of 8/5.  -Appreciate continued nephrology input.  -Hold further CRRT for now.  -Continue to monitor labs.     Acute kidney injury.  -Likely multifactorial.  -Appreciate nephrology input.  -Continuous IV fluids.  -Avoid nephrotoxins as able.   -Metformin discontinued.  -Hold lisinopril.  -Initiated CRRT.  CRRT stopped the morning of 8/5.  -Improving.     Undifferentiated shock.  -Suspect multifactorial due to severe dehydration and continued antihypertensive use.  -Hold antihypertensives.    -Continuous IV fluids.  -Continue IV norepinephrine as needed.     Possible sepsis.  -Unclear source.    -Possible esophagitis and colitis on CT.  -Initially started on IV Zosyn and vancomycin in the ER.  -MRSA PCR negative.  -Enteric virus and bacterial panel negative.  -C. difficile toxin negative.  -Continue IV Zosyn today.  -Likely stop Zosyn in the next day or 2 if cultures remain negative.  -Monitor blood culture result.     Diabetes mellitus type 2.  Ketoacidosis.  -Suspect starvation ketoacidosis.    -Holding metformin.  -Stop  "continuous IV insulin infusion.  -Start aspart insulin sliding scale as needed.  -Start full liquid diet.     Substance use disorder.  -Would benefit from long-term cessation.    -Chemical dependency consult once more stable.     Hyperkalemia.    Hyponatremia.  Hypocalcemia.  -Improved with CRRT.  -Continue to monitor labs.     GERD.  -Change pantoprazole to 40 mg oral twice a day.     Elevated troponin.    -No chest pain.  -Repeat troponin not elevating.  -Likely type II myocardial infarction due to demand ischemia.    -Monitor on telemetry.    -Check echocardiogram.     Hyperlipidemia.  -Hold atorvastatin.     Schizophrenia.  -Normally on intramuscular paliperidone every 28 days.  -Reportedly due for intramuscular paliperidone.  -For now, haloperidol as needed.             Diet: Snacks/Supplements Adult: Glucerna; With Meals  Regular Diet Adult    DVT Prophylaxis: Pneumatic Compression Devices  Carranza Catheter: PRESENT, indication: Acute retention or obstruction  Lines: PRESENT      [REMOVED] CVC Double Lumen Right Internal jugular Hemodialysis/CRRT-Site Assessment: WDL  CVC Triple Lumen Right Femoral-Site Assessment: WDL      Cardiac Monitoring: ACTIVE order. Indication: ICU  Code Status: Full Code      Clinically Significant Risk Factors        # Hypokalemia: Lowest K = 3.2 mmol/L in last 2 days, will replace as needed     # Hypomagnesemia: Lowest Mg = 1.6 mg/dL in last 2 days, will replace as needed  # Anion Gap Metabolic Acidosis: Highest Anion Gap = 31 mmol/L in last 2 days, will monitor and treat as appropriate  # Hypoalbuminemia: Lowest albumin = 2.7 g/dL at 8/6/2024  5:57 AM, will monitor as appropriate               # DMII: A1C = 7.2 % (Ref range: <5.7 %) within past 6 months, PRESENT ON ADMISSION  # Overweight: Estimated body mass index is 25.24 kg/m  as calculated from the following:    Height as of this encounter: 1.702 m (5' 7\").    Weight as of this encounter: 73.1 kg (161 lb 2.5 oz)., PRESENT ON " "ADMISSION            Disposition Plan     Medically Ready for Discharge: Anticipated in 2-4 Days             Sridhar Yost MD  Hospitalist Service  [facility]  Securely message with [employer] (more info)  Text page via [employer] Paging/Directory   ______________________________________________________________________    Interval History   Nursing notes reviewed; no acute events overnight. Patient answering with noises in the affirmative and negative, but not actually saying \"yes\" or \"no.\"     Physical Exam   Temp: 98  F (36.7  C) Temp src: Oral BP: 136/71 Pulse: (!) 126   Resp: 14 SpO2: (!) 89 % O2 Device: None (Room air) Oxygen Delivery: 2 LPM Height: 170.2 cm (5' 7\") Weight: 73.1 kg (161 lb 2.5 oz)  Estimated body mass index is 25.24 kg/m  as calculated from the following:    Height as of this encounter: 1.702 m (5' 7\").    Weight as of this encounter: 73.1 kg (161 lb 2.5 oz).    General: Very pleasant male resting comfortably in hospital bed.  Awake, alert, minimally interactive. Closed eyes during conversation.  HEENT: Normocephalic, atraumatic.  Mucous membranes moist.  Cardiac: Regular rate and rhythm without murmur, gallop, or rub.  No peripheral edema.  Respiratory: Normal work of breathing.  Clear to auscultation bilaterally without wheezing, rales, or rhonchi.  GI: Normal, active bowel sounds.  Abdomen soft, nontender, nondistended.  : Deferred.  Musculoskeletal: Moving all extremities appropriately.  Skin: No rashes or abrasions on exposed skin.  Neurologic: Alert; orientation difficult to assess 2/2 patient cooperation.  Cranial nerves II through XII grossly intact.        Medical Decision Making       50 MINUTES SPENT BY ME on the date of service doing chart review, history, exam, documentation & further activities per the note.      Data     I have personally reviewed the following data over the past 24 hrs:    6.5  \   7.6 (L)   / 167     136 99 20.7 /  158 (H)   3.2 (L) 26 1.11 \ "     ALT: 8 AST: 16 AP: 52 TBILI: 0.3   ALB: 2.7 (L) TOT PROTEIN: 4.2 (L) LIPASE: N/A     Procal: N/A CRP: N/A Lactic Acid: 0.8         Imaging results reviewed over the past 24 hrs:   No results found for this or any previous visit (from the past 24 hour(s)).

## 2024-08-06 NOTE — PLAN OF CARE
ICU End of Shift Summary.  For vital signs and complete assessments, please see documentation flowsheets.      Pertinent assessments:   Neuro: A and O 3  Cardiac: ST, BP stable  Resp: NC O2, LS dim  GI: WDL  : Carranza draining well  Skin: WDL  Lines: CVC R fem, R internal jugular HD line    Major Shift Events: none     Bedside Shift Report Completed : y  Bedside Safety Check Completed: y       Problem: Adult Inpatient Plan of Care  Goal: Plan of Care Review  Description: The Plan of Care Review/Shift note should be completed every shift.  The Outcome Evaluation is a brief statement about your assessment that the patient is improving, declining, or no change.  This information will be displayed automatically on your shift  note.  Flowsheets (Taken 8/6/2024 0635)  Outcome Evaluation: Rested well overnight  Plan of Care Reviewed With: patient  Overall Patient Progress: improving  Goal: Absence of Hospital-Acquired Illness or Injury  Intervention: Identify and Manage Fall Risk  Recent Flowsheet Documentation  Taken 8/6/2024 0400 by Robert Pearce RN  Safety Promotion/Fall Prevention: activity supervised  Taken 8/6/2024 0000 by Robert Pearce RN  Safety Promotion/Fall Prevention: activity supervised  Taken 8/5/2024 2000 by Robert Pearce RN  Safety Promotion/Fall Prevention: activity supervised  Intervention: Prevent Skin Injury  Recent Flowsheet Documentation  Taken 8/6/2024 0400 by Robert Pearce RN  Body Position: position changed independently  Skin Protection:   adhesive use limited   incontinence pads utilized   transparent dressing maintained   skin to skin areas padded  Device Skin Pressure Protection:   absorbent pad utilized/changed   positioning supports utilized   adhesive use limited   tubing/devices free from skin contact  Taken 8/6/2024 0000 by Robert Pearce RN  Body Position: position changed independently  Skin Protection:   adhesive use limited   incontinence pads utilized   transparent  dressing maintained   skin to skin areas padded  Device Skin Pressure Protection:   absorbent pad utilized/changed   positioning supports utilized   adhesive use limited   tubing/devices free from skin contact  Taken 8/5/2024 2200 by Robert Pearce RN  Body Position: position changed independently  Taken 8/5/2024 2000 by Robert Pearce RN  Body Position:   legs elevated   position changed independently   right  Skin Protection:   adhesive use limited   incontinence pads utilized   transparent dressing maintained   skin to skin areas padded  Device Skin Pressure Protection:   absorbent pad utilized/changed   positioning supports utilized   adhesive use limited   tubing/devices free from skin contact  Intervention: Prevent and Manage VTE (Venous Thromboembolism) Risk  Recent Flowsheet Documentation  Taken 8/6/2024 0400 by Robert Pearce RN  VTE Prevention/Management: SCDs on (sequential compression devices)  Taken 8/6/2024 0000 by Robert Pearce RN  VTE Prevention/Management: SCDs on (sequential compression devices)  Taken 8/5/2024 2000 by Robert Pearce RN  VTE Prevention/Management: SCDs on (sequential compression devices)  Intervention: Prevent Infection  Recent Flowsheet Documentation  Taken 8/6/2024 0400 by Robert Pearce RN  Infection Prevention:   single patient room provided   rest/sleep promoted  Taken 8/6/2024 0000 by Robert Pearce RN  Infection Prevention:   single patient room provided   rest/sleep promoted  Taken 8/5/2024 2000 by Robert Pearce RN  Infection Prevention:   single patient room provided   rest/sleep promoted  Goal: Optimal Comfort and Wellbeing  Intervention: Provide Person-Centered Care  Recent Flowsheet Documentation  Taken 8/6/2024 0400 by Robert Pearce RN  Trust Relationship/Rapport:   care explained   emotional support provided   questions answered  Taken 8/6/2024 0000 by Robert Pearce RN  Trust Relationship/Rapport:   care explained   emotional support provided   questions  answered  Taken 8/5/2024 2000 by Robert Pearce, RN  Trust Relationship/Rapport:   care explained   emotional support provided   questions answered   Goal Outcome Evaluation:      Plan of Care Reviewed With: patient    Overall Patient Progress: improvingOverall Patient Progress: improving    Outcome Evaluation: Rested well overnight

## 2024-08-06 NOTE — CONSULTS
Care Management Follow Up    Length of Stay (days): 2    Expected Discharge Date: 08/06/2024     Concerns to be Addressed:       Patient plan of care discussed at interdisciplinary rounds: Yes    Anticipated Discharge Disposition: Return to       Anticipated Discharge Services:    Anticipated Discharge DME:      Additional Information:  Patient with URR 32%. Admitted with intractable nausea, vomiting and diarrhea. Severe lactic acidosis, KAVITA, shock, possible sepsis admitted to the ICU.  Per chart review and rounds patient patient not appropriate for assessment yet.  Have attempted to reach out to his  who are his emergency contacts to discuss current living situation, level of cares and discuss discharge planning.  Left messages with [facility]  [phone] and their RN [place] [phone].  Waiting for call back.    Sri Maldonado RN BSN OCN  Care Coordinator  [facility]  [phone]

## 2024-08-06 NOTE — PROGRESS NOTES
"[facility]     Renal Progress Note       SHORTHAND KEY FOR MY NOTES:  c = with, s = without, p = after, a = before, x = except, asx = asymptomatic, tx = transplant or treatment, sx = symptoms or symptomatic, cx = canceled or culture, rxn = reaction, yday = yesterday, nl = normal, abx = antibiotics, fxn = function, dx = diagnosis, dz = disease, m/h = melena/hematochezia, c/d/l/ha = cramping/dizziness/lightheadedness/headache, d/c = discharge or diarrhea/constipation, f/c/n/v = fevers/chills/nausea/vomiting, cp/sob = chest pain/shortness of breath, tbv = total body volume, rxn = reaction, tdc = tunneled dialysis catheter, pta = prior to admission, hd = hemodialysis, pd = peritoneal dialysis, hhd = home hemodialysis, edw = estimated dry wt         Assessment/Plan:     1.  KAVITA - resolved.  Pt's renal fxn is back to nl and he is making plenty of urine.  He does not need CRRT (or dialysis) anymore.  A.  Pull temp HD catheter.  B.  Discontinue all CRRT orders.    Thank you for this consultation.  I will sign off.  Please call if any ?s.        Interval History:     Pt is urinating well.  No cp/sob.  He wants some \"pop\".         Medications and Allergies:     Current Facility-Administered Medications   Medication Dose Route Frequency Provider Last Rate Last Admin    heparin lock flush 10 unit/mL injection 5-20 mL  5-20 mL Intracatheter Q24H Ghassan Holland, DO   5 mL at 08/06/24 0506    insulin aspart (NovoLOG) injection (RAPID ACTING)  1-7 Units Subcutaneous TID AC Dave Huber DO   1 Units at 08/06/24 0843    insulin aspart (NovoLOG) injection (RAPID ACTING)  1-5 Units Subcutaneous At Bedtime Dave Huber, DO        pantoprazole (PROTONIX) EC tablet 40 mg  40 mg Oral BID AC Dave Huber DO   40 mg at 08/06/24 0842    piperacillin-tazobactam (ZOSYN) 3.375 g vial to attach to  mL bag  3.375 g Intravenous Q6H Reagan Bonilla,    3.375 g at 08/06/24 0842    sodium " "chloride (PF) 0.9% PF flush 10-40 mL  10-40 mL Intracatheter Q8H Ghassan Holland, DO   10 mL at 08/05/24 2047     Allergies   Allergen Reactions    Alprazolam      Other reaction(s): *Unknown States \"I break out\"    Oxycodone-Acetaminophen      Other reaction(s): *Unknown, States \"I break out\"    Oxycodone-Acetaminophen Other (See Comments)     Other reaction(s): *Unknown, States \"I break out\"          Physical Exam:     Vitals were reviewed     , Blood pressure (!) 175/103, pulse 106, temperature 97.6  F (36.4  C), temperature source Axillary, resp. rate 12, height 1.702 m (5' 7\"), weight 73.1 kg (161 lb 2.5 oz), SpO2 97%.  Wt Readings from Last 3 Encounters:   08/06/24 73.1 kg (161 lb 2.5 oz)   06/23/24 72.7 kg (160 lb 4.4 oz)   03/16/24 77.4 kg (170 lb 10.2 oz)     Intake/Output Summary (Last 24 hours) at 8/6/2024 1137  Last data filed at 8/6/2024 1000  Gross per 24 hour   Intake 625 ml   Output 2250 ml   Net -1625 ml     GENERAL APPEARANCE: pleasant, NAD, interactive at times  RESP: CTA B c good efforts  CV: RRR, nl S1/S2   ABDOMEN: o/s/nt/nd  EXTREMITIES/SKIN: no edema  LINES: + RIJ temp HD catheter, + mckeon         Data:     CBC RESULTS:     Recent Labs   Lab 08/06/24  0557 08/05/24  1128 08/05/24  0415 08/04/24  1721 08/04/24  1230 08/04/24  0301   WBC 6.5 11.7* 13.1* 15.6* 13.6* 14.3*   RBC 2.74* 3.13* 3.16* 3.09* 3.20* 3.87*   HGB 7.6* 8.7* 8.8* 8.8* 8.9* 11.0*   HCT 23.5* 26.8* 27.4* 28.6* 30.5* 35.8*    203 226 265 307 359     Basic Metabolic Panel:  Recent Labs   Lab 08/06/24  0557 08/06/24  0204 08/05/24  2221 08/05/24  1611 08/05/24  1128 08/05/24  1111 08/05/24  0758 08/05/24  0740 08/05/24  0458 08/05/24  0415 08/04/24  2103 08/04/24  2023 08/04/24  1806 08/04/24  1721     --   --   --  137  --   --  137  137  --  138  --  138  138  --  137   POTASSIUM 3.2*  --   --   --  3.7  --   --  3.6  3.6  --  3.6  --  4.2  4.2  --  4.3   CHLORIDE 99  --   --   --  100  --   --  100  100  --  " 101  --  97*  96*  --  94*   CO2 26  --   --   --  24  --   --  25  25  --  24  --  16*  16*  --  12*   BUN 20.7  --   --   --  35.3*  --   --  35.1*  35.1*  --  33.7*  --  34.4*  35.0*  --  37.0*   CR 1.11  --   --   --  1.60*  --   --  1.63*  1.63*  --  1.67*  --  1.94*  1.94*  --  2.19*   * 155* 161* 147* 119* 113*   < > 103*  103*   < > 102*   < > 132*  132*   < > 222*   MITCHEL 8.0*  --   --   --  8.3*  --   --  8.4*  8.4*  --  8.6*  --  8.3*  8.4*  --  8.0*    < > = values in this interval not displayed.     INRNo lab results found in last 7 days.   Attestation:   I have reviewed today's relevant vital signs, notes, medications, labs and imaging.    Enoch Cardenas MD  [facility] - Nephrology  [phone]

## 2024-08-06 NOTE — PROGRESS NOTES
Tele-Intensivist note  Notified of elevated ketones  Patient with starvation ketosis and metabolically compensated- Continue dextrose containing fluids and no other changes at this time.     Paulette hillman  August 5, 2024

## 2024-08-06 NOTE — PROGRESS NOTES
[facility]    Medicine Progress Note - Hospitalist Service    Date of Admission:  8/4/2024    Assessment & Plan     Benja Duong is a 59 year old male with a history of hypertension, diabetes mellitus, polysubstance abuse with cocaine, cognitive delay, methamphetamine, and opiates, tobacco use disorder, alcohol use disorder, paranoid schizophrenia, peptic ulcer disease and esophagitis, and depression who presents with intractable nausea, vomiting, and diarrhea.      Severe lactic acidosis, improved  -Etiology initially unclear.  -Suspected multifactorial due to dehydration, hypotension, and metformin use.  Infection and recent substance use possibly contributing.  -General surgery consult obtained to help rule out concern for ischemic bowel.    -Appreciate surgery input.  -Nephrology consult obtained.    -Continue IV fluids.  -Initiated on CRRT.  -Significantly improved by morning of 8/5.  -Appreciate continued nephrology input.  -Hold further CRRT for now.  -Continue to monitor labs.     Acute kidney injury, improved  -Likely multifactorial.  -Appreciate nephrology input.  -Continuous IV fluids.  -Avoid nephrotoxins as able.   -Metformin discontinued.  -Hold lisinopril.  -Initiated CRRT.  CRRT stopped the morning of 8/5.  -Improving.     Undifferentiated shock, improved  -Suspect multifactorial due to severe dehydration and continued antihypertensive use.  -Hold antihypertensives.    -Continuous IV fluids.  -Now off pressors     Possible sepsis.  -Unclear source.    -Possible esophagitis and colitis on CT.  -Initially started on IV Zosyn and vancomycin in the ER.  -MRSA PCR negative.  -Enteric virus and bacterial panel negative.  -C. difficile toxin negative.  -Continue IV Zosyn today.  -Likely stop Zosyn in the next day or 2 if cultures remain negative.  -Monitor blood culture result.     Diabetes mellitus type 2.  Ketoacidosis, improving  -Suspect starvation ketoacidosis.    -Holding  "metformin.  -Stop continuous IV insulin infusion.  -Start aspart insulin sliding scale as needed.  -Start full liquid diet.     Substance use disorder.  -Would benefit from long-term cessation.    -Chemical dependency consult once more stable.     Hyperkalemia.    Hyponatremia.  Hypocalcemia.  -Improved with CRRT.  -Continue to monitor labs.     GERD.  -Change pantoprazole to 40 mg oral twice a day.     Elevated troponin.    -No chest pain.  -Repeat troponin not elevating.  -Likely type II myocardial infarction due to demand ischemia.    -Monitor on telemetry.    -Check echocardiogram.     Hyperlipidemia.  -Hold atorvastatin.     Schizophrenia.  -Normally on intramuscular paliperidone every 28 days.  -Reportedly due for intramuscular paliperidone.  -For now, haloperidol as needed.             Diet: Snacks/Supplements Adult: Glucerna; With Meals  Regular Diet Adult    DVT Prophylaxis: Pneumatic Compression Devices  [place] Catheter: PRESENT, indication: Acute retention or obstruction  Lines: None       Cardiac Monitoring: ACTIVE order. Indication: ICU  Code Status: Full Code      Clinically Significant Risk Factors        # Hypokalemia: Lowest K = 3.2 mmol/L in last 2 days, will replace as needed     # Hypomagnesemia: Lowest Mg = 1.6 mg/dL in last 2 days, will replace as needed  # Anion Gap Metabolic Acidosis: Highest Anion Gap = 31 mmol/L in last 2 days, will monitor and treat as appropriate  # Hypoalbuminemia: Lowest albumin = 2.7 g/dL at 8/6/2024  5:57 AM, will monitor as appropriate               # DMII: A1C = 7.2 % (Ref range: <5.7 %) within past 6 months, PRESENT ON ADMISSION  # Overweight: Estimated body mass index is 25.24 kg/m  as calculated from the following:    Height as of this encounter: 1.702 m (5' 7\").    Weight as of this encounter: 73.1 kg (161 lb 2.5 oz)., PRESENT ON ADMISSION            Disposition Plan     Medically Ready for Discharge: Anticipated in 2-4 Days             Sridhar Yost MD  Hospitalist " "Service  [facility]  Securely message with [employer] (more info)  Text page via [facility] Paging/Directory   ______________________________________________________________________    Interval History   Nursing notes reviewed; no acute events overnight. Patient answering with noises in the affirmative and negative, but not actually saying \"yes\" or \"no.\"     Physical Exam   Temp: 98.2  F (36.8  C) Temp src: Oral BP: (!) 154/87 Pulse: 120   Resp: 16 SpO2: 95 % O2 Device: None (Room air) Oxygen Delivery: 2 LPM Height: 170.2 cm (5' 7\") Weight: 73.1 kg (161 lb 2.5 oz)  Estimated body mass index is 25.24 kg/m  as calculated from the following:    Height as of this encounter: 1.702 m (5' 7\").    Weight as of this encounter: 73.1 kg (161 lb 2.5 oz).    General: Very pleasant male resting comfortably in hospital bed.  Awake, alert, minimally interactive. Closed eyes during conversation.  HEENT: Normocephalic, atraumatic.  Mucous membranes moist.  Cardiac: Regular rate and rhythm without murmur, gallop, or rub.  No peripheral edema.  Respiratory: Normal work of breathing.  Clear to auscultation bilaterally without wheezing, rales, or rhonchi.  GI: Normal, active bowel sounds.  Abdomen soft, nontender, nondistended.  : Deferred.  Musculoskeletal: Moving all extremities appropriately.  Skin: No rashes or abrasions on exposed skin.  Neurologic: Alert; orientation difficult to assess 2/2 patient cooperation.  Cranial nerves II through XII grossly intact.        Medical Decision Making       50 MINUTES OF CRITICAL CARE TIME SPENT BY ME on the date of service doing chart review, history, exam, documentation & further activities per the note.      Data     I have personally reviewed the following data over the past 24 hrs:    6.5  \   7.6 (L)   / 167     136 99 20.7 /  158 (H)   3.2 (L) 26 1.11 \     ALT: 8 AST: 16 AP: 52 TBILI: 0.3   ALB: 2.7 (L) TOT PROTEIN: 4.2 (L) LIPASE: N/A     Procal: N/A CRP: N/A Lactic " Acid: 0.8         Imaging results reviewed over the past 24 hrs:   No results found for this or any previous visit (from the past 24 hour(s)).

## 2024-08-07 VITALS
RESPIRATION RATE: 16 BRPM | HEIGHT: 67 IN | WEIGHT: 167.11 LBS | OXYGEN SATURATION: 99 % | HEART RATE: 104 BPM | TEMPERATURE: 98.1 F | SYSTOLIC BLOOD PRESSURE: 166 MMHG | DIASTOLIC BLOOD PRESSURE: 89 MMHG | BODY MASS INDEX: 26.23 KG/M2

## 2024-08-07 LAB
ANION GAP SERPL CALCULATED.3IONS-SCNC: 10 MMOL/L (ref 7–15)
BUN SERPL-MCNC: 5.9 MG/DL (ref 8–23)
CALCIUM SERPL-MCNC: 8.3 MG/DL (ref 8.8–10.4)
CHLORIDE SERPL-SCNC: 97 MMOL/L (ref 98–107)
CREAT SERPL-MCNC: 0.76 MG/DL (ref 0.67–1.17)
EGFRCR SERPLBLD CKD-EPI 2021: >90 ML/MIN/1.73M2
GLUCOSE BLDC GLUCOMTR-MCNC: 162 MG/DL (ref 70–99)
GLUCOSE BLDC GLUCOMTR-MCNC: 209 MG/DL (ref 70–99)
GLUCOSE BLDC GLUCOMTR-MCNC: 227 MG/DL (ref 70–99)
GLUCOSE BLDC GLUCOMTR-MCNC: 233 MG/DL (ref 70–99)
GLUCOSE BLDC GLUCOMTR-MCNC: 278 MG/DL (ref 70–99)
GLUCOSE SERPL-MCNC: 257 MG/DL (ref 70–99)
HCO3 SERPL-SCNC: 25 MMOL/L (ref 22–29)
MAGNESIUM SERPL-MCNC: 1.2 MG/DL (ref 1.7–2.3)
POTASSIUM SERPL-SCNC: 3.4 MMOL/L (ref 3.4–5.3)
POTASSIUM SERPL-SCNC: 3.5 MMOL/L (ref 3.4–5.3)
POTASSIUM SERPL-SCNC: 3.5 MMOL/L (ref 3.4–5.3)
SODIUM SERPL-SCNC: 132 MMOL/L (ref 135–145)

## 2024-08-07 PROCEDURE — 36415 COLL VENOUS BLD VENIPUNCTURE: CPT | Performed by: HOSPITALIST

## 2024-08-07 PROCEDURE — 250N000011 HC RX IP 250 OP 636: Performed by: HOSPITALIST

## 2024-08-07 PROCEDURE — 250N000013 HC RX MED GY IP 250 OP 250 PS 637: Performed by: INTERNAL MEDICINE

## 2024-08-07 PROCEDURE — 83735 ASSAY OF MAGNESIUM: CPT | Performed by: STUDENT IN AN ORGANIZED HEALTH CARE EDUCATION/TRAINING PROGRAM

## 2024-08-07 PROCEDURE — 80048 BASIC METABOLIC PNL TOTAL CA: CPT | Performed by: STUDENT IN AN ORGANIZED HEALTH CARE EDUCATION/TRAINING PROGRAM

## 2024-08-07 PROCEDURE — 250N000013 HC RX MED GY IP 250 OP 250 PS 637: Performed by: STUDENT IN AN ORGANIZED HEALTH CARE EDUCATION/TRAINING PROGRAM

## 2024-08-07 PROCEDURE — 84132 ASSAY OF SERUM POTASSIUM: CPT | Performed by: HOSPITALIST

## 2024-08-07 PROCEDURE — 36415 COLL VENOUS BLD VENIPUNCTURE: CPT | Performed by: STUDENT IN AN ORGANIZED HEALTH CARE EDUCATION/TRAINING PROGRAM

## 2024-08-07 PROCEDURE — 250N000011 HC RX IP 250 OP 636: Performed by: STUDENT IN AN ORGANIZED HEALTH CARE EDUCATION/TRAINING PROGRAM

## 2024-08-07 PROCEDURE — 82565 ASSAY OF CREATININE: CPT | Performed by: STUDENT IN AN ORGANIZED HEALTH CARE EDUCATION/TRAINING PROGRAM

## 2024-08-07 PROCEDURE — 250N000013 HC RX MED GY IP 250 OP 250 PS 637: Performed by: HOSPITALIST

## 2024-08-07 PROCEDURE — 99239 HOSP IP/OBS DSCHRG MGMT >30: CPT | Performed by: STUDENT IN AN ORGANIZED HEALTH CARE EDUCATION/TRAINING PROGRAM

## 2024-08-07 RX ORDER — CARVEDILOL 12.5 MG/1
12.5 TABLET ORAL 2 TIMES DAILY WITH MEALS
Status: DISCONTINUED | OUTPATIENT
Start: 2024-08-07 | End: 2024-08-07 | Stop reason: HOSPADM

## 2024-08-07 RX ORDER — POTASSIUM CHLORIDE 1500 MG/1
40 TABLET, EXTENDED RELEASE ORAL ONCE
Status: COMPLETED | OUTPATIENT
Start: 2024-08-07 | End: 2024-08-07

## 2024-08-07 RX ORDER — MAGNESIUM SULFATE HEPTAHYDRATE 40 MG/ML
4 INJECTION, SOLUTION INTRAVENOUS ONCE
Status: COMPLETED | OUTPATIENT
Start: 2024-08-07 | End: 2024-08-07

## 2024-08-07 RX ORDER — LISINOPRIL 40 MG/1
40 TABLET ORAL DAILY
Status: DISCONTINUED | OUTPATIENT
Start: 2024-08-07 | End: 2024-08-07 | Stop reason: HOSPADM

## 2024-08-07 RX ADMIN — Medication 5 MG: at 00:44

## 2024-08-07 RX ADMIN — MAGNESIUM SULFATE HEPTAHYDRATE 4 G: 4 INJECTION, SOLUTION INTRAVENOUS at 09:41

## 2024-08-07 RX ADMIN — LISINOPRIL 40 MG: 40 TABLET ORAL at 10:58

## 2024-08-07 RX ADMIN — PANTOPRAZOLE SODIUM 40 MG: 40 TABLET, DELAYED RELEASE ORAL at 09:29

## 2024-08-07 RX ADMIN — POTASSIUM CHLORIDE 40 MEQ: 1500 TABLET, EXTENDED RELEASE ORAL at 03:57

## 2024-08-07 RX ADMIN — INSULIN ASPART 2 UNITS: 100 INJECTION, SOLUTION INTRAVENOUS; SUBCUTANEOUS at 12:03

## 2024-08-07 RX ADMIN — PIPERACILLIN AND TAZOBACTAM 3.38 G: 3; .375 INJECTION, POWDER, FOR SOLUTION INTRAVENOUS at 02:08

## 2024-08-07 RX ADMIN — CARVEDILOL 12.5 MG: 12.5 TABLET, FILM COATED ORAL at 10:59

## 2024-08-07 RX ADMIN — INSULIN ASPART 2 UNITS: 100 INJECTION, SOLUTION INTRAVENOUS; SUBCUTANEOUS at 09:30

## 2024-08-07 ASSESSMENT — ACTIVITIES OF DAILY LIVING (ADL)
ADLS_ACUITY_SCORE: 46
ADLS_ACUITY_SCORE: 47
ADLS_ACUITY_SCORE: 46
ADLS_ACUITY_SCORE: 47
ADLS_ACUITY_SCORE: 51
ADLS_ACUITY_SCORE: 46
ADLS_ACUITY_SCORE: 47
ADLS_ACUITY_SCORE: 46
DEPENDENT_IADLS:: CLEANING;COOKING;LAUNDRY;SHOPPING;MEAL PREPARATION;MEDICATION MANAGEMENT;MONEY MANAGEMENT;TRANSPORTATION
ADLS_ACUITY_SCORE: 46
ADLS_ACUITY_SCORE: 51
ADLS_ACUITY_SCORE: 46

## 2024-08-07 NOTE — DISCHARGE SUMMARY
"[facility]  Hospitalist Discharge Summary      Date of Admission:  8/4/2024  Date of Discharge:  8/7/2024  Discharging Provider: Michael Paez MD  Discharge Service: Hospitalist Service    Discharge Diagnoses     Intractable nausea, vomiting and diarrhea.  Acute kidney injury needing CRRT.  Undifferentiated shock.  Possible sepsis  Severe lactic acidosis.  Diabetes mellitus type 2.  Ketoacidosis.  Abscess use disorder.  GERD.  Hyperlipidemia.  Schizophrenia.  Elevated troponin.      Clinically Significant Risk Factors     # DMII: A1C = 7.2 % (Ref range: <5.7 %) within past 6 months  # Overweight: Estimated body mass index is 26.17 kg/m  as calculated from the following:    Height as of this encounter: 1.702 m (5' 7\").    Weight as of this encounter: 75.8 kg (167 lb 1.7 oz).       Follow-ups Needed After Discharge   Follow-up Appointments     Follow-up and recommended labs and tests       Follow up with primary care provider, [facility], within 7 days for hospital follow- up.  The following labs/tests   are recommended: CBC and BMP.            Unresulted Labs Ordered in the Past 30 Days of this Admission       Date and Time Order Name Status Description    8/6/2024 11:40 AM Blood Culture Hand, Right Preliminary     8/4/2024  4:24 AM Blood Culture Peripheral Blood Preliminary         These results will be followed up by hospitalist team    Discharge Disposition   Discharged to group home  Condition at discharge: Stable    Hospital Course   59 year old male with a history of hypertension, diabetes mellitus with blindness due to diabetic retinopathy, polysubstance abuse with cocaine, cognitive delay, methamphetamine, and opiates, tobacco use disorder, alcohol use disorder, paranoid schizophrenia, peptic ulcer disease and esophagitis, and depression who presents with intractable nausea, vomiting, and diarrhea.     He had severe lactic acidosis with lactate of 21, KAVITA " with BUN/creatinine of 39.9/2.95 and CT of abdomen was suggestive of colitis.  Was seen by general surgery and was not thought to be ischemic bowel.  Was empirically treated with vancomycin and Zosyn, blood cultures grew Staphylococcus hominis which is likely contaminant.  He needed CRRT for his KAVITA which was stopped and his creatinine continued to trend down, last check was 0.76.  Patient diarrhea and lactic acidosis also resolved.  Patient probably had some viral diarrhea but tested negative for COVID-19, influenza A/B and RSV.  Also tested negative for COVID-19 and enteric bacterial and viral panel.  Patient will be discharged back to his group home with no changes in his medication except holding metformin as this could be a potential cause of patient presentation.        Consultations This Hospital Stay   PHARMACY TO [facility]  SURGERY GENERAL IP CONSULT  NEPHROLOGY IP CONSULT  SPEECH LANGUAGE PATH ADULT IP CONSULT  PHARMACY IP CONSULT  PHARMACY CRRT IP CONSULT  CARE MANAGEMENT / SOCIAL WORK IP CONSULT    Code Status   Full Code    Time Spent on this Encounter   I, Michael Paez MD, personally saw the patient today and spent greater than 30 minutes discharging this patient.       Michael Paez MD  [place] [address] [facility]  [address] [place] [address]  Phone: [phone]  Fax: [phone]  ______________________________________________________________________    Physical Exam   Vital Signs: Temp: 98.1  F (36.7  C) Temp src: Oral BP: (!) 166/89 Pulse: 104   Resp: 16 SpO2: 99 % O2 Device: None (Room air)    Weight: 167 lbs 1.74 oz  General Appearance: Alert awake and oriented x 3.  .  Respiratory: Clear to auscultation  Cardiovascular: S1-S2 normal  GI: Soft and nontender  Skin: No rash  Other: No edema         Primary Care Physician   [facility]    Discharge Orders      Reason for your hospital stay    Acute kidney injury, need CRRT and now  resolved.  Diarrhea now resolved.     Follow-up and recommended labs and tests     Follow up with primary care provider, [facility], within 7 days for hospital follow- up.  The following labs/tests are recommended: CBC and BMP.     Activity    Your activity upon discharge: activity as tolerated     Diet    Follow this diet upon discharge: Orders Placed This Encounter      Snacks/Supplements Adult: Glucerna; With Meals      Regular Diet Adult       Significant Results and Procedures   Most Recent 3 CBC's:  Recent Labs   Lab Test 08/06/24  0557 08/05/24  1128 08/05/24  0415   WBC 6.5 11.7* 13.1*   HGB 7.6* 8.7* 8.8*   MCV 86 86 87    203 226     Most Recent 3 BMP's:    Recent Labs   Lab Test 08/07/24  0807 08/07/24  0621 08/07/24  0312 08/07/24  0154 08/06/24  2148 08/06/24  2102 08/06/24  1629 08/06/24  0557 08/05/24  1611 08/05/24  1128   NA  --  132*  --   --   --   --   --  136  --  137   POTASSIUM  --  3.5  3.5 3.4  --   --  3.3*  --  3.2*  --  3.7   CHLORIDE  --  97*  --   --   --   --   --  99  --  100   CO2  --  25  --   --   --   --   --  26  --  24   BUN  --  5.9*  --   --   --   --   --  20.7  --  35.3*   CR  --  0.76  --   --   --   --   --  1.11  --  1.60*   ANIONGAP  --  10  --   --   --   --   --  11  --  13   MITCHEL  --  8.3*  --   --   --   --   --  8.0*  --  8.3*   * 257*  --  278*   < >  --    < > 158*   < > 119*    < > = values in this interval not displayed.     Most Recent 2 LFT's:  Recent Labs   Lab Test 08/06/24  0557 08/05/24  0740   AST 16 18   ALT 8 7   ALKPHOS 52 57   BILITOTAL 0.3 0.3   ,   Results for orders placed or performed during the hospital encounter of 08/04/24   CT Chest (PE) Abdomen Pelvis w Contrast    Narrative    EXAM: CT CHEST PE, ABDOMEN AND PELVIS WITH CONTRAST  LOCATION: [facility]  DATE: 08/04/2024    INDICATION: Shortness of breath, abdominal pain, nausea, vomiting, diarrhea; lactate 20.  COMPARISON: Chest  CTA on 04/27/2023.  TECHNIQUE: CT chest pulmonary angiogram and routine CT abdomen pelvis with IV contrast. Arterial phase through the chest and venous phase through the abdomen and pelvis. Multiplanar reformats and MIP reconstructions were performed. Dose reduction   techniques were used.   CONTRAST: 70 mL Isovue 370.    FINDINGS:  ANGIOGRAM CHEST: No evidence of pulmonary embolism. No evidence of thoracic aortic aneurysm or dissection. No evidence for right heart strain.     MEDIASTINUM/AXILLAE: No cardiomegaly or significant pericardial effusion. No significant mediastinal or hilar lymphadenopathy. Diffuse esophageal wall thickening predominantly of the mid and distal esophagus, worrisome for underlying esophagitis.   Esophagus appears fluid-filled. Heterogenous thyroid parenchyma.    LUNGS AND PLEURA: No pleural effusion or pneumothorax. There is approximately 1.1 x 0.8 cm right upper lobe nodule (series 7 image 62), not significantly changed as compared to 04/27/2023 exam.    CORONARY ARTERY CALCIFICATION: Moderate.    ABDOMEN/PELVIS:    HEPATOBILIARY: No suspicious focal hepatic lesion. Distended gallbladder with layering gallstones.    PANCREAS: No main pancreatic ductal dilatation or definite solid pancreatic mass.    SPLEEN: No splenomegaly.    ADRENAL GLANDS: No adrenal nodules.    KIDNEYS/BLADDER: No radiodense kidney/ureteral stones or hydronephrosis in either kidney. A few small right kidney stones. No left kidney stone. No ureteral stone or hydronephrosis in either kidney. A few hypodense cystic foci in the kidneys, some can be   characterized as renal cysts including 1.7 cm cyst at the upper pole of the right kidney, 2 cm renal cyst at the mid/upper pole of the right kidney while a few others are subcentimeter and too small to characterize. Diffuse urinary bladder wall   thickening, nonspecific, can be seen with underdistention versus chronic cystitis.    BOWEL: Significant gastric distention. No  abnormally dilated small bowel loops. The appendix is visualized and appears normal. Scattered colonic diverticulosis predominantly of the right colon. Mild apparent colonic wall thickening of the transverse and   descending colon, likely due to underdistention versus less likely underlying colitis.    PERITONEUM: No evidence of free fluid in the abdomen and pelvis. No free peritoneal or portal venous gas.    PELVIC ORGANS: Unremarkable.    VASCULATURE: Moderate atherosclerotic vascular calcification of the abdominal aorta and iliac arteries.    LYMPH NODES: Unremarkable.    MUSCULOSKELETAL: Moderate-sized fat-containing supraumbilical and small fat-containing umbilical hernia. Multilevel degenerative changes of the spine.      Impression    IMPRESSION:  1.  No evidence of pulmonary embolism.  2.  Diffuse esophageal wall thickening predominantly of the mid and distal esophagus, worrisome for esophagitis. The esophagus appears fluid-filled.  3.  Significant fluid distention of the stomach. No abnormally dilated small bowel loops.  4.  Distended gallbladder with layering gallstones. No other sonographic evidence of acute cholecystitis.  5.  Heterogenous thyroid gland, can be further evaluated with thyroid ultrasound if clinically warranted.  6.  Apparent colonic wall thickening of the transverse and descending colon, likely due to underdistention versus less likely underlying colitis.       XR Chest Port 1 View    Narrative    EXAM: XR CHEST PORT 1 VIEW  LOCATION: [facility]  DATE: 8/4/2024    INDICATION: Dialysis access placed  COMPARISON: Same day CT.      Impression    IMPRESSION: Right IJ approach dialysis catheter with distal tip projecting at the superior cavoatrial junction. Patchy opacity of the medial right lung base may represent atelectasis or infection/aspiration. No pleural effusion or pneumothorax.   Echocardiogram Complete     Value    LVEF  >70%    Narrative     [id number]  [id number]  [id number]  [id number]^ALEKSANDRA^WENDY     [facility]  Echocardiography Laboratory  [address] [place] [address]     Name: JEANNE TOBIAS  MRN: [id number]  : 1965  Study Date: 2024 10:34 AM  Age: 59 yrs  Gender: Male  Patient Location: [facility]  Reason For Study: CHF  Ordering Physician: ALEKSANDRA GIBBONS  Referring Physician: Clinic, [facility]  Performed By: Kim Tee RDCS     BSA: 1.9 m2  Height: 68 in  Weight: 160 lb  HR: 107  BP: 114/63 mmHg  ______________________________________________________________________________  Procedure  Complete Portable Echo Adult.  ______________________________________________________________________________  Interpretation Summary     The left ventricle is normal in size.  There is mild concentric left ventricular hypertrophy.  Hyperdynamic left ventricular function The visual ejection fraction is >70%.  Echo findings are not consistent with left ventricular outflow obstruction.  There is mild (1+) mitral regurgitation.  The inferior vena cava was normal in size with preserved respiratory  variability.  Moderate left pleural effusion  Compared to prior study, there is no significant change.  ______________________________________________________________________________  Left Ventricle  The left ventricle is normal in size. There is mild concentric left  ventricular hypertrophy. Echo findings are not consistent with left  ventricular outflow obstruction. Hyperdynamic left ventricular function. The  visual ejection fraction is >70%. No regional wall motion abnormalities noted.     Right Ventricle  The right ventricle is normal size. The right ventricular systolic function is  normal.     Atria  The left atrium is moderately dilated. Right atrial size is normal. Intact  atrial septum.     Mitral Valve  There is mild (1+) mitral regurgitation.     Tricuspid Valve  The tricuspid valve is normal  in structure and function. There is trace  tricuspid regurgitation. IVC diameter <2.1 cm collapsing >50% with sniff  suggests a normal RA pressure of 3 mmHg. Right ventricular systolic pressure  could not be approximated due to inadequate tricuspid regurgitation.     Aortic Valve  The aortic valve is normal in structure and function. No aortic regurgitation  is present. No aortic stenosis is present.     Pulmonic Valve  The pulmonic valve is not well seen, but is grossly normal. There is trace  pulmonic valvular regurgitation.     Vessels  The aortic root is normal size. The inferior vena cava was normal in size with  preserved respiratory variability.     Pericardium  There is no pericardial effusion. Moderate left pleural effusion.     Rhythm  Sinus rhythm was noted.  ______________________________________________________________________________  MMode/2D Measurements & Calculations  IVSd: 1.2 cm     LVIDd: 3.2 cm  LVIDs: 1.7 cm  LVPWd: 1.3 cm  IVC diam: 1.9 cm  FS: 48.3 %  LV mass(C)d: 126.6 grams  LV mass(C)dI: 68.1 grams/m2  Ao root diam: 2.8 cm  LA dimension: 3.8 cm  asc Aorta Diam: 3.0 cm  LA/Ao: 1.3  LVOT diam: 2.0 cm  LVOT area: 3.1 cm2  Ao root diam index Ht(cm/m): 1.6  Ao root diam index BSA (cm/m2): 1.5  Asc Ao diam index BSA (cm/m2): 1.6  Asc Ao diam index Ht(cm/m): 1.7  LA Volume (BP): 81.2 ml     LA Volume Index (BP): 43.7 ml/m2  RV Base: 3.8 cm  RWT: 0.81  TAPSE: 2.5 cm     Doppler Measurements & Calculations  MV E max neri: 112.7 cm/sec  MV A max neri: 94.9 cm/sec  MV E/A: 1.2  MV dec time: 0.15 sec  Ao V2 max: 237.4 cm/sec  Ao max P.5 mmHg  Ao V2 mean: 153.4 cm/sec  Ao mean PG: 10.8 mmHg  Ao V2 VTI: 42.5 cm  BAY(I,D): 2.0 cm2  BAY(V,D): 2.2 cm2  LV V1 max P.4 mmHg  LV V1 max: 168.6 cm/sec  LV V1 VTI: 27.6 cm  SV(LVOT): 85.7 ml  SI(LVOT): 46.1 ml/m2  AV Neri Ratio (DI): 0.71  BAY Index (cm2/m2): 1.1  E/E' av.2  Lateral E/e': 14.0     Medial E/e': 14.4  RV S Neri: 19.8 cm/sec      ______________________________________________________________________________  Report approved by: Christin Arce 08/04/2024 07:32 PM             Discharge Medications        Review of your medicines        CONTINUE these medicines which have NOT CHANGED        Dose / Directions   acetaminophen 325 MG tablet  Commonly known as: TYLENOL      Dose: 650 mg  Take 650 mg by mouth every 4 hours as needed for mild pain  Refills: 0     atorvastatin 20 MG tablet  Commonly known as: LIPITOR      Dose: 20 mg  Take 20 mg by mouth At Bedtime  Refills: 0     carvedilol 12.5 MG tablet  Commonly known as: COREG      Dose: 12.5 mg  Take 12.5 mg by mouth 2 times daily (with meals)  Refills: 0     * diphenhydrAMINE 50 MG capsule  Commonly known as: BENADRYL      Dose: 50 mg  Take 50 mg by mouth At Bedtime  Refills: 0     * diphenhydrAMINE 50 MG capsule  Commonly known as: BENADRYL      Dose: 50 mg  Take 50 mg by mouth every 4 hours as needed for itching or allergies  Refills: 0     exenatide ER 2 MG recon vial kit susp for weekly inj  Commonly known as: BYDUREON      Dose: 2 mg  Inject 2 mg Subcutaneous every 14 days DO not administer until he is seen by his PCP.  Quantity:    Refills: 0     glucose 4 g chewable tablet  Commonly known as: BD GLUCOSE      Dose: 4 tablet  Take 4 tablets by mouth as needed for low blood sugar  Refills: 0     hydrALAZINE 25 MG tablet  Commonly known as: APRESOLINE      Dose: 25 mg  Take 25 mg by mouth 3 times daily Hold for SBP <120  Refills: 0     hypromellose 0.3 % Gel ophthalmic gel  Commonly known as: GENTEAL SEVERE      Dose: 1 drop  Place 1 drop into both eyes 2 times daily as needed for dry eyes  Refills: 0     insulin glargine 100 UNIT/ML pen  Commonly known as: LANTUS PEN      Dose: 8 Units  Inject 8 Units subcutaneously at bedtime  Refills: 0     lisinopril 40 MG tablet  Commonly known as: ZESTRIL      Dose: 40 mg  Take 40 mg by mouth daily  Refills: 0     magnesium oxide 400 MG  "tablet  Commonly known as: MAG-OX      Dose: 400 mg  Take 400 mg by mouth daily  Refills: 0     melatonin 5 MG tablet      Dose: 5 mg  Take 5 mg by mouth daily as needed for sleep  Refills: 0     mineral oil-white petrolatum cream      Apply topically 2 times daily as needed for dry skin  Refills: 0     ondansetron 4 MG ODT tab  Commonly known as: ZOFRAN ODT      Dose: 4 mg  Take 4 mg by mouth every 8 hours as needed for nausea  Refills: 0     paliperidone 156 MG/ML Angle injection  Commonly known as: INVEGA SUSTENNA      Dose: 156 mg  Inject 156 mg into the muscle every 28 days  Refills: 0     pantoprazole 40 MG EC tablet  Commonly known as: PROTONIX      Dose: 40 mg  Take 40 mg by mouth daily  Refills: 0     senna-docusate 8.6-50 MG tablet  Commonly known as: SENOKOT-S/PERICOLACE      Dose: 1 tablet  Take 1 tablet by mouth 2 times daily as needed  Refills: 0     tamsulosin 0.4 MG capsule  Commonly known as: FLOMAX  Used for: Urinary retention      Dose: 0.4 mg  Take 1 capsule (0.4 mg) by mouth daily  Quantity: 30 capsule  Refills: 3     traZODone 50 MG tablet  Commonly known as: DESYREL      Dose: 50 mg  Take 50 mg by mouth nightly as needed for sleep  Refills: 0     Vitamin D3 25 mcg (1000 units) tablet  Commonly known as: Vitamin D-1000 [address]      Dose: 1,000 Units  Take 1,000 Units by mouth daily  Quantity: 30 tablet  Refills: 1           * This list has 2 medication(s) that are the same as other medications prescribed for you. Read the directions carefully, and ask your doctor or other care provider to review them with you.                STOP taking      metFORMIN 1000 MG tablet  Commonly known as: GLUCOPHAGE                  Allergies   Allergies   Allergen Reactions    Alprazolam      Other reaction(s): *Unknown States \"I break out\"    Oxycodone-Acetaminophen      Other reaction(s): *Unknown, States \"I break out\"    Oxycodone-Acetaminophen Other (See Comments)     Other reaction(s): *Unknown, States \"I " "break out\"     "

## 2024-08-07 NOTE — PLAN OF CARE
"VSS. Carranza catheter in place, draining adequately. IV Zosyn given. D5+ NS infusing @ 50 ml/hr. Ax2. Potassium replaced, redraw @ 0250.   Problem: Adult Inpatient Plan of Care  Goal: Plan of Care Review  Description: The Plan of Care Review/Shift note should be completed every shift.  The Outcome Evaluation is a brief statement about your assessment that the patient is improving, declining, or no change.  This information will be displayed automatically on your shift  note.  Outcome: Progressing  Flowsheets (Taken 8/6/2024 9768)  Outcome Evaluation: IV Zosyn given.  Plan of Care Reviewed With: patient  Goal: Patient-Specific Goal (Individualized)  Description: You can add care plan individualizations to a care plan. Examples of Individualization might be:  \"Parent requests to be called daily at 9am for status\", \"I have a hard time hearing out of my right ear\", or \"Do not touch me to wake me up as it startles  me\".  Outcome: Progressing  Goal: Absence of Hospital-Acquired Illness or Injury  Outcome: Progressing  Intervention: Identify and Manage Fall Risk  Recent Flowsheet Documentation  Taken 8/6/2024 2000 by Renuka Keith, RN  Safety Promotion/Fall Prevention:   safety round/check completed   activity supervised  Intervention: Prevent Skin Injury  Recent Flowsheet Documentation  Taken 8/6/2024 2000 by Renuka Keith, RN  Body Position: position changed independently  Intervention: Prevent Infection  Recent Flowsheet Documentation  Taken 8/6/2024 2000 by Renuka Keith, RN  Infection Prevention:   cohorting utilized   hand hygiene promoted   rest/sleep promoted   single patient room provided  Goal: Optimal Comfort and Wellbeing  Outcome: Progressing  Goal: Readiness for Transition of Care  Outcome: Progressing         Goal Outcome Evaluation:      Plan of Care Reviewed With: patient        Outcome Evaluation: IV Zosyn given.      "

## 2024-08-07 NOTE — PLAN OF CARE
"For vital signs and complete assessments, please see documentation flowsheets.     [id number]  Pertinent assessments: Pt A&Ox4 with flat affect.On RA. Up Ax2. Afebrile. Elevated BP & HR. Denies pain, nausea, & SOB. Carranza in place with adequate output. On tele ST-103. B. PIV infusing IVF.    Major Shift Events : K+ replaced orally. Recheck at 0845.    Treatment Plan: Symptom management. Monitor labs, BG & tele. Carranza cares. IV zosyn. IVF.     Bedside Nurse: Ty Umaña, RN     Problem: Adult Inpatient Plan of Care  Goal: Plan of Care Review  Description: The Plan of Care Review/Shift note should be completed every shift.  The Outcome Evaluation is a brief statement about your assessment that the patient is improving, declining, or no change.  This information will be displayed automatically on your shift  note.  Outcome: Progressing  Flowsheets (Taken 2024 0548)  Outcome Evaluation: IV zosyn & IVF continued.  Plan of Care Reviewed With: patient  Overall Patient Progress: improving  Goal: Patient-Specific Goal (Individualized)  Description: You can add care plan individualizations to a care plan. Examples of Individualization might be:  \"Parent requests to be called daily at 9am for status\", \"I have a hard time hearing out of my right ear\", or \"Do not touch me to wake me up as it startles  me\".  Outcome: Progressing  Goal: Absence of Hospital-Acquired Illness or Injury  Outcome: Progressing  Intervention: Identify and Manage Fall Risk  Recent Flowsheet Documentation  Taken 2024 0011 by Ty Umaña, RN  Safety Promotion/Fall Prevention:   assistive device/personal items within reach   clutter free environment maintained   increase visualization of patient   nonskid shoes/slippers when out of bed   room near nurse's station   room organization consistent   treat reversible contributory factors   treat underlying cause  Intervention: Prevent Skin Injury  Recent Flowsheet Documentation  Taken " 8/7/2024 0011 by Ty Umaña RN  Body Position: position changed independently  Intervention: Prevent Infection  Recent Flowsheet Documentation  Taken 8/7/2024 0011 by Ty Umaña RN  Infection Prevention:   cohorting utilized   hand hygiene promoted   rest/sleep promoted   single patient room provided  Goal: Optimal Comfort and Wellbeing  Outcome: Progressing  Goal: Readiness for Transition of Care  Outcome: Progressing     Problem: Skin Injury Risk Increased  Goal: Skin Health and Integrity  Outcome: Progressing  Intervention: Optimize Skin Protection  Recent Flowsheet Documentation  Taken 8/7/2024 0011 by Ty Umaña RN  Head of Bed (HOB) Positioning: HOB at 20-30 degrees     Problem: Acute Kidney Injury/Impairment  Goal: Fluid and Electrolyte Balance  Outcome: Progressing  Goal: Improved Oral Intake  Outcome: Progressing  Goal: Effective Renal Function  Outcome: Progressing  Intervention: Monitor and Support Renal Function  Recent Flowsheet Documentation  Taken 8/7/2024 0011 by Ty Umaña RN  Medication Review/Management: medications reviewed     Goal Outcome Evaluation:      Plan of Care Reviewed With: patient    Overall Patient Progress: improvingOverall Patient Progress: improving    Outcome Evaluation: IV zosyn & IVF continued.

## 2024-08-07 NOTE — PROGRESS NOTES
Discharge Note    Patient discharged to Group Home via  group home ride  accompanied by N/A.  IV: Discontinued  Prescriptions N/A.   Belongings reviewed and sent with patient.   Home medications returned to patient: NA  Equipment sent with: patient.   patient verbalizes understanding of discharge instructions. AVS given to patient and group home.  Additional education completed?  N/A

## 2024-08-07 NOTE — CONSULTS
Care Management Initial Consult    General Information  Assessment completed with: Patient, Care Team Member,    Type of CM/SW Visit: Initial Assessment    Primary Care Provider verified and updated as needed:     Readmission within the last 30 days: no previous admission in last 30 days      Reason for Consult: discharge planning    Communication Assessment  Patient's communication style: spoken language (English or Bilingual)    Hearing Difficulty or Deaf: no   Wear Glasses or Blind: yes    Cognitive  Cognitive/Neuro/Behavioral: .WDL except, mood/behavior  Level of Consciousness: alert  Arousal Level: opens eyes spontaneously, arouses to voice  Orientation: oriented x 4  Mood/Behavior: flat affect  Best Language: 0 - No aphasia  Speech: clear (slow)    Living Environment:   People in home: alone     Current living Arrangements: group home      Able to return to prior arrangements: yes     Family/Social Support:  Care provided by: self, other (see comments) (care home staff)  Provides care for: no one, unable/limited ability to care for self  Marital Status: Single  Facility resident(s)/Staff          Description of Support System: Supportive, Involved       Current Resources:   Patient receiving home care services: No     Community Resources: [facility]  Equipment currently used at home: none  Supplies currently used at home:      Employment/Financial:  Employment Status:          Financial Concerns: none       Does the patient's insurance plan have a 3 day qualifying hospital stay waiver?  No    Lifestyle & Psychosocial Needs:  Social Determinants of Health     Food Insecurity: Not on file   Depression: At risk (9/5/2019)    Received from [facility]    PHQ-2     PHQ-2 Score: 3   Housing Stability: Not on file   Tobacco Use: High Risk (10/25/2023)    Received from [facility]    Patient History     Smoking Tobacco Use: Every Day     Smokeless Tobacco Use: Never     Passive Exposure: Not on  file   Financial Resource Strain: Not on file   Alcohol Use: Not At Risk (10/7/2020)    Received from [facility]    AUDIT-C     Frequency of Alcohol Consumption: Monthly or less     Average Number of Drinks: 3 or 4     Frequency of Binge Drinking: Never   Transportation Needs: Not on file   Physical Activity: Not on file   Interpersonal Safety: Not on file   Stress: Not on file   Social Connections: Not on file   Health Literacy: Not on file       Functional Status:  Prior to admission patient needed assistance:   Dependent ADLs:: Bathing  Dependent IADLs:: Cleaning, Cooking, Laundry, Shopping, Meal Preparation, Medication Management, Money Management, Transportation     Mental Health Status:  Mental Health Status: No Current Concerns       Care Management Discharge Note    Discharge Date: 08/07/2024       Discharge Disposition: Home    Discharge Services: None    Discharge DME: None    Discharge Transportation: family or friend will provide    Private pay costs discussed: Not applicable    Does the patient's insurance plan have a 3 day qualifying hospital stay waiver?  No    Patient/family educated on Medicare website which has current facility and service quality ratings: no    Education Provided on the Discharge Plan: Yes  Persons Notified of Discharge Plans: patient, group home  Patient/Family in Agreement with the Plan: yes    Handoff Referral Completed: No    Additional Information:  Care management for discharge planning. Patient admitted from [facility] with sepsis, nausea, vomiting.   Patient to discharge back to [facility]. Spoke with nurse at [facility] and they can accept patient back anytime. They can transport patient. Any new meds to [facility]. Their fax# is [phone].  Discharge orders faxed to [facility]. Left message that patient is discharging today. Mobility is baseline. Await call back as to  time.     Kianna Gentile RN  Care Coordinator  [facility]

## 2024-08-08 LAB
BACTERIA BLD CULT: ABNORMAL
BACTERIA BLD CULT: ABNORMAL

## 2024-08-11 LAB — BACTERIA BLD CULT: NO GROWTH

## 2024-11-15 ENCOUNTER — HOSPITAL ENCOUNTER (INPATIENT)
Facility: CLINIC | Age: 59
LOS: 2 days | Discharge: NURSING, REHAB OR LONG-TERM CARE | DRG: 638 | End: 2024-11-17
Attending: EMERGENCY MEDICINE | Admitting: INTERNAL MEDICINE
Payer: COMMERCIAL

## 2024-11-15 ENCOUNTER — APPOINTMENT (OUTPATIENT)
Dept: CT IMAGING | Facility: CLINIC | Age: 59
DRG: 638 | End: 2024-11-15
Attending: EMERGENCY MEDICINE
Payer: COMMERCIAL

## 2024-11-15 DIAGNOSIS — E88.89 KETOSIS (H): ICD-10-CM

## 2024-11-15 DIAGNOSIS — R07.9 CHEST PAIN, UNSPECIFIED TYPE: ICD-10-CM

## 2024-11-15 DIAGNOSIS — R73.9 HYPERGLYCEMIA: ICD-10-CM

## 2024-11-15 DIAGNOSIS — K21.00 GASTROESOPHAGEAL REFLUX DISEASE WITH ESOPHAGITIS WITHOUT HEMORRHAGE: ICD-10-CM

## 2024-11-15 DIAGNOSIS — I10 BENIGN ESSENTIAL HYPERTENSION: ICD-10-CM

## 2024-11-15 DIAGNOSIS — R11.10 VOMITING, UNSPECIFIED VOMITING TYPE, UNSPECIFIED WHETHER NAUSEA PRESENT: ICD-10-CM

## 2024-11-15 DIAGNOSIS — E11.10 DIABETIC KETOACIDOSIS WITHOUT COMA ASSOCIATED WITH TYPE 2 DIABETES MELLITUS (H): Primary | ICD-10-CM

## 2024-11-15 LAB
ALBUMIN SERPL BCG-MCNC: 4.7 G/DL (ref 3.5–5.2)
ALBUMIN UR-MCNC: 50 MG/DL
ALP SERPL-CCNC: 121 U/L (ref 40–150)
ALT SERPL W P-5'-P-CCNC: 18 U/L (ref 0–70)
AMPHETAMINES UR QL SCN: ABNORMAL
ANION GAP SERPL CALCULATED.3IONS-SCNC: 18 MMOL/L (ref 7–15)
APPEARANCE UR: CLEAR
AST SERPL W P-5'-P-CCNC: 16 U/L (ref 0–45)
B-OH-BUTYR SERPL-SCNC: 2.28 MMOL/L
BARBITURATES UR QL SCN: ABNORMAL
BASE EXCESS BLDV CALC-SCNC: 3.8 MMOL/L (ref -3–3)
BASOPHILS # BLD AUTO: 0 10E3/UL (ref 0–0.2)
BASOPHILS NFR BLD AUTO: 0 %
BENZODIAZ UR QL SCN: ABNORMAL
BILIRUB SERPL-MCNC: 0.7 MG/DL
BILIRUB UR QL STRIP: NEGATIVE
BUN SERPL-MCNC: 10.2 MG/DL (ref 8–23)
BZE UR QL SCN: ABNORMAL
CALCIUM SERPL-MCNC: 10.1 MG/DL (ref 8.8–10.4)
CANNABINOIDS UR QL SCN: ABNORMAL
CHLORIDE SERPL-SCNC: 88 MMOL/L (ref 98–107)
COLOR UR AUTO: ABNORMAL
CREAT SERPL-MCNC: 0.97 MG/DL (ref 0.67–1.17)
D DIMER PPP FEU-MCNC: 0.61 UG/ML FEU (ref 0–0.5)
EGFRCR SERPLBLD CKD-EPI 2021: 89 ML/MIN/1.73M2
EOSINOPHIL # BLD AUTO: 0 10E3/UL (ref 0–0.7)
EOSINOPHIL NFR BLD AUTO: 0 %
ERYTHROCYTE [DISTWIDTH] IN BLOOD BY AUTOMATED COUNT: 18.9 % (ref 10–15)
EST. AVERAGE GLUCOSE BLD GHB EST-MCNC: 217 MG/DL
ETHANOL SERPL-MCNC: <0.01 G/DL
FENTANYL UR QL: ABNORMAL
FLUAV RNA SPEC QL NAA+PROBE: NEGATIVE
FLUBV RNA RESP QL NAA+PROBE: NEGATIVE
GLUCOSE BLDC GLUCOMTR-MCNC: 334 MG/DL (ref 70–99)
GLUCOSE SERPL-MCNC: 368 MG/DL (ref 70–99)
GLUCOSE UR STRIP-MCNC: >=1000 MG/DL
HBA1C MFR BLD: 9.2 %
HCO3 BLDV-SCNC: 29 MMOL/L (ref 21–28)
HCO3 SERPL-SCNC: 25 MMOL/L (ref 22–29)
HCT VFR BLD AUTO: 42.2 % (ref 40–53)
HGB BLD-MCNC: 13.6 G/DL (ref 13.3–17.7)
HGB UR QL STRIP: NEGATIVE
HOLD SPECIMEN: NORMAL
HOLD SPECIMEN: NORMAL
IMM GRANULOCYTES # BLD: 0.1 10E3/UL
IMM GRANULOCYTES NFR BLD: 0 %
KETONES UR STRIP-MCNC: 80 MG/DL
LACTATE SERPL-SCNC: 1.4 MMOL/L (ref 0.7–2)
LEUKOCYTE ESTERASE UR QL STRIP: NEGATIVE
LIPASE SERPL-CCNC: 17 U/L (ref 13–60)
LYMPHOCYTES # BLD AUTO: 1.2 10E3/UL (ref 0.8–5.3)
LYMPHOCYTES NFR BLD AUTO: 7 %
MCH RBC QN AUTO: 25 PG (ref 26.5–33)
MCHC RBC AUTO-ENTMCNC: 32.2 G/DL (ref 31.5–36.5)
MCV RBC AUTO: 77 FL (ref 78–100)
MONOCYTES # BLD AUTO: 0.6 10E3/UL (ref 0–1.3)
MONOCYTES NFR BLD AUTO: 4 %
MUCOUS THREADS #/AREA URNS LPF: PRESENT /LPF
NEUTROPHILS # BLD AUTO: 14.4 10E3/UL (ref 1.6–8.3)
NEUTROPHILS NFR BLD AUTO: 88 %
NITRATE UR QL: NEGATIVE
NRBC # BLD AUTO: 0 10E3/UL
NRBC BLD AUTO-RTO: 0 /100
O2/TOTAL GAS SETTING VFR VENT: 21 %
OPIATES UR QL SCN: ABNORMAL
OXYHGB MFR BLDV: 90 % (ref 70–75)
PCO2 BLDV: 44 MM HG (ref 40–50)
PCP QUAL URINE (ROCHE): ABNORMAL
PH BLDV: 7.43 [PH] (ref 7.32–7.43)
PH UR STRIP: 7 [PH] (ref 5–7)
PLATELET # BLD AUTO: 223 10E3/UL (ref 150–450)
PO2 BLDV: 64 MM HG (ref 25–47)
POTASSIUM SERPL-SCNC: 4 MMOL/L (ref 3.4–5.3)
PROT SERPL-MCNC: 7.8 G/DL (ref 6.4–8.3)
RBC # BLD AUTO: 5.45 10E6/UL (ref 4.4–5.9)
RBC URINE: 6 /HPF
RSV RNA SPEC NAA+PROBE: NEGATIVE
SAO2 % BLDV: 92.5 % (ref 70–75)
SARS-COV-2 RNA RESP QL NAA+PROBE: NEGATIVE
SODIUM SERPL-SCNC: 131 MMOL/L (ref 135–145)
SP GR UR STRIP: 1.03 (ref 1–1.03)
SQUAMOUS EPITHELIAL: <1 /HPF
TROPONIN T SERPL HS-MCNC: 19 NG/L
TROPONIN T SERPL HS-MCNC: 21 NG/L
UROBILINOGEN UR STRIP-MCNC: NORMAL MG/DL
WBC # BLD AUTO: 16.4 10E3/UL (ref 4–11)
WBC URINE: 1 /HPF

## 2024-11-15 PROCEDURE — 82010 KETONE BODYS QUAN: CPT | Performed by: EMERGENCY MEDICINE

## 2024-11-15 PROCEDURE — 120N000013 HC R&B IMCU

## 2024-11-15 PROCEDURE — 82805 BLOOD GASES W/O2 SATURATION: CPT | Performed by: EMERGENCY MEDICINE

## 2024-11-15 PROCEDURE — 83036 HEMOGLOBIN GLYCOSYLATED A1C: CPT | Performed by: INTERNAL MEDICINE

## 2024-11-15 PROCEDURE — 93005 ELECTROCARDIOGRAM TRACING: CPT

## 2024-11-15 PROCEDURE — 250N000011 HC RX IP 250 OP 636: Performed by: EMERGENCY MEDICINE

## 2024-11-15 PROCEDURE — 36415 COLL VENOUS BLD VENIPUNCTURE: CPT | Performed by: EMERGENCY MEDICINE

## 2024-11-15 PROCEDURE — 85004 AUTOMATED DIFF WBC COUNT: CPT | Performed by: EMERGENCY MEDICINE

## 2024-11-15 PROCEDURE — 87040 BLOOD CULTURE FOR BACTERIA: CPT | Performed by: EMERGENCY MEDICINE

## 2024-11-15 PROCEDURE — 99285 EMERGENCY DEPT VISIT HI MDM: CPT | Mod: 25

## 2024-11-15 PROCEDURE — 83605 ASSAY OF LACTIC ACID: CPT | Performed by: EMERGENCY MEDICINE

## 2024-11-15 PROCEDURE — 83690 ASSAY OF LIPASE: CPT | Performed by: EMERGENCY MEDICINE

## 2024-11-15 PROCEDURE — HZ2ZZZZ DETOXIFICATION SERVICES FOR SUBSTANCE ABUSE TREATMENT: ICD-10-PCS | Performed by: INTERNAL MEDICINE

## 2024-11-15 PROCEDURE — 250N000013 HC RX MED GY IP 250 OP 250 PS 637: Performed by: INTERNAL MEDICINE

## 2024-11-15 PROCEDURE — 81001 URINALYSIS AUTO W/SCOPE: CPT | Performed by: INTERNAL MEDICINE

## 2024-11-15 PROCEDURE — 82077 ASSAY SPEC XCP UR&BREATH IA: CPT | Performed by: INTERNAL MEDICINE

## 2024-11-15 PROCEDURE — 96374 THER/PROPH/DIAG INJ IV PUSH: CPT

## 2024-11-15 PROCEDURE — 84484 ASSAY OF TROPONIN QUANT: CPT | Performed by: EMERGENCY MEDICINE

## 2024-11-15 PROCEDURE — 82962 GLUCOSE BLOOD TEST: CPT

## 2024-11-15 PROCEDURE — 85041 AUTOMATED RBC COUNT: CPT | Performed by: EMERGENCY MEDICINE

## 2024-11-15 PROCEDURE — 250N000009 HC RX 250: Performed by: EMERGENCY MEDICINE

## 2024-11-15 PROCEDURE — 80307 DRUG TEST PRSMV CHEM ANLYZR: CPT | Performed by: INTERNAL MEDICINE

## 2024-11-15 PROCEDURE — 250N000013 HC RX MED GY IP 250 OP 250 PS 637: Performed by: EMERGENCY MEDICINE

## 2024-11-15 PROCEDURE — 80053 COMPREHEN METABOLIC PANEL: CPT | Performed by: EMERGENCY MEDICINE

## 2024-11-15 PROCEDURE — 87637 SARSCOV2&INF A&B&RSV AMP PRB: CPT | Performed by: EMERGENCY MEDICINE

## 2024-11-15 PROCEDURE — 99223 1ST HOSP IP/OBS HIGH 75: CPT | Performed by: INTERNAL MEDICINE

## 2024-11-15 PROCEDURE — 250N000009 HC RX 250: Performed by: INTERNAL MEDICINE

## 2024-11-15 PROCEDURE — 258N000003 HC RX IP 258 OP 636: Performed by: EMERGENCY MEDICINE

## 2024-11-15 PROCEDURE — 87149 DNA/RNA DIRECT PROBE: CPT | Performed by: EMERGENCY MEDICINE

## 2024-11-15 PROCEDURE — 250N000011 HC RX IP 250 OP 636: Performed by: INTERNAL MEDICINE

## 2024-11-15 PROCEDURE — 71275 CT ANGIOGRAPHY CHEST: CPT

## 2024-11-15 PROCEDURE — 85025 COMPLETE CBC W/AUTO DIFF WBC: CPT | Performed by: EMERGENCY MEDICINE

## 2024-11-15 PROCEDURE — 85379 FIBRIN DEGRADATION QUANT: CPT | Performed by: EMERGENCY MEDICINE

## 2024-11-15 RX ORDER — IOPAMIDOL 755 MG/ML
500 INJECTION, SOLUTION INTRAVASCULAR ONCE
Status: COMPLETED | OUTPATIENT
Start: 2024-11-15 | End: 2024-11-15

## 2024-11-15 RX ORDER — HYDRALAZINE HYDROCHLORIDE 10 MG/1
10 TABLET, FILM COATED ORAL EVERY 4 HOURS PRN
Status: DISCONTINUED | OUTPATIENT
Start: 2024-11-15 | End: 2024-11-17 | Stop reason: HOSPADM

## 2024-11-15 RX ORDER — LISINOPRIL 10 MG/1
20 TABLET ORAL DAILY
Status: DISCONTINUED | OUTPATIENT
Start: 2024-11-16 | End: 2024-11-16

## 2024-11-15 RX ORDER — LIDOCAINE 40 MG/G
CREAM TOPICAL
Status: DISCONTINUED | OUTPATIENT
Start: 2024-11-15 | End: 2024-11-15

## 2024-11-15 RX ORDER — CALCIUM CARBONATE 500 MG/1
1000 TABLET, CHEWABLE ORAL 4 TIMES DAILY PRN
Status: DISCONTINUED | OUTPATIENT
Start: 2024-11-15 | End: 2024-11-17 | Stop reason: HOSPADM

## 2024-11-15 RX ORDER — HYDRALAZINE HYDROCHLORIDE 25 MG/1
25 TABLET, FILM COATED ORAL EVERY 8 HOURS SCHEDULED
Status: DISCONTINUED | OUTPATIENT
Start: 2024-11-15 | End: 2024-11-17 | Stop reason: HOSPADM

## 2024-11-15 RX ORDER — ACETAMINOPHEN 325 MG/1
650 TABLET ORAL EVERY 4 HOURS PRN
Status: DISCONTINUED | OUTPATIENT
Start: 2024-11-15 | End: 2024-11-17 | Stop reason: HOSPADM

## 2024-11-15 RX ORDER — DEXTROSE MONOHYDRATE 25 G/50ML
25-50 INJECTION, SOLUTION INTRAVENOUS
Status: DISCONTINUED | OUTPATIENT
Start: 2024-11-15 | End: 2024-11-16

## 2024-11-15 RX ORDER — ASPIRIN 81 MG/1
81 TABLET ORAL DAILY
Status: DISCONTINUED | OUTPATIENT
Start: 2024-11-16 | End: 2024-11-17 | Stop reason: HOSPADM

## 2024-11-15 RX ORDER — CLONIDINE HYDROCHLORIDE 0.1 MG/1
0.1 TABLET ORAL EVERY 8 HOURS
Status: DISCONTINUED | OUTPATIENT
Start: 2024-11-15 | End: 2024-11-17 | Stop reason: HOSPADM

## 2024-11-15 RX ORDER — MULTIPLE VITAMINS W/ MINERALS TAB 9MG-400MCG
1 TAB ORAL DAILY
Status: DISCONTINUED | OUTPATIENT
Start: 2024-11-16 | End: 2024-11-17 | Stop reason: HOSPADM

## 2024-11-15 RX ORDER — ONDANSETRON 2 MG/ML
4 INJECTION INTRAMUSCULAR; INTRAVENOUS EVERY 30 MIN PRN
Status: DISCONTINUED | OUTPATIENT
Start: 2024-11-15 | End: 2024-11-15

## 2024-11-15 RX ORDER — HYDRALAZINE HYDROCHLORIDE 20 MG/ML
10 INJECTION INTRAMUSCULAR; INTRAVENOUS EVERY 4 HOURS PRN
Status: DISCONTINUED | OUTPATIENT
Start: 2024-11-15 | End: 2024-11-17 | Stop reason: HOSPADM

## 2024-11-15 RX ORDER — HYDRALAZINE HYDROCHLORIDE 20 MG/ML
10 INJECTION INTRAMUSCULAR; INTRAVENOUS ONCE
Status: COMPLETED | OUTPATIENT
Start: 2024-11-15 | End: 2024-11-15

## 2024-11-15 RX ORDER — ACETAMINOPHEN 650 MG/1
650 SUPPOSITORY RECTAL EVERY 4 HOURS PRN
Status: DISCONTINUED | OUTPATIENT
Start: 2024-11-15 | End: 2024-11-17 | Stop reason: HOSPADM

## 2024-11-15 RX ORDER — MAGNESIUM HYDROXIDE/ALUMINUM HYDROXICE/SIMETHICONE 120; 1200; 1200 MG/30ML; MG/30ML; MG/30ML
15 SUSPENSION ORAL ONCE
Status: COMPLETED | OUTPATIENT
Start: 2024-11-15 | End: 2024-11-15

## 2024-11-15 RX ORDER — FLUMAZENIL 0.1 MG/ML
0.2 INJECTION, SOLUTION INTRAVENOUS
Status: DISCONTINUED | OUTPATIENT
Start: 2024-11-15 | End: 2024-11-17 | Stop reason: HOSPADM

## 2024-11-15 RX ORDER — DEXTROSE MONOHYDRATE, SODIUM CHLORIDE, AND POTASSIUM CHLORIDE 50; 1.49; 4.5 G/1000ML; G/1000ML; G/1000ML
INJECTION, SOLUTION INTRAVENOUS CONTINUOUS
Status: DISCONTINUED | OUTPATIENT
Start: 2024-11-15 | End: 2024-11-16

## 2024-11-15 RX ORDER — FOLIC ACID 1 MG/1
1 TABLET ORAL DAILY
Status: DISCONTINUED | OUTPATIENT
Start: 2024-11-16 | End: 2024-11-17 | Stop reason: HOSPADM

## 2024-11-15 RX ORDER — LABETALOL HYDROCHLORIDE 5 MG/ML
20 INJECTION, SOLUTION INTRAVENOUS ONCE
Status: COMPLETED | OUTPATIENT
Start: 2024-11-15 | End: 2024-11-15

## 2024-11-15 RX ORDER — HYDRALAZINE HYDROCHLORIDE 20 MG/ML
10 INJECTION INTRAMUSCULAR; INTRAVENOUS EVERY 4 HOURS PRN
Status: DISCONTINUED | OUTPATIENT
Start: 2024-11-15 | End: 2024-11-15

## 2024-11-15 RX ORDER — AMOXICILLIN 250 MG
2 CAPSULE ORAL 2 TIMES DAILY PRN
Status: DISCONTINUED | OUTPATIENT
Start: 2024-11-15 | End: 2024-11-17 | Stop reason: HOSPADM

## 2024-11-15 RX ORDER — ONDANSETRON 2 MG/ML
4 INJECTION INTRAMUSCULAR; INTRAVENOUS EVERY 6 HOURS PRN
Status: DISCONTINUED | OUTPATIENT
Start: 2024-11-15 | End: 2024-11-17 | Stop reason: HOSPADM

## 2024-11-15 RX ORDER — ONDANSETRON 4 MG/1
4 TABLET, ORALLY DISINTEGRATING ORAL EVERY 6 HOURS PRN
Status: DISCONTINUED | OUTPATIENT
Start: 2024-11-15 | End: 2024-11-17 | Stop reason: HOSPADM

## 2024-11-15 RX ORDER — POLYETHYLENE GLYCOL 3350 17 G/17G
17 POWDER, FOR SOLUTION ORAL 2 TIMES DAILY PRN
Status: DISCONTINUED | OUTPATIENT
Start: 2024-11-15 | End: 2024-11-17 | Stop reason: HOSPADM

## 2024-11-15 RX ORDER — BISACODYL 10 MG
10 SUPPOSITORY, RECTAL RECTAL DAILY PRN
Status: DISCONTINUED | OUTPATIENT
Start: 2024-11-15 | End: 2024-11-17 | Stop reason: HOSPADM

## 2024-11-15 RX ORDER — NICOTINE POLACRILEX 4 MG
15-30 LOZENGE BUCCAL
Status: DISCONTINUED | OUTPATIENT
Start: 2024-11-15 | End: 2024-11-16

## 2024-11-15 RX ORDER — LIDOCAINE 40 MG/G
CREAM TOPICAL
Status: DISCONTINUED | OUTPATIENT
Start: 2024-11-15 | End: 2024-11-17 | Stop reason: HOSPADM

## 2024-11-15 RX ORDER — AMOXICILLIN 250 MG
1 CAPSULE ORAL 2 TIMES DAILY PRN
Status: DISCONTINUED | OUTPATIENT
Start: 2024-11-15 | End: 2024-11-17 | Stop reason: HOSPADM

## 2024-11-15 RX ORDER — SODIUM CHLORIDE AND POTASSIUM CHLORIDE 150; 450 MG/100ML; MG/100ML
INJECTION, SOLUTION INTRAVENOUS CONTINUOUS
Status: DISCONTINUED | OUTPATIENT
Start: 2024-11-15 | End: 2024-11-16

## 2024-11-15 RX ORDER — ATORVASTATIN CALCIUM 20 MG/1
20 TABLET, FILM COATED ORAL EVERY EVENING
Status: DISCONTINUED | OUTPATIENT
Start: 2024-11-15 | End: 2024-11-17 | Stop reason: HOSPADM

## 2024-11-15 RX ADMIN — ATORVASTATIN CALCIUM 20 MG: 20 TABLET, FILM COATED ORAL at 23:34

## 2024-11-15 RX ADMIN — SODIUM CHLORIDE 1000 ML: 9 INJECTION, SOLUTION INTRAVENOUS at 19:05

## 2024-11-15 RX ADMIN — SODIUM CHLORIDE 88 ML: 9 INJECTION, SOLUTION INTRAVENOUS at 20:23

## 2024-11-15 RX ADMIN — CLONIDINE HYDROCHLORIDE 0.1 MG: 0.1 TABLET ORAL at 22:59

## 2024-11-15 RX ADMIN — IOPAMIDOL 67 ML: 755 INJECTION, SOLUTION INTRAVENOUS at 20:23

## 2024-11-15 RX ADMIN — POTASSIUM CHLORIDE AND SODIUM CHLORIDE: 450; 150 INJECTION, SOLUTION INTRAVENOUS at 23:19

## 2024-11-15 RX ADMIN — HYDRALAZINE HYDROCHLORIDE 10 MG: 20 INJECTION INTRAMUSCULAR; INTRAVENOUS at 19:54

## 2024-11-15 RX ADMIN — PANTOPRAZOLE SODIUM 40 MG: 40 INJECTION, POWDER, FOR SOLUTION INTRAVENOUS at 23:17

## 2024-11-15 RX ADMIN — INSULIN HUMAN: 1 INJECTION, SOLUTION INTRAVENOUS at 23:24

## 2024-11-15 RX ADMIN — ALUMINUM HYDROXIDE, MAGNESIUM HYDROXIDE, AND DIMETHICONE 15 ML: 200; 20; 200 SUSPENSION ORAL at 19:33

## 2024-11-15 RX ADMIN — LABETALOL HYDROCHLORIDE 20 MG: 5 INJECTION, SOLUTION INTRAVENOUS at 21:16

## 2024-11-15 ASSESSMENT — COLUMBIA-SUICIDE SEVERITY RATING SCALE - C-SSRS
1. IN THE PAST MONTH, HAVE YOU WISHED YOU WERE DEAD OR WISHED YOU COULD GO TO SLEEP AND NOT WAKE UP?: NO
2. HAVE YOU ACTUALLY HAD ANY THOUGHTS OF KILLING YOURSELF IN THE PAST MONTH?: NO
6. HAVE YOU EVER DONE ANYTHING, STARTED TO DO ANYTHING, OR PREPARED TO DO ANYTHING TO END YOUR LIFE?: NO

## 2024-11-15 ASSESSMENT — ACTIVITIES OF DAILY LIVING (ADL)
ADLS_ACUITY_SCORE: 0

## 2024-11-15 NOTE — ED TRIAGE NOTES
Patient arrives with caretaker endorsing multiple complaints. Pt has endorses a burning pain in chest and back, weakness and vomiting since this morning. Pt is hypertensive and tachycardic on arrival. 7/10 chest and back pain. Hx DM. A&Ox4, ABC's intact.      Triage Assessment (Adult)       Row Name 11/15/24 1146          Respiratory WDL    Respiratory WDL WDL        Skin Circulation/Temperature WDL    Skin Circulation/Temperature WDL WDL        Cardiac WDL    Cardiac WDL X        Peripheral/Neurovascular WDL    Peripheral Neurovascular WDL WDL        Cognitive/Neuro/Behavioral WDL    Cognitive/Neuro/Behavioral WDL WDL

## 2024-11-16 LAB
ANION GAP SERPL CALCULATED.3IONS-SCNC: 14 MMOL/L (ref 7–15)
ANION GAP SERPL CALCULATED.3IONS-SCNC: 16 MMOL/L (ref 7–15)
B-OH-BUTYR SERPL-SCNC: 0.18 MMOL/L
BUN SERPL-MCNC: 14.1 MG/DL (ref 8–23)
BUN SERPL-MCNC: 23.1 MG/DL (ref 8–23)
CALCIUM SERPL-MCNC: 9.6 MG/DL (ref 8.8–10.4)
CALCIUM SERPL-MCNC: 9.7 MG/DL (ref 8.8–10.4)
CHLORIDE SERPL-SCNC: 90 MMOL/L (ref 98–107)
CHLORIDE SERPL-SCNC: 94 MMOL/L (ref 98–107)
CREAT SERPL-MCNC: 0.82 MG/DL (ref 0.67–1.17)
CREAT SERPL-MCNC: 0.87 MG/DL (ref 0.67–1.17)
EGFRCR SERPLBLD CKD-EPI 2021: >90 ML/MIN/1.73M2
EGFRCR SERPLBLD CKD-EPI 2021: >90 ML/MIN/1.73M2
ERYTHROCYTE [DISTWIDTH] IN BLOOD BY AUTOMATED COUNT: 18.8 % (ref 10–15)
GLUCOSE BLDC GLUCOMTR-MCNC: 128 MG/DL (ref 70–99)
GLUCOSE BLDC GLUCOMTR-MCNC: 162 MG/DL (ref 70–99)
GLUCOSE BLDC GLUCOMTR-MCNC: 167 MG/DL (ref 70–99)
GLUCOSE BLDC GLUCOMTR-MCNC: 176 MG/DL (ref 70–99)
GLUCOSE BLDC GLUCOMTR-MCNC: 178 MG/DL (ref 70–99)
GLUCOSE BLDC GLUCOMTR-MCNC: 182 MG/DL (ref 70–99)
GLUCOSE BLDC GLUCOMTR-MCNC: 207 MG/DL (ref 70–99)
GLUCOSE BLDC GLUCOMTR-MCNC: 217 MG/DL (ref 70–99)
GLUCOSE BLDC GLUCOMTR-MCNC: 271 MG/DL (ref 70–99)
GLUCOSE BLDC GLUCOMTR-MCNC: 273 MG/DL (ref 70–99)
GLUCOSE BLDC GLUCOMTR-MCNC: 277 MG/DL (ref 70–99)
GLUCOSE BLDC GLUCOMTR-MCNC: 283 MG/DL (ref 70–99)
GLUCOSE BLDC GLUCOMTR-MCNC: 298 MG/DL (ref 70–99)
GLUCOSE BLDC GLUCOMTR-MCNC: 315 MG/DL (ref 70–99)
GLUCOSE SERPL-MCNC: 173 MG/DL (ref 70–99)
GLUCOSE SERPL-MCNC: 295 MG/DL (ref 70–99)
HCO3 SERPL-SCNC: 24 MMOL/L (ref 22–29)
HCO3 SERPL-SCNC: 26 MMOL/L (ref 22–29)
HCT VFR BLD AUTO: 35.3 % (ref 40–53)
HGB BLD-MCNC: 11.3 G/DL (ref 13.3–17.7)
MAGNESIUM SERPL-MCNC: 1.9 MG/DL (ref 1.7–2.3)
MCH RBC QN AUTO: 24.6 PG (ref 26.5–33)
MCHC RBC AUTO-ENTMCNC: 32 G/DL (ref 31.5–36.5)
MCV RBC AUTO: 77 FL (ref 78–100)
PHOSPHATE SERPL-MCNC: 3 MG/DL (ref 2.5–4.5)
PLATELET # BLD AUTO: 205 10E3/UL (ref 150–450)
POTASSIUM SERPL-SCNC: 3.5 MMOL/L (ref 3.4–5.3)
POTASSIUM SERPL-SCNC: 4 MMOL/L (ref 3.4–5.3)
RBC # BLD AUTO: 4.59 10E6/UL (ref 4.4–5.9)
SODIUM SERPL-SCNC: 132 MMOL/L (ref 135–145)
SODIUM SERPL-SCNC: 132 MMOL/L (ref 135–145)
WBC # BLD AUTO: 19.8 10E3/UL (ref 4–11)

## 2024-11-16 PROCEDURE — 258N000003 HC RX IP 258 OP 636: Performed by: INTERNAL MEDICINE

## 2024-11-16 PROCEDURE — 250N000013 HC RX MED GY IP 250 OP 250 PS 637: Performed by: INTERNAL MEDICINE

## 2024-11-16 PROCEDURE — 84100 ASSAY OF PHOSPHORUS: CPT | Performed by: INTERNAL MEDICINE

## 2024-11-16 PROCEDURE — 120N000001 HC R&B MED SURG/OB

## 2024-11-16 PROCEDURE — 250N000012 HC RX MED GY IP 250 OP 636 PS 637: Performed by: STUDENT IN AN ORGANIZED HEALTH CARE EDUCATION/TRAINING PROGRAM

## 2024-11-16 PROCEDURE — 250N000009 HC RX 250: Performed by: INTERNAL MEDICINE

## 2024-11-16 PROCEDURE — 999N000128 HC STATISTIC PERIPHERAL IV START W/O US GUIDANCE

## 2024-11-16 PROCEDURE — 82010 KETONE BODYS QUAN: CPT | Performed by: INTERNAL MEDICINE

## 2024-11-16 PROCEDURE — 250N000013 HC RX MED GY IP 250 OP 250 PS 637: Performed by: STUDENT IN AN ORGANIZED HEALTH CARE EDUCATION/TRAINING PROGRAM

## 2024-11-16 PROCEDURE — 85041 AUTOMATED RBC COUNT: CPT | Performed by: INTERNAL MEDICINE

## 2024-11-16 PROCEDURE — 99232 SBSQ HOSP IP/OBS MODERATE 35: CPT | Performed by: STUDENT IN AN ORGANIZED HEALTH CARE EDUCATION/TRAINING PROGRAM

## 2024-11-16 PROCEDURE — 83735 ASSAY OF MAGNESIUM: CPT | Performed by: INTERNAL MEDICINE

## 2024-11-16 PROCEDURE — 80048 BASIC METABOLIC PNL TOTAL CA: CPT | Performed by: INTERNAL MEDICINE

## 2024-11-16 PROCEDURE — 82565 ASSAY OF CREATININE: CPT | Performed by: INTERNAL MEDICINE

## 2024-11-16 PROCEDURE — 36415 COLL VENOUS BLD VENIPUNCTURE: CPT | Performed by: INTERNAL MEDICINE

## 2024-11-16 PROCEDURE — 85014 HEMATOCRIT: CPT | Performed by: INTERNAL MEDICINE

## 2024-11-16 RX ORDER — HYDROCODONE BITARTRATE AND ACETAMINOPHEN 5; 325 MG/1; MG/1
1 TABLET ORAL EVERY 4 HOURS PRN
COMMUNITY

## 2024-11-16 RX ORDER — ONDANSETRON 4 MG/1
4 TABLET, ORALLY DISINTEGRATING ORAL EVERY 8 HOURS PRN
Status: DISCONTINUED | OUTPATIENT
Start: 2024-11-16 | End: 2024-11-16

## 2024-11-16 RX ORDER — TRAZODONE HYDROCHLORIDE 50 MG/1
50 TABLET, FILM COATED ORAL
Status: DISCONTINUED | OUTPATIENT
Start: 2024-11-16 | End: 2024-11-17 | Stop reason: HOSPADM

## 2024-11-16 RX ORDER — LISINOPRIL 40 MG/1
40 TABLET ORAL DAILY
Status: DISCONTINUED | OUTPATIENT
Start: 2024-11-16 | End: 2024-11-17 | Stop reason: HOSPADM

## 2024-11-16 RX ORDER — NICOTINE POLACRILEX 4 MG
15-30 LOZENGE BUCCAL
Status: DISCONTINUED | OUTPATIENT
Start: 2024-11-16 | End: 2024-11-17 | Stop reason: HOSPADM

## 2024-11-16 RX ORDER — FERROUS SULFATE 325(65) MG
325 TABLET, DELAYED RELEASE (ENTERIC COATED) ORAL EVERY OTHER DAY
COMMUNITY

## 2024-11-16 RX ORDER — DEXTROSE MONOHYDRATE 25 G/50ML
25-50 INJECTION, SOLUTION INTRAVENOUS
Status: DISCONTINUED | OUTPATIENT
Start: 2024-11-16 | End: 2024-11-17 | Stop reason: HOSPADM

## 2024-11-16 RX ORDER — CARVEDILOL 12.5 MG/1
12.5 TABLET ORAL 2 TIMES DAILY WITH MEALS
Status: DISCONTINUED | OUTPATIENT
Start: 2024-11-16 | End: 2024-11-17

## 2024-11-16 RX ORDER — PANTOPRAZOLE SODIUM 40 MG/1
40 TABLET, DELAYED RELEASE ORAL
Status: DISCONTINUED | OUTPATIENT
Start: 2024-11-16 | End: 2024-11-17 | Stop reason: HOSPADM

## 2024-11-16 RX ORDER — TAMSULOSIN HYDROCHLORIDE 0.4 MG/1
0.4 CAPSULE ORAL DAILY
Status: DISCONTINUED | OUTPATIENT
Start: 2024-11-16 | End: 2024-11-17 | Stop reason: HOSPADM

## 2024-11-16 RX ADMIN — INSULIN ASPART 6 UNITS: 100 INJECTION, SOLUTION INTRAVENOUS; SUBCUTANEOUS at 18:22

## 2024-11-16 RX ADMIN — PANTOPRAZOLE SODIUM 40 MG: 40 TABLET, DELAYED RELEASE ORAL at 18:16

## 2024-11-16 RX ADMIN — CLONIDINE HYDROCHLORIDE 0.1 MG: 0.1 TABLET ORAL at 21:30

## 2024-11-16 RX ADMIN — HYDRALAZINE HYDROCHLORIDE 25 MG: 25 TABLET ORAL at 00:31

## 2024-11-16 RX ADMIN — HYDRALAZINE HYDROCHLORIDE 25 MG: 25 TABLET ORAL at 16:43

## 2024-11-16 RX ADMIN — CLONIDINE HYDROCHLORIDE 0.1 MG: 0.1 TABLET ORAL at 07:03

## 2024-11-16 RX ADMIN — INSULIN GLARGINE 12 UNITS: 100 INJECTION, SOLUTION SUBCUTANEOUS at 10:56

## 2024-11-16 RX ADMIN — INSULIN ASPART 6 UNITS: 100 INJECTION, SOLUTION INTRAVENOUS; SUBCUTANEOUS at 12:34

## 2024-11-16 RX ADMIN — Medication 1 TABLET: at 10:01

## 2024-11-16 RX ADMIN — FOLIC ACID 1 MG: 1 TABLET ORAL at 10:01

## 2024-11-16 RX ADMIN — HYDRALAZINE HYDROCHLORIDE 25 MG: 25 TABLET ORAL at 07:03

## 2024-11-16 RX ADMIN — INSULIN ASPART 1 UNITS: 100 INJECTION, SOLUTION INTRAVENOUS; SUBCUTANEOUS at 10:56

## 2024-11-16 RX ADMIN — THIAMINE HCL TAB 100 MG 100 MG: 100 TAB at 10:01

## 2024-11-16 RX ADMIN — POTASSIUM CHLORIDE, DEXTROSE MONOHYDRATE AND SODIUM CHLORIDE 100 ML/HR: 150; 5; 450 INJECTION, SOLUTION INTRAVENOUS at 05:05

## 2024-11-16 RX ADMIN — LISINOPRIL 40 MG: 40 TABLET ORAL at 10:01

## 2024-11-16 RX ADMIN — CLONIDINE HYDROCHLORIDE 0.1 MG: 0.1 TABLET ORAL at 16:43

## 2024-11-16 RX ADMIN — ASPIRIN 81 MG: 81 TABLET, COATED ORAL at 10:01

## 2024-11-16 RX ADMIN — CARVEDILOL 12.5 MG: 12.5 TABLET, FILM COATED ORAL at 16:45

## 2024-11-16 RX ADMIN — TAMSULOSIN HYDROCHLORIDE 0.4 MG: 0.4 CAPSULE ORAL at 16:43

## 2024-11-16 RX ADMIN — PANTOPRAZOLE SODIUM 40 MG: 40 INJECTION, POWDER, FOR SOLUTION INTRAVENOUS at 10:01

## 2024-11-16 RX ADMIN — HYDRALAZINE HYDROCHLORIDE 25 MG: 25 TABLET ORAL at 21:28

## 2024-11-16 ASSESSMENT — ACTIVITIES OF DAILY LIVING (ADL)
ADLS_ACUITY_SCORE: 0
DEPENDENT_IADLS:: CLEANING;COOKING;LAUNDRY;SHOPPING;MEAL PREPARATION;MEDICATION MANAGEMENT;MONEY MANAGEMENT;TRANSPORTATION
ADLS_ACUITY_SCORE: 0

## 2024-11-16 NOTE — PLAN OF CARE
"Goal Outcome Evaluation:      Plan of Care Reviewed With: patient    Overall Patient Progress: improvingOverall Patient Progress: improving    Outcome Evaluation: Anion gap closed. Insulin gtt off. Staff fed patient what he would eat. At discharge will return to group home.  Personal belongings and call light in reach. Bed alarm on and falls bundle present. For VS and assessment, please see documentation under flowsheets.     Problem: Adult Inpatient Plan of Care  Goal: Plan of Care Review  Description: The Plan of Care Review/Shift note should be completed every shift.  The Outcome Evaluation is a brief statement about your assessment that the patient is improving, declining, or no change.  This information will be displayed automatically on your shift  note.  Outcome: Progressing  Flowsheets (Taken 11/16/2024 1314)  Outcome Evaluation: Anion gap closed. Insulin gtt off.  Plan of Care Reviewed With: patient  Overall Patient Progress: improving  Goal: Patient-Specific Goal (Individualized)  Description: You can add care plan individualizations to a care plan. Examples of Individualization might be:  \"Parent requests to be called daily at 9am for status\", \"I have a hard time hearing out of my right ear\", or \"Do not touch me to wake me up as it startles  me\".  Outcome: Progressing  Goal: Absence of Hospital-Acquired Illness or Injury  Outcome: Progressing  Intervention: Identify and Manage Fall Risk  Recent Flowsheet Documentation  Taken 11/16/2024 0900 by Vika Black, RN  Safety Promotion/Fall Prevention:   activity supervised   clutter free environment maintained   nonskid shoes/slippers when out of bed   room door open   room near nurse's station   room organization consistent   safety round/check completed   supervised activity  Intervention: Prevent Infection  Recent Flowsheet Documentation  Taken 11/16/2024 0900 by Vika Black, RN  Infection Prevention: single patient room provided  Goal: Optimal Comfort " and Wellbeing  Outcome: Progressing  Goal: Readiness for Transition of Care  Outcome: Progressing     Problem: Diabetic Ketoacidosis  Goal: Optimal Coping  Outcome: Progressing  Goal: Fluid and Electrolyte Balance with Absence of Ketosis  Outcome: Progressing     Problem: Comorbidity Management  Goal: Blood Glucose Levels Within Targeted Range  Outcome: Progressing  Intervention: Monitor and Manage Glycemia  Recent Flowsheet Documentation  Taken 11/16/2024 0900 by Vika Black RN  Medication Review/Management: medications reviewed

## 2024-11-16 NOTE — PLAN OF CARE
"A & O x 1, pt more awake and talking than when admitted to floor. Pt on insulin gtt per protocol. K+, Mag, Phos rechecks all in for tomorrow morning. BP elevated upon admission, scheduled hydralazine given with improvement. Per pt legally blind and also is documented in previous visits. Pt voiding via urinal sitting at bedside with assist of 1. Neuro intact, CIWA 3/4.  Tele: Sinus Tach. PIV R & L AC infusing.  Limited admit profile done due to pt cognitive delay.       Plan of Care Reviewed With: patient    Overall Patient Progress: no changeOverall Patient Progress: no change      Problem: Adult Inpatient Plan of Care  Goal: Plan of Care Review  Description: The Plan of Care Review/Shift note should be completed every shift.  The Outcome Evaluation is a brief statement about your assessment that the patient is improving, declining, or no change.  This information will be displayed automatically on your shift  note.  Outcome: Progressing  Flowsheets (Taken 11/16/2024 0535)  Plan of Care Reviewed With: patient  Overall Patient Progress: no change  Goal: Patient-Specific Goal (Individualized)  Description: You can add care plan individualizations to a care plan. Examples of Individualization might be:  \"Parent requests to be called daily at 9am for status\", \"I have a hard time hearing out of my right ear\", or \"Do not touch me to wake me up as it startles  me\".  Outcome: Progressing  Goal: Absence of Hospital-Acquired Illness or Injury  Outcome: Progressing  Intervention: Prevent Skin Injury  Recent Flowsheet Documentation  Taken 11/16/2024 0030 by Theodora Dooley RN  Body Position: position changed independently  Intervention: Prevent and Manage VTE (Venous Thromboembolism) Risk  Recent Flowsheet Documentation  Taken 11/16/2024 0030 by Theodora Dooley, RN  VTE Prevention/Management: SCDs on (sequential compression devices)  Goal: Optimal Comfort and Wellbeing  Outcome: Progressing  Goal: Readiness for Transition of " Care  Outcome: Progressing  Intervention: Mutually Develop Transition Plan  Recent Flowsheet Documentation  Taken 11/16/2024 0030 by Theodora Dooley, RN  Patient/Family Anticipated Services at Transition: none  Patient/Family Anticipates Transition to: group home

## 2024-11-16 NOTE — ED PROVIDER NOTES
Emergency Department Note      History of Present Illness     Chief Complaint   Hypertension, Vomiting, and Generalized Weakness      HPI   Benja Duong is a 59 year old male who presents to the ER from group home for evaluation of elevated blood pressure, nausea vomit, chest pain, upper back pain.  Patient tells that he is blind and is not sure if there was blood in the vomit.  He denies diarrhea.  No fever or cough.  He has history of alcohol use but is not sure of the last use.  He denies other drug use.      Review of External Notes   I reviewed hospital discharge summary from 8/7/2024, which revealed admission for intractable nausea vomiting and diarrhea and acute kidney injury requiring CRRT, undifferentiated shock with possible sepsis, ketoacidosis in the setting of type 2 diabetes, underlying schizophrenia history.    CTPE abdomen 8/4/24:    IMPRESSION:  1.  No evidence of pulmonary embolism.  2.  Diffuse esophageal wall thickening predominantly of the mid and distal esophagus, worrisome for esophagitis. The esophagus appears fluid-filled.  3.  Significant fluid distention of the stomach. No abnormally dilated small bowel loops.  4.  Distended gallbladder with layering gallstones. No other sonographic evidence of acute cholecystitis.  5.  Heterogenous thyroid gland, can be further evaluated with thyroid ultrasound if clinically warranted.  6.  Apparent colonic wall thickening of the transverse and descending colon, likely due to underdistention versus less likely underlying colitis.       Past Medical History     Medical History and Problem List   Past Medical History:   Diagnosis Date    Depression     Diabetes mellitus     h/o Cocaine abuse     Homeless     Hypertension     Latent tuberculosis     Proliferative diabetic retinopathy     Schizophrenia        Medications   acetaminophen (TYLENOL) 325 MG tablet  atorvastatin (LIPITOR) 20 MG tablet  carvedilol (COREG) 12.5 MG tablet  cholecalciferol  "(VITAMIN D-1000 MAX ST) 1000 units TABS  diphenhydrAMINE (BENADRYL) 50 MG capsule  diphenhydrAMINE (BENADRYL) 50 MG capsule  exenatide ER (BYDUREON) 2 MG recon vial kit susp for weekly inj  glucose (BD GLUCOSE) 4 g chewable tablet  hydrALAZINE (APRESOLINE) 25 MG tablet  hypromellose (GENTEAL SEVERE) ophthalmic gel 0.3%  insulin glargine (LANTUS PEN) 100 UNIT/ML pen  lisinopril (ZESTRIL) 40 MG tablet  magnesium oxide (MAG-OX) 400 MG tablet  melatonin 5 MG tablet  mineral oil-white petrolatum (EUCERIN/MINERIN) cream  ondansetron (ZOFRAN ODT) 4 MG ODT tab  paliperidone (INVEGA SUSTENNA) 156 MG/ML PABLO injection  pantoprazole (PROTONIX) 40 MG EC tablet  senna-docusate (SENOKOT-S/PERICOLACE) 8.6-50 MG tablet  tamsulosin (FLOMAX) 0.4 MG capsule  traZODone (DESYREL) 50 MG tablet        Surgical History   No past surgical history on file.    Physical Exam     Patient Vitals for the past 24 hrs:   BP Temp Temp src Pulse Resp SpO2 Height Weight   11/15/24 2205 (!) 188/105 -- -- 102 18 -- -- --   11/15/24 2150 (!) 197/107 -- -- 106 11 90 % -- --   11/15/24 2140 (!) 172/93 -- -- 101 19 99 % -- --   11/15/24 2135 (!) 181/97 -- -- 101 11 99 % -- --   11/15/24 2130 (!) 180/96 -- -- 101 18 99 % -- --   11/15/24 2116 -- -- -- 117 22 99 % -- --   11/15/24 2115 (!) 191/98 -- -- 117 20 100 % -- --   11/15/24 2015 (!) 185/118 -- -- (!) 124 27 100 % -- --   11/15/24 2000 (!) 231/120 -- -- 117 13 98 % -- --   11/15/24 1920 (!) 211/137 -- -- 112 16 95 % -- --   11/15/24 1845 (!) 208/121 -- -- 110 -- 100 % -- --   11/15/24 1800 (!) 218/112 -- -- 111 -- 99 % -- --   11/15/24 1726 (!) 147/125 (!) 96.7  F (35.9  C) Oral 118 16 98 % 1.702 m (5' 7\") 72.3 kg (159 lb 6.3 oz)     Physical Exam  VS: Reviewed per above  HENT: Mucous membranes dry  EYES: sclera anicteric  CV: Rate as noted,  regular rhythm.   RESP: Effort normal. Breath sounds are normal bilaterally.  GI: no focal tenderness/rebound/guarding, not distended.  NEURO: Alert, moving all " extremities  MSK: No deformity of the extremities  SKIN: Warm and dry      Diagnostics     Lab Results   Labs Ordered and Resulted from Time of ED Arrival to Time of ED Departure   COMPREHENSIVE METABOLIC PANEL - Abnormal       Result Value    Sodium 131 (*)     Potassium 4.0      Carbon Dioxide (CO2) 25      Anion Gap 18 (*)     Urea Nitrogen 10.2      Creatinine 0.97      GFR Estimate 89      Calcium 10.1      Chloride 88 (*)     Glucose 368 (*)     Alkaline Phosphatase 121      AST 16      ALT 18      Protein Total 7.8      Albumin 4.7      Bilirubin Total 0.7     CBC WITH PLATELETS AND DIFFERENTIAL - Abnormal    WBC Count 16.4 (*)     RBC Count 5.45      Hemoglobin 13.6      Hematocrit 42.2      MCV 77 (*)     MCH 25.0 (*)     MCHC 32.2      RDW 18.9 (*)     Platelet Count 223      % Neutrophils 88      % Lymphocytes 7      % Monocytes 4      % Eosinophils 0      % Basophils 0      % Immature Granulocytes 0      NRBCs per 100 WBC 0      Absolute Neutrophils 14.4 (*)     Absolute Lymphocytes 1.2      Absolute Monocytes 0.6      Absolute Eosinophils 0.0      Absolute Basophils 0.0      Absolute Immature Granulocytes 0.1      Absolute NRBCs 0.0     D DIMER QUANTITATIVE - Abnormal    D-Dimer Quantitative 0.61 (*)    KETONE BETA-HYDROXYBUTYRATE QUANTITATIVE, RAPID - Abnormal    Ketone (Beta-Hydroxybutyrate) Quantitative 2.28 (*)    BLOOD GAS VENOUS - Abnormal    pH Venous 7.43      pCO2 Venous 44      pO2 Venous 64 (*)     Bicarbonate Venous 29 (*)     Base Excess/Deficit Venous 3.8 (*)     FIO2 21      Oxyhemoglobin Venous 90 (*)     O2 Sat, Venous 92.5 (*)    LIPASE - Normal    Lipase 17     TROPONIN T, HIGH SENSITIVITY - Normal    Troponin T, High Sensitivity 21     LACTIC ACID WHOLE BLOOD WITH 1X REPEAT IN 2 HR WHEN >2 - Normal    Lactic Acid, Initial 1.4     INFLUENZA A/B, RSV AND SARS-COV2 PCR - Normal    Influenza A PCR Negative      Influenza B PCR Negative      RSV PCR Negative      SARS CoV2 PCR Negative      TROPONIN T, HIGH SENSITIVITY - Normal    Troponin T, High Sensitivity 19     BLOOD CULTURE       Imaging   CT Chest Pulmonary Embolism w Contrast   Final Result   IMPRESSION:   1.  No PE.   2.  Wall thickening of the distal esophagus has increased from the comparison study and likely represents esophagitis. Malignancy is unlikely. Recommend further evaluation.   3.  The stomach is distended with fluid.             EKG   ECG results from 11/15/24   EKG 12 lead     Value    Systolic Blood Pressure     Diastolic Blood Pressure     Ventricular Rate 115    Atrial Rate 115    MO Interval 130    QRS Duration 96        QTc 475    P Axis 60    R AXIS 36    T Axis 67    Interpretation ECG      Sinus tachycardia  Otherwise normal ECG  When compared with ECG of 04-Aug-2024 05:56,  No significant change was found           ED Course      Medications Administered   Medications   ondansetron (ZOFRAN) injection 4 mg (has no administration in time range)   sodium chloride 0.9% BOLUS 1,000 mL (has no administration in time range)   sodium chloride 0.9% BOLUS 1,000 mL (1,000 mLs Intravenous $New Bag 11/15/24 1905)   alum & mag hydroxide-simethicone (MAALOX) suspension 15 mL (15 mLs Oral $Given 11/15/24 1933)   hydrALAZINE (APRESOLINE) injection 10 mg (10 mg Intravenous $Given 11/15/24 1954)   CT Scan Flush (88 mLs Intravenous $Given 11/15/24 2023)   iopamidol (ISOVUE-370) solution 500 mL (67 mLs Intravenous $Given 11/15/24 2023)   labetalol (NORMODYNE/TRANDATE) injection 20 mg (20 mg Intravenous $Given 11/15/24 2116)       Procedures   Procedures       ED Course   ED Course as of 11/15/24 2211   Fri Nov 15, 2024   2049 I called kendal caregiver- she explains that the pt wasn't eating much and his blood sugar was high and he was vomiting a lot            Medical Decision Making / Diagnosis       MIPS     CT for PE was ordered because the patient had an abnormal d-dimer.    MONIE Duong is a 59 year old male who  presents to the ER for evaluation of nausea and vomiting, chest pain and upper back pain, hyperglycemia.  On arrival he is hypertensive and tachycardic.  Abdominal exam is benign.  Patient tells me his pain is in the central chest and upper back.  ECG and troponin do not suggest ACS.  CT chest negative for PE or other acute intrathoracic process aside from signs of esophagitis.  He denies abdominal pain and had reassuring abdominal exam.  He was given some IV fluids and antiemetics.  He was also given antihypertensives, as he was vomiting his oral antihypertensives today.  He has ketosis and hyperglycemia but no acidosis.  In discussion with admitting hospitalist, recommended insulin drip.  At signout to my colleague about patient was awaiting hospital bed with plan for admitting team to order insulin drip and other therapeutics.    Disposition   The patient was admitted to the hospital- Dr Huber     Diagnosis     ICD-10-CM    1. Hyperglycemia  R73.9       2. Ketosis (H)  E88.89       3. Vomiting, unspecified vomiting type, unspecified whether nausea present  R11.10       4. Chest pain, unspecified type  R07.9            Discharge Medications   New Prescriptions    No medications on file          Drake Rice MD  11/15/24 [id number]

## 2024-11-16 NOTE — PROGRESS NOTES
Care Management Follow Up    Length of Stay (days): 1    Expected Discharge Date: 11/18/2024     Concerns to be Addressed:       Patient plan of care discussed at interdisciplinary rounds: Yes    Anticipated Discharge Disposition:                Anticipated Discharge Services:    Anticipated Discharge DME:      Patient/family educated on Medicare website which has current facility and service quality ratings:    Education Provided on the Discharge Plan:    Patient/Family in Agreement with the Plan:      Referrals Placed by CM/SW:    Private pay costs discussed: Not applicable    Discussed  Partnership in Safe Discharge Planning  document with patient/family: No     Handoff Completed: No, handoff not indicated or clinically appropriate    Additional Information:  CM attempted to assess patient but he was confused and not responsive to questions.    Next Steps: Attempt tomorrow    Tracy Licona RN, BSN, CM  Inpatient Care Coordination  [facility]  [phone]      Dominique Licona RN

## 2024-11-16 NOTE — H&P
[facility]    History and Physical - Hospitalist Service       Date of Admission:  11/15/2024    Assessment & Plan      Benja Duong is a 59 year old male admitted on 11/15/2024. He has a past medical history significant for diabetes mellitus type 2, hypertension, polysubstance abuse, tobacco use disorder, alcohol use disorder, schizophrenia, GERD, esophagitis, depression, and cognitive delay.  He presented to emergency room due to nausea, vomiting, and chills.    Diabetes mellitus type 2.  Probable hyperosmolar hyperglycemic state versus early mild diabetic ketoacidosis.  -Hold prior to admission diabetic medications.  -Start continuous IV insulin infusion on DKA protocol initially.  -Monitor metabolic panel every 4 hours until anion gap has resolved.    Ketosis.  -Possible starvation ketosis versus DKA.  -Allow clear liquid diet.  -DKA protocol with dextrose and IV fluids once glucose drops below 200.  -Recheck ketones in the morning.    Probable esophagitis.  GERD.  -Start pantoprazole 40 mg IV twice a day.  -Consider gastroenterology consult if symptoms persist.    Chest discomfort.  -Suspect symptoms related to esophagitis.  -Troponin levels not elevated.  -Monitor on telemetry.  -Start aspirin 81 mg a day for now.  -Restart atorvastatin 20 mg a day.  -Likely restart other home medications once verified by pharmacy.    Nausea and vomiting.  -Possibly due to esophagitis.  -Pantoprazole as above.  -Check influenza, RSV, SARS-CoV-2 PCR's.  -Ondansetron as needed.    Leukocytosis.  -No obvious infection on CT chest.  -Check influenza, RSV, SARS-CoV-2 PCR's as above.  -Check urinalysis.  -Monitor blood culture results from ER.    Hypertensive urgency.  -History of polysubstance abuse and alcohol use disorder.  -Check urine drug screen.  -Start scheduled clonidine 0.1 mg every 8 hours with hold parameters.  -Hydralazine if needed.  -Likely restart home medications once verified by  pharmacy.    History of polysubstance abuse.  Alcohol use disorder.  -Start scheduled clonidine as above.  -Vitamins with folic acid, thiamine, multivitamin.  -Monitor on CIWA protocol.  -States he is allergic to benzodiazepines.  -Not in obvious withdrawals at this point.  -Check EtOH.  -Urine drug screen.    Hyperlipidemia.  -Restart atorvastatin 20 mg a day.    Schizophrenia.  Depression.  Cognitive delay.  -Likely restart home medications once verified by pharmacy.              Diet: Clear Liquid Diet    DVT Prophylaxis: Pneumatic Compression Devices  Carranza Catheter: Not present  Lines: None     Cardiac Monitoring: ACTIVE order. Indication: Tachyarrhythmias, acute (48 hours)  Code Status: Full Code      Clinically Significant Risk Factors Present on Admission         # Hyponatremia: Lowest Na = 131 mmol/L in last 2 days, will monitor as appropriate  # Hypochloremia: Lowest Cl = 88 mmol/L in last 2 days, will monitor as appropriate          # Hypertension: Home medication list includes antihypertensive(s)          # DMII: A1C = N/A within past 6 months        # Financial/Environmental Concerns:           Disposition Plan     Medically Ready for Discharge: Anticipated in 2-4 Days           Dave Huber DO  Hospitalist Service  [facility]  Securely message with [employer] (more info)  Text page via [employer] Paging/Directory     ______________________________________________________________________    Chief Complaint   Nausea and vomiting.    History is obtained from the patient    History of Present Illness   Benja Duong is a 59 year old male who has a past medical history significant for diabetes mellitus type 2, hypertension, polysubstance abuse, tobacco use disorder, alcohol use disorder, schizophrenia, GERD, esophagitis, depression, and cognitive delay.  He is a poor medical historian.  It is difficult to get specifics from him.  He does note that he has been having nausea and  multiple episodes of vomiting.  I think that he is saying that this just started earlier today.  Has also been having chills.  Occasional cough.  Short of breath at times.  Has had a mild chest discomfort in the lower part of his chest.  He does not notice anything in particular that makes the pain worse.  It seems that pain is better after medications given in the ER.  He does not know if he has had pain like this previously.  He does state that he smokes a pack of cigarettes a day.  Drinks a bottle of beer whenever he can get alcohol.  Has not had any alcohol for the past day or 2.  Does not remember when his last bowel movement was.  Does not think that he is constipated.  Does not member having any difficulty urinating.  No other acute complaints.      Past Medical History    Past Medical History:   Diagnosis Date    Depression     Diabetes mellitus     h/o Cocaine abuse     Homeless     Hypertension     Latent tuberculosis     Proliferative diabetic retinopathy     Schizophrenia        Past Surgical History   No past surgical history on file.    Prior to Admission Medications   Prior to Admission Medications   Prescriptions Last Dose Informant Patient Reported? Taking?   acetaminophen (TYLENOL) 325 MG tablet   Yes No   Sig: Take 650 mg by mouth every 4 hours as needed for mild pain   atorvastatin (LIPITOR) 20 MG tablet   Yes No   Sig: Take 20 mg by mouth At Bedtime   carvedilol (COREG) 12.5 MG tablet   Yes No   Sig: Take 12.5 mg by mouth 2 times daily (with meals)   cholecalciferol (VITAMIN D-1000 MAX ST) 1000 units TABS   No No   Sig: Take 1,000 Units by mouth daily   diphenhydrAMINE (BENADRYL) 50 MG capsule   Yes No   Sig: Take 50 mg by mouth At Bedtime   diphenhydrAMINE (BENADRYL) 50 MG capsule   Yes No   Sig: Take 50 mg by mouth every 4 hours as needed for itching or allergies   exenatide ER (BYDUREON) 2 MG recon vial kit susp for weekly inj   Yes No   Sig: Inject 2 mg Subcutaneous every 14 days DO not  "administer until he is seen by his PCP.   glucose (BD GLUCOSE) 4 g chewable tablet   Yes No   Sig: Take 4 tablets by mouth as needed for low blood sugar   hydrALAZINE (APRESOLINE) 25 MG tablet   Yes No   Sig: Take 25 mg by mouth 3 times daily Hold for SBP <120   hypromellose (GENTEAL SEVERE) ophthalmic gel 0.3%   Yes No   Sig: Place 1 drop into both eyes 2 times daily as needed for dry eyes   insulin glargine (LANTUS PEN) 100 UNIT/ML pen   Yes No   Sig: Inject 8 Units subcutaneously at bedtime   lisinopril (ZESTRIL) 40 MG tablet   Yes No   Sig: Take 40 mg by mouth daily   magnesium oxide (MAG-OX) 400 MG tablet   Yes No   Sig: Take 400 mg by mouth daily   melatonin 5 MG tablet   Yes No   Sig: Take 5 mg by mouth daily as needed for sleep   mineral oil-white petrolatum (EUCERIN/MINERIN) cream   Yes No   Sig: Apply topically 2 times daily as needed for dry skin   ondansetron (ZOFRAN ODT) 4 MG ODT tab   Yes No   Sig: Take 4 mg by mouth every 8 hours as needed for nausea   paliperidone (INVEGA SUSTENNA) 156 MG/ML PABLO injection   Yes No   Sig: Inject 156 mg into the muscle every 28 days   pantoprazole (PROTONIX) 40 MG EC tablet   Yes No   Sig: Take 40 mg by mouth daily   senna-docusate (SENOKOT-S/PERICOLACE) 8.6-50 MG tablet   Yes No   Sig: Take 1 tablet by mouth 2 times daily as needed   tamsulosin (FLOMAX) 0.4 MG capsule   No No   Sig: Take 1 capsule (0.4 mg) by mouth daily   traZODone (DESYREL) 50 MG tablet   Yes No   Sig: Take 50 mg by mouth nightly as needed for sleep      Facility-Administered Medications: None        Allergies   Allergies   Allergen Reactions    Alprazolam      Other reaction(s): *Unknown States \"I break out\"    Oxycodone-Acetaminophen      Other reaction(s): *Unknown, States \"I break out\"    Oxycodone-Acetaminophen Other (See Comments)     Other reaction(s): *Unknown, States \"I break out\"        Physical Exam   Vital Signs: Temp: (!) 96.7  F (35.9  C) Temp src: Oral BP: (!) 188/105 Pulse: 102   " Resp: 18 SpO2: 90 % O2 Device: None (Room air)    Weight: 159 lbs 6.28 oz    Gen:  NAD, A&O seemingly x2 to person and place.  Seems to have trouble with time.  Eyes:  PERRL, sclera anicteric.  OP:  MMM, no lesions.  Neck:  Supple.  CV:  Regular, mildly tachycardic, no loud murmurs.  Lung:  CTA b/l, normal effort.  Ab:  +BS, soft.  Skin:  Warm, dry to touch.  No rash.  Ext:  No pitting edema LE b/l.      Medical Decision Making       80 MINUTES SPENT BY ME on the date of service doing chart review, history, exam, documentation & further activities per the note.      Data     I have personally reviewed the following data over the past 24 hrs:    16.4 (H)  \   13.6   / 223     131 (L) 88 (L) 10.2 /  368 (H)   4.0 25 0.97 \     ALT: 18 AST: 16 AP: 121 TBILI: 0.7   ALB: 4.7 TOT PROTEIN: 7.8 LIPASE: 17     Trop: 19 BNP: N/A     Procal: N/A CRP: N/A Lactic Acid: 1.4       INR:  N/A PTT:  N/A   D-dimer:  0.61 (H) Fibrinogen:  N/A       Imaging results reviewed over the past 24 hrs:   Recent Results (from the past 24 hours)   CT Chest Pulmonary Embolism w Contrast    Narrative    EXAM: CT CHEST PULMONARY EMBOLISM W CONTRAST  LOCATION: [facility]  DATE: 11/15/2024    INDICATION: cp elevated ddimer. LVH.  COMPARISON: 8/4/2024.  TECHNIQUE: CT chest pulmonary angiogram during arterial phase injection of IV contrast. Multiplanar reformats and MIP reconstructions were performed. Dose reduction techniques were used.   CONTRAST: 67mL Isovue 370    FINDINGS:  ANGIOGRAM CHEST: Pulmonary arteries are normal caliber and negative for pulmonary emboli. Thoracic aorta is negative for dissection.    LUNGS AND PLEURA: An 10 mm x 7 mm right upper lobe nodule was previously 11 mm x 8 mm and does not require follow-up (series 7 image 73). Mild emphysema is present. Peripheral groundglass opacities in the right lower lobe have not changed.    MEDIASTINUM/AXILLAE: Large amount of fluid in the esophagus. The distal  esophagus exhibits wall thickening over a length of approximately 11 cm. Wall thickening has increased from the comparison study. There is wall thickening of the left ventricle which   is consistent with history of left ventricular hypertrophy.    CORONARY ARTERY CALCIFICATION: Mild.    UPPER ABDOMEN: Cholelithiasis. Right renal lesions were previously characterized as simple cysts and do not require follow-up. There is mild right perinephric stranding. The stomach is distended with fluid.    MUSCULOSKELETAL: Normal.      Impression    IMPRESSION:  1.  No PE.  2.  Wall thickening of the distal esophagus has increased from the comparison study and likely represents esophagitis. Malignancy is unlikely. Recommend further evaluation.  3.  The stomach is distended with fluid.

## 2024-11-16 NOTE — ED NOTES
"[facility]  ED Nurse Handoff Report    ED Chief complaint: Hypertension, Vomiting, and Generalized Weakness  . ED Diagnosis:   Final diagnoses:   Hyperglycemia   Ketosis (H)   Vomiting, unspecified vomiting type, unspecified whether nausea present   Chest pain, unspecified type       Allergies:   Allergies   Allergen Reactions    Alprazolam      Other reaction(s): *Unknown States \"I break out\"    Oxycodone-Acetaminophen      Other reaction(s): *Unknown, States \"I break out\"    Oxycodone-Acetaminophen Other (See Comments)     Other reaction(s): *Unknown, States \"I break out\"       Code Status: Full Code    Activity level - Baseline/Home:  in bed.  Activity Level - Current:   in bed.   Lift room needed: No.   Bariatric: No   Needed: No   Isolation: No.   Infection: Not Applicable.     Respiratory status: Room air    Vital Signs (within 30 minutes):   Vitals:    11/15/24 2000 11/15/24 2015 11/15/24 2115 11/15/24 2116   BP: (!) 231/120 (!) 185/118 (!) 191/98    Pulse: 117 (!) 124 117 117   Resp: 13 27 20 22   Temp:       TempSrc:       SpO2: 98% 100% 100% 99%   Weight:       Height:           Cardiac Rhythm:  ,      Pain level:    Patient confused: No.   Patient Falls Risk: bed/chair alarm on, nonskid shoes/slippers when out of bed, arm band in place, and patient and family education.   Elimination Status: Has voided     Patient Report - Initial Complaint: HPI   Benja Duong is a 59 year old male who presents to the ER from group home for evaluation of elevated blood pressure, nausea vomit, chest pain, upper back pain.  Patient tells that he is blind and is not sure if there was blood in the vomit.  He denies diarrhea.  No fever or cough.  He has history of alcohol use but is not sure of the last use.  He denies other drug use.       Review of External Notes   I reviewed hospital discharge summary from 8/7/2024, which revealed admission for intractable nausea vomiting and diarrhea " and acute kidney injury requiring CRRT, undifferentiated shock with possible sepsis, ketoacidosis in the setting of type 2 diabetes, underlying schizophrenia history.  Focused Assessment:     Abnormal Results:   Labs Ordered and Resulted from Time of ED Arrival to Time of ED Departure   COMPREHENSIVE METABOLIC PANEL - Abnormal       Result Value    Sodium 131 (*)     Potassium 4.0      Carbon Dioxide (CO2) 25      Anion Gap 18 (*)     Urea Nitrogen 10.2      Creatinine 0.97      GFR Estimate 89      Calcium 10.1      Chloride 88 (*)     Glucose 368 (*)     Alkaline Phosphatase 121      AST 16      ALT 18      Protein Total 7.8      Albumin 4.7      Bilirubin Total 0.7     CBC WITH PLATELETS AND DIFFERENTIAL - Abnormal    WBC Count 16.4 (*)     RBC Count 5.45      Hemoglobin 13.6      Hematocrit 42.2      MCV 77 (*)     MCH 25.0 (*)     MCHC 32.2      RDW 18.9 (*)     Platelet Count 223      % Neutrophils 88      % Lymphocytes 7      % Monocytes 4      % Eosinophils 0      % Basophils 0      % Immature Granulocytes 0      NRBCs per 100 WBC 0      Absolute Neutrophils 14.4 (*)     Absolute Lymphocytes 1.2      Absolute Monocytes 0.6      Absolute Eosinophils 0.0      Absolute Basophils 0.0      Absolute Immature Granulocytes 0.1      Absolute NRBCs 0.0     D DIMER QUANTITATIVE - Abnormal    D-Dimer Quantitative 0.61 (*)    KETONE BETA-HYDROXYBUTYRATE QUANTITATIVE, RAPID - Abnormal    Ketone (Beta-Hydroxybutyrate) Quantitative 2.28 (*)    BLOOD GAS VENOUS - Abnormal    pH Venous 7.43      pCO2 Venous 44      pO2 Venous 64 (*)     Bicarbonate Venous 29 (*)     Base Excess/Deficit Venous 3.8 (*)     FIO2 21      Oxyhemoglobin Venous 90 (*)     O2 Sat, Venous 92.5 (*)    LIPASE - Normal    Lipase 17     TROPONIN T, HIGH SENSITIVITY - Normal    Troponin T, High Sensitivity 21     LACTIC ACID WHOLE BLOOD WITH 1X REPEAT IN 2 HR WHEN >2 - Normal    Lactic Acid, Initial 1.4     INFLUENZA A/B, RSV AND SARS-COV2 PCR - Normal     Influenza A PCR Negative      Influenza B PCR Negative      RSV PCR Negative      SARS CoV2 PCR Negative     TROPONIN T, HIGH SENSITIVITY - Normal    Troponin T, High Sensitivity 19     BLOOD CULTURE        CT Chest Pulmonary Embolism w Contrast    (Results Pending)       Treatments provided: ANTIHYPERTENSIVE. IVF. CT  Family Comments:   OBS brochure/video discussed/provided to patient:  Yes  ED Medications:   Medications   ondansetron (ZOFRAN) injection 4 mg (has no administration in time range)   sodium chloride 0.9% BOLUS 1,000 mL (has no administration in time range)   sodium chloride 0.9% BOLUS 1,000 mL (1,000 mLs Intravenous $New Bag 11/15/24 1905)   alum & mag hydroxide-simethicone (MAALOX) suspension 15 mL (15 mLs Oral $Given 11/15/24 1933)   hydrALAZINE (APRESOLINE) injection 10 mg (10 mg Intravenous $Given 11/15/24 1954)   CT Scan Flush (88 mLs Intravenous $Given 11/15/24 2023)   iopamidol (ISOVUE-370) solution 500 mL (67 mLs Intravenous $Given 11/15/24 2023)   labetalol (NORMODYNE/TRANDATE) injection 20 mg (20 mg Intravenous $Given 11/15/24 2116)       Drips infusing:  Yes  For the majority of the shift this patient was Green.   Interventions performed were : bloodwork incl: VBG, ketones. IVF. CT. Antihypertensive IV meds given    Sepsis treatment initiated: No    Cares/treatment/interventions/medications to be completed following ED care: IVF, manage nausea    ED Nurse Name: Alyssa Nascimento RN  9:24 PM     RECEIVING UNIT ED HANDOFF REVIEW    Above ED Nurse Handoff Report was reviewed: Yes  Reviewed by: Theodora Dooley RN on November 15, 2024 at 11:40 PM   GARRICK Santiago called the ED to inform them the note was read: Yes

## 2024-11-16 NOTE — PROGRESS NOTES
[facility]    Medicine Progress Note - Hospitalist Service    Date of Admission:  11/15/2024    Assessment & Plan   59-year-old male with history of diabetes mellitus type 2, hypertension, polysubstance abuse, tobacco use disorder, alcohol use, schizophrenia, cognitive delay, esophagitis, GERD and depression who lives in a group home presented to ED with nausea, vomiting and chills.    He was found to be in DKA with a ketone level of 2.28 and glucose level 368.  WBC count of 16,000.  Tested negative for COVID, influenza A/B and RSV.  Urine was negative for nitrite or leukocyte esterase.  Urine drug screen was positive for amphetamines and cannabinoids.    He was admitted to [facility] on insulin drip with diagnosis of DKA.      Diabetic ketoacidosis.  Likely due to compliance issues but patient does not give much history.  DKA is resolved, off insulin drip and was given Lantus 12 units in the morning but has blood glucose is rising rapidly (270).  Will give another 10 units of Lantus and increase the sliding scale intensity to high.  Prior to admission he was on Lantus 8 units daily and exenatide 2 mg every 14 days.  I wonder if exam right is causing contributing to nausea and vomiting.    Leukocytosis.  BBC went up from 16K on presentation 19.8, likely stress reaction.  No focal sign of infection, negative UA and no lung infiltrates on CT.    Chest discomfort, likely esophagitis.  CT chest showed esophageal thickening which appears to be esophagitis per radiology.  Started on p.o. Protonix but she had to get an upper GI endoscopy in 4 weeks.  Troponins negative.    Polysubstance abuse.  Urine drug screen positive for amphetamines and cannabinoids.    Chronic medical conditions  Schizophrenia: Prior to admission on Invega 156 mg every 4 weeks.  Continue trazodone 50 mg every night.  Essential hypertension: Presented with hypertensive urgency.  Prior to admission on carvedilol 12.5 mg twice daily,  lisinopril 40 mg daily and hydralazine 25 mg 3 times daily, resumed.  Hyperlipidemia: Continue on Lipitor.  BPH: Continue Flomax.  GERD: Continue Protonix.  History of polysubstance abuse: Urine drug screen positive for vitamins and cannabinoids.  Alcohol use disorder: Monitor on CIWA protocol.  Cognitive delay: Lives in group home.  Depression:     Disposition  Likely discharge back to group home.        Diet: Room Service  Moderate Consistent Carb (60 g CHO per Meal) Diet    DVT Prophylaxis: Pneumatic Compression Devices  Carranza Catheter: Not present  Lines: None     Cardiac Monitoring: ACTIVE order. Indication: Tachyarrhythmias, acute (48 hours)  Code Status: Full Code      Clinically Significant Risk Factors Present on Admission         # Hyponatremia: Lowest Na = 131 mmol/L in last 2 days, will monitor as appropriate  # Hypochloremia: Lowest Cl = 88 mmol/L in last 2 days, will monitor as appropriate          # Hypertension: Home medication list includes antihypertensive(s)          # DMII: A1C = 9.2 % (Ref range: <5.7 %) within past 6 months        # Financial/Environmental Concerns:           Social Drivers of Health    Tobacco Use: High Risk (9/24/2024)    Received from [facility]    Patient History     Smoking Tobacco Use: Every Day     Smokeless Tobacco Use: Never          Disposition Plan     Medically Ready for Discharge: Anticipated Tomorrow             Michael Paez MD  Hospitalist Service  [facility]  Securely message with [employer] (more info)  Text page via [employer] Paging/Directory   ______________________________________________________________________    Interval History   Off insulin drip.  Patient lies cold up in the bed and answers questions in one-word answers.  Does not appear to be in discomfort.    Physical Exam   Vital Signs: Temp: 99  F (37.2  C) Temp src: Oral BP: (!) 171/78 Pulse: 116   Resp: 18 SpO2: 97 % O2 Device: None (Room air)    Weight: 159 lbs 6.28  oz    General Appearance: : Up in the bed but appears comfortable answer simple questions.  Respiratory: Clear to auscultation  Cardiovascular: S1-S2 normal  GI: Soft and nontender  Skin: No rash  Other: No edema      Medical Decision Making       Patient and RN      Data     I have personally reviewed the following data over the past 24 hrs:    19.8 (H)  \   11.3 (L)   / 205     132 (L) 94 (L) 23.1 (H) /  271 (H)   4.0 24 0.87 \     ALT: 18 AST: 16 AP: 121 TBILI: 0.7   ALB: 4.7 TOT PROTEIN: 7.8 LIPASE: 17     Trop: 19 BNP: N/A     TSH: N/A T4: N/A A1C: 9.2 (H)     Procal: N/A CRP: N/A Lactic Acid: 1.4       INR:  N/A PTT:  N/A   D-dimer:  0.61 (H) Fibrinogen:  N/A       Imaging results reviewed over the past 24 hrs:   Recent Results (from the past 24 hours)   CT Chest Pulmonary Embolism w Contrast    Narrative    EXAM: CT CHEST PULMONARY EMBOLISM W CONTRAST  LOCATION: [facility]  DATE: 11/15/2024    INDICATION: cp elevated ddimer. LVH.  COMPARISON: 8/4/2024.  TECHNIQUE: CT chest pulmonary angiogram during arterial phase injection of IV contrast. Multiplanar reformats and MIP reconstructions were performed. Dose reduction techniques were used.   CONTRAST: 67mL Isovue 370    FINDINGS:  ANGIOGRAM CHEST: Pulmonary arteries are normal caliber and negative for pulmonary emboli. Thoracic aorta is negative for dissection.    LUNGS AND PLEURA: An 10 mm x 7 mm right upper lobe nodule was previously 11 mm x 8 mm and does not require follow-up (series 7 image 73). Mild emphysema is present. Peripheral groundglass opacities in the right lower lobe have not changed.    MEDIASTINUM/AXILLAE: Large amount of fluid in the esophagus. The distal esophagus exhibits wall thickening over a length of approximately 11 cm. Wall thickening has increased from the comparison study. There is wall thickening of the left ventricle which   is consistent with history of left ventricular hypertrophy.    CORONARY ARTERY  CALCIFICATION: Mild.    UPPER ABDOMEN: Cholelithiasis. Right renal lesions were previously characterized as simple cysts and do not require follow-up. There is mild right perinephric stranding. The stomach is distended with fluid.    MUSCULOSKELETAL: Normal.      Impression    IMPRESSION:  1.  No PE.  2.  Wall thickening of the distal esophagus has increased from the comparison study and likely represents esophagitis. Malignancy is unlikely. Recommend further evaluation.  3.  The stomach is distended with fluid.

## 2024-11-16 NOTE — CONSULTS
Care Management Initial Consult    General Information  Assessment completed with: Care Team MemberCARLOS  Type of CM/SW Visit: Initial Assessment    Primary Care Provider verified and updated as needed: Yes   Readmission within the last 30 days: no previous admission in last 30 days      Reason for Consult: discharge planning, care coordination/care conference, other (see comments) (high URR)  Advance Care Planning:            Communication Assessment  Patient's communication style: spoken language (English or Bilingual)             Cognitive  Cognitive/Neuro/Behavioral: .WDL except  Level of Consciousness: lethargic, confused  Arousal Level: opens eyes spontaneously  Orientation: disoriented to, place, time, situation  Mood/Behavior: agitated     Speech: clear    Living Environment:   People in home:       Current living Arrangements: group home      Able to return to prior arrangements: yes       Family/Social Support:  Care provided by: other (see comments), self (group home)  Provides care for: no one, unable/limited ability to care for self  Marital Status: Single  Support system: Facility resident(s)/Staff          Description of Support System: Involved, Supportive    Support Assessment: Adequate social supports    Current Resources:   Patient receiving home care services: No        Community Resources: None  Equipment currently used at home: none  Supplies currently used at home: Diabetic Supplies    Employment/Financial:  Employment Status:          Financial Concerns:             Does the patient's insurance plan have a 3 day qualifying hospital stay waiver?  No    Lifestyle & Psychosocial Needs:  Social Drivers of Health     Food Insecurity: No Food Insecurity (9/23/2024)    Received from [facility]    Hunger Vital Sign     Worried About Running Out of Food in the Last Year: Never true     Ran Out of Food in the Last Year: Never true   Depression: Not at risk (9/23/2024)    Received from [facility]    PHQ-2     PHQ-2 Subtotal: 0   Housing Stability: Not on file   Tobacco Use: High Risk (9/24/2024)    Received from [facility]    Patient History     Smoking Tobacco Use: Every Day     Smokeless Tobacco Use: Never     Passive Exposure: Not on file   Financial Resource Strain: Not on file   Alcohol Use: Not At Risk (10/7/2020)    Received from [facility]    AUDIT-C     Frequency of Alcohol Consumption: Monthly or less     Average Number of Drinks: 3 or 4     Frequency of Binge Drinking: Never   Transportation Needs: Not on file   Physical Activity: Not on file   Interpersonal Safety: Not on file   Stress: Not on file   Social Connections: Not on file   Health Literacy: Not on file       Functional Status:  Prior to admission patient needed assistance:   Dependent ADLs:: Bathing, Dressing, Ambulation-no assistive device  Dependent IADLs:: Cleaning, Cooking, Laundry, Shopping, Meal Preparation, Medication Management, Money Management, Transportation  Assesssment of Functional Status: Not at functional baseline                Discussed  Partnership in Safe Discharge Planning  document with patient/family: No    Additional Information:  Patient admitted for nausea , vomiting and chills. He was found to be in early DKA, Ketosis, esophagitis. He is followed by the Hospitalist.  CM attempted to meet with patient at the bedside. Patient lethargic and confused. CM called Group Home.    A call was placed to [facility] and services were verified.     Patient receives services as follows: showering, meals, meds , laundry, cooking, cleaning, transportation. Patient is legally blind but is used to  layout and can mobilize without any assistive devices.    [facility] contact (name/number): [phone]  Fax #: [phone]  Baseline mobility: see above  Time of preferred return: anytime as long as able to transport  New meds/prescriptions/Pharmacy: [facility]  Transportation:       Next  Steps: RN CC/SW will continue to follow for discharge planning and return to facility.    Tracy Licona RN, BSN, CM  Inpatient Care Coordination  [facility]  [phone]

## 2024-11-17 VITALS
OXYGEN SATURATION: 99 % | SYSTOLIC BLOOD PRESSURE: 147 MMHG | HEIGHT: 67 IN | TEMPERATURE: 98.2 F | WEIGHT: 159.39 LBS | HEART RATE: 103 BPM | BODY MASS INDEX: 25.02 KG/M2 | RESPIRATION RATE: 18 BRPM | DIASTOLIC BLOOD PRESSURE: 73 MMHG

## 2024-11-17 LAB
ANION GAP SERPL CALCULATED.3IONS-SCNC: 9 MMOL/L (ref 7–15)
BASOPHILS # BLD AUTO: 0 10E3/UL (ref 0–0.2)
BASOPHILS NFR BLD AUTO: 0 %
BUN SERPL-MCNC: 18.6 MG/DL (ref 8–23)
CALCIUM SERPL-MCNC: 9.7 MG/DL (ref 8.8–10.4)
CHLORIDE SERPL-SCNC: 97 MMOL/L (ref 98–107)
CREAT SERPL-MCNC: 0.87 MG/DL (ref 0.67–1.17)
EGFRCR SERPLBLD CKD-EPI 2021: >90 ML/MIN/1.73M2
EOSINOPHIL # BLD AUTO: 0 10E3/UL (ref 0–0.7)
EOSINOPHIL NFR BLD AUTO: 0 %
ERYTHROCYTE [DISTWIDTH] IN BLOOD BY AUTOMATED COUNT: 19.1 % (ref 10–15)
GLUCOSE BLDC GLUCOMTR-MCNC: 155 MG/DL (ref 70–99)
GLUCOSE BLDC GLUCOMTR-MCNC: 170 MG/DL (ref 70–99)
GLUCOSE BLDC GLUCOMTR-MCNC: 92 MG/DL (ref 70–99)
GLUCOSE SERPL-MCNC: 160 MG/DL (ref 70–99)
HCO3 SERPL-SCNC: 23 MMOL/L (ref 22–29)
HCT VFR BLD AUTO: 33.9 % (ref 40–53)
HGB BLD-MCNC: 10.8 G/DL (ref 13.3–17.7)
IMM GRANULOCYTES # BLD: 0.1 10E3/UL
IMM GRANULOCYTES NFR BLD: 1 %
LYMPHOCYTES # BLD AUTO: 2.3 10E3/UL (ref 0.8–5.3)
LYMPHOCYTES NFR BLD AUTO: 15 %
MAGNESIUM SERPL-MCNC: 1.8 MG/DL (ref 1.7–2.3)
MCH RBC QN AUTO: 25.1 PG (ref 26.5–33)
MCHC RBC AUTO-ENTMCNC: 31.9 G/DL (ref 31.5–36.5)
MCV RBC AUTO: 79 FL (ref 78–100)
MONOCYTES # BLD AUTO: 1.1 10E3/UL (ref 0–1.3)
MONOCYTES NFR BLD AUTO: 7 %
NEUTROPHILS # BLD AUTO: 11.7 10E3/UL (ref 1.6–8.3)
NEUTROPHILS NFR BLD AUTO: 77 %
NRBC # BLD AUTO: 0 10E3/UL
NRBC BLD AUTO-RTO: 0 /100
PHOSPHATE SERPL-MCNC: 3.2 MG/DL (ref 2.5–4.5)
PLATELET # BLD AUTO: 181 10E3/UL (ref 150–450)
POTASSIUM SERPL-SCNC: 3.9 MMOL/L (ref 3.4–5.3)
RBC # BLD AUTO: 4.31 10E6/UL (ref 4.4–5.9)
SODIUM SERPL-SCNC: 129 MMOL/L (ref 135–145)
WBC # BLD AUTO: 15.2 10E3/UL (ref 4–11)

## 2024-11-17 PROCEDURE — 250N000013 HC RX MED GY IP 250 OP 250 PS 637: Performed by: STUDENT IN AN ORGANIZED HEALTH CARE EDUCATION/TRAINING PROGRAM

## 2024-11-17 PROCEDURE — 83735 ASSAY OF MAGNESIUM: CPT | Performed by: INTERNAL MEDICINE

## 2024-11-17 PROCEDURE — 250N000013 HC RX MED GY IP 250 OP 250 PS 637: Performed by: INTERNAL MEDICINE

## 2024-11-17 PROCEDURE — 258N000003 HC RX IP 258 OP 636: Performed by: STUDENT IN AN ORGANIZED HEALTH CARE EDUCATION/TRAINING PROGRAM

## 2024-11-17 PROCEDURE — 99239 HOSP IP/OBS DSCHRG MGMT >30: CPT | Performed by: STUDENT IN AN ORGANIZED HEALTH CARE EDUCATION/TRAINING PROGRAM

## 2024-11-17 PROCEDURE — 85025 COMPLETE CBC W/AUTO DIFF WBC: CPT | Performed by: STUDENT IN AN ORGANIZED HEALTH CARE EDUCATION/TRAINING PROGRAM

## 2024-11-17 PROCEDURE — 93010 ELECTROCARDIOGRAM REPORT: CPT | Performed by: INTERNAL MEDICINE

## 2024-11-17 PROCEDURE — 80048 BASIC METABOLIC PNL TOTAL CA: CPT | Performed by: STUDENT IN AN ORGANIZED HEALTH CARE EDUCATION/TRAINING PROGRAM

## 2024-11-17 PROCEDURE — 84100 ASSAY OF PHOSPHORUS: CPT | Performed by: INTERNAL MEDICINE

## 2024-11-17 PROCEDURE — 36415 COLL VENOUS BLD VENIPUNCTURE: CPT | Performed by: INTERNAL MEDICINE

## 2024-11-17 RX ORDER — CLONIDINE HYDROCHLORIDE 0.1 MG/1
0.1 TABLET ORAL 2 TIMES DAILY
Qty: 60 TABLET | Refills: 1 | Status: SHIPPED | OUTPATIENT
Start: 2024-11-17

## 2024-11-17 RX ORDER — CARVEDILOL 25 MG/1
25 TABLET ORAL 2 TIMES DAILY WITH MEALS
Status: DISCONTINUED | OUTPATIENT
Start: 2024-11-17 | End: 2024-11-17 | Stop reason: HOSPADM

## 2024-11-17 RX ORDER — CARVEDILOL 12.5 MG/1
12.5 TABLET ORAL ONCE
Status: COMPLETED | OUTPATIENT
Start: 2024-11-17 | End: 2024-11-17

## 2024-11-17 RX ORDER — PANTOPRAZOLE SODIUM 40 MG/1
TABLET, DELAYED RELEASE ORAL
Status: SHIPPED
Start: 2024-11-17

## 2024-11-17 RX ORDER — CARVEDILOL 25 MG/1
25 TABLET ORAL 2 TIMES DAILY WITH MEALS
Qty: 60 TABLET | Refills: 1 | Status: SHIPPED | OUTPATIENT
Start: 2024-11-17

## 2024-11-17 RX ADMIN — CARVEDILOL 12.5 MG: 12.5 TABLET, FILM COATED ORAL at 08:21

## 2024-11-17 RX ADMIN — THIAMINE HCL TAB 100 MG 100 MG: 100 TAB at 08:21

## 2024-11-17 RX ADMIN — PANTOPRAZOLE SODIUM 40 MG: 40 TABLET, DELAYED RELEASE ORAL at 08:22

## 2024-11-17 RX ADMIN — TAMSULOSIN HYDROCHLORIDE 0.4 MG: 0.4 CAPSULE ORAL at 08:22

## 2024-11-17 RX ADMIN — LISINOPRIL 40 MG: 40 TABLET ORAL at 08:21

## 2024-11-17 RX ADMIN — SODIUM CHLORIDE, POTASSIUM CHLORIDE, SODIUM LACTATE AND CALCIUM CHLORIDE 1000 ML: 600; 310; 30; 20 INJECTION, SOLUTION INTRAVENOUS at 09:27

## 2024-11-17 RX ADMIN — INSULIN ASPART 2 UNITS: 100 INJECTION, SOLUTION INTRAVENOUS; SUBCUTANEOUS at 08:26

## 2024-11-17 RX ADMIN — ASPIRIN 81 MG: 81 TABLET, COATED ORAL at 08:22

## 2024-11-17 RX ADMIN — FOLIC ACID 1 MG: 1 TABLET ORAL at 08:21

## 2024-11-17 RX ADMIN — Medication 1 TABLET: at 08:21

## 2024-11-17 RX ADMIN — HYDRALAZINE HYDROCHLORIDE 25 MG: 25 TABLET ORAL at 05:22

## 2024-11-17 RX ADMIN — CLONIDINE HYDROCHLORIDE 0.1 MG: 0.1 TABLET ORAL at 05:30

## 2024-11-17 RX ADMIN — CARVEDILOL 12.5 MG: 12.5 TABLET, FILM COATED ORAL at 12:49

## 2024-11-17 ASSESSMENT — ACTIVITIES OF DAILY LIVING (ADL)
ADLS_ACUITY_SCORE: 0

## 2024-11-17 NOTE — DISCHARGE SUMMARY
[facility]  Hospitalist Discharge Summary      Date of Admission:  11/15/2024  Date of Discharge:  11/17/2024  Discharging Provider: Michael Paez MD  Discharge Service: Hospitalist Service    Discharge Diagnoses   Diabetic ketoacidosis.  Stress-induced leukocytosis.  Esophagitis.  Polysubstance abuse  Alcohol use disorder  Schizophrenia  Essential hypertension   Hyperlipidemia  BPH  Cognitive delay  Depression  Legal blindness    Clinically Significant Risk Factors     # DMII: A1C = 9.2 % (Ref range: <5.7 %) within past 6 months       Follow-ups Needed After Discharge   Follow-up Appointments       Follow-up and recommended labs and tests       Follow up with primary care provider, [facility], within 7 days for hospital follow- up.  The following labs/tests are recommended: CBC and BMP.    Follow-up with GI in 1 month for upper GI endoscopy due to CT findings of esophageal thickening.                Unresulted Labs Ordered in the Past 30 Days of this Admission       Date and Time Order Name Status Description    11/15/2024  6:47 PM Blood Culture Peripheral Blood Preliminary         These results will be followed up by hospitalist team    Discharge Disposition   Discharged to home  Condition at discharge: Stable    Hospital Course   59-year-old male with history of diabetes mellitus type 2, hypertension, polysubstance abuse, tobacco use disorder, alcohol use, schizophrenia, cognitive delay, esophagitis, GERD and depression who lives in a group home presented to ED with nausea, vomiting and chills.    He was found to be in DKA with a ketone level of 2.28 and glucose level 368.  WBC count of 16,000.  Tested negative for COVID, influenza A/B and RSV.  Urine was negative for nitrite or leukocyte esterase.  Urine drug screen was positive for amphetamines and cannabinoids.    He was admitted to [facility] on insulin drip with diagnosis of DKA.      Diabetic ketoacidosis.  Likely  due to compliance issues but patient does not give much history.  DKA is resolved, off insulin drip and will change the dose of Lantus from 8 units daily to 25 units every morning and add sliding scale.  Prior to admission patient was also on exenatide 2 mg every 14 days which could be contributing to nausea and vomiting and will therefore discontinued.    Leukocytosis.  WBC went up to 19.8, likely stress reaction.  No focal sign of infection, negative UA and no lung infiltrates on CT. WBC count improved to 15.    Chest discomfort, likely esophagitis.  CT chest showed esophageal thickening which appears to be esophagitis per radiology.  Started on p.o. Protonix 40 mg p.o. twice daily for 1 month followed by 40 mg daily and patient should follow-up with GI to get an upper GI endoscopy in 4 weeks.  Troponins negative.    Polysubstance abuse.  Urine drug screen positive for amphetamines and cannabinoids.    Chronic medical conditions  Schizophrenia: Prior to admission on Invega 156 mg every 4 weeks.  Continue trazodone 50 mg every night.  Essential hypertension: Presented with hypertensive urgency.  Prior to admission on carvedilol 12.5 mg twice daily, lisinopril 40 mg daily and hydralazine 25 mg 3 times daily, resumed.  Due to continued hypertension and sinus tachycardia, increase carvedilol dose to 25 mg twice daily and added clonidine 0.1 mg twice daily.  Hyperlipidemia: Continue on Lipitor.  BPH: Continue Flomax.  GERD: Continue Protonix.  History of polysubstance abuse: Urine drug screen positive for amphetamines and cannabinoids.  Alcohol use disorder: Monitor on CIWA protocol.  Cognitive delay: Lives in group home.  Depression:     Disposition  Likely discharge back to group home.    Consultations This Hospital Stay   CARE MANAGEMENT / SOCIAL WORK IP CONSULT  CARE MANAGEMENT / SOCIAL WORK IP CONSULT    Code Status   Full Code    Time Spent on this Encounter   Michael MCCAULEY MD, personally saw the patient today  and spent greater than 30 minutes discharging this patient.       Michael Paez MD  [place] [address] [facility]  [address]  [facility] [address]  Phone: [phone]  Fax: [phone]  ______________________________________________________________________    Physical Exam   Vital Signs: Temp: 98.2  F (36.8  C) Temp src: Oral BP: (!) 147/73 Pulse: 103   Resp: 18 SpO2: 99 % O2 Device: None (Room air)    Weight: 159 lbs 6.28 oz  General Appearance: Lies curled up in the bed, answer simple questions and insisting on going home.  Respiratory: Clear to auscultation  Cardiovascular: S1-S2 normal  GI: Soft and nontender  Skin: No rash  Other: No edema         Primary Care Physician   [facility]    Discharge Orders      Reason for your hospital stay    Diabetic ketoacidosis     Activity    Your activity upon discharge: activity as tolerated     Resume Home Care Services     Follow-up and recommended labs and tests     Follow up with primary care provider, [facility], within 7 days for hospital follow- up.  The following labs/tests are recommended: CBC and BMP.    Follow-up with GI in 1 month for upper GI endoscopy due to CT findings of esophageal thickening.     Diet    Follow this diet upon discharge: Current Diet:Orders Placed This Encounter      Moderate Consistent Carb (60 g CHO per Meal) Diet       Significant Results and Procedures   Most Recent 3 CBC's:  Recent Labs   Lab Test 11/17/24  0732 11/16/24  0709 11/15/24  1755   WBC 15.2* 19.8* 16.4*   HGB 10.8* 11.3* 13.6   MCV 79 77* 77*    205 223     Most Recent 3 BMP's:  Recent Labs   Lab Test 11/17/24  0804 11/17/24  0732 11/17/24  0258 11/16/24  0802 11/16/24  0709 11/16/24  0213 11/16/24  0207   NA  --  129*  --   --  132*  --  132*   POTASSIUM  --  3.9  --   --  4.0  --  3.5   CHLORIDE  --  97*  --   --  94*  --  90*   CO2  --  23  --   --  24  --  26   BUN  --  18.6  --   --   23.1*  --  14.1   CR  --  0.87  --   --  0.87  --  0.82   ANIONGAP  --  9  --   --  14  --  16*   MITCHEL  --  9.7  --   --  9.7  --  9.6   * 160* 155*   < > 173*   < > 295*    < > = values in this interval not displayed.     Most Recent 2 LFT's:  Recent Labs   Lab Test 11/15/24  1755 08/06/24  0557   AST 16 16   ALT 18 8   ALKPHOS 121 52   BILITOTAL 0.7 0.3   ,   Results for orders placed or performed during the hospital encounter of 11/15/24   CT Chest Pulmonary Embolism w Contrast    Narrative    EXAM: CT CHEST PULMONARY EMBOLISM W CONTRAST  LOCATION: [facility]  DATE: 11/15/2024    INDICATION: cp elevated ddimer. LVH.  COMPARISON: 8/4/2024.  TECHNIQUE: CT chest pulmonary angiogram during arterial phase injection of IV contrast. Multiplanar reformats and MIP reconstructions were performed. Dose reduction techniques were used.   CONTRAST: 67mL Isovue 370    FINDINGS:  ANGIOGRAM CHEST: Pulmonary arteries are normal caliber and negative for pulmonary emboli. Thoracic aorta is negative for dissection.    LUNGS AND PLEURA: An 10 mm x 7 mm right upper lobe nodule was previously 11 mm x 8 mm and does not require follow-up (series 7 image 73). Mild emphysema is present. Peripheral groundglass opacities in the right lower lobe have not changed.    MEDIASTINUM/AXILLAE: Large amount of fluid in the esophagus. The distal esophagus exhibits wall thickening over a length of approximately 11 cm. Wall thickening has increased from the comparison study. There is wall thickening of the left ventricle which   is consistent with history of left ventricular hypertrophy.    CORONARY ARTERY CALCIFICATION: Mild.    UPPER ABDOMEN: Cholelithiasis. Right renal lesions were previously characterized as simple cysts and do not require follow-up. There is mild right perinephric stranding. The stomach is distended with fluid.    MUSCULOSKELETAL: Normal.      Impression    IMPRESSION:  1.  No PE.  2.  Wall thickening  of the distal esophagus has increased from the comparison study and likely represents esophagitis. Malignancy is unlikely. Recommend further evaluation.  3.  The stomach is distended with fluid.         Discharge Medications   Current Discharge Medication List        START taking these medications    Details   cloNIDine (CATAPRES) 0.1 MG tablet Take 1 tablet (0.1 mg) by mouth 2 times daily.  Qty: 60 tablet, Refills: 1    Associated Diagnoses: Benign essential hypertension      !! insulin aspart (NOVOLOG PEN) 100 UNIT/ML pen Inject 1-7 Units subcutaneously 3 times daily (before meals). Correction Scale - HIGH INSULIN RESISTANCE DOSING   Do Not give Correction Insulin if Pre-Meal BG less than 140. For Pre-Meal  - 164 give 1 unit. For Pre-Meal  - 189 give 2 units. For Pre-Meal  - 214 give 3 units. For Pre-Meal  - 239 give 4 units. For Pre-Meal  - 264 give 5 units. For Pre-Meal  - 289 give 6 units. For Pre-Meal  - 314 give 7 units. For Pre-Meal  - 339 give 8 units. For Pre-Meal  - 364 give 9 units.  For Pre-Meal BG greater than or equal to 365 give 10 units To be given with prandial insulin, and based on pre-meal blood glucose. Administering insulin within 5 minutes of the start of the meal is ideal. Administer insulin no more than 30 minutes after the start of the meal, unless directed otherwise by provider. Notify provider if glucose greater than or equal to 350 mg/dL after administration of correction dose.  Qty: 15 mL, Refills: 1    Associated Diagnoses: Diabetic ketoacidosis without coma associated with type 2 diabetes mellitus (H)      !! insulin aspart (NOVOLOG PEN) 100 UNIT/ML pen Inject 1-5 Units subcutaneously at bedtime. HIGH INSULIN RESISTANCE DOSING  Do Not give Bedtime Correction Insulin if BG less than 200. For  - 224 give 1 units. For  - 249 give 2 units. For  - 274 give 3 units. For  - 299 give 4 units. For  - 324 give 5  units. For  - 349 give 6 units. For BG greater than or equal to 350 give 7 units. Notify provider if glucose greater than or equal to 350 mg/dL after administration of correction dose.    Associated Diagnoses: Diabetic ketoacidosis without coma associated with type 2 diabetes mellitus (H)       !! - Potential duplicate medications found. Please discuss with provider.        CONTINUE these medications which have CHANGED    Details   carvedilol (COREG) 25 MG tablet Take 1 tablet (25 mg) by mouth 2 times daily (with meals).  Qty: 60 tablet, Refills: 1    Associated Diagnoses: Benign essential hypertension      insulin glargine (LANTUS PEN) 100 UNIT/ML pen Inject 25 Units subcutaneously every morning.    Comments: If Lantus is not covered by insurance, may substitute Basaglar or Semglee or other insulin glargine product per insurance preference at same dose and frequency.    Associated Diagnoses: Diabetic ketoacidosis without coma associated with type 2 diabetes mellitus (H)      pantoprazole (PROTONIX) 40 MG EC tablet Protonix 40 mg p.o. twice daily for 1 month followed by 40 mg p.o. daily.    Associated Diagnoses: Gastroesophageal reflux disease with esophagitis without hemorrhage           CONTINUE these medications which have NOT CHANGED    Details   acetaminophen (TYLENOL) 325 MG tablet Take 650 mg by mouth every 4 hours as needed for mild pain      atorvastatin (LIPITOR) 20 MG tablet Take 20 mg by mouth At Bedtime      cholecalciferol (VITAMIN D-1000 MAX ST) 1000 units TABS Take 1,000 Units by mouth daily  Qty: 30 tablet, Refills: 1      !! diphenhydrAMINE (BENADRYL) 50 MG capsule Take 50 mg by mouth every 4 hours as needed for itching or allergies      !! diphenhydrAMINE (BENADRYL) 50 MG capsule Take 50 mg by mouth At Bedtime      ferrous sulfate (FE TABS) 325 (65 Fe) MG EC tablet Take 325 mg by mouth every other day.      glucose (BD GLUCOSE) 4 g chewable tablet Take 4 tablets by mouth as needed for low  "blood sugar      hydrALAZINE (APRESOLINE) 25 MG tablet Take 25 mg by mouth 3 times daily Hold for SBP <120      HYDROcodone-acetaminophen (NORCO) 5-325 MG tablet Take 1 tablet by mouth every 4 hours as needed for severe pain.      hypromellose (GENTEAL SEVERE) ophthalmic gel 0.3% Place 1 drop into both eyes 2 times daily as needed for dry eyes      lisinopril (ZESTRIL) 40 MG tablet Take 40 mg by mouth daily      magnesium oxide (MAG-OX) 400 MG tablet Take 400 mg by mouth daily      melatonin 5 MG tablet Take 5 mg by mouth daily as needed for sleep      ondansetron (ZOFRAN ODT) 4 MG ODT tab Take 4 mg by mouth every 8 hours as needed for nausea      paliperidone (INVEGA SUSTENNA) 156 MG/ML PABLO injection Inject 156 mg into the muscle every 28 days      senna-docusate (SENOKOT-S/PERICOLACE) 8.6-50 MG tablet Take 1 tablet by mouth 2 times daily as needed      tamsulosin (FLOMAX) 0.4 MG capsule Take 1 capsule (0.4 mg) by mouth daily  Qty: 30 capsule, Refills: 3    Associated Diagnoses: Urinary retention      traZODone (DESYREL) 50 MG tablet Take 50 mg by mouth nightly as needed for sleep       !! - Potential duplicate medications found. Please discuss with provider.        STOP taking these medications       exenatide ER (BYDUREON BCISE) 2 MG/0.85ML auto-injector Comments:   Reason for Stopping:             Allergies   Allergies   Allergen Reactions    Alprazolam      Other reaction(s): *Unknown States \"I break out\"    Oxycodone-Acetaminophen      Other reaction(s): *Unknown, States \"I break out\"    Oxycodone-Acetaminophen Other (See Comments)     Other reaction(s): *Unknown, States \"I break out\"     "

## 2024-11-17 NOTE — PLAN OF CARE
"Pt is alert to self. Pt is confused. Pt is on blood sugar check. Pt is on Tele monitoring, Sinus tachy. Pt is on CIWA protocol. Vitals signs are stable. No acute distress noted. Pt is assist of two in transfer and cares. Pt is sleeping in bed. Bed alarm in place and call light within reach.             Goal Outcome Evaluation:      Plan of Care Reviewed With: patient    Overall Patient Progress: improvingOverall Patient Progress: improving    Outcome Evaluation: pt is on blood sugar checks. pt is on tele montioring. pt tolerating moderate carb diet.      Problem: Adult Inpatient Plan of Care  Goal: Plan of Care Review  Description: The Plan of Care Review/Shift note should be completed every shift.  The Outcome Evaluation is a brief statement about your assessment that the patient is improving, declining, or no change.  This information will be displayed automatically on your shift  note.  Outcome: Progressing  Flowsheets (Taken 11/17/2024 0025)  Outcome Evaluation: pt is on blood sugar checks. pt is on tele montioring. pt tolerating moderate carb diet.  Plan of Care Reviewed With: patient  Overall Patient Progress: improving  Goal: Patient-Specific Goal (Individualized)  Description: You can add care plan individualizations to a care plan. Examples of Individualization might be:  \"Parent requests to be called daily at 9am for status\", \"I have a hard time hearing out of my right ear\", or \"Do not touch me to wake me up as it startles  me\".  Outcome: Progressing  Goal: Absence of Hospital-Acquired Illness or Injury  Outcome: Progressing  Intervention: Identify and Manage Fall Risk  Recent Flowsheet Documentation  Taken 11/17/2024 0000 by Everardo Negrete RN  Safety Promotion/Fall Prevention:   assistive device/personal items within reach   activity supervised   clutter free environment maintained   increased rounding and observation   increase visualization of patient   safety round/check completed   mobility " aid in reach   nonskid shoes/slippers when out of bed  Intervention: Prevent Skin Injury  Recent Flowsheet Documentation  Taken 11/17/2024 0000 by Everardo Negrete RN  Body Position: position changed independently  Intervention: Prevent and Manage VTE (Venous Thromboembolism) Risk  Recent Flowsheet Documentation  Taken 11/17/2024 0000 by Everardo Negrete RN  VTE Prevention/Management: SCDs on (sequential compression devices)  Intervention: Prevent Infection  Recent Flowsheet Documentation  Taken 11/17/2024 0000 by Everardo Negrete RN  Infection Prevention:   rest/sleep promoted   single patient room provided   hand hygiene promoted  Goal: Optimal Comfort and Wellbeing  Outcome: Progressing  Goal: Readiness for Transition of Care  Outcome: Progressing     Problem: Diabetic Ketoacidosis  Goal: Optimal Coping  Outcome: Progressing  Intervention: Support Wellbeing and Self-Management Success  Recent Flowsheet Documentation  Taken 11/17/2024 0000 by Everardo Negrete RN  Family/Support System Care: self-care encouraged  Goal: Fluid and Electrolyte Balance with Absence of Ketosis  Outcome: Progressing     Problem: Comorbidity Management  Goal: Blood Glucose Levels Within Targeted Range  Outcome: Progressing  Intervention: Monitor and Manage Glycemia  Recent Flowsheet Documentation  Taken 11/17/2024 0000 by Everardo Negrete RN  Medication Review/Management: medications reviewed     Problem: Fall Injury Risk  Goal: Absence of Fall and Fall-Related Injury  Outcome: Progressing  Intervention: Identify and Manage Contributors  Recent Flowsheet Documentation  Taken 11/17/2024 0000 by Everardo Negrete RN  Medication Review/Management: medications reviewed  Intervention: Promote Injury-Free Environment  Recent Flowsheet Documentation  Taken 11/17/2024 0000 by Everardo Negrete RN  Safety Promotion/Fall Prevention:   assistive device/personal items within reach    activity supervised   clutter free environment maintained   increased rounding and observation   increase visualization of patient   safety round/check completed   mobility aid in reach   nonskid shoes/slippers when out of bed     Problem: Pain Acute  Goal: Optimal Pain Control and Function  Outcome: Progressing  Intervention: Prevent or Manage Pain  Recent Flowsheet Documentation  Taken 11/17/2024 0000 by Everardo Negrete RN  Medication Review/Management: medications reviewed     Problem: Adult Inpatient Plan of Care  Goal: Plan of Care Review  Description: The Plan of Care Review/Shift note should be completed every shift.  The Outcome Evaluation is a brief statement about your assessment that the patient is improving, declining, or no change.  This information will be displayed automatically on your shift  note.  Outcome: Progressing  Flowsheets (Taken 11/17/2024 0025)  Outcome Evaluation: pt is on blood sugar checks. pt is on tele montioring. pt tolerating moderate carb diet.  Plan of Care Reviewed With: patient  Overall Patient Progress: improving  Goal: Absence of Hospital-Acquired Illness or Injury  Intervention: Identify and Manage Fall Risk  Recent Flowsheet Documentation  Taken 11/17/2024 0000 by Everardo Negrete RN  Safety Promotion/Fall Prevention:   assistive device/personal items within reach   activity supervised   clutter free environment maintained   increased rounding and observation   increase visualization of patient   safety round/check completed   mobility aid in reach   nonskid shoes/slippers when out of bed  Intervention: Prevent Skin Injury  Recent Flowsheet Documentation  Taken 11/17/2024 0000 by Everardo Negrete RN  Body Position: position changed independently  Intervention: Prevent and Manage VTE (Venous Thromboembolism) Risk  Recent Flowsheet Documentation  Taken 11/17/2024 0000 by Everardo Negrete RN  VTE Prevention/Management: SCDs on (sequential  compression devices)  Intervention: Prevent Infection  Recent Flowsheet Documentation  Taken 11/17/2024 0000 by Everardo Negrete RN  Infection Prevention:   rest/sleep promoted   single patient room provided   hand hygiene promoted     Problem: Diabetic Ketoacidosis  Goal: Optimal Coping  Intervention: Support Wellbeing and Self-Management Success  Recent Flowsheet Documentation  Taken 11/17/2024 0000 by Everardo Negrete RN  Family/Support System Care: self-care encouraged     Problem: Comorbidity Management  Goal: Blood Glucose Levels Within Targeted Range  Intervention: Monitor and Manage Glycemia  Recent Flowsheet Documentation  Taken 11/17/2024 0000 by Everardo Negrete RN  Medication Review/Management: medications reviewed     Problem: Fall Injury Risk  Goal: Absence of Fall and Fall-Related Injury  Intervention: Identify and Manage Contributors  Recent Flowsheet Documentation  Taken 11/17/2024 0000 by Everardo Negrete RN  Medication Review/Management: medications reviewed  Intervention: Promote Injury-Free Environment  Recent Flowsheet Documentation  Taken 11/17/2024 0000 by Everardo Negrete RN  Safety Promotion/Fall Prevention:   assistive device/personal items within reach   activity supervised   clutter free environment maintained   increased rounding and observation   increase visualization of patient   safety round/check completed   mobility aid in reach   nonskid shoes/slippers when out of bed     Problem: Pain Acute  Goal: Optimal Pain Control and Function  Intervention: Prevent or Manage Pain  Recent Flowsheet Documentation  Taken 11/17/2024 0000 by Everardo Negrete RN  Medication Review/Management: medications reviewed

## 2024-11-17 NOTE — PLAN OF CARE
Care from 19:00-23:00  Inpatient Progress Note - [facility]  For vital signs and complete assessments, please see documentation flowsheets.     ORIENTATION: Disoriented, cognitive delay Hx. Lethargic between cares.  CIWA: 0  VSS.  PAIN: Denies pain, CP, SOB, n/v.  O2: RA  TELE: ST  ACTIVITY: Not OOB this shift.  DIET: Mod. Carb, no appetite.  LINES/DRAINS: PIVs SL  PROTOCOLS: K, Mg, Phos rechecks in for AM.  B  PLAN: Pending discharge to group home.    Goal Outcome Evaluation:      Plan of Care Reviewed With: patient    Overall Patient Progress: no changeOverall Patient Progress: no change    Outcome Evaluation: Lethargic but alert;  at HS, on sliding scale and Lantus; /68 at HS, pt refused to take scheduled BP meds.    Problem: Adult Inpatient Plan of Care  Goal: Plan of Care Review  Description: The Plan of Care Review/Shift note should be completed every shift.  The Outcome Evaluation is a brief statement about your assessment that the patient is improving, declining, or no change.  This information will be displayed automatically on your shift  note.  2024 by Rosa Sparks, RN  Outcome: Progressing  Flowsheets (Taken 2024)  Outcome Evaluation:   Lethargic but alert    at HS, on sliding scale and Lantus   /68 at HS, pt refused to take scheduled BP meds.  Plan of Care Reviewed With: patient  Overall Patient Progress: no change  2024 by Rosa Sparks, RN  Outcome: Progressing  Flowsheets  Taken 2024  Outcome Evaluation:   Lethargic but alert    at HS, on sliding scale and Lantus   /68 at HS, pt refused to take scheduled BP meds.  Plan of Care Reviewed With: patient  Overall Patient Progress: no change  Taken 2024  Outcome Evaluation:   Lethargic but alert    at HS, on sliding scale and Lantus   /68 at HS, pt refused to take scheduled BP meds.  Overall Patient Progress: no change  Goal: Patient-Specific Goal  "(Individualized)  Description: You can add care plan individualizations to a care plan. Examples of Individualization might be:  \"Parent requests to be called daily at 9am for status\", \"I have a hard time hearing out of my right ear\", or \"Do not touch me to wake me up as it startles  me\".  11/16/2024 2156 by Rosa Sparks RN  Outcome: Progressing  11/16/2024 2156 by Rosa Sparks RN  Outcome: Progressing  Goal: Absence of Hospital-Acquired Illness or Injury  11/16/2024 2156 by Rosa Sparks RN  Outcome: Progressing  11/16/2024 2156 by oRsa Sparks RN  Outcome: Progressing  Intervention: Identify and Manage Fall Risk  Recent Flowsheet Documentation  Taken 11/16/2024 2128 by Rosa Sparks RN  Safety Promotion/Fall Prevention:   safety round/check completed   supervised activity   room organization consistent   room near nurse's station   room door open   patient and family education   nonskid shoes/slippers when out of bed   mobility aid in reach   lighting adjusted   increase visualization of patient   increased rounding and observation   clutter free environment maintained   assistive device/personal items within reach   activity supervised  Intervention: Prevent Skin Injury  Recent Flowsheet Documentation  Taken 11/16/2024 2128 by Rosa Sparks RN  Body Position: position changed independently  Intervention: Prevent and Manage VTE (Venous Thromboembolism) Risk  Recent Flowsheet Documentation  Taken 11/16/2024 2128 by Rosa Sparks RN  VTE Prevention/Management: SCDs on (sequential compression devices)  Intervention: Prevent Infection  Recent Flowsheet Documentation  Taken 11/16/2024 2128 by Rosa Sparks RN  Infection Prevention:   single patient room provided   rest/sleep promoted  Goal: Optimal Comfort and Wellbeing  11/16/2024 2156 by Rosa Sparks RN  Outcome: Progressing  11/16/2024 2156 by Rosa Sparks RN  Outcome: Progressing  Goal: Readiness for Transition of " Care  11/16/2024 2156 by Rosa Sparks, RN  Outcome: Progressing  11/16/2024 2156 by Rosa Sparks, RN  Outcome: Progressing

## 2024-11-17 NOTE — DISCHARGE SUMMARY
[facility]  Hospitalist Discharge Summary      Date of Admission:  11/15/2024  Date of Discharge:  11/17/2024  Discharging Provider: Michael Paez MD  Discharge Service: Hospitalist Service    Discharge Diagnoses   Diabetic ketoacidosis.  Stress-induced leukocytosis.  Esophagitis.  Polysubstance abuse  Alcohol use disorder  Schizophrenia  Essential hypertension   Hyperlipidemia  BPH  Cognitive delay  Depression  Legal blindness    Clinically Significant Risk Factors     # DMII: A1C = 9.2 % (Ref range: <5.7 %) within past 6 months       Follow-ups Needed After Discharge   Follow-up Appointments       Follow-up and recommended labs and tests       Follow up with primary care provider, [facility], within 7 days for hospital follow- up.  The following labs/tests are recommended: CBC and BMP.    Follow-up with GI in 1 month for upper GI endoscopy due to CT findings of esophageal thickening.                Unresulted Labs Ordered in the Past 30 Days of this Admission       Date and Time Order Name Status Description    11/15/2024  6:47 PM Blood Culture Peripheral Blood Preliminary         These results will be followed up by hospitalist team    Discharge Disposition   Discharged to home  Condition at discharge: Stable    Hospital Course   59-year-old male with history of diabetes mellitus type 2, hypertension, polysubstance abuse, tobacco use disorder, alcohol use, schizophrenia, cognitive delay, esophagitis, GERD and depression who lives in a group home presented to ED with nausea, vomiting and chills.    He was found to be in DKA with a ketone level of 2.28 and glucose level 368.  WBC count of 16,000.  Tested negative for COVID, influenza A/B and RSV.  Urine was negative for nitrite or leukocyte esterase.  Urine drug screen was positive for amphetamines and cannabinoids.    He was admitted to [facility] on insulin drip with diagnosis of DKA.      Diabetic ketoacidosis.  Likely  due to compliance issues but patient does not give much history.  DKA is resolved, off insulin drip and will change the dose of Lantus from 8 units daily to 25 units every morning and add sliding scale.  Prior to admission patient was also on exenatide 2 mg every 14 days which could be contributing to nausea and vomiting and will therefore discontinued.    Leukocytosis.  WBC went up to 19.8, likely stress reaction.  No focal sign of infection, negative UA and no lung infiltrates on CT. WBC count improved to 15.    Chest discomfort, likely esophagitis.  CT chest showed esophageal thickening which appears to be esophagitis per radiology.  Started on p.o. Protonix 40 mg p.o. twice daily for 1 month followed by 40 mg daily and patient should follow-up with GI to get an upper GI endoscopy in 4 weeks.  Troponins negative.    Polysubstance abuse.  Urine drug screen positive for amphetamines and cannabinoids.    Chronic medical conditions  Schizophrenia: Prior to admission on Invega 156 mg every 4 weeks.  Continue trazodone 50 mg every night.  Essential hypertension: Presented with hypertensive urgency.  Prior to admission on carvedilol 12.5 mg twice daily, lisinopril 40 mg daily and hydralazine 25 mg 3 times daily, resumed.  Due to continued hypertension and sinus tachycardia, increase carvedilol dose to 25 mg twice daily and added clonidine 0.1 mg twice daily.  Hyperlipidemia: Continue on Lipitor.  BPH: Continue Flomax.  GERD: Continue Protonix.  History of polysubstance abuse: Urine drug screen positive for amphetamines and cannabinoids.  Alcohol use disorder: Monitor on CIWA protocol.  Cognitive delay: Lives in group home.  Depression:     Disposition  Likely discharge back to group home.    Consultations This Hospital Stay   CARE MANAGEMENT / SOCIAL WORK IP CONSULT  CARE MANAGEMENT / SOCIAL WORK IP CONSULT    Code Status   Full Code    Time Spent on this Encounter   Michael MCCAULEY MD, personally saw the patient today  and spent greater than 30 minutes discharging this patient.       Michael Paez MD  [place] [address] MEDICAL SURGICAL  [address]  [facility] [address]  Phone: [phone]  Fax: [phone]  ______________________________________________________________________    Physical Exam   Vital Signs: Temp: 98.2  F (36.8  C) Temp src: Oral BP: (!) 147/73 Pulse: 103   Resp: 18 SpO2: 99 % O2 Device: None (Room air)    Weight: 159 lbs 6.28 oz  General Appearance: Lies curled up in the bed, answer simple questions and insisting on going home.  Respiratory: Clear to auscultation  Cardiovascular: S1-S2 normal  GI: Soft and nontender  Skin: No rash  Other: No edema         Primary Care Physician   [facility]    Discharge Orders      Reason for your hospital stay    Diabetic ketoacidosis     Activity    Your activity upon discharge: activity as tolerated     Resume Home Care Services     Follow-up and recommended labs and tests     Follow up with primary care provider, [facility], within 7 days for hospital follow- up.  The following labs/tests are recommended: CBC and BMP.    Follow-up with GI in 1 month for upper GI endoscopy due to CT findings of esophageal thickening.     Diet    Follow this diet upon discharge: Current Diet:Orders Placed This Encounter      Moderate Consistent Carb (60 g CHO per Meal) Diet       Significant Results and Procedures   Most Recent 3 CBC's:  Recent Labs   Lab Test 11/17/24  0732 11/16/24  0709 11/15/24  1755   WBC 15.2* 19.8* 16.4*   HGB 10.8* 11.3* 13.6   MCV 79 77* 77*    205 223     Most Recent 3 BMP's:  Recent Labs   Lab Test 11/17/24  1250 11/17/24  0804 11/17/24  0732 11/16/24  0802 11/16/24  0709 11/16/24  0213 11/16/24  0207   NA  --   --  129*  --  132*  --  132*   POTASSIUM  --   --  3.9  --  4.0  --  3.5   CHLORIDE  --   --  97*  --  94*  --  90*   CO2  --   --  23  --  24  --  26   BUN  --   --  18.6  --   23.1*  --  14.1   CR  --   --  0.87  --  0.87  --  0.82   ANIONGAP  --   --  9  --  14  --  16*   MITCHEL  --   --  9.7  --  9.7  --  9.6   GLC 92 170* 160*   < > 173*   < > 295*    < > = values in this interval not displayed.     Most Recent 2 LFT's:  Recent Labs   Lab Test 11/15/24  1755 08/06/24  0557   AST 16 16   ALT 18 8   ALKPHOS 121 52   BILITOTAL 0.7 0.3   ,   Results for orders placed or performed during the hospital encounter of 11/15/24   CT Chest Pulmonary Embolism w Contrast    Narrative    EXAM: CT CHEST PULMONARY EMBOLISM W CONTRAST  LOCATION: [facility]  DATE: 11/15/2024    INDICATION: cp elevated ddimer. LVH.  COMPARISON: 8/4/2024.  TECHNIQUE: CT chest pulmonary angiogram during arterial phase injection of IV contrast. Multiplanar reformats and MIP reconstructions were performed. Dose reduction techniques were used.   CONTRAST: 67mL Isovue 370    FINDINGS:  ANGIOGRAM CHEST: Pulmonary arteries are normal caliber and negative for pulmonary emboli. Thoracic aorta is negative for dissection.    LUNGS AND PLEURA: An 10 mm x 7 mm right upper lobe nodule was previously 11 mm x 8 mm and does not require follow-up (series 7 image 73). Mild emphysema is present. Peripheral groundglass opacities in the right lower lobe have not changed.    MEDIASTINUM/AXILLAE: Large amount of fluid in the esophagus. The distal esophagus exhibits wall thickening over a length of approximately 11 cm. Wall thickening has increased from the comparison study. There is wall thickening of the left ventricle which   is consistent with history of left ventricular hypertrophy.    CORONARY ARTERY CALCIFICATION: Mild.    UPPER ABDOMEN: Cholelithiasis. Right renal lesions were previously characterized as simple cysts and do not require follow-up. There is mild right perinephric stranding. The stomach is distended with fluid.    MUSCULOSKELETAL: Normal.      Impression    IMPRESSION:  1.  No PE.  2.  Wall thickening of  the distal esophagus has increased from the comparison study and likely represents esophagitis. Malignancy is unlikely. Recommend further evaluation.  3.  The stomach is distended with fluid.         Discharge Medications        Review of your medicines        START taking        Dose / Directions   cloNIDine 0.1 MG tablet  Commonly known as: CATAPRES  Used for: Benign essential hypertension      Dose: 0.1 mg  Take 1 tablet (0.1 mg) by mouth 2 times daily.  Quantity: 60 tablet  Refills: 1     insulin aspart 100 UNIT/ML pen  Commonly known as: NovoLOG PEN  Used for: Diabetic ketoacidosis without coma associated with type 2 diabetes mellitus (H)      Dose: 3 Units  Inject 3 Units subcutaneously 3 times daily (with meals).  Quantity: 15 mL  Refills: 0            CONTINUE these medicines which may have CHANGED, or have new prescriptions. If we are uncertain of the size of tablets/capsules you have at home, strength may be listed as something that might have changed.        Dose / Directions   carvedilol 25 MG tablet  Commonly known as: COREG  This may have changed:   medication strength  how much to take  Used for: Benign essential hypertension      Dose: 25 mg  Take 1 tablet (25 mg) by mouth 2 times daily (with meals).  Quantity: 60 tablet  Refills: 1     insulin glargine 100 UNIT/ML pen  Commonly known as: LANTUS PEN  This may have changed:   how much to take  when to take this  Used for: Diabetic ketoacidosis without coma associated with type 2 diabetes mellitus (H)      Dose: 25 Units  Inject 25 Units subcutaneously every morning.  Refills: 0     pantoprazole 40 MG EC tablet  Commonly known as: PROTONIX  This may have changed:   how much to take  how to take this  when to take this  additional instructions  Used for: Gastroesophageal reflux disease with esophagitis without hemorrhage      Protonix 40 mg p.o. twice daily for 1 month followed by 40 mg p.o. daily.  Refills: 0            CONTINUE these medicines which  have NOT CHANGED        Dose / Directions   acetaminophen 325 MG tablet  Commonly known as: TYLENOL      Dose: 650 mg  Take 650 mg by mouth every 4 hours as needed for mild pain  Refills: 0     atorvastatin 20 MG tablet  Commonly known as: LIPITOR      Dose: 20 mg  Take 20 mg by mouth At Bedtime  Refills: 0     * diphenhydrAMINE 50 MG capsule  Commonly known as: BENADRYL      Dose: 50 mg  Take 50 mg by mouth At Bedtime  Refills: 0     * diphenhydrAMINE 50 MG capsule  Commonly known as: BENADRYL      Dose: 50 mg  Take 50 mg by mouth every 4 hours as needed for itching or allergies  Refills: 0     ferrous sulfate 325 (65 Fe) MG EC tablet  Commonly known as: FE TABS      Dose: 325 mg  Take 325 mg by mouth every other day.  Refills: 0     glucose 4 g chewable tablet  Commonly known as: BD GLUCOSE      Dose: 4 tablet  Take 4 tablets by mouth as needed for low blood sugar  Refills: 0     hydrALAZINE 25 MG tablet  Commonly known as: APRESOLINE      Dose: 25 mg  Take 25 mg by mouth 3 times daily Hold for SBP <120  Refills: 0     HYDROcodone-acetaminophen 5-325 MG tablet  Commonly known as: NORCO      Dose: 1 tablet  Take 1 tablet by mouth every 4 hours as needed for severe pain.  Refills: 0     hypromellose 0.3 % Gel ophthalmic gel  Commonly known as: GENTEAL SEVERE      Dose: 1 drop  Place 1 drop into both eyes 2 times daily as needed for dry eyes  Refills: 0     lisinopril 40 MG tablet  Commonly known as: ZESTRIL      Dose: 40 mg  Take 40 mg by mouth daily  Refills: 0     magnesium oxide 400 MG tablet  Commonly known as: MAG-OX      Dose: 400 mg  Take 400 mg by mouth daily  Refills: 0     melatonin 5 MG tablet      Dose: 5 mg  Take 5 mg by mouth daily as needed for sleep  Refills: 0     ondansetron 4 MG ODT tab  Commonly known as: ZOFRAN ODT      Dose: 4 mg  Take 4 mg by mouth every 8 hours as needed for nausea  Refills: 0     paliperidone 156 MG/ML Angle injection  Commonly known as: INVEGA SUSTENNA      Dose: 156  "mg  Inject 156 mg into the muscle every 28 days  Refills: 0     senna-docusate 8.6-50 MG tablet  Commonly known as: SENOKOT-S/PERICOLACE      Dose: 1 tablet  Take 1 tablet by mouth 2 times daily as needed  Refills: 0     tamsulosin 0.4 MG capsule  Commonly known as: FLOMAX  Used for: Urinary retention      Dose: 0.4 mg  Take 1 capsule (0.4 mg) by mouth daily  Quantity: 30 capsule  Refills: 3     traZODone 50 MG tablet  Commonly known as: DESYREL      Dose: 50 mg  Take 50 mg by mouth nightly as needed for sleep  Refills: 0     Vitamin D3 25 mcg (1000 units) tablet  Commonly known as: Vitamin D-[address]      Dose: 1,000 Units  Take 1,000 Units by mouth daily  Quantity: 30 tablet  Refills: 1           * This list has 2 medication(s) that are the same as other medications prescribed for you. Read the directions carefully, and ask your doctor or other care provider to review them with you.                STOP taking      exenatide ER 2 MG/0.85ML auto-injector  Commonly known as: BYDUREON BCise                  Where to get your medicines        These medications were sent to [facility] [address] [address] [address]      Phone: [phone]   carvedilol 25 MG tablet  cloNIDine 0.1 MG tablet  insulin aspart 100 UNIT/ML pen          Allergies   Allergies   Allergen Reactions    Alprazolam      Other reaction(s): *Unknown States \"I break out\"    Oxycodone-Acetaminophen      Other reaction(s): *Unknown, States \"I break out\"    Oxycodone-Acetaminophen Other (See Comments)     Other reaction(s): *Unknown, States \"I break out\"     "

## 2024-11-17 NOTE — PROGRESS NOTES
Care Management Discharge Note    Discharge Date: 11/17/2024       Discharge Disposition: Group Home    Discharge Services: None    Discharge DME:      Discharge Transportation: other (see comments) ([facility])    Private pay costs discussed: Not applicable    Does the patient's insurance plan have a 3 day qualifying hospital stay waiver?  Yes     Which insurance plan 3 day waiver is available? Alternative insurance waiver    Will the waiver be used for post-acute placement? No    Education Provided on the Discharge Plan:  Yes  Persons Notified of Discharge Plans: Provider, Group Home, bedside nurse.    Handoff Referral Completed: No, handoff not indicated or clinically appropriate    Additional Information:  Pt will be discharging back to [facility] today. Spoke with the , he asked the pt not discharge with sliding scale insulin. Messaged MD, insulin order changed.   [facility] requested meds be filled at [facility] discharge pharmacy as they would not receive new medications until tomorrow.  [facility] staff will pick pt up between 1500 and 1600.  Orders have been faxed to the [facility].  Please call if additional needs arise.    Chayo Kim RN   Inpatient Care Coordination  [facility]   Phone: [phone]

## 2024-11-17 NOTE — PLAN OF CARE
"  Plan of Care Reviewed With: patient  Overall Patient Progress: improving  Outcome Evaluation: Tele ST.  Admit profile unable to be completed due to patients cognition.  Personal belongings and call light in reach. Bed alarm on and falls bundle present. For VS and assessment, please see documentation under flowsheets.   Discharged back to group home. All belongings with patient. Medications filled and staff from group home signed for meds. All questions answered. Discharge completed.  Problem: Adult Inpatient Plan of Care  Goal: Plan of Care Review  Description: The Plan of Care Review/Shift note should be completed every shift.  The Outcome Evaluation is a brief statement about your assessment that the patient is improving, declining, or no change.  This information will be displayed automatically on your shift  note.  Outcome: Progressing  Flowsheets (Taken 11/17/2024 8190)  Outcome Evaluation: Tele ST.  Admit profile unable to be completed due to patients cognition.  Plan of Care Reviewed With: patient  Overall Patient Progress: improving  Goal: Patient-Specific Goal (Individualized)  Description: You can add care plan individualizations to a care plan. Examples of Individualization might be:  \"Parent requests to be called daily at 9am for status\", \"I have a hard time hearing out of my right ear\", or \"Do not touch me to wake me up as it startles  me\".  Outcome: Progressing  Goal: Absence of Hospital-Acquired Illness or Injury  Outcome: Progressing  Goal: Optimal Comfort and Wellbeing  Outcome: Progressing  Goal: Readiness for Transition of Care  Outcome: Progressing     Problem: Diabetic Ketoacidosis  Goal: Optimal Coping  Outcome: Progressing  Goal: Fluid and Electrolyte Balance with Absence of Ketosis  Outcome: Progressing     Problem: Comorbidity Management  Goal: Blood Glucose Levels Within Targeted Range  Outcome: Progressing     Problem: Fall Injury Risk  Goal: Absence of Fall and Fall-Related " Injury  Outcome: Progressing     Problem: Pain Acute  Goal: Optimal Pain Control and Function  Outcome: Progressing

## 2024-11-18 ENCOUNTER — TELEPHONE (OUTPATIENT)
Dept: FAMILY MEDICINE | Facility: CLINIC | Age: 59
End: 2024-11-18
Payer: COMMERCIAL

## 2024-11-18 LAB
ATRIAL RATE - MUSE: 107 BPM
ATRIAL RATE - MUSE: 115 BPM
DIASTOLIC BLOOD PRESSURE - MUSE: NORMAL MMHG
DIASTOLIC BLOOD PRESSURE - MUSE: NORMAL MMHG
INTERPRETATION ECG - MUSE: NORMAL
INTERPRETATION ECG - MUSE: NORMAL
P AXIS - MUSE: 51 DEGREES
P AXIS - MUSE: 60 DEGREES
PR INTERVAL - MUSE: 130 MS
PR INTERVAL - MUSE: 132 MS
QRS DURATION - MUSE: 88 MS
QRS DURATION - MUSE: 96 MS
QT - MUSE: 336 MS
QT - MUSE: 344 MS
QTC - MUSE: 448 MS
QTC - MUSE: 475 MS
R AXIS - MUSE: 28 DEGREES
R AXIS - MUSE: 36 DEGREES
SYSTOLIC BLOOD PRESSURE - MUSE: NORMAL MMHG
SYSTOLIC BLOOD PRESSURE - MUSE: NORMAL MMHG
T AXIS - MUSE: 59 DEGREES
T AXIS - MUSE: 67 DEGREES
VENTRICULAR RATE- MUSE: 107 BPM
VENTRICULAR RATE- MUSE: 115 BPM

## 2024-11-19 LAB
ENTEROCOCCUS FAECALIS: NOT DETECTED
ENTEROCOCCUS FAECIUM: NOT DETECTED
LISTERIA SPECIES (DETECTED/NOT DETECTED): NOT DETECTED
STAPHYLOCOCCUS AUREUS: NOT DETECTED
STAPHYLOCOCCUS EPIDERMIDIS: NOT DETECTED
STAPHYLOCOCCUS LUGDUNENSIS: NOT DETECTED
STAPHYLOCOCCUS SPECIES: NOT DETECTED
STREPTOCOCCUS AGALACTIAE: NOT DETECTED
STREPTOCOCCUS ANGINOSUS GROUP: NOT DETECTED
STREPTOCOCCUS PNEUMONIAE: NOT DETECTED
STREPTOCOCCUS PYOGENES: NOT DETECTED
STREPTOCOCCUS SPECIES: NOT DETECTED

## 2024-11-19 NOTE — TELEPHONE ENCOUNTER
Brief Hospitalist Update    Received call from ID lab [phone]. Patient with blood culture prelim showing GPCs suggestive of diptheroids on day 4 of culture. Patient came in for DKA as well as leukocytosis, no specific symptoms. Discharged at baseline back to group home. PCP to follow up after hospital stay, follow blood culture final result, and pursue further if new evidence of infection.     Iraida Moody MD FACP  Hospitalist Service  [facility]

## 2024-11-21 LAB
BACTERIA BLD CULT: ABNORMAL
BACTERIA BLD CULT: ABNORMAL

## 2025-02-18 ENCOUNTER — APPOINTMENT (OUTPATIENT)
Dept: CT IMAGING | Facility: CLINIC | Age: 60
End: 2025-02-18
Attending: EMERGENCY MEDICINE
Payer: COMMERCIAL

## 2025-02-18 ENCOUNTER — HOSPITAL ENCOUNTER (OUTPATIENT)
Facility: CLINIC | Age: 60
Setting detail: OBSERVATION
Discharge: NURSING, REHAB OR LONG-TERM CARE | End: 2025-02-19
Attending: EMERGENCY MEDICINE | Admitting: HOSPITALIST
Payer: COMMERCIAL

## 2025-02-18 ENCOUNTER — APPOINTMENT (OUTPATIENT)
Dept: ULTRASOUND IMAGING | Facility: CLINIC | Age: 60
End: 2025-02-18
Attending: EMERGENCY MEDICINE
Payer: COMMERCIAL

## 2025-02-18 DIAGNOSIS — N17.9 AKI (ACUTE KIDNEY INJURY): ICD-10-CM

## 2025-02-18 DIAGNOSIS — F19.10 POLYSUBSTANCE ABUSE (H): ICD-10-CM

## 2025-02-18 DIAGNOSIS — R40.4 TRANSIENT ALTERATION OF AWARENESS: ICD-10-CM

## 2025-02-18 DIAGNOSIS — R11.2 NAUSEA AND VOMITING, UNSPECIFIED VOMITING TYPE: ICD-10-CM

## 2025-02-18 LAB
ALBUMIN SERPL BCG-MCNC: 4.4 G/DL (ref 3.5–5.2)
ALBUMIN UR-MCNC: 30 MG/DL
ALP SERPL-CCNC: 73 U/L (ref 40–150)
ALT SERPL W P-5'-P-CCNC: 11 U/L (ref 0–70)
AMPHETAMINES UR QL SCN: ABNORMAL
ANION GAP SERPL CALCULATED.3IONS-SCNC: 14 MMOL/L (ref 7–15)
APPEARANCE UR: CLEAR
AST SERPL W P-5'-P-CCNC: 14 U/L (ref 0–45)
ATRIAL RATE - MUSE: 123 BPM
B-OH-BUTYR SERPL-SCNC: <0.18 MMOL/L
BARBITURATES UR QL SCN: ABNORMAL
BASE EXCESS BLDV CALC-SCNC: 3.8 MMOL/L (ref -3–3)
BASOPHILS # BLD AUTO: 0 10E3/UL (ref 0–0.2)
BASOPHILS NFR BLD AUTO: 0 %
BENZODIAZ UR QL SCN: ABNORMAL
BILIRUB DIRECT SERPL-MCNC: <0.2 MG/DL (ref 0–0.3)
BILIRUB SERPL-MCNC: 0.2 MG/DL
BILIRUB UR QL STRIP: NEGATIVE
BUN SERPL-MCNC: 37.4 MG/DL (ref 8–23)
BZE UR QL SCN: ABNORMAL
CALCIUM SERPL-MCNC: 10.1 MG/DL (ref 8.8–10.4)
CANNABINOIDS UR QL SCN: ABNORMAL
CHLORIDE SERPL-SCNC: 91 MMOL/L (ref 98–107)
COLOR UR AUTO: ABNORMAL
CREAT SERPL-MCNC: 1.48 MG/DL (ref 0.67–1.17)
DIASTOLIC BLOOD PRESSURE - MUSE: NORMAL MMHG
EGFRCR SERPLBLD CKD-EPI 2021: 54 ML/MIN/1.73M2
EOSINOPHIL # BLD AUTO: 0 10E3/UL (ref 0–0.7)
EOSINOPHIL NFR BLD AUTO: 0 %
ERYTHROCYTE [DISTWIDTH] IN BLOOD BY AUTOMATED COUNT: 14.9 % (ref 10–15)
EST. AVERAGE GLUCOSE BLD GHB EST-MCNC: 189 MG/DL
ETHANOL SERPL-MCNC: <0.01 G/DL
FENTANYL UR QL: ABNORMAL
FLUAV RNA SPEC QL NAA+PROBE: NEGATIVE
FLUBV RNA RESP QL NAA+PROBE: NEGATIVE
GLUCOSE BLDC GLUCOMTR-MCNC: 114 MG/DL (ref 70–99)
GLUCOSE BLDC GLUCOMTR-MCNC: 115 MG/DL (ref 70–99)
GLUCOSE BLDC GLUCOMTR-MCNC: 133 MG/DL (ref 70–99)
GLUCOSE BLDC GLUCOMTR-MCNC: 86 MG/DL (ref 70–99)
GLUCOSE SERPL-MCNC: 260 MG/DL (ref 70–99)
GLUCOSE UR STRIP-MCNC: 1000 MG/DL
HBA1C MFR BLD: 8.2 %
HCO3 BLDV-SCNC: 30 MMOL/L (ref 21–28)
HCO3 BLDV-SCNC: 33 MMOL/L (ref 21–28)
HCO3 SERPL-SCNC: 27 MMOL/L (ref 22–29)
HCT VFR BLD AUTO: 34.4 % (ref 40–53)
HGB BLD-MCNC: 11 G/DL (ref 13.3–17.7)
HGB UR QL STRIP: NEGATIVE
HOLD SPECIMEN: NORMAL
HOLD SPECIMEN: NORMAL
HYALINE CASTS: 12 /LPF
IMM GRANULOCYTES # BLD: 0.1 10E3/UL
IMM GRANULOCYTES NFR BLD: 1 %
INTERPRETATION ECG - MUSE: NORMAL
KETONES UR STRIP-MCNC: ABNORMAL MG/DL
LACTATE BLD-SCNC: 2.6 MMOL/L
LACTATE SERPL-SCNC: 1.1 MMOL/L (ref 0.7–2)
LEUKOCYTE ESTERASE UR QL STRIP: NEGATIVE
LIPASE SERPL-CCNC: 19 U/L (ref 13–60)
LYMPHOCYTES # BLD AUTO: 1.7 10E3/UL (ref 0.8–5.3)
LYMPHOCYTES NFR BLD AUTO: 10 %
MAGNESIUM SERPL-MCNC: 2.4 MG/DL (ref 1.7–2.3)
MCH RBC QN AUTO: 26.8 PG (ref 26.5–33)
MCHC RBC AUTO-ENTMCNC: 32 G/DL (ref 31.5–36.5)
MCV RBC AUTO: 84 FL (ref 78–100)
MONOCYTES # BLD AUTO: 1.5 10E3/UL (ref 0–1.3)
MONOCYTES NFR BLD AUTO: 9 %
MUCOUS THREADS #/AREA URNS LPF: PRESENT /LPF
NEUTROPHILS # BLD AUTO: 13.3 10E3/UL (ref 1.6–8.3)
NEUTROPHILS NFR BLD AUTO: 80 %
NITRATE UR QL: NEGATIVE
NRBC # BLD AUTO: 0 10E3/UL
NRBC BLD AUTO-RTO: 0 /100
O2/TOTAL GAS SETTING VFR VENT: 0 %
OPIATES UR QL SCN: ABNORMAL
OXYHGB MFR BLDV: 34 % (ref 70–75)
P AXIS - MUSE: 53 DEGREES
PCO2 BLDV: 54 MM HG (ref 40–50)
PCO2 BLDV: 56 MM HG (ref 40–50)
PCP QUAL URINE (ROCHE): ABNORMAL
PH BLDV: 7.36 [PH] (ref 7.32–7.43)
PH BLDV: 7.38 [PH] (ref 7.32–7.43)
PH UR STRIP: 6.5 [PH] (ref 5–7)
PLATELET # BLD AUTO: 182 10E3/UL (ref 150–450)
PO2 BLDV: 24 MM HG (ref 25–47)
PO2 BLDV: 32 MM HG (ref 25–47)
POTASSIUM SERPL-SCNC: 4 MMOL/L (ref 3.4–5.3)
PR INTERVAL - MUSE: 116 MS
PROT SERPL-MCNC: 6.9 G/DL (ref 6.4–8.3)
QRS DURATION - MUSE: 88 MS
QT - MUSE: 330 MS
QTC - MUSE: 472 MS
R AXIS - MUSE: 37 DEGREES
RBC # BLD AUTO: 4.1 10E6/UL (ref 4.4–5.9)
RBC URINE: 2 /HPF
RSV RNA SPEC NAA+PROBE: NEGATIVE
SAO2 % BLDV: 35.2 % (ref 70–75)
SAO2 % BLDV: 58 % (ref 70–75)
SARS-COV-2 RNA RESP QL NAA+PROBE: NEGATIVE
SODIUM SERPL-SCNC: 132 MMOL/L (ref 135–145)
SP GR UR STRIP: 1.03 (ref 1–1.03)
SQUAMOUS EPITHELIAL: <1 /HPF
SYSTOLIC BLOOD PRESSURE - MUSE: NORMAL MMHG
T AXIS - MUSE: 67 DEGREES
UROBILINOGEN UR STRIP-MCNC: NORMAL MG/DL
VENTRICULAR RATE- MUSE: 123 BPM
WBC # BLD AUTO: 16.7 10E3/UL (ref 4–11)
WBC URINE: 1 /HPF

## 2025-02-18 PROCEDURE — G0378 HOSPITAL OBSERVATION PER HR: HCPCS

## 2025-02-18 PROCEDURE — 36415 COLL VENOUS BLD VENIPUNCTURE: CPT | Performed by: NURSE PRACTITIONER

## 2025-02-18 PROCEDURE — 74177 CT ABD & PELVIS W/CONTRAST: CPT

## 2025-02-18 PROCEDURE — 36415 COLL VENOUS BLD VENIPUNCTURE: CPT | Performed by: EMERGENCY MEDICINE

## 2025-02-18 PROCEDURE — 85025 COMPLETE CBC W/AUTO DIFF WBC: CPT | Performed by: EMERGENCY MEDICINE

## 2025-02-18 PROCEDURE — 81001 URINALYSIS AUTO W/SCOPE: CPT | Performed by: EMERGENCY MEDICINE

## 2025-02-18 PROCEDURE — 96376 TX/PRO/DX INJ SAME DRUG ADON: CPT

## 2025-02-18 PROCEDURE — 80307 DRUG TEST PRSMV CHEM ANLYZR: CPT | Performed by: EMERGENCY MEDICINE

## 2025-02-18 PROCEDURE — 87040 BLOOD CULTURE FOR BACTERIA: CPT | Performed by: NURSE PRACTITIONER

## 2025-02-18 PROCEDURE — 82962 GLUCOSE BLOOD TEST: CPT

## 2025-02-18 PROCEDURE — 96361 HYDRATE IV INFUSION ADD-ON: CPT

## 2025-02-18 PROCEDURE — 83735 ASSAY OF MAGNESIUM: CPT | Performed by: EMERGENCY MEDICINE

## 2025-02-18 PROCEDURE — 96375 TX/PRO/DX INJ NEW DRUG ADDON: CPT

## 2025-02-18 PROCEDURE — 258N000003 HC RX IP 258 OP 636: Performed by: EMERGENCY MEDICINE

## 2025-02-18 PROCEDURE — 250N000009 HC RX 250: Performed by: EMERGENCY MEDICINE

## 2025-02-18 PROCEDURE — 83036 HEMOGLOBIN GLYCOSYLATED A1C: CPT | Performed by: NURSE PRACTITIONER

## 2025-02-18 PROCEDURE — 87637 SARSCOV2&INF A&B&RSV AMP PRB: CPT | Performed by: EMERGENCY MEDICINE

## 2025-02-18 PROCEDURE — 82803 BLOOD GASES ANY COMBINATION: CPT

## 2025-02-18 PROCEDURE — 250N000013 HC RX MED GY IP 250 OP 250 PS 637: Performed by: EMERGENCY MEDICINE

## 2025-02-18 PROCEDURE — 93005 ELECTROCARDIOGRAM TRACING: CPT

## 2025-02-18 PROCEDURE — 96374 THER/PROPH/DIAG INJ IV PUSH: CPT | Mod: 59

## 2025-02-18 PROCEDURE — 83605 ASSAY OF LACTIC ACID: CPT | Performed by: NURSE PRACTITIONER

## 2025-02-18 PROCEDURE — 82077 ASSAY SPEC XCP UR&BREATH IA: CPT | Performed by: EMERGENCY MEDICINE

## 2025-02-18 PROCEDURE — 76705 ECHO EXAM OF ABDOMEN: CPT

## 2025-02-18 PROCEDURE — 82010 KETONE BODYS QUAN: CPT | Performed by: EMERGENCY MEDICINE

## 2025-02-18 PROCEDURE — 70450 CT HEAD/BRAIN W/O DYE: CPT

## 2025-02-18 PROCEDURE — 250N000013 HC RX MED GY IP 250 OP 250 PS 637: Performed by: NURSE PRACTITIONER

## 2025-02-18 PROCEDURE — 83690 ASSAY OF LIPASE: CPT | Performed by: EMERGENCY MEDICINE

## 2025-02-18 PROCEDURE — 250N000011 HC RX IP 250 OP 636: Performed by: EMERGENCY MEDICINE

## 2025-02-18 PROCEDURE — 99285 EMERGENCY DEPT VISIT HI MDM: CPT | Mod: 25

## 2025-02-18 PROCEDURE — 82805 BLOOD GASES W/O2 SATURATION: CPT | Performed by: EMERGENCY MEDICINE

## 2025-02-18 PROCEDURE — 258N000003 HC RX IP 258 OP 636: Performed by: NURSE PRACTITIONER

## 2025-02-18 PROCEDURE — 84155 ASSAY OF PROTEIN SERUM: CPT | Performed by: EMERGENCY MEDICINE

## 2025-02-18 RX ORDER — AMOXICILLIN 250 MG
2 CAPSULE ORAL 2 TIMES DAILY
Status: DISCONTINUED | OUTPATIENT
Start: 2025-02-18 | End: 2025-02-19 | Stop reason: HOSPADM

## 2025-02-18 RX ORDER — HYDRALAZINE HYDROCHLORIDE 25 MG/1
25 TABLET, FILM COATED ORAL EVERY 8 HOURS SCHEDULED
Status: DISCONTINUED | OUTPATIENT
Start: 2025-02-18 | End: 2025-02-19 | Stop reason: HOSPADM

## 2025-02-18 RX ORDER — POLYETHYLENE GLYCOL 3350 17 G/17G
17 POWDER, FOR SOLUTION ORAL 2 TIMES DAILY PRN
Status: DISCONTINUED | OUTPATIENT
Start: 2025-02-18 | End: 2025-02-19 | Stop reason: HOSPADM

## 2025-02-18 RX ORDER — METFORMIN HYDROCHLORIDE 500 MG/1
1000 TABLET, EXTENDED RELEASE ORAL
COMMUNITY

## 2025-02-18 RX ORDER — NICOTINE POLACRILEX 4 MG
15-30 LOZENGE BUCCAL
Status: DISCONTINUED | OUTPATIENT
Start: 2025-02-18 | End: 2025-02-19 | Stop reason: HOSPADM

## 2025-02-18 RX ORDER — CLONIDINE HYDROCHLORIDE 0.1 MG/1
0.1 TABLET ORAL EVERY 6 HOURS PRN
Status: DISCONTINUED | OUTPATIENT
Start: 2025-02-18 | End: 2025-02-19 | Stop reason: HOSPADM

## 2025-02-18 RX ORDER — ONDANSETRON 2 MG/ML
4 INJECTION INTRAMUSCULAR; INTRAVENOUS EVERY 6 HOURS PRN
Status: DISCONTINUED | OUTPATIENT
Start: 2025-02-18 | End: 2025-02-19 | Stop reason: HOSPADM

## 2025-02-18 RX ORDER — LOPERAMIDE HYDROCHLORIDE 2 MG/1
2 CAPSULE ORAL EVERY 4 HOURS PRN
Status: DISCONTINUED | OUTPATIENT
Start: 2025-02-18 | End: 2025-02-19 | Stop reason: HOSPADM

## 2025-02-18 RX ORDER — TRAZODONE HYDROCHLORIDE 50 MG/1
50 TABLET ORAL
Status: DISCONTINUED | OUTPATIENT
Start: 2025-02-18 | End: 2025-02-19 | Stop reason: HOSPADM

## 2025-02-18 RX ORDER — ACETAMINOPHEN 325 MG/1
650 TABLET ORAL EVERY 4 HOURS PRN
Status: DISCONTINUED | OUTPATIENT
Start: 2025-02-18 | End: 2025-02-19 | Stop reason: HOSPADM

## 2025-02-18 RX ORDER — SODIUM CHLORIDE, SODIUM LACTATE, POTASSIUM CHLORIDE, CALCIUM CHLORIDE 600; 310; 30; 20 MG/100ML; MG/100ML; MG/100ML; MG/100ML
INJECTION, SOLUTION INTRAVENOUS CONTINUOUS
Status: DISCONTINUED | OUTPATIENT
Start: 2025-02-18 | End: 2025-02-19 | Stop reason: HOSPADM

## 2025-02-18 RX ORDER — CLONIDINE HYDROCHLORIDE 0.1 MG/1
0.1 TABLET ORAL 2 TIMES DAILY
Status: DISCONTINUED | OUTPATIENT
Start: 2025-02-18 | End: 2025-02-18

## 2025-02-18 RX ORDER — LISINOPRIL 10 MG/1
40 TABLET ORAL ONCE
Status: COMPLETED | OUTPATIENT
Start: 2025-02-18 | End: 2025-02-18

## 2025-02-18 RX ORDER — CARVEDILOL 12.5 MG/1
12.5 TABLET ORAL ONCE
Status: COMPLETED | OUTPATIENT
Start: 2025-02-18 | End: 2025-02-18

## 2025-02-18 RX ORDER — CLONIDINE HYDROCHLORIDE 0.1 MG/1
0.1 TABLET ORAL 2 TIMES DAILY
Status: DISCONTINUED | OUTPATIENT
Start: 2025-02-18 | End: 2025-02-19 | Stop reason: HOSPADM

## 2025-02-18 RX ORDER — DIPHENHYDRAMINE HCL 25 MG
50 CAPSULE ORAL AT BEDTIME
Status: DISCONTINUED | OUTPATIENT
Start: 2025-02-18 | End: 2025-02-19 | Stop reason: HOSPADM

## 2025-02-18 RX ORDER — NALOXONE HYDROCHLORIDE 0.4 MG/ML
0.2 INJECTION, SOLUTION INTRAMUSCULAR; INTRAVENOUS; SUBCUTANEOUS ONCE
Status: COMPLETED | OUTPATIENT
Start: 2025-02-18 | End: 2025-02-18

## 2025-02-18 RX ORDER — EXENATIDE 2 MG/.65ML
2 INJECTION, SUSPENSION, EXTENDED RELEASE SUBCUTANEOUS
COMMUNITY

## 2025-02-18 RX ORDER — TAMSULOSIN HYDROCHLORIDE 0.4 MG/1
0.4 CAPSULE ORAL DAILY
Status: DISCONTINUED | OUTPATIENT
Start: 2025-02-18 | End: 2025-02-19 | Stop reason: HOSPADM

## 2025-02-18 RX ORDER — PANTOPRAZOLE SODIUM 40 MG/1
40 TABLET, DELAYED RELEASE ORAL DAILY
COMMUNITY

## 2025-02-18 RX ORDER — PANTOPRAZOLE SODIUM 40 MG/1
40 TABLET, DELAYED RELEASE ORAL DAILY
Status: DISCONTINUED | OUTPATIENT
Start: 2025-02-19 | End: 2025-02-19 | Stop reason: HOSPADM

## 2025-02-18 RX ORDER — OLANZAPINE 10 MG/2ML
5 INJECTION, POWDER, FOR SOLUTION INTRAMUSCULAR EVERY 6 HOURS PRN
Status: DISCONTINUED | OUTPATIENT
Start: 2025-02-18 | End: 2025-02-19 | Stop reason: HOSPADM

## 2025-02-18 RX ORDER — LISINOPRIL 40 MG/1
40 TABLET ORAL DAILY
Status: DISCONTINUED | OUTPATIENT
Start: 2025-02-19 | End: 2025-02-19 | Stop reason: HOSPADM

## 2025-02-18 RX ORDER — ACETAMINOPHEN 650 MG/1
650 SUPPOSITORY RECTAL EVERY 4 HOURS PRN
Status: DISCONTINUED | OUTPATIENT
Start: 2025-02-18 | End: 2025-02-19 | Stop reason: HOSPADM

## 2025-02-18 RX ORDER — AMOXICILLIN 250 MG
1 CAPSULE ORAL 2 TIMES DAILY
Status: DISCONTINUED | OUTPATIENT
Start: 2025-02-18 | End: 2025-02-19 | Stop reason: HOSPADM

## 2025-02-18 RX ORDER — IOPAMIDOL 755 MG/ML
500 INJECTION, SOLUTION INTRAVASCULAR ONCE
Status: COMPLETED | OUTPATIENT
Start: 2025-02-18 | End: 2025-02-18

## 2025-02-18 RX ORDER — PROCHLORPERAZINE MALEATE 5 MG/1
10 TABLET ORAL EVERY 6 HOURS PRN
Status: DISCONTINUED | OUTPATIENT
Start: 2025-02-18 | End: 2025-02-19 | Stop reason: HOSPADM

## 2025-02-18 RX ORDER — DICYCLOMINE HYDROCHLORIDE 10 MG/1
20 CAPSULE ORAL 3 TIMES DAILY PRN
Status: DISCONTINUED | OUTPATIENT
Start: 2025-02-18 | End: 2025-02-19 | Stop reason: HOSPADM

## 2025-02-18 RX ORDER — FERROUS SULFATE 325(65) MG
325 TABLET ORAL
Status: DISCONTINUED | OUTPATIENT
Start: 2025-02-19 | End: 2025-02-19 | Stop reason: HOSPADM

## 2025-02-18 RX ORDER — DEXTROSE MONOHYDRATE 25 G/50ML
25-50 INJECTION, SOLUTION INTRAVENOUS
Status: DISCONTINUED | OUTPATIENT
Start: 2025-02-18 | End: 2025-02-19 | Stop reason: HOSPADM

## 2025-02-18 RX ORDER — METHOCARBAMOL 500 MG/1
500 TABLET, FILM COATED ORAL 3 TIMES DAILY PRN
Status: DISCONTINUED | OUTPATIENT
Start: 2025-02-18 | End: 2025-02-19 | Stop reason: HOSPADM

## 2025-02-18 RX ORDER — CARVEDILOL 12.5 MG/1
12.5 TABLET ORAL 2 TIMES DAILY WITH MEALS
COMMUNITY

## 2025-02-18 RX ORDER — CARVEDILOL 12.5 MG/1
12.5 TABLET ORAL 2 TIMES DAILY WITH MEALS
Status: DISCONTINUED | OUTPATIENT
Start: 2025-02-18 | End: 2025-02-19 | Stop reason: HOSPADM

## 2025-02-18 RX ORDER — ONDANSETRON 4 MG/1
4 TABLET, ORALLY DISINTEGRATING ORAL EVERY 6 HOURS PRN
Status: DISCONTINUED | OUTPATIENT
Start: 2025-02-18 | End: 2025-02-19 | Stop reason: HOSPADM

## 2025-02-18 RX ADMIN — SODIUM CHLORIDE, POTASSIUM CHLORIDE, SODIUM LACTATE AND CALCIUM CHLORIDE: 600; 310; 30; 20 INJECTION, SOLUTION INTRAVENOUS at 22:10

## 2025-02-18 RX ADMIN — SODIUM CHLORIDE, POTASSIUM CHLORIDE, SODIUM LACTATE AND CALCIUM CHLORIDE: 600; 310; 30; 20 INJECTION, SOLUTION INTRAVENOUS at 11:27

## 2025-02-18 RX ADMIN — IOPAMIDOL 80 ML: 755 INJECTION, SOLUTION INTRAVENOUS at 05:59

## 2025-02-18 RX ADMIN — SODIUM CHLORIDE, POTASSIUM CHLORIDE, SODIUM LACTATE AND CALCIUM CHLORIDE 1000 ML: 600; 310; 30; 20 INJECTION, SOLUTION INTRAVENOUS at 06:15

## 2025-02-18 RX ADMIN — SODIUM CHLORIDE, POTASSIUM CHLORIDE, SODIUM LACTATE AND CALCIUM CHLORIDE 1000 ML: 600; 310; 30; 20 INJECTION, SOLUTION INTRAVENOUS at 04:09

## 2025-02-18 RX ADMIN — DIPHENHYDRAMINE HYDROCHLORIDE 50 MG: 25 CAPSULE ORAL at 22:01

## 2025-02-18 RX ADMIN — NALXONE HYDROCHLORIDE 0.2 MG: 0.4 INJECTION INTRAMUSCULAR; INTRAVENOUS; SUBCUTANEOUS at 04:42

## 2025-02-18 RX ADMIN — CARVEDILOL 12.5 MG: 12.5 TABLET, FILM COATED ORAL at 07:56

## 2025-02-18 RX ADMIN — NALOXONE HYDROCHLORIDE 0.2 MG: 0.4 INJECTION, SOLUTION INTRAMUSCULAR; INTRAVENOUS; SUBCUTANEOUS at 06:32

## 2025-02-18 RX ADMIN — LISINOPRIL 40 MG: 10 TABLET ORAL at 07:08

## 2025-02-18 RX ADMIN — HYDRALAZINE HYDROCHLORIDE 25 MG: 25 TABLET ORAL at 07:57

## 2025-02-18 RX ADMIN — CLONIDINE HYDROCHLORIDE 0.1 MG: 0.1 TABLET ORAL at 15:29

## 2025-02-18 RX ADMIN — CARVEDILOL 12.5 MG: 12.5 TABLET, FILM COATED ORAL at 17:39

## 2025-02-18 RX ADMIN — CLONIDINE HYDROCHLORIDE 0.1 MG: 0.1 TABLET ORAL at 22:03

## 2025-02-18 RX ADMIN — HYDRALAZINE HYDROCHLORIDE 25 MG: 25 TABLET ORAL at 15:29

## 2025-02-18 RX ADMIN — TAMSULOSIN HYDROCHLORIDE 0.4 MG: 0.4 CAPSULE ORAL at 15:29

## 2025-02-18 RX ADMIN — CLONIDINE HYDROCHLORIDE 0.1 MG: 0.1 TABLET ORAL at 07:08

## 2025-02-18 RX ADMIN — SODIUM CHLORIDE 60 ML: 9 INJECTION, SOLUTION INTRAVENOUS at 05:59

## 2025-02-18 RX ADMIN — PANTOPRAZOLE SODIUM 40 MG: 40 INJECTION, POWDER, FOR SOLUTION INTRAVENOUS at 07:55

## 2025-02-18 RX ADMIN — SENNOSIDES AND DOCUSATE SODIUM 1 TABLET: 50; 8.6 TABLET ORAL at 22:01

## 2025-02-18 RX ADMIN — HYDRALAZINE HYDROCHLORIDE 25 MG: 25 TABLET ORAL at 22:03

## 2025-02-18 ASSESSMENT — COLUMBIA-SUICIDE SEVERITY RATING SCALE - C-SSRS
1. IN THE PAST MONTH, HAVE YOU WISHED YOU WERE DEAD OR WISHED YOU COULD GO TO SLEEP AND NOT WAKE UP?: NO
6. HAVE YOU EVER DONE ANYTHING, STARTED TO DO ANYTHING, OR PREPARED TO DO ANYTHING TO END YOUR LIFE?: NO
2. HAVE YOU ACTUALLY HAD ANY THOUGHTS OF KILLING YOURSELF IN THE PAST MONTH?: NO

## 2025-02-18 ASSESSMENT — ACTIVITIES OF DAILY LIVING (ADL)
ADLS_ACUITY_SCORE: 62
ADLS_ACUITY_SCORE: 64
ADLS_ACUITY_SCORE: 62
ADLS_ACUITY_SCORE: 50
ADLS_ACUITY_SCORE: 65
ADLS_ACUITY_SCORE: 50
ADLS_ACUITY_SCORE: 62
ADLS_ACUITY_SCORE: 65
ADLS_ACUITY_SCORE: 50
ADLS_ACUITY_SCORE: 62
ADLS_ACUITY_SCORE: 50
ADLS_ACUITY_SCORE: 62
ADLS_ACUITY_SCORE: 47
ADLS_ACUITY_SCORE: 65
ADLS_ACUITY_SCORE: 50
ADLS_ACUITY_SCORE: 50
ADLS_ACUITY_SCORE: 62
ADLS_ACUITY_SCORE: 50

## 2025-02-18 ASSESSMENT — LIFESTYLE VARIABLES: TOTAL_SCORE: 5

## 2025-02-18 NOTE — PLAN OF CARE
ROOM # 203-1    Living Situation (if not independent, order SW consult):  Facility name:  :  [facility] - [phone]    Activity level at baseline: independent  Activity level on admit: assist x2 due to mentation. Pt appears drowsy    Who will be transporting you at discharge: unknown    Patient registered to observation; given Patient Bill of Rights; given the opportunity to ask questions about observation status and their plan of care.  Patient has been oriented to the observation room, bathroom and call light is in place.    Discussed discharge goals and expectations with patient/family.

## 2025-02-18 NOTE — ED TRIAGE NOTES
Pt was sent in from a group home with c/o a few hours of n/v. No pain or fever. Unknown if presentation is baseline for pt. Ems bg 334. Gave 4 mg zofran and placed 18 left a/c.     Triage Assessment (Adult)       Row Name 02/18/25 0359          Triage Assessment    Airway WDL WDL        Respiratory WDL    Respiratory WDL WDL        Skin Circulation/Temperature WDL    Skin Circulation/Temperature WDL WDL        Cognitive/Neuro/Behavioral WDL    Cognitive/Neuro/Behavioral WDL X     Level of Consciousness confused     Arousal Level arouses to pain;arouses to repeated stimulation     Orientation person     Speech slurred

## 2025-02-18 NOTE — ED PROVIDER NOTES
Emergency Department Note      History of Present Illness     Chief Complaint   Nausea, Vomiting, & Diarrhea (Pt bg was 334 per ems. N/v for a few hours prior to call)      HUGO Duong is a 59 year old male with a history of DM2, hyperlipidemia, hypertension, polysubstance abuse, schizophrenia, and intellectual disability who presents to the ED via EMS for nausea, vomiting, and diarrhea. Per EMS report, the patient is coming form a group home where he has had nausea and vomiting for the past few hours. He is not having any pain or fevers. EMS measured BG at 334. He was given 4 mg Zofran. EMS uncertain if patient presents at his baseline. Patient is unable to provide any history as he his mental status is altered.    After administration of narcan, patient's altered mental status has improved. He denies any drug use at this time.     Independent Historian   EMS as detailed above.      I was able to speak with the group home around 7:30 AM.  States patient could get access to substances from other clients in the group home.  He does not know if he would have had any substances last night.  He states that he had been complaining of some nausea for the last day or 2 but given the worsening nausea with associated emesis last night they called the ambulance.      Review of External Notes   See ED course.    Past Medical History     Medical History and Problem List   Psychosis  Hyponatremia  Anemia  Hyperglycemia  UTI  Acute kidney injury  Volume depletion  Acute renal failure  Hypokalemia  Lactic acidosis  Pneumonia  Hypomagnesemia  Polysubstance abuse  Severe sepsis  Ketosis  Depression  Diabetes mellitus, type 2  Homelessness  Hypertension  Legally blind  Latent tuberculosis  Proliferative diabetic retinopathy  Schizophrenia   Cataract, post subcapsular polar senile, bilateral  Diabetic ulcer of toe of right foot  Edentulism  GERD  Hyperlipidemia  Hypovitaminosis D  Intellectual disability  Muscular  "deconditioning  Odynophagia  Peritonsillar abscess  Pulmonary nodule  Umbilical hernia    Medications   Coreg  Catapres  Novolog pen  Lantus pen  Protonix  Flomax  Tylenol  Lipitor  Benadryl  Glucose  Apresoline  Norco  Genteal severe  Zestril  Zofran  Invega sustenna  Senokot-s/pericolace  Desyrel     Surgical History   No past surgical history on file.    Physical Exam     Patient Vitals for the past 24 hrs:   BP Temp Temp src Pulse Resp SpO2 Height Weight   02/18/25 0630 (!) 180/96 -- -- (!) 127 (!) 9 100 % -- --   02/18/25 0615 (!) 169/94 -- -- (!) 127 13 98 % -- --   02/18/25 0530 139/65 -- -- 120 12 -- -- --   02/18/25 0504 131/64 -- -- 118 13 98 % -- --   02/18/25 0502 131/64 -- -- 116 14 99 % -- --   02/18/25 0500 -- -- -- (!) 126 27 98 % -- --   02/18/25 0436 119/62 -- -- 117 13 -- -- --   02/18/25 0413 113/67 -- -- 119 15 97 % -- --   02/18/25 0406 113/67 -- -- (!) 121 21 97 % -- --   02/18/25 0358 (!) 118/35 98.1  F (36.7  C) Oral (!) 123 14 96 % 1.626 m (5' 4\") 72.1 kg (159 lb)     Physical Exam    HENT:  mmm, no rhinorrhea, atraumatic  Eyes: No nystagmus at rest, pupils 3 mm bilaterally  Neck: supple, no abnormal swelling  Lungs:  CTAB  CV: Tachycardic, no m/r/g, ppi  Abd: soft, nontender, nondistended, no rebound/masses/guarding/hsm  Ext: no peripheral edema  Skin: warm, dry, well perfused, no rashes/bruising/lesions on exposed skin  Neuro: Lethargic, right facial drop, tongue protruding, response to noxious stimuli in all 4 extremities but weaker motor response in the right upper and lower extremity relative to the left.    After administration of Narcan there is no facial asymmetry, moving all extremities equally as far as motor and sensation.  Psych: Normal mood, normal affect      Diagnostics     Lab Results   Labs Ordered and Resulted from Time of ED Arrival to Time of ED Departure   BASIC METABOLIC PANEL - Abnormal       Result Value    Sodium 132 (*)     Potassium 4.0      Chloride 91 (*)     " Carbon Dioxide (CO2) 27      Anion Gap 14      Urea Nitrogen 37.4 (*)     Creatinine 1.48 (*)     GFR Estimate 54 (*)     Calcium 10.1      Glucose 260 (*)    MAGNESIUM - Abnormal    Magnesium 2.4 (*)    CBC WITH PLATELETS AND DIFFERENTIAL - Abnormal    WBC Count 16.7 (*)     RBC Count 4.10 (*)     Hemoglobin 11.0 (*)     Hematocrit 34.4 (*)     MCV 84      MCH 26.8      MCHC 32.0      RDW 14.9      Platelet Count 182      % Neutrophils 80      % Lymphocytes 10      % Monocytes 9      % Eosinophils 0      % Basophils 0      % Immature Granulocytes 1      NRBCs per 100 WBC 0      Absolute Neutrophils 13.3 (*)     Absolute Lymphocytes 1.7      Absolute Monocytes 1.5 (*)     Absolute Eosinophils 0.0      Absolute Basophils 0.0      Absolute Immature Granulocytes 0.1      Absolute NRBCs 0.0     BLOOD GAS VENOUS - Abnormal    pH Venous 7.36      pCO2 Venous 54 (*)     pO2 Venous 24 (*)     Bicarbonate Venous 30 (*)     Base Excess/Deficit Venous 3.8 (*)     FIO2 0      Oxyhemoglobin Venous 34 (*)     O2 Sat, Venous 35.2 (*)    ROUTINE UA WITH MICROSCOPIC - Abnormal    Color Urine Light Yellow      Appearance Urine Clear      Glucose Urine 1000 (*)     Bilirubin Urine Negative      Ketones Urine Trace (*)     Specific Gravity Urine 1.027      Blood Urine Negative      pH Urine 6.5      Protein Albumin Urine 30 (*)     Urobilinogen Urine Normal      Nitrite Urine Negative      Leukocyte Esterase Urine Negative      Mucus Urine Present (*)     RBC Urine 2      WBC Urine 1      Squamous Epithelials Urine <1      Hyaline Casts Urine 12 (*)    ISTAT GASES LACTATE VENOUS POCT - Abnormal    Lactic Acid POCT 2.6 (*)     Bicarbonate Venous POCT 33 (*)     O2 Sat, Venous POCT 58 (*)     pCO2 Venous POCT 56 (*)     pH Venous POCT 7.38      pO2 Venous POCT 32     HEPATIC FUNCTION PANEL - Normal    Protein Total 6.9      Albumin 4.4      Bilirubin Total 0.2      Alkaline Phosphatase 73      AST 14      ALT 11      Bilirubin Direct  <0.20     LIPASE - Normal    Lipase 19     KETONE BETA-HYDROXYBUTYRATE QUANTITATIVE, RAPID - Normal    Ketone (Beta-Hydroxybutyrate) Quantitative <0.18     ETHYL ALCOHOL LEVEL - Normal    Alcohol ethyl <0.01     URINE DRUG SCREEN PANEL       Imaging   Abd/pelvis CT,  IV  contrast only TRAUMA / AAA   Final Result   IMPRESSION:    1.  Sludge or cholelithiasis within the gallbladder. The gallbladder is distended. Consider ultrasound correlation.   2.  No bowel obstruction or abscess. Appendix is normal.   3.  Tiny nonobstructing right renal calculus.   4.  The bladder is distended.   5.  Wall thickening of the visualized esophagus is again seen. Consider follow-up esophagram or endoscopy.      Head CT w/o contrast   Final Result   IMPRESSION:   1.  No CT evidence for acute intracranial process.   2.  Brain atrophy and presumed chronic microvascular ischemic changes as above.   3.  Large old right medial orbital wall blowout fracture.   4.  Mildly shrunken partially calcified right globe with hyperdense material in the vitreous.         US Abdomen Limited    (Results Pending)       EKG   ECG taken at 0353, ECG read at 0400  Sinus tachycardia  Otherwise normal ECG   Rate 123 bpm. AK interval 116 ms. QRS duration 88 ms. QT/QTc 330/472 ms. P-R-T axes 53 37 67.    Independent Interpretation   CT Head: No intracranial hemorrhage.    ED Course      Medications Administered   Medications   cloNIDine (CATAPRES) tablet 0.1 mg (0.1 mg Oral $Given 2/18/25 0708)   carvedilol (COREG) tablet 25 mg (has no administration in time range)   hydrALAZINE (APRESOLINE) tablet 25 mg (has no administration in time range)   pantoprazole (PROTONIX) IV push injection 40 mg (has no administration in time range)   lactated ringers BOLUS 1,000 mL (0 mLs Intravenous Stopped 2/18/25 0616)   naloxone (NARCAN) injection 0.2 mg (0.2 mg Intravenous $Given 2/18/25 0442)   lactated ringers BOLUS 1,000 mL (1,000 mLs Intravenous $New Bag 2/18/25 0615)   CT  scan flush (60 mLs Intravenous $Given 2/18/25 0516)   iopamidol (ISOVUE-370) solution 500 mL (80 mLs Intravenous $Given 2/18/25 0559)   naloxone (NARCAN) injection 0.2 mg (0.2 mg Intravenous $Given 2/18/25 0632)   lisinopril (ZESTRIL) tablet 40 mg (40 mg Oral $Given 2/18/25 0708)       Procedures   Procedures     Discussion of Management   Admitting Hospitalist,      ED Course   ED Course as of 02/18/25 0724 Tue Feb 18, 2025   0338 I reviewed recent discharge summary from November 2024 when he was hospitalized for diabetic ketoacidosis from the 15th to 17 November.   0339 I reviewed an office visit in the [facility] system from January 14, 2025 for a health management visit appears he was at his baseline at that time as far as glucose management, blood pressure management and management of his chronic nausea.   0341 I obtained history and examined the patient as noted above.     0441 Creatinine(!): 1.48  In November creatinine was <1   0451 I rechecked the patient.   0558 Prescription database reviewed and no prescriptions for opiates present.       Additional Documentation      Medical Decision Making / Diagnosis     CMS Diagnoses:     None and Lactic acid elevated due to dehydration, hyperglycemia, KAVITA. At this time there are no signs of sepsis or septic shock      MIPS           MONIE   Benja Duong is a 59 year old male     Presenting from a group home with reported 2 hours of nausea and vomiting prior to arrival.  He is quite sleepy on arrival here in has what appears to be some right sided asymmetry in terms of his motor function and facial symmetry which I do not see mention previously in the medical record.  Was hyperglycemic with medics.  Concern for possible stroke and so head CT ordered.  He also was given Narcan which fortunately improved his mental status and interestingly resolved this asymmetry of the face and motor of the extremities.  Denies knowingly taking an opiate.  He is not prescribed  opiate analgesics.  History of diabetes and last admission for DKA.  Ketones are negative here.  He does have an ongoing sinus tachycardia as well as a mild KAVITA.  Venous blood gas done before administration of Narcan she has a mild respiratory acidosis.  Will go ahead with CT of the head as well as abdomen and pelvis to ideally rule out any significant intracranial or intra-abdominal pathology.  Next steps thereafter would depend on the need for administration of further Narcan for refractory nausea/vomiting.      Update: CT head negative for acute findings, CT abdomen pelvis showing a distended gallbladder, changes that may suggest esophagitis as well as possible cystitis.  No surgical emergency present.  Will follow-up the CT scan with an ultrasound of the right upper quadrant to evaluate for the possibility of cholecystitis.  Labs do not suggest a common duct obstruction.  Continues with tachycardia and hypertension.    Suspect he will need admission for ongoing hydration and symptom management.  May be multi factorial presentation including viral gastroenteritis, some component of substance abuse given initial response to Narcan.  Also the possibility of a component of alcohol withdrawal he has a history of alcohol use disorder.  Urinalysis pending to see if there is a component of a urinary tract infection.    Mildly elevated lactate, likely secondary to underlying vomiting diarrhea and dehydration.  No evidence at this point of the underlying bacterial infection causing the lactic acidosis.    He did get a second small dose of Narcan (0.2 mg) he was sleepy but easily arousable before the dose to voice and would follow commands and answer simple questions, no significant change in level of alertness no indication in my mind for more Narcan or Narcan infusion.  UA negative for infection.         Disposition   The patient was admitted to the hospital.     Diagnosis     ICD-10-CM    1. Nausea and vomiting,  unspecified vomiting type  R11.2       2. Transient alteration of awareness  R40.4       3. Polysubstance abuse (H)  F19.10       4. KAVITA (acute kidney injury)  N17.9            Discharge Medications   New Prescriptions    No medications on file         Scribe Disclosure:  I, Elizabeth Celina, am serving as a scribe at 4:26 AM on 2/18/2025 to document services personally performed by Flako Jose MD based on my observations and the provider's statements to me.        Flako Jose MD  02/18/25 0736       Flako Jose MD  02/18/25 0737

## 2025-02-18 NOTE — PROGRESS NOTES
[facility]    ED Boarding Nurse Handoff Addendum Report:    Date/time: 2/18/2025, 12:43 PM    Activity Level: assist of 1    Fall Risk: Yes:  bed/chair alarm on, nonskid shoes/slippers when out of bed, arm band in place, patient and family education, and assistive device/personal items within reach    Active Infusions: LR 100ml/hr    Current Meds Due: see MAR    Current care needs: IVF, monitor N/V/D, COWS    Oxygen requirements (liters/min and/or FiO2): none    Respiratory status: Room air    Vital signs (within last 30 minutes):    Vitals:    02/18/25 0930 02/18/25 1030 02/18/25 1045 02/18/25 1100   BP: (!) 158/89  (!) 152/98 (!) 149/91   Pulse: 115  114 117   Resp: 14 16  14   Temp:       TempSrc:       SpO2: 94%   98%   Weight:       Height:           Focused assessment within last 30 minutes:    Patients mental status improving, now A&Ox3 with intermittent confusion and lethargy although lethargy improving. Denies pain, nausea, vomiting, or diarrhea currently. Abdomen soft and non tender. Patient is legally blind, requires set up and assistance with eating and ADLs. COWS 5. Frequent yawning and runny nose noted. Ate 25% of lunch. Frequently asking to go home, plan of care explained. External catheter in use.     ED Boarding Nurse name: Aissatou Barber RN

## 2025-02-18 NOTE — H&P
[facility]    History and Physical - Hospitalist Service       Date of Admission:  2/18/2025    Assessment & Plan      Benja Duong is a 59 year old male who for poor historian at baseline.  He has a past medical history of type 2 diabetes, hyperlipidemia, hypertension, polysubstance abuse, schizophrenia, and intellectual disability who resides in a group home who presents to [facility] on the morning of 2/18/2025 for multiple symptoms to include nausea vomiting and diarrhea.  EMS reports, he presents from the group home where he has had nausea and vomiting for the past few hours.  He denies any pain or fevers.  In the field his blood sugar was 334.  He was given 4 mg of Zofran.  Given his past history suspicion was raised of possible polysubstance use contributing as he was unable to provide any history given his altered state.  Did receive 2 doses of Narcan with improvement.  My colleague was able to speak to the [facility] staff around 730 the morning of presentation after he had been in the emergency department for several hours.  They state that he could have gotten access to substances from other individuals in the group home.  However, they do add that he had been complaining of nausea for the past couple of days.      Acute medical issues:  #Acute encephalopathy:  differential broad -- infectious vs toxic encephalopathy. Lives in a group home.  Has a history of polysubstance use. Likely multifactorial -- recent illness +/- polysubstance use history and response to Narcan. Per  staff, he could have received illicit substances from another resident.  Review of [facility] does not show any recent fills.     -- Admit to Observation.  -- Remote telemetry monitoring x 24 hours  -- IVF hydration -- LR   -- Replace elytes PRN.   -- Monitor opiate withdrawal scale.    -- Symptom management    #SIRS possible early sepsis -- mild elevated lactic acid  #Leukocytosis: stress  induced vs dehydration vs evolving infection.   Has had GI symptoms for 1-2 days.    -- Check enteric panel.  Repeat lactic acid at 1000.  -- IVF hydration. Received 2 L IV LR  -- UA reassuring.  Not hypoxic and lower chest on CT A/P without mention of infilitrate.     -- Check RSV, Flu, COVID-19 panel.    -- Repeat CBC and BMP in the AM.    #GI symptoms:  Likely viral GI illness.  Does have sludge/cholelithiasis with gallbladder distention.  Could be evolving cholecystitis,although less likely as he denies any abdominal pain.  LFT normal.  Lipase normal. Does have a history of chronic nausea  -- US abdomen pending.  -- Enteric panel.   -- APAP. Robaxin for pain. Would like to hold off of opiates as able. If opiates needed- judicious use.   -- Antiemetics    #KAVITA:  Likely prerenal given above.  Baseline Scr 0.8, 1.48 at time of admit.  -- S/P IV 2L LR.  Continue MIVF.  -- Repeat BMP in the AM.   -- Received PTA dose of lisinopril in th ED.  Hold for now.      #Hyperglycemia in the setting of known T2DM.  BHB reassuring.  No evidence of DKA.   -- IV hydration.  -- Check A1C  -- Correctional scale coverage. Hypoglycemia protocol.  -- Regular diet as tolerated.     -----------------------------------  Chronic medical issues:  #T2DM  #HTN  #HLD  #Schizophrenia  #Polysubstance use  #Intellectual disability    Awaiting medication reconciliation.      Addendum: 2/18/2025 10:50 AM   COVID, RSV, Flu negative.  UDS: + THC o/w normal.   Repeat lactate pending.       Observation Goals: -diagnostic tests and consults completed and resulted, -vital signs normal or at patient baseline, -tolerating oral intake to maintain hydration, -returns to baseline functional status, -safe disposition plan has been identified, Nurse to notify provider when observation goals have been met and patient is ready for discharge.  Diet: Regular Diet Adult  DVT Prophylaxis: Low Risk/Ambulatory with no VTE prophylaxis indicated and Pneumatic  Compression Devices  Carranza Catheter: Not present  Lines: None     Cardiac Monitoring: ACTIVE order. Indication: Drug overdose (24 hours)  Code Status: Full Code      Disposition Plan     Medically Ready for Discharge: Anticipated Tomorrow         The patient's care was discussed with the Bedside Nurse, Care Coordinator/, Patient, and EM Team.  ---    Please note that this document was prepared using verbal transcription software. While we strive for accuracy, errors or oversights may occur. We appreciate your understanding and encourage you to contact us if any corrections are needed.    Thank you.    ---  George Donaldson Ed.D, APRN, CNP  Hospital Medicine  Available Via VPEP    _____________________________________________________________________    Chief Complaint   Multiple    History is obtained from the patient, electronic health record, and emergency department physician    History of Present Illness   Benja Duong is a 59 year old male who for poor historian at baseline.  He has a past medical history of type 2 diabetes, hyperlipidemia, hypertension, polysubstance abuse, schizophrenia, and intellectual disability who resides in a group home who presents to [facility] on the morning of 2/18/2025 for multiple symptoms to include nausea vomiting and diarrhea.  EMS reports, he presents from the group home where he has had nausea and vomiting for the past few hours.  He denies any pain or fevers.  In the field his blood sugar was 334.  He was given 4 mg of Zofran.  Given his past history suspicion was raised of possible polysubstance use contributing as he was unable to provide any history given his altered state.  Did receive 2 doses of Narcan with improvement.  My colleague was able to speak to the longterm staff around 730 the morning of presentation after he had been in the emergency department for several hours.  They state that he could have gotten access to substances from  other individuals in the group home.  However, they do add that he had been complaining of nausea for the past couple of days.    Review of records:    11-15 to 11-17 admitted for DKA  1-14-25 [facility] -- routine visit -- doing okay at that time.     ED course: IV, labs, imaging, treatment  Findings:  Labs:   CBC: Leukocytosis with a white count of 16.7 K, hemoglobin 11, platelets 182K.  BMP/CMP: LFTs within normal limits.  BMP shows a chloride of 91 and a CO2 of 27 BUN and creatinine are elevated at 37 and 1.48 and glucose 260.  ID/Markers: Lactic acid 2.6.,  Magnesium 2.4, beta hydroxybutyrate negative, EtOH negative, UA unremarkable.   Misc: N/A  Imaging:   CT of the head is negative.  CT of the abdomen pelvis with contrast demonstrates large/cholelithiasis within the gallbladder with gallbladder distention.  Gallbladder ultrasound is currently pending.  No bowel obstruction or abscess.  Appendix is normal.  Tiny nonobstructing renal calculi.  Esophageal wall thickening.  EKG:   Sinus tachycardia with a rate of 20  Treatments:   LR 2 L, clonidine, lisinopril, pantoprazole, Narcan    Past Medical History    Past Medical History:   Diagnosis Date    Depression     Diabetes mellitus     h/o Cocaine abuse     Homeless     Hypertension     Latent tuberculosis     Legally blind     Proliferative diabetic retinopathy     Schizophrenia        Past Surgical History   No past surgical history on file.    Prior to Admission Medications   Prior to Admission Medications   Prescriptions Last Dose Informant Patient Reported? Taking?   HYDROcodone-acetaminophen (NORCO) 5-325 MG tablet   Yes No   Sig: Take 1 tablet by mouth every 4 hours as needed for severe pain.   acetaminophen (TYLENOL) 325 MG tablet   Yes No   Sig: Take 650 mg by mouth every 4 hours as needed for mild pain   atorvastatin (LIPITOR) 20 MG tablet   Yes No   Sig: Take 20 mg by mouth At Bedtime   carvedilol (COREG) 25 MG tablet   No No   Sig: Take 1 tablet  (25 mg) by mouth 2 times daily (with meals).   cholecalciferol (VITAMIN D-1000 MAX ST) 1000 units TABS   No No   Sig: Take 1,000 Units by mouth daily   cloNIDine (CATAPRES) 0.1 MG tablet   No No   Sig: Take 1 tablet (0.1 mg) by mouth 2 times daily.   diphenhydrAMINE (BENADRYL) 50 MG capsule   Yes No   Sig: Take 50 mg by mouth At Bedtime   diphenhydrAMINE (BENADRYL) 50 MG capsule   Yes No   Sig: Take 50 mg by mouth every 4 hours as needed for itching or allergies   ferrous sulfate (FE TABS) 325 (65 Fe) MG EC tablet   Yes No   Sig: Take 325 mg by mouth every other day.   glucose (BD GLUCOSE) 4 g chewable tablet   Yes No   Sig: Take 4 tablets by mouth as needed for low blood sugar   hydrALAZINE (APRESOLINE) 25 MG tablet   Yes No   Sig: Take 25 mg by mouth 3 times daily Hold for SBP <120   hypromellose (GENTEAL SEVERE) ophthalmic gel 0.3%   Yes No   Sig: Place 1 drop into both eyes 2 times daily as needed for dry eyes   insulin aspart (NOVOLOG PEN) 100 UNIT/ML pen   No No   Sig: Inject 3 Units subcutaneously 3 times daily (with meals).   insulin glargine (LANTUS PEN) 100 UNIT/ML pen   No No   Sig: Inject 25 Units subcutaneously every morning.   lisinopril (ZESTRIL) 40 MG tablet   Yes No   Sig: Take 40 mg by mouth daily   magnesium oxide (MAG-OX) 400 MG tablet   Yes No   Sig: Take 400 mg by mouth daily   melatonin 5 MG tablet   Yes No   Sig: Take 5 mg by mouth daily as needed for sleep   ondansetron (ZOFRAN ODT) 4 MG ODT tab   Yes No   Sig: Take 4 mg by mouth every 8 hours as needed for nausea   paliperidone (INVEGA SUSTENNA) 156 MG/ML PABLO injection   Yes No   Sig: Inject 156 mg into the muscle every 28 days   pantoprazole (PROTONIX) 40 MG EC tablet   No No   Sig: Protonix 40 mg p.o. twice daily for 1 month followed by 40 mg p.o. daily.   senna-docusate (SENOKOT-S/PERICOLACE) 8.6-50 MG tablet   Yes No   Sig: Take 1 tablet by mouth 2 times daily as needed   tamsulosin (FLOMAX) 0.4 MG capsule   No No   Sig: Take 1 capsule  "(0.4 mg) by mouth daily   traZODone (DESYREL) 50 MG tablet   Yes No   Sig: Take 50 mg by mouth nightly as needed for sleep      Facility-Administered Medications: None        Review of Systems    Review of systems not obtained due to patient factors - confusion and lack of cooperation    Social History   I have reviewed this patient's social history and updated it with pertinent information if needed.  Social History     Tobacco Use    Smoking status: Unknown   Substance Use Topics    Alcohol use: Yes     Comment: \"few beers\"    Drug use: Yes     Types: \"Crack\" cocaine         Family History     Unable to obtain due to: mental state      Allergies   Allergies   Allergen Reactions    Alprazolam      Other reaction(s): *Unknown States \"I break out\"    Oxycodone-Acetaminophen      Other reaction(s): *Unknown, States \"I break out\"    Oxycodone-Acetaminophen Other (See Comments)     Other reaction(s): *Unknown, States \"I break out\"        Physical Exam   Vital Signs: Temp: 98.1  F (36.7  C) Temp src: Oral BP: 116/56 Pulse: (!) 123   Resp: 10 SpO2: 98 % O2 Device: None (Room air)    Weight: 159 lbs 0 oz    GEN:   Alert, oriented x 3, appears comfortable, NAD.  NECK:   Supple ,no mass or thyromegaly   HEENT:  Normocephalic/atraumatic, no scleral icterus, no nasal discharge, mouth moist.  CV:   Regular rate and rhythm, no murmur or JVD.  S1 + S2 noted, no S3 or S4.  LUNGS:   Clear to auscultation bilaterally without rales/rhonchi/wheezing/retractions.  Symmetric chest rise on inhalation noted.  ABD:   Active bowel sounds, soft, non-tender/non-distended.  No rebound/guarding/rigidity.  EXT:   No edema.  No cyanosis.  No joint synovitis noted.  SKIN:   Dry to touch, no exanthems noted in the visualized areas.  Neurologic: Grossly intact,non focal.     Medical Decision Making       90 MINUTES SPENT BY ME on the date of service doing chart review, history, exam, documentation & further activities per the note.      Data "   ------------------------- PAST 24 HR DATA REVIEWED -----------------------------------------------    I have personally reviewed the following data over the past 24 hrs:    16.7 (H)  \   11.0 (L)   / 182     132 (L) 91 (L) 37.4 (H) /  114 (H)   4.0 27 1.48 (H) \     ALT: 11 AST: 14 AP: 73 TBILI: 0.2   ALB: 4.4 TOT PROTEIN: 6.9 LIPASE: 19     TSH: N/A T4: N/A A1C: 8.2 (H)     Procal: N/A CRP: N/A Lactic Acid: 2.6 (H)         Imaging results reviewed over the past 24 hrs:   Recent Results (from the past 24 hours)   Head CT w/o contrast    Narrative    EXAM: CT HEAD W/O CONTRAST  LOCATION: [facility]  DATE: 2/18/2025    INDICATION: AMS, vomiting, RUE and RLE deficits, unknown chronicity  COMPARISON: None.  TECHNIQUE: Routine CT Head without IV contrast. Multiplanar reformats. Dose reduction techniques were used.    FINDINGS:  INTRACRANIAL CONTENTS: No intracranial hemorrhage, extraaxial collection, or mass effect.  No CT evidence of acute infarct. Mild to moderate presumed chronic small vessel ischemic changes. Mild generalized volume loss. No hydrocephalus. Mild intracranial   atherosclerotic disease.    VISUALIZED ORBITS/SINUSES/MASTOIDS: Large old right medial orbital wall blowout fracture. Mildly shrunken partially calcified right globe with hyperdense material in the vitreous. Incompletely imaged left orbit grossly unremarkable. No paranasal sinus   mucosal disease. No middle ear or mastoid effusion.    BONES/SOFT TISSUES: No acute abnormality.      Impression    IMPRESSION:  1.  No CT evidence for acute intracranial process.  2.  Brain atrophy and presumed chronic microvascular ischemic changes as above.  3.  Large old right medial orbital wall blowout fracture.  4.  Mildly shrunken partially calcified right globe with hyperdense material in the vitreous.     Abd/pelvis CT,  IV  contrast only TRAUMA / AAA    Narrative    EXAM: CT ABDOMEN PELVIS W CONTRAST  LOCATION: [facility]  DATE: 2/18/2025    INDICATION: abd pain, n v  COMPARISON: 08/04/2024  TECHNIQUE: CT scan of the abdomen and pelvis was performed following injection of IV contrast. Multiplanar reformats were obtained. Dose reduction techniques were used.  CONTRAST: 80mL Isovue 370    FINDINGS:   LOWER CHEST: Wall thickening of distal esophagus again seen.    HEPATOBILIARY: Sludge or cholelithiasis. Gallbladder distention.    PANCREAS: Normal.    SPLEEN: Normal.    ADRENAL GLANDS: Thickening of the adrenal glands.    KIDNEYS/BLADDER: 2 mm right renal calculus. Subcentimeter renal hypodensities small for characterization. Small right renal cyst. Bladder is distended.    BOWEL: Normal caliber. Moderate amount of colonic stool. Normal appendix.    LYMPH NODES: Normal.    VASCULATURE: Atherosclerotic vascular calcification.    PELVIC ORGANS: Prominent prostate.    MUSCULOSKELETAL: Fat-containing umbilical hernia. Degenerative change osseous structures.      Impression    IMPRESSION:   1.  Sludge or cholelithiasis within the gallbladder. The gallbladder is distended. Consider ultrasound correlation.  2.  No bowel obstruction or abscess. Appendix is normal.  3.  Tiny nonobstructing right renal calculus.  4.  The bladder is distended.  5.  Wall thickening of the visualized esophagus is again seen. Consider follow-up esophagram or endoscopy.   US Abdomen Limited    Narrative    EXAM: US ABDOMEN LIMITED  LOCATION: [facility]  DATE: 2/18/2025    INDICATION: Nausea and vomiting, GB abnl on CT scan  COMPARISON: CT abdomen pelvis 2/18/2025  TECHNIQUE: Limited abdominal ultrasound.    FINDINGS:    GALLBLADDER: Sludge in an otherwise normal gallbladder. No gallbladder distention, wall thickening, or pericholecystic fluid. Negative sonographic Quiros's sign.    BILE DUCTS: No biliary dilatation. The common duct measures 4 mm.    LIVER: Normal parenchyma with smooth contour. No focal mass. The portal vein is  patent with flow in the normal direction.    RIGHT KIDNEY: No hydronephrosis.    PANCREAS: The pancreas is obscured by overlying gas.    No ascites.        Impression    IMPRESSION:  1.  Gallbladder sludge without sonographic findings to suggest cholecystitis.  2.  The pancreas is obscured.

## 2025-02-18 NOTE — PHARMACY-ADMISSION MEDICATION HISTORY
Pharmacist Admission Medication History    Admission medication history is complete. The information provided in this note is only as accurate as the sources available at the time of the update.    Information Source(s):  Caregiver at group home ([phone])  via phone    Pertinent Information: Blood pressure meds were held last night on 2/17/2025 due to low blood pressure    Changes made to PTA medication list:  Added: Bydureon, metformin, Systane  Deleted: Norco, genteal severe, Novolog  Changed: Coreg from 25 mg BID to 12.5 mg BID, Lantus from morning to at bedtime    Allergies reviewed with patient and updates made in EHR: no    Medication History Completed By: Rosa Espinoza [facility] 2/18/2025 12:18 PM    PTA Med List   Medication Sig Note Last Dose/Taking    acetaminophen (TYLENOL) 325 MG tablet Take 650 mg by mouth every 4 hours as needed for mild pain  Taking As Needed    atorvastatin (LIPITOR) 20 MG tablet Take 20 mg by mouth At Bedtime  2/17/2025 Bedtime    carvedilol (COREG) 12.5 MG tablet Take 12.5 mg by mouth 2 times daily (with meals).  2/17/2025 Morning    cholecalciferol (VITAMIN D-1000 MAX ST) 1000 units TABS Take 1,000 Units by mouth daily  2/17/2025 Morning    cloNIDine (CATAPRES) 0.1 MG tablet Take 1 tablet (0.1 mg) by mouth 2 times daily.  2/17/2025 Morning    diphenhydrAMINE (BENADRYL) 50 MG capsule Take 50 mg by mouth every 4 hours as needed for itching or allergies  Taking As Needed    diphenhydrAMINE (BENADRYL) 50 MG capsule Take 50 mg by mouth At Bedtime  2/17/2025 Bedtime    exenatide ER (BYDUREON) 2 MG pen Inject 2 mg subcutaneously every 7 days.  2/14/2025    ferrous sulfate (FE TABS) 325 (65 Fe) MG EC tablet Take 325 mg by mouth Every Mon, Wed, Fri Morning.  2/17/2025 Morning    glucose (BD GLUCOSE) 4 g chewable tablet Take 4 tablets by mouth as needed for low blood sugar  Taking As Needed    hydrALAZINE (APRESOLINE) 25 MG tablet Take 25 mg by mouth 3 times daily Hold for SBP <120   2/17/2025    insulin glargine (LANTUS PEN) 100 UNIT/ML pen Inject 25 Units subcutaneously at bedtime.  2/17/2025 Bedtime    lisinopril (ZESTRIL) 40 MG tablet Take 40 mg by mouth daily  2/17/2025 Morning    magnesium oxide (MAG-OX) 400 MG tablet Take 400 mg by mouth daily  2/17/2025 Morning    melatonin 5 MG tablet Take 5 mg by mouth daily as needed for sleep  Taking As Needed    metFORMIN (GLUCOPHAGE XR) 500 MG 24 hr tablet Take 1,000 mg by mouth daily (with breakfast).  2/17/2025 Morning    ondansetron (ZOFRAN ODT) 4 MG ODT tab Take 4 mg by mouth every 8 hours as needed for nausea  Taking As Needed    paliperidone (INVEGA SUSTENNA) 156 MG/ML PABLO injection Inject 156 mg into the muscle every 28 days 2/18/2025: Next dose due 3/16/2025 Taking    pantoprazole (PROTONIX) 40 MG EC tablet Take 40 mg by mouth daily.  2/17/2025 Morning    polyethylene glycol-propylene glycol (SYSTANE ULTRA) 0.4-0.3 % SOLN ophthalmic solution Place 1 drop into both eyes 2 times daily as needed for dry eyes.  Taking As Needed    senna-docusate (SENOKOT-S/PERICOLACE) 8.6-50 MG tablet Take 1 tablet by mouth 2 times daily as needed  Taking As Needed    tamsulosin (FLOMAX) 0.4 MG capsule Take 1 capsule (0.4 mg) by mouth daily  2/17/2025 Morning    traZODone (DESYREL) 50 MG tablet Take 50 mg by mouth nightly as needed for sleep  Taking As Needed

## 2025-02-18 NOTE — ED NOTES
"RECEIVING UNIT ED HANDOFF REVIEW    Above ED Nurse Handoff Report was reviewed: Yes  Reviewed by: Gillian Santamaria RN on February 18, 2025 at 2:44 PM   I Paemla called the ED to inform them the note was read: Yes     [facility] [place]  ED Nurse Handoff Report    ED Chief complaint: Nausea, Vomiting, & Diarrhea (Pt bg was 334 per ems. N/v for a few hours prior to call)  . ED Diagnosis:   Final diagnoses:   Nausea and vomiting, unspecified vomiting type   Transient alteration of awareness   Polysubstance abuse (H)   KAVITA (acute kidney injury)       Allergies:   Allergies   Allergen Reactions    Alprazolam      Other reaction(s): *Unknown States \"I break out\"    Oxycodone-Acetaminophen      Other reaction(s): *Unknown, States \"I break out\"    Oxycodone-Acetaminophen Other (See Comments)     Other reaction(s): *Unknown, States \"I break out\"       Code Status: Full Code    Activity level - Baseline/Home:  independent.  Activity Level - Current:   standby and assist of 2.   Lift room needed: No.   Bariatric: No   Needed: No   Isolation: No.   Infection: Not Applicable.     Respiratory status: Room air    Vital Signs (within 30 minutes):   Vitals:    02/18/25 0504 02/18/25 0530 02/18/25 0615 02/18/25 0630   BP: 131/64 139/65 (!) 169/94 (!) 180/96   Pulse: 118 120 (!) 127 (!) 127   Resp: 13 12 13 (!) 9   Temp:       TempSrc:       SpO2: 98%  98% 100%   Weight:       Height:           Cardiac Rhythm:  ,      Pain level:    Patient confused: No.   Patient Falls Risk: nonskid shoes/slippers when out of bed, arm band in place, and activity supervised.   Elimination Status: Has voided     Patient Report - Initial Complaint: Nausea, Vomiting, & Diarrhea.   Focused Assessment: HPI   Benja Duong is a 59 year old male with a history of DM2, hyperlipidemia, hypertension, polysubstance abuse, schizophrenia, and intellectual disability who presents to the ED via EMS for nausea, vomiting, and diarrhea. Per " EMS report, the patient is coming form a group home where he has had nausea and vomiting for the past few hours. He is not having any pain or fevers. EMS measured BG at 334. He was given 4 mg Zofran. EMS uncertain if patient presents at his baseline. Patient is unable to provide any history as he his mental status is altered.     After administration of narcan, patient's altered mental status has improved. He denies any drug use at this time.      Abnormal Results:   Labs Ordered and Resulted from Time of ED Arrival to Time of ED Departure   BASIC METABOLIC PANEL - Abnormal       Result Value    Sodium 132 (*)     Potassium 4.0      Chloride 91 (*)     Carbon Dioxide (CO2) 27      Anion Gap 14      Urea Nitrogen 37.4 (*)     Creatinine 1.48 (*)     GFR Estimate 54 (*)     Calcium 10.1      Glucose 260 (*)    MAGNESIUM - Abnormal    Magnesium 2.4 (*)    CBC WITH PLATELETS AND DIFFERENTIAL - Abnormal    WBC Count 16.7 (*)     RBC Count 4.10 (*)     Hemoglobin 11.0 (*)     Hematocrit 34.4 (*)     MCV 84      MCH 26.8      MCHC 32.0      RDW 14.9      Platelet Count 182      % Neutrophils 80      % Lymphocytes 10      % Monocytes 9      % Eosinophils 0      % Basophils 0      % Immature Granulocytes 1      NRBCs per 100 WBC 0      Absolute Neutrophils 13.3 (*)     Absolute Lymphocytes 1.7      Absolute Monocytes 1.5 (*)     Absolute Eosinophils 0.0      Absolute Basophils 0.0      Absolute Immature Granulocytes 0.1      Absolute NRBCs 0.0     BLOOD GAS VENOUS - Abnormal    pH Venous 7.36      pCO2 Venous 54 (*)     pO2 Venous 24 (*)     Bicarbonate Venous 30 (*)     Base Excess/Deficit Venous 3.8 (*)     FIO2 0      Oxyhemoglobin Venous 34 (*)     O2 Sat, Venous 35.2 (*)    ROUTINE UA WITH MICROSCOPIC - Abnormal    Color Urine Light Yellow      Appearance Urine Clear      Glucose Urine 1000 (*)     Bilirubin Urine Negative      Ketones Urine Trace (*)     Specific Gravity Urine 1.027      Blood Urine Negative      pH  Urine 6.5      Protein Albumin Urine 30 (*)     Urobilinogen Urine Normal      Nitrite Urine Negative      Leukocyte Esterase Urine Negative      Mucus Urine Present (*)     RBC Urine 2      WBC Urine 1      Squamous Epithelials Urine <1      Hyaline Casts Urine 12 (*)    ISTAT GASES LACTATE VENOUS POCT - Abnormal    Lactic Acid POCT 2.6 (*)     Bicarbonate Venous POCT 33 (*)     O2 Sat, Venous POCT 58 (*)     pCO2 Venous POCT 56 (*)     pH Venous POCT 7.38      pO2 Venous POCT 32     URINE DRUG SCREEN PANEL - Abnormal    Amphetamines Urine Screen Negative      Barbituates Urine Screen Negative      Benzodiazepine Urine Screen Negative      Cannabinoids Urine Screen Positive (*)     Cocaine Urine Screen Negative      Fentanyl Qual Urine Screen Negative      Opiates Urine Screen Negative      PCP Urine Screen Negative     HEPATIC FUNCTION PANEL - Normal    Protein Total 6.9      Albumin 4.4      Bilirubin Total 0.2      Alkaline Phosphatase 73      AST 14      ALT 11      Bilirubin Direct <0.20     LIPASE - Normal    Lipase 19     KETONE BETA-HYDROXYBUTYRATE QUANTITATIVE, RAPID - Normal    Ketone (Beta-Hydroxybutyrate) Quantitative <0.18     ETHYL ALCOHOL LEVEL - Normal    Alcohol ethyl <0.01     INFLUENZA A/B, RSV AND SARS-COV2 PCR        Abd/pelvis CT,  IV  contrast only TRAUMA / AAA   Final Result   IMPRESSION:    1.  Sludge or cholelithiasis within the gallbladder. The gallbladder is distended. Consider ultrasound correlation.   2.  No bowel obstruction or abscess. Appendix is normal.   3.  Tiny nonobstructing right renal calculus.   4.  The bladder is distended.   5.  Wall thickening of the visualized esophagus is again seen. Consider follow-up esophagram or endoscopy.      Head CT w/o contrast   Final Result   IMPRESSION:   1.  No CT evidence for acute intracranial process.   2.  Brain atrophy and presumed chronic microvascular ischemic changes as above.   3.  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   4.  Mildly shrunken partially calcified right globe with hyperdense material in the vitreous.         US Abdomen Limited    (Results Pending)       Treatments provided: See MAR  Family Comments: NA  OBS brochure/video discussed/provided to patient:  Yes  ED Medications:   Medications   cloNIDine (CATAPRES) tablet 0.1 mg (0.1 mg Oral $Given 2/18/25 0708)   hydrALAZINE (APRESOLINE) tablet 25 mg (25 mg Oral $Given 2/18/25 0757)   lactated ringers BOLUS 1,000 mL (0 mLs Intravenous Stopped 2/18/25 0616)   naloxone (NARCAN) injection 0.2 mg (0.2 mg Intravenous $Given 2/18/25 0442)   lactated ringers BOLUS 1,000 mL (0 mLs Intravenous Stopped 2/18/25 0754)   CT scan flush (60 mLs Intravenous $Given 2/18/25 0559)   iopamidol (ISOVUE-370) solution 500 mL (80 mLs Intravenous $Given 2/18/25 0559)   naloxone (NARCAN) injection 0.2 mg (0.2 mg Intravenous $Given 2/18/25 0632)   carvedilol (COREG) tablet 12.5 mg (12.5 mg Oral $Given 2/18/25 0756)   lisinopril (ZESTRIL) tablet 40 mg (40 mg Oral $Given 2/18/25 0708)   pantoprazole (PROTONIX) IV push injection 40 mg (40 mg Intravenous $Given 2/18/25 0755)       Drips infusing:  No  For the majority of the shift this patient was Green.   Interventions performed were NA.    Sepsis treatment initiated: No    Cares/treatment/interventions/medications to be completed following ED care: See Orders.     ED Nurse Name: Dee Reddy RN  8:02 AM

## 2025-02-19 VITALS
HEIGHT: 64 IN | RESPIRATION RATE: 18 BRPM | DIASTOLIC BLOOD PRESSURE: 64 MMHG | TEMPERATURE: 97.7 F | HEART RATE: 108 BPM | BODY MASS INDEX: 27.14 KG/M2 | OXYGEN SATURATION: 96 % | WEIGHT: 159 LBS | SYSTOLIC BLOOD PRESSURE: 128 MMHG

## 2025-02-19 LAB
ANION GAP SERPL CALCULATED.3IONS-SCNC: 9 MMOL/L (ref 7–15)
BASOPHILS # BLD AUTO: 0.1 10E3/UL (ref 0–0.2)
BASOPHILS NFR BLD AUTO: 1 %
BUN SERPL-MCNC: 15 MG/DL (ref 8–23)
CALCIUM SERPL-MCNC: 8.6 MG/DL (ref 8.8–10.4)
CHLORIDE SERPL-SCNC: 95 MMOL/L (ref 98–107)
CREAT SERPL-MCNC: 1 MG/DL (ref 0.67–1.17)
EGFRCR SERPLBLD CKD-EPI 2021: 86 ML/MIN/1.73M2
EOSINOPHIL # BLD AUTO: 0.1 10E3/UL (ref 0–0.7)
EOSINOPHIL NFR BLD AUTO: 1 %
ERYTHROCYTE [DISTWIDTH] IN BLOOD BY AUTOMATED COUNT: 14.9 % (ref 10–15)
GLUCOSE BLDC GLUCOMTR-MCNC: 119 MG/DL (ref 70–99)
GLUCOSE BLDC GLUCOMTR-MCNC: 155 MG/DL (ref 70–99)
GLUCOSE BLDC GLUCOMTR-MCNC: 97 MG/DL (ref 70–99)
GLUCOSE SERPL-MCNC: 106 MG/DL (ref 70–99)
HCO3 SERPL-SCNC: 29 MMOL/L (ref 22–29)
HCT VFR BLD AUTO: 26.5 % (ref 40–53)
HGB BLD-MCNC: 8.6 G/DL (ref 13.3–17.7)
IMM GRANULOCYTES # BLD: 0.1 10E3/UL
IMM GRANULOCYTES NFR BLD: 1 %
LYMPHOCYTES # BLD AUTO: 2.9 10E3/UL (ref 0.8–5.3)
LYMPHOCYTES NFR BLD AUTO: 27 %
MAGNESIUM SERPL-MCNC: 1.7 MG/DL (ref 1.7–2.3)
MCH RBC QN AUTO: 26.7 PG (ref 26.5–33)
MCHC RBC AUTO-ENTMCNC: 32.5 G/DL (ref 31.5–36.5)
MCV RBC AUTO: 82 FL (ref 78–100)
MONOCYTES # BLD AUTO: 1.2 10E3/UL (ref 0–1.3)
MONOCYTES NFR BLD AUTO: 11 %
NEUTROPHILS # BLD AUTO: 6.6 10E3/UL (ref 1.6–8.3)
NEUTROPHILS NFR BLD AUTO: 60 %
NRBC # BLD AUTO: 0 10E3/UL
NRBC BLD AUTO-RTO: 0 /100
PLATELET # BLD AUTO: 141 10E3/UL (ref 150–450)
POTASSIUM SERPL-SCNC: 3.3 MMOL/L (ref 3.4–5.3)
RBC # BLD AUTO: 3.22 10E6/UL (ref 4.4–5.9)
SODIUM SERPL-SCNC: 133 MMOL/L (ref 135–145)
WBC # BLD AUTO: 10.9 10E3/UL (ref 4–11)

## 2025-02-19 PROCEDURE — 85025 COMPLETE CBC W/AUTO DIFF WBC: CPT | Performed by: NURSE PRACTITIONER

## 2025-02-19 PROCEDURE — 250N000013 HC RX MED GY IP 250 OP 250 PS 637: Performed by: NURSE PRACTITIONER

## 2025-02-19 PROCEDURE — 96361 HYDRATE IV INFUSION ADD-ON: CPT

## 2025-02-19 PROCEDURE — 82962 GLUCOSE BLOOD TEST: CPT

## 2025-02-19 PROCEDURE — 250N000013 HC RX MED GY IP 250 OP 250 PS 637: Performed by: PHYSICIAN ASSISTANT

## 2025-02-19 PROCEDURE — 250N000013 HC RX MED GY IP 250 OP 250 PS 637: Performed by: EMERGENCY MEDICINE

## 2025-02-19 PROCEDURE — 82310 ASSAY OF CALCIUM: CPT | Performed by: NURSE PRACTITIONER

## 2025-02-19 PROCEDURE — 83735 ASSAY OF MAGNESIUM: CPT | Performed by: PHYSICIAN ASSISTANT

## 2025-02-19 PROCEDURE — 258N000003 HC RX IP 258 OP 636: Performed by: NURSE PRACTITIONER

## 2025-02-19 PROCEDURE — G0378 HOSPITAL OBSERVATION PER HR: HCPCS

## 2025-02-19 PROCEDURE — 36415 COLL VENOUS BLD VENIPUNCTURE: CPT | Performed by: NURSE PRACTITIONER

## 2025-02-19 PROCEDURE — 99239 HOSP IP/OBS DSCHRG MGMT >30: CPT | Performed by: PHYSICIAN ASSISTANT

## 2025-02-19 RX ORDER — POTASSIUM CHLORIDE 1.5 G/1.58G
40 POWDER, FOR SOLUTION ORAL ONCE
Status: COMPLETED | OUTPATIENT
Start: 2025-02-19 | End: 2025-02-19

## 2025-02-19 RX ADMIN — TAMSULOSIN HYDROCHLORIDE 0.4 MG: 0.4 CAPSULE ORAL at 08:49

## 2025-02-19 RX ADMIN — SODIUM CHLORIDE, POTASSIUM CHLORIDE, SODIUM LACTATE AND CALCIUM CHLORIDE: 600; 310; 30; 20 INJECTION, SOLUTION INTRAVENOUS at 05:41

## 2025-02-19 RX ADMIN — CARVEDILOL 12.5 MG: 12.5 TABLET, FILM COATED ORAL at 08:48

## 2025-02-19 RX ADMIN — PANTOPRAZOLE SODIUM 40 MG: 40 TABLET, DELAYED RELEASE ORAL at 08:49

## 2025-02-19 RX ADMIN — POTASSIUM CHLORIDE 40 MEQ: 1.5 POWDER, FOR SOLUTION ORAL at 12:06

## 2025-02-19 RX ADMIN — LISINOPRIL 40 MG: 40 TABLET ORAL at 08:49

## 2025-02-19 RX ADMIN — CLONIDINE HYDROCHLORIDE 0.1 MG: 0.1 TABLET ORAL at 08:50

## 2025-02-19 RX ADMIN — FERROUS SULFATE TAB 325 MG (65 MG ELEMENTAL FE) 325 MG: 325 (65 FE) TAB at 08:49

## 2025-02-19 RX ADMIN — HYDRALAZINE HYDROCHLORIDE 25 MG: 25 TABLET ORAL at 05:37

## 2025-02-19 ASSESSMENT — ACTIVITIES OF DAILY LIVING (ADL)
ADLS_ACUITY_SCORE: 46
ADLS_ACUITY_SCORE: 50
ADLS_ACUITY_SCORE: 46
ADLS_ACUITY_SCORE: 50
ADLS_ACUITY_SCORE: 46
ADLS_ACUITY_SCORE: 46
ADLS_ACUITY_SCORE: 51
ADLS_ACUITY_SCORE: 50
ADLS_ACUITY_SCORE: 50

## 2025-02-19 NOTE — PLAN OF CARE
Patient's After Visit Summary was reviewed with patient and his staff nurse from .   Patient verbalized understanding of After Visit Summary, recommended follow up and was given an opportunity to ask questions.   Discharge medications sent home with patient/family: N/A  Discharged with:  staff RN Sedrick    OBSERVATION patient END time: 1516

## 2025-02-19 NOTE — PLAN OF CARE
PRIMARY DIAGNOSIS: N/V/D    OUTPATIENT/OBSERVATION GOALS TO BE MET BEFORE DISCHARGE  1. Orthostatic performed: No    2. Tolerating PO fluid and/or antibiotics (if applicable): Yes    3. Nausea/Vomiting/Diarrhea symptoms improved: Yes    4. Pain status: Pain free.    5. Return to near baseline physical activity: No - refusing to get out of bed, wants to rest    Discharge Planner Nurse   Safe discharge environment identified: Yes  Barriers to discharge: No       Entered by: Stacy Slaughter RN 02/19/2025 8:00AM     Please review provider order for any additional goals.   Nurse to notify provider when observation goals have been met and patient is ready for discharge.    Intermittent confusion, has not gotten oob yet, requesting to rest, fair appetite, denies pain, PAINAD 0, attempting repositioning though pt able to perform independently and prefers fetal position and right side, POC ongoing, bed alarm on.

## 2025-02-19 NOTE — PROGRESS NOTES
"SPIRITUAL HEALTH SERVICES - Progress Note  Obs 2    Referral Source/ Reason for Visit: Length of stay visit for emotional support.    Summary and Recommendations -     Offered compassion and support to patient who said he feels \"Totally alone.\"    Other than people who have been, \"Assigned to me,\" he says he has no one in his corner.    Plan: Patient is scheduled to discharge to his group home today.    [facility] remains available for emotional support upon request.    Rev. Marce Frye, M. Taras.  Staff     [facility] available 24/7 for emergent requests/ referrals, either by paging the on-call  or by entering an ASAP/STAT consult in [email or web address], which will also page the on-call .      Assessment    Saw pt Benja uDong regarding length of stay visit for emotional support.    Patient/Family Understanding of Illness and Goals of Care - Patient said, \"I got sick all of a sudden.\"  Medical record lists reason for admission as transient alteration of awareness with additional hospital problems.    Distress and Loss - I asked if he has any family or friends visiting him at the group home.  He said no.    Strengths, Coping and Resources - He named staff and other residents at his group home; [facility], as sources of support.    Meaning, Beliefs and Spirituality - Patient has no Restoration beliefs.        "

## 2025-02-19 NOTE — PLAN OF CARE
"PRIMARY DIAGNOSIS: nausea/vomiting, transient alteration of awareness, KAVITA    OUTPATIENT/OBSERVATION GOALS TO BE MET BEFORE DISCHARGE  1. Orthostatic performed: No    2. Tolerating PO fluid and/or antibiotics (if applicable): Yes    3. Nausea/Vomiting/Diarrhea symptoms improved: Yes    4. Pain status: Pain free.    5. Return to near baseline physical activity: No    Discharge Planner Nurse   Safe discharge environment identified: Yes  Barriers to discharge: Yes       Entered by: Gillian Santamaria RN 02/18/2025 6:11 PM   Pt drowsy, states he wants to sleep. Disoriented to situation. He states blindness in both eyes is baseline for him. On continuous IVF's. Complains of intermittent nausea. Clonidine given x1, as pt was having increased agitation, yawning, runny nose upon arrival. HR remains elevated, 125. On tele monitor. Brief placed, urinal at bedside. Bed alarm on and call light in reach. Need stool sample.     Please review provider order for any additional goals.   Nurse to notify provider when observation goals have been met and patient is ready for discharge.  Problem: Adult Inpatient Plan of Care  Goal: Plan of Care Review  Description: The Plan of Care Review/Shift note should be completed every shift.  The Outcome Evaluation is a brief statement about your assessment that the patient is improving, declining, or no change.  This information will be displayed automatically on your shift  note.  Outcome: Met  Flowsheets (Taken 2/18/2025 1809)  Outcome Evaluation: Pt arrived to unit, drowsy, disoriented to situation.  Goal: Patient-Specific Goal (Individualized)  Description: You can add care plan individualizations to a care plan. Examples of Individualization might be:  \"Parent requests to be called daily at 9am for status\", \"I have a hard time hearing out of my right ear\", or \"Do not touch me to wake me up as it startles  me\".  Outcome: Met  Goal: Absence of Hospital-Acquired Illness or Injury  Outcome: " Met  Intervention: Identify and Manage Fall Risk  Recent Flowsheet Documentation  Taken 2/18/2025 1523 by Gillian Santamaria, RN  Safety Promotion/Fall Prevention:   activity supervised   assistive device/personal items within reach   lighting adjusted   increased rounding and observation   increase visualization of patient   clutter free environment maintained  Intervention: Prevent Infection  Recent Flowsheet Documentation  Taken 2/18/2025 1523 by Gillian Santamaria, RN  Infection Prevention:   rest/sleep promoted   cohorting utilized   hand hygiene promoted   equipment surfaces disinfected  Goal: Optimal Comfort and Wellbeing  Outcome: Met  Goal: Readiness for Transition of Care  Outcome: Met  Intervention: Mutually Develop Transition Plan  Recent Flowsheet Documentation  Taken 2/18/2025 1500 by Gillian Santamaria, RN  Equipment Currently Used at Home: none   Goal Outcome Evaluation:                 Outcome Evaluation: Pt arrived to unit, drowsy, disoriented to situation.

## 2025-02-19 NOTE — DISCHARGE SUMMARY
"[facility]  Hospitalist Discharge Summary      Date of Admission:  2/18/2025  Date of Discharge:  2/19/2025  Discharging Provider: Marine Bustamante PA-C  Discharge Service: Hospitalist Service    Discharge Diagnoses   Nausea, vomiting and diarrhea-resolved  Altered mental status-resolved  Hypokalemia    Clinically Significant Risk Factors     # DMII: A1C = 8.2 % (Ref range: <5.7 %) within past 6 months  # Overweight: Estimated body mass index is 27.29 kg/m  as calculated from the following:    Height as of this encounter: 1.626 m (5' 4\").    Weight as of this encounter: 72.1 kg (159 lb).       Follow-ups Needed After Discharge   Follow-up Appointments       Follow-up and recommended labs and tests       Follow up with primary care in 1 week for repeat lab work including BMP (sodium and potassium slightly low)                Unresulted Labs Ordered in the Past 30 Days of this Admission       Date and Time Order Name Status Description    2/18/2025  2:26 PM Blood Culture Hand, Left Preliminary     2/18/2025  2:26 PM Blood Culture Peripheral Blood Preliminary         These results will be followed up by primary care provider    Discharge Disposition   Discharged to home  Condition at discharge: Stable    Hospital Course   Benja Duong is a 59 year old male who for poor historian at baseline.  He has a past medical history of type 2 diabetes, hyperlipidemia, hypertension, polysubstance abuse, schizophrenia, and intellectual disability who resides in a group home who presents to [facility] on the morning of 2/18/2025 for multiple symptoms to include nausea vomiting and diarrhea.      EMS reports, he presents from the group home where he has had nausea and vomiting for the past few hours.  He denies any pain or fevers.  In the field his blood sugar was 334.  He was given 4 mg of Zofran.  Given his past history suspicion was raised of possible polysubstance use contributing " as he was unable to provide any history given his altered state.  Did receive 2 doses of Narcan with improvement.  My colleague was able to speak to the [facility] staff around 730 the morning of presentation after he had been in the emergency department for several hours.  They state that he could have gotten access to substances from other individuals in the group home.  However, they do add that he had been complaining of nausea for the past couple of days.      In the ED he was vitally stable. Lab remarkable for LFTs within normal limits. BMP shows a chloride of 91 and a CO2 of 27 BUN and creatinine are elevated at 37 and 1.48 and glucose 260. CBC with leukocytosis with a white count of 16.7 K, hemoglobin 11, platelets 182K. Lactic acid 2.6.,  Magnesium 2.4, beta hydroxybutyrate negative, EtOH negative, UA unremarkable. CT of the head is negative. CT of the abdomen pelvis with contrast demonstrates large/cholelithiasis within the gallbladder with gallbladder distention.  Gallbladder ultrasound shows sludge.  No bowel obstruction or abscess.  Appendix is normal.  Tiny nonobstructing renal calculi.  Esophageal wall thickening. ECG shows sinus tachycardia.  Urine drug screen is positive for cannabinoids but otherwise negative.  He received 2 L of lactated Ringer's, Narcan x 2, clonidine, lisinopril and Protonix with observation admission.    Since admission he has not had any further nausea, vomiting or diarrhea suspect viral gastroenteritis.  He became more alert and oriented in the evening and has been fine since then.  He was ambulated with nursing and is at his physical baseline.  I called the group home who are comfortable taking him back today.        Consultations This Hospital Stay   CARE MANAGEMENT / SOCIAL WORK IP CONSULT  CARE MANAGEMENT / SOCIAL WORK IP CONSULT    Code Status   Full Code    Time Spent on this Encounter   IMarine PA-C, personally saw the patient today and spent greater  than 30 minutes discharging this patient.       Marine Bustamante PA-C  [facility] OBSERVATION DEPT  [address]  [facility] [address]  Phone: [phone]  ______________________________________________________________________    Physical Exam   Vital Signs: Temp: 98.1  F (36.7  C) Temp src: Oral BP: 128/80 Pulse: 115   Resp: 17 SpO2: 94 % O2 Device: None (Room air)    Weight: 159 lbs 0 oz  General Appearance: Alert, orientated, flat affect, little interest in talking to me  Respiratory: CTAB  Cardiovascular: Tachycardic without murmur  GI: Bowel sounds are present without tenderness  Skin: No rash or open sores  Other: No lower extremity swelling       Primary Care Physician   [facility]    Discharge Orders      Reason for your hospital stay    You were admitted for concerns of nausea, vomiting and diarrhea that has since resolved. You seemed confused on admission but that has resolved as well. We have not made any medication changes.     Follow-up and recommended labs and tests     Follow up with primary care in 1 week for repeat lab work including BMP (sodium and potassium slightly low)     Activity    Your activity upon discharge: activity as tolerated     Discharge Instructions    Resume all previous orders     Diet    Follow this diet upon discharge: Resume previous diet       Significant Results and Procedures   Most Recent 3 CBC's:  Recent Labs   Lab Test 02/19/25  0527 02/18/25  0350 11/17/24  0732   WBC 10.9 16.7* 15.2*   HGB 8.6* 11.0* 10.8*   MCV 82 84 79   * 182 181     Most Recent 3 BMP's:  Recent Labs   Lab Test 02/19/25  1209 02/19/25  0808 02/19/25  0527 02/18/25  0940 02/18/25  0350 11/17/24  0804 11/17/24  0732   NA  --   --  133*  --  132*  --  129*   POTASSIUM  --   --  3.3*  --  4.0  --  3.9   CHLORIDE  --   --  95*  --  91*  --  97*   CO2  --   --  29  --  27  --  23   BUN  --   --  15.0  --  37.4*  --  18.6   CR  --   --  1.00   --  1.48*  --  0.87   ANIONGAP  --   --  9  --  14  --  9   MITCHEL  --   --  8.6*  --  10.1  --  9.7   * 97 106*   < > 260*   < > 160*    < > = values in this interval not displayed.   ,   Results for orders placed or performed during the hospital encounter of 02/18/25   Head CT w/o contrast    Narrative    EXAM: CT HEAD W/O CONTRAST  LOCATION: [facility]  DATE: 2/18/2025    INDICATION: AMS, vomiting, RUE and RLE deficits, unknown chronicity  COMPARISON: None.  TECHNIQUE: Routine CT Head without IV contrast. Multiplanar reformats. Dose reduction techniques were used.    FINDINGS:  INTRACRANIAL CONTENTS: No intracranial hemorrhage, extraaxial collection, or mass effect.  No CT evidence of acute infarct. Mild to moderate presumed chronic small vessel ischemic changes. Mild generalized volume loss. No hydrocephalus. Mild intracranial   atherosclerotic disease.    VISUALIZED ORBITS/SINUSES/MASTOIDS: Large old right medial orbital wall blowout fracture. Mildly shrunken partially calcified right globe with hyperdense material in the vitreous. Incompletely imaged left orbit grossly unremarkable. No paranasal sinus   mucosal disease. No middle ear or mastoid effusion.    BONES/SOFT TISSUES: No acute abnormality.      Impression    IMPRESSION:  1.  No CT evidence for acute intracranial process.  2.  Brain atrophy and presumed chronic microvascular ischemic changes as above.  3.  Large old right medial orbital wall blowout fracture.  4.  Mildly shrunken partially calcified right globe with hyperdense material in the vitreous.     Abd/pelvis CT,  IV  contrast only TRAUMA / AAA    Narrative    EXAM: CT ABDOMEN PELVIS W CONTRAST  LOCATION: [facility]  DATE: 2/18/2025    INDICATION: abd pain, n v  COMPARISON: 08/04/2024  TECHNIQUE: CT scan of the abdomen and pelvis was performed following injection of IV contrast. Multiplanar reformats were obtained. Dose reduction techniques  were used.  CONTRAST: 80mL Isovue 370    FINDINGS:   LOWER CHEST: Wall thickening of distal esophagus again seen.    HEPATOBILIARY: Sludge or cholelithiasis. Gallbladder distention.    PANCREAS: Normal.    SPLEEN: Normal.    ADRENAL GLANDS: Thickening of the adrenal glands.    KIDNEYS/BLADDER: 2 mm right renal calculus. Subcentimeter renal hypodensities small for characterization. Small right renal cyst. Bladder is distended.    BOWEL: Normal caliber. Moderate amount of colonic stool. Normal appendix.    LYMPH NODES: Normal.    VASCULATURE: Atherosclerotic vascular calcification.    PELVIC ORGANS: Prominent prostate.    MUSCULOSKELETAL: Fat-containing umbilical hernia. Degenerative change osseous structures.      Impression    IMPRESSION:   1.  Sludge or cholelithiasis within the gallbladder. The gallbladder is distended. Consider ultrasound correlation.  2.  No bowel obstruction or abscess. Appendix is normal.  3.  Tiny nonobstructing right renal calculus.  4.  The bladder is distended.  5.  Wall thickening of the visualized esophagus is again seen. Consider follow-up esophagram or endoscopy.   US Abdomen Limited    Narrative    EXAM: US ABDOMEN LIMITED  LOCATION: [facility]  DATE: 2/18/2025    INDICATION: Nausea and vomiting, GB abnl on CT scan  COMPARISON: CT abdomen pelvis 2/18/2025  TECHNIQUE: Limited abdominal ultrasound.    FINDINGS:    GALLBLADDER: Sludge in an otherwise normal gallbladder. No gallbladder distention, wall thickening, or pericholecystic fluid. Negative sonographic Quiros's sign.    BILE DUCTS: No biliary dilatation. The common duct measures 4 mm.    LIVER: Normal parenchyma with smooth contour. No focal mass. The portal vein is patent with flow in the normal direction.    RIGHT KIDNEY: No hydronephrosis.    PANCREAS: The pancreas is obscured by overlying gas.    No ascites.        Impression    IMPRESSION:  1.  Gallbladder sludge without sonographic findings to suggest  cholecystitis.  2.  The pancreas is obscured.           Discharge Medications   Current Discharge Medication List        CONTINUE these medications which have NOT CHANGED    Details   acetaminophen (TYLENOL) 325 MG tablet Take 650 mg by mouth every 4 hours as needed for mild pain      atorvastatin (LIPITOR) 20 MG tablet Take 20 mg by mouth At Bedtime      carvedilol (COREG) 12.5 MG tablet Take 12.5 mg by mouth 2 times daily (with meals).      cholecalciferol (VITAMIN D-1000 MAX ST) 1000 units TABS Take 1,000 Units by mouth daily  Qty: 30 tablet, Refills: 1      cloNIDine (CATAPRES) 0.1 MG tablet Take 1 tablet (0.1 mg) by mouth 2 times daily.  Qty: 60 tablet, Refills: 1    Associated Diagnoses: Benign essential hypertension      !! diphenhydrAMINE (BENADRYL) 50 MG capsule Take 50 mg by mouth every 4 hours as needed for itching or allergies      !! diphenhydrAMINE (BENADRYL) 50 MG capsule Take 50 mg by mouth At Bedtime      exenatide ER (BYDUREON) 2 MG pen Inject 2 mg subcutaneously every 7 days.      ferrous sulfate (FE TABS) 325 (65 Fe) MG EC tablet Take 325 mg by mouth Every Mon, Wed, Fri Morning.      glucose (BD GLUCOSE) 4 g chewable tablet Take 4 tablets by mouth as needed for low blood sugar      hydrALAZINE (APRESOLINE) 25 MG tablet Take 25 mg by mouth 3 times daily Hold for SBP <120      insulin glargine (LANTUS PEN) 100 UNIT/ML pen Inject 25 Units subcutaneously at bedtime.      lisinopril (ZESTRIL) 40 MG tablet Take 40 mg by mouth daily      magnesium oxide (MAG-OX) 400 MG tablet Take 400 mg by mouth daily      melatonin 5 MG tablet Take 5 mg by mouth daily as needed for sleep      metFORMIN (GLUCOPHAGE XR) 500 MG 24 hr tablet Take 1,000 mg by mouth daily (with breakfast).      ondansetron (ZOFRAN ODT) 4 MG ODT tab Take 4 mg by mouth every 8 hours as needed for nausea      paliperidone (INVEGA SUSTENNA) 156 MG/ML PABLO injection Inject 156 mg into the muscle every 28 days      pantoprazole (PROTONIX) 40 MG  "EC tablet Take 40 mg by mouth daily.      polyethylene glycol-propylene glycol (SYSTANE ULTRA) 0.4-0.3 % SOLN ophthalmic solution Place 1 drop into both eyes 2 times daily as needed for dry eyes.      senna-docusate (SENOKOT-S/PERICOLACE) 8.6-50 MG tablet Take 1 tablet by mouth 2 times daily as needed      tamsulosin (FLOMAX) 0.4 MG capsule Take 1 capsule (0.4 mg) by mouth daily  Qty: 30 capsule, Refills: 3    Associated Diagnoses: Urinary retention      traZODone (DESYREL) 50 MG tablet Take 50 mg by mouth nightly as needed for sleep       !! - Potential duplicate medications found. Please discuss with provider.        Allergies   Allergies   Allergen Reactions    Alprazolam      Other reaction(s): *Unknown States \"I break out\"    Oxycodone-Acetaminophen      Other reaction(s): *Unknown, States \"I break out\"    Oxycodone-Acetaminophen Other (See Comments)     Other reaction(s): *Unknown, States \"I break out\"     "

## 2025-02-19 NOTE — PLAN OF CARE
PRIMARY DIAGNOSIS: Nausea, vomiting, encephalopathy    OUTPATIENT/OBSERVATION GOALS TO BE MET BEFORE DISCHARGE  1. Orthostatic performed: No    2. Tolerating PO fluid and/or antibiotics (if applicable): Yes    3. Nausea/Vomiting/Diarrhea symptoms improved: Yes    4. Pain status: Pain free.    5. Return to near baseline physical activity: No    Discharge Planner Nurse   Safe discharge environment identified: Yes  Barriers to discharge: Yes       Entered by: Trice Poe RN 02/19/2025 3:38 AM     Please review provider order for any additional goals.   Nurse to notify provider when observation goals have been met and patient is ready for discharge.    Alert disoriented to time and situation. Denies pain. Blind. IV LR infusing at 100ml/hr. Blood sugar checks. Denies pain. Denies nausea. Not oob, assist of 2. Stool sample collection needed. Tele tachy.

## 2025-02-19 NOTE — PLAN OF CARE
PRIMARY DIAGNOSIS: N/V/D     OUTPATIENT/OBSERVATION GOALS TO BE MET BEFORE DISCHARGE  1. Orthostatic performed: No     2. Tolerating PO fluid and/or antibiotics (if applicable): Yes     3. Nausea/Vomiting/Diarrhea symptoms improved: Yes     4. Pain status: Pain free.     5. Return to near baseline physical activity: No - refusing to get out of bed, wants to rest        Discharge Planner Nurse  Safe discharge environment identified: Yes  Barriers to discharge: No       Entered by: Stacy Slaughter RN 02/19/2025 12:00PM        Please review provider order for any additional goals.   Nurse to notify provider when observation goals have been met and patient is ready for discharge.     SBA, wants to continue to rest, fair appetite, declining to order food as he wants to eat at home, denies nausea and pain, PAINAD 0, attempting repositioning though pt able to perform independently and prefers fetal position, K+ replaced, POC ongoing, bed alarm on. Discharge later.  staff notified.

## 2025-02-19 NOTE — PLAN OF CARE
Goal Outcome Evaluation:  PRIMARY DIAGNOSIS: Nausea, vomiting, encephalopathy     OUTPATIENT/OBSERVATION GOALS TO BE MET BEFORE DISCHARGE  1. Orthostatic performed: No     2. Tolerating PO fluid and/or antibiotics (if applicable): Yes     3. Nausea/Vomiting/Diarrhea symptoms improved: Yes     4. Pain status: Pain free.     5. Return to near baseline physical activity: No        Discharge Planner Nurse  Safe discharge environment identified: Yes  Barriers to discharge: Yes       Entered by: Trice Poe RN 02/19/2025 3:38 AM        Please review provider order for any additional goals.   Nurse to notify provider when observation goals have been met and patient is ready for discharge.     Alert disoriented to time and situation. Denies pain. Blind. IV LR infusing at 100ml/hr. Blood sugar checks. Denies pain. Denies nausea. Not oob, assist of 2. Stool sample collection needed. Tele tachy.

## 2025-02-19 NOTE — CONSULTS
Care Management Discharge Note    Discharge Date: 02/19/2025       Discharge Disposition:  home ([facility])    Discharge Services:  none    Discharge DME:  none    Discharge Transportation:   staff     Private pay costs discussed: Not applicable    Does the patient's insurance plan have a 3 day qualifying hospital stay waiver?  No    Education Provided on the Discharge Plan:  yes  Persons Notified of Discharge Plans: pt,  staff   Patient/Family in Agreement with the Plan:  yes    Handoff Referral Completed: No, handoff not indicated or clinically appropriate    Additional Information:  No needs anticipated from CM at discharge per pt; independent at baseline. Pt to follow up with PCP post discharge if needs arise.  staff to transport home.       CYNDI Vale   Social Work Care Manager   [facility]   [phone]

## 2025-02-20 LAB
BACTERIA BLD CULT: NORMAL
BACTERIA BLD CULT: NORMAL

## 2025-02-23 LAB
BACTERIA BLD CULT: NO GROWTH
BACTERIA BLD CULT: NO GROWTH

## 2025-06-21 ENCOUNTER — HOSPITAL ENCOUNTER (INPATIENT)
Facility: CLINIC | Age: 60
LOS: 1 days | Discharge: NURSING, REHAB OR LONG-TERM CARE | DRG: 641 | End: 2025-06-22
Attending: EMERGENCY MEDICINE | Admitting: STUDENT IN AN ORGANIZED HEALTH CARE EDUCATION/TRAINING PROGRAM
Payer: COMMERCIAL

## 2025-06-21 DIAGNOSIS — E87.20 METABOLIC ACIDEMIA: ICD-10-CM

## 2025-06-21 DIAGNOSIS — R73.9 HYPERGLYCEMIA: ICD-10-CM

## 2025-06-21 DIAGNOSIS — E86.0 DEHYDRATION: ICD-10-CM

## 2025-06-21 DIAGNOSIS — R79.89 ELEVATED TROPONIN: ICD-10-CM

## 2025-06-21 DIAGNOSIS — R11.2 NAUSEA AND VOMITING, UNSPECIFIED VOMITING TYPE: ICD-10-CM

## 2025-06-21 DIAGNOSIS — E87.1 HYPONATREMIA: ICD-10-CM

## 2025-06-21 DIAGNOSIS — E87.8 HYPOCHLOREMIA: ICD-10-CM

## 2025-06-21 LAB
ALBUMIN SERPL BCG-MCNC: 4.5 G/DL (ref 3.5–5.2)
ALBUMIN UR-MCNC: 50 MG/DL
ALP SERPL-CCNC: 81 U/L (ref 40–150)
ALT SERPL W P-5'-P-CCNC: 11 U/L (ref 0–70)
ANION GAP SERPL CALCULATED.3IONS-SCNC: 17 MMOL/L (ref 7–15)
ANION GAP SERPL CALCULATED.3IONS-SCNC: 7 MMOL/L (ref 7–15)
APPEARANCE UR: CLEAR
AST SERPL W P-5'-P-CCNC: 13 U/L (ref 0–45)
B-OH-BUTYR SERPL-SCNC: 0.35 MMOL/L
BASE EXCESS BLDV CALC-SCNC: 5.1 MMOL/L (ref -3–3)
BASOPHILS # BLD AUTO: 0 10E3/UL (ref 0–0.2)
BASOPHILS NFR BLD AUTO: 0 %
BILIRUB DIRECT SERPL-MCNC: 0.14 MG/DL (ref 0–0.3)
BILIRUB SERPL-MCNC: 0.5 MG/DL
BILIRUB UR QL STRIP: NEGATIVE
BUN SERPL-MCNC: 20.7 MG/DL (ref 8–23)
BUN SERPL-MCNC: 25.9 MG/DL (ref 8–23)
CALCIUM SERPL-MCNC: 10.2 MG/DL (ref 8.8–10.4)
CALCIUM SERPL-MCNC: 9.3 MG/DL (ref 8.8–10.4)
CHLORIDE SERPL-SCNC: 86 MMOL/L (ref 98–107)
CHLORIDE SERPL-SCNC: 97 MMOL/L (ref 98–107)
COLOR UR AUTO: YELLOW
CREAT SERPL-MCNC: 0.79 MG/DL (ref 0.67–1.17)
CREAT SERPL-MCNC: 1.15 MG/DL (ref 0.67–1.17)
EGFRCR SERPLBLD CKD-EPI 2021: 72 ML/MIN/1.73M2
EGFRCR SERPLBLD CKD-EPI 2021: >90 ML/MIN/1.73M2
EOSINOPHIL # BLD AUTO: 0 10E3/UL (ref 0–0.7)
EOSINOPHIL NFR BLD AUTO: 0 %
ERYTHROCYTE [DISTWIDTH] IN BLOOD BY AUTOMATED COUNT: 15.1 % (ref 10–15)
ERYTHROCYTE [DISTWIDTH] IN BLOOD BY AUTOMATED COUNT: 15.3 % (ref 10–15)
FLUAV RNA SPEC QL NAA+PROBE: NEGATIVE
FLUBV RNA RESP QL NAA+PROBE: NEGATIVE
GLUCOSE BLDC GLUCOMTR-MCNC: 185 MG/DL (ref 70–99)
GLUCOSE BLDC GLUCOMTR-MCNC: 202 MG/DL (ref 70–99)
GLUCOSE BLDC GLUCOMTR-MCNC: 257 MG/DL (ref 70–99)
GLUCOSE BLDC GLUCOMTR-MCNC: 259 MG/DL (ref 70–99)
GLUCOSE BLDC GLUCOMTR-MCNC: 271 MG/DL (ref 70–99)
GLUCOSE BLDC GLUCOMTR-MCNC: 315 MG/DL (ref 70–99)
GLUCOSE SERPL-MCNC: 197 MG/DL (ref 70–99)
GLUCOSE SERPL-MCNC: 329 MG/DL (ref 70–99)
GLUCOSE UR STRIP-MCNC: 500 MG/DL
HCO3 BLDV-SCNC: 32 MMOL/L (ref 21–28)
HCO3 SERPL-SCNC: 27 MMOL/L (ref 22–29)
HCO3 SERPL-SCNC: 29 MMOL/L (ref 22–29)
HCT VFR BLD AUTO: 31.6 % (ref 40–53)
HCT VFR BLD AUTO: 40 % (ref 40–53)
HGB BLD-MCNC: 10.6 G/DL (ref 13.3–17.7)
HGB BLD-MCNC: 13.3 G/DL (ref 13.3–17.7)
HGB UR QL STRIP: NEGATIVE
HOLD SPECIMEN: NORMAL
HOLD SPECIMEN: NORMAL
HYALINE CASTS: 86 /LPF
IMM GRANULOCYTES # BLD: 0.1 10E3/UL
IMM GRANULOCYTES NFR BLD: 1 %
KETONES UR STRIP-MCNC: 40 MG/DL
LEUKOCYTE ESTERASE UR QL STRIP: NEGATIVE
LIPASE SERPL-CCNC: 34 U/L (ref 13–60)
LYMPHOCYTES # BLD AUTO: 1.6 10E3/UL (ref 0.8–5.3)
LYMPHOCYTES NFR BLD AUTO: 15 %
MAGNESIUM SERPL-MCNC: 2 MG/DL (ref 1.7–2.3)
MCH RBC QN AUTO: 27.6 PG (ref 26.5–33)
MCH RBC QN AUTO: 27.7 PG (ref 26.5–33)
MCHC RBC AUTO-ENTMCNC: 33.3 G/DL (ref 31.5–36.5)
MCHC RBC AUTO-ENTMCNC: 33.5 G/DL (ref 31.5–36.5)
MCV RBC AUTO: 82 FL (ref 78–100)
MCV RBC AUTO: 83 FL (ref 78–100)
MONOCYTES # BLD AUTO: 0.9 10E3/UL (ref 0–1.3)
MONOCYTES NFR BLD AUTO: 9 %
MUCOUS THREADS #/AREA URNS LPF: PRESENT /LPF
NEUTROPHILS # BLD AUTO: 8.4 10E3/UL (ref 1.6–8.3)
NEUTROPHILS NFR BLD AUTO: 76 %
NITRATE UR QL: NEGATIVE
NRBC # BLD AUTO: 0 10E3/UL
NRBC BLD AUTO-RTO: 0 /100
O2/TOTAL GAS SETTING VFR VENT: 21 %
OXYHGB MFR BLDV: 37 % (ref 70–75)
PCO2 BLDV: 53 MM HG (ref 40–50)
PH BLDV: 7.38 [PH] (ref 7.32–7.43)
PH UR STRIP: 7 [PH] (ref 5–7)
PHOSPHATE SERPL-MCNC: 2.6 MG/DL (ref 2.5–4.5)
PLATELET # BLD AUTO: 177 10E3/UL (ref 150–450)
PLATELET # BLD AUTO: 240 10E3/UL (ref 150–450)
PO2 BLDV: 27 MM HG (ref 25–47)
POTASSIUM SERPL-SCNC: 3.5 MMOL/L (ref 3.4–5.3)
POTASSIUM SERPL-SCNC: 3.7 MMOL/L (ref 3.4–5.3)
PROT SERPL-MCNC: 7.2 G/DL (ref 6.4–8.3)
RBC # BLD AUTO: 3.84 10E6/UL (ref 4.4–5.9)
RBC # BLD AUTO: 4.81 10E6/UL (ref 4.4–5.9)
RBC URINE: 3 /HPF
RSV RNA SPEC NAA+PROBE: NEGATIVE
SAO2 % BLDV: 37.4 % (ref 70–75)
SARS-COV-2 RNA RESP QL NAA+PROBE: NEGATIVE
SODIUM SERPL-SCNC: 130 MMOL/L (ref 135–145)
SODIUM SERPL-SCNC: 133 MMOL/L (ref 135–145)
SP GR UR STRIP: 1.02 (ref 1–1.03)
SQUAMOUS EPITHELIAL: <1 /HPF
TROPONIN T SERPL HS-MCNC: 24 NG/L
TROPONIN T SERPL HS-MCNC: 26 NG/L
TROPONIN T SERPL HS-MCNC: 31 NG/L
UROBILINOGEN UR STRIP-MCNC: NORMAL MG/DL
WBC # BLD AUTO: 11 10E3/UL (ref 4–11)
WBC # BLD AUTO: 13 10E3/UL (ref 4–11)
WBC URINE: 1 /HPF

## 2025-06-21 PROCEDURE — 250N000012 HC RX MED GY IP 250 OP 636 PS 637: Performed by: INTERNAL MEDICINE

## 2025-06-21 PROCEDURE — 93005 ELECTROCARDIOGRAM TRACING: CPT

## 2025-06-21 PROCEDURE — 82805 BLOOD GASES W/O2 SATURATION: CPT | Performed by: EMERGENCY MEDICINE

## 2025-06-21 PROCEDURE — 96375 TX/PRO/DX INJ NEW DRUG ADDON: CPT

## 2025-06-21 PROCEDURE — 84100 ASSAY OF PHOSPHORUS: CPT | Performed by: INTERNAL MEDICINE

## 2025-06-21 PROCEDURE — 82010 KETONE BODYS QUAN: CPT | Performed by: EMERGENCY MEDICINE

## 2025-06-21 PROCEDURE — 250N000011 HC RX IP 250 OP 636: Performed by: EMERGENCY MEDICINE

## 2025-06-21 PROCEDURE — G0378 HOSPITAL OBSERVATION PER HR: HCPCS

## 2025-06-21 PROCEDURE — 36415 COLL VENOUS BLD VENIPUNCTURE: CPT | Performed by: INTERNAL MEDICINE

## 2025-06-21 PROCEDURE — 81001 URINALYSIS AUTO W/SCOPE: CPT | Performed by: EMERGENCY MEDICINE

## 2025-06-21 PROCEDURE — 85027 COMPLETE CBC AUTOMATED: CPT | Performed by: INTERNAL MEDICINE

## 2025-06-21 PROCEDURE — 96361 HYDRATE IV INFUSION ADD-ON: CPT

## 2025-06-21 PROCEDURE — 96376 TX/PRO/DX INJ SAME DRUG ADON: CPT

## 2025-06-21 PROCEDURE — 82962 GLUCOSE BLOOD TEST: CPT

## 2025-06-21 PROCEDURE — 250N000011 HC RX IP 250 OP 636: Performed by: INTERNAL MEDICINE

## 2025-06-21 PROCEDURE — 120N000001 HC R&B MED SURG/OB

## 2025-06-21 PROCEDURE — 83690 ASSAY OF LIPASE: CPT | Performed by: EMERGENCY MEDICINE

## 2025-06-21 PROCEDURE — 99222 1ST HOSP IP/OBS MODERATE 55: CPT | Performed by: INTERNAL MEDICINE

## 2025-06-21 PROCEDURE — 80048 BASIC METABOLIC PNL TOTAL CA: CPT | Performed by: EMERGENCY MEDICINE

## 2025-06-21 PROCEDURE — 85004 AUTOMATED DIFF WBC COUNT: CPT | Performed by: EMERGENCY MEDICINE

## 2025-06-21 PROCEDURE — 99285 EMERGENCY DEPT VISIT HI MDM: CPT | Mod: 25

## 2025-06-21 PROCEDURE — 82310 ASSAY OF CALCIUM: CPT | Performed by: INTERNAL MEDICINE

## 2025-06-21 PROCEDURE — 82565 ASSAY OF CREATININE: CPT | Performed by: EMERGENCY MEDICINE

## 2025-06-21 PROCEDURE — 258N000003 HC RX IP 258 OP 636: Performed by: PHYSICIAN ASSISTANT

## 2025-06-21 PROCEDURE — 83735 ASSAY OF MAGNESIUM: CPT | Performed by: INTERNAL MEDICINE

## 2025-06-21 PROCEDURE — 84155 ASSAY OF PROTEIN SERUM: CPT | Performed by: EMERGENCY MEDICINE

## 2025-06-21 PROCEDURE — 96374 THER/PROPH/DIAG INJ IV PUSH: CPT

## 2025-06-21 PROCEDURE — 258N000003 HC RX IP 258 OP 636: Performed by: EMERGENCY MEDICINE

## 2025-06-21 PROCEDURE — 84484 ASSAY OF TROPONIN QUANT: CPT | Performed by: EMERGENCY MEDICINE

## 2025-06-21 PROCEDURE — 36415 COLL VENOUS BLD VENIPUNCTURE: CPT | Performed by: EMERGENCY MEDICINE

## 2025-06-21 PROCEDURE — 87637 SARSCOV2&INF A&B&RSV AMP PRB: CPT | Performed by: EMERGENCY MEDICINE

## 2025-06-21 PROCEDURE — 84484 ASSAY OF TROPONIN QUANT: CPT | Performed by: INTERNAL MEDICINE

## 2025-06-21 PROCEDURE — 99207 PR NO BILLABLE SERVICE THIS VISIT: CPT | Performed by: PHYSICIAN ASSISTANT

## 2025-06-21 PROCEDURE — 250N000013 HC RX MED GY IP 250 OP 250 PS 637: Performed by: INTERNAL MEDICINE

## 2025-06-21 RX ORDER — DIPHENHYDRAMINE HCL 25 MG
50 CAPSULE ORAL AT BEDTIME
Status: DISCONTINUED | OUTPATIENT
Start: 2025-06-21 | End: 2025-06-22 | Stop reason: HOSPADM

## 2025-06-21 RX ORDER — TRAZODONE HYDROCHLORIDE 50 MG/1
50 TABLET ORAL
Status: DISCONTINUED | OUTPATIENT
Start: 2025-06-21 | End: 2025-06-22 | Stop reason: HOSPADM

## 2025-06-21 RX ORDER — ONDANSETRON 2 MG/ML
4 INJECTION INTRAMUSCULAR; INTRAVENOUS EVERY 6 HOURS PRN
Status: DISCONTINUED | OUTPATIENT
Start: 2025-06-21 | End: 2025-06-22 | Stop reason: HOSPADM

## 2025-06-21 RX ORDER — CARVEDILOL 12.5 MG/1
12.5 TABLET ORAL 2 TIMES DAILY WITH MEALS
Status: DISCONTINUED | OUTPATIENT
Start: 2025-06-21 | End: 2025-06-22 | Stop reason: HOSPADM

## 2025-06-21 RX ORDER — LISINOPRIL 10 MG/1
40 TABLET ORAL DAILY
Status: DISCONTINUED | OUTPATIENT
Start: 2025-06-21 | End: 2025-06-22 | Stop reason: HOSPADM

## 2025-06-21 RX ORDER — HALOPERIDOL 5 MG/ML
2 INJECTION INTRAMUSCULAR ONCE
Status: COMPLETED | OUTPATIENT
Start: 2025-06-21 | End: 2025-06-21

## 2025-06-21 RX ORDER — ACETAMINOPHEN 325 MG/1
650 TABLET ORAL EVERY 4 HOURS PRN
Status: DISCONTINUED | OUTPATIENT
Start: 2025-06-21 | End: 2025-06-22 | Stop reason: HOSPADM

## 2025-06-21 RX ORDER — FERROUS SULFATE 325(65) MG
325 TABLET ORAL
COMMUNITY

## 2025-06-21 RX ORDER — DEXTROSE MONOHYDRATE 25 G/50ML
25-50 INJECTION, SOLUTION INTRAVENOUS
Status: DISCONTINUED | OUTPATIENT
Start: 2025-06-21 | End: 2025-06-22 | Stop reason: HOSPADM

## 2025-06-21 RX ORDER — LOPERAMIDE HYDROCHLORIDE 2 MG/1
2 CAPSULE ORAL 4 TIMES DAILY PRN
Status: DISCONTINUED | OUTPATIENT
Start: 2025-06-21 | End: 2025-06-22 | Stop reason: HOSPADM

## 2025-06-21 RX ORDER — SEMAGLUTIDE 0.68 MG/ML
0.25 INJECTION, SOLUTION SUBCUTANEOUS
COMMUNITY
Start: 2025-06-16

## 2025-06-21 RX ORDER — DIPHENHYDRAMINE HYDROCHLORIDE 50 MG/ML
25 INJECTION, SOLUTION INTRAMUSCULAR; INTRAVENOUS ONCE
Status: COMPLETED | OUTPATIENT
Start: 2025-06-21 | End: 2025-06-21

## 2025-06-21 RX ORDER — METOCLOPRAMIDE 5 MG/1
5 TABLET ORAL 2 TIMES DAILY PRN
COMMUNITY

## 2025-06-21 RX ORDER — SODIUM CHLORIDE, SODIUM LACTATE, POTASSIUM CHLORIDE, CALCIUM CHLORIDE 600; 310; 30; 20 MG/100ML; MG/100ML; MG/100ML; MG/100ML
INJECTION, SOLUTION INTRAVENOUS CONTINUOUS
Status: ACTIVE | OUTPATIENT
Start: 2025-06-21 | End: 2025-06-21

## 2025-06-21 RX ORDER — ONDANSETRON 4 MG/1
4 TABLET, ORALLY DISINTEGRATING ORAL EVERY 6 HOURS PRN
Status: DISCONTINUED | OUTPATIENT
Start: 2025-06-21 | End: 2025-06-22 | Stop reason: HOSPADM

## 2025-06-21 RX ORDER — NICOTINE POLACRILEX 4 MG
15-30 LOZENGE BUCCAL
Status: DISCONTINUED | OUTPATIENT
Start: 2025-06-21 | End: 2025-06-22 | Stop reason: HOSPADM

## 2025-06-21 RX ORDER — MAGNESIUM OXIDE 400 MG/1
400 TABLET ORAL DAILY
Status: DISCONTINUED | OUTPATIENT
Start: 2025-06-21 | End: 2025-06-22 | Stop reason: HOSPADM

## 2025-06-21 RX ORDER — DIPHENHYDRAMINE HCL 25 MG
50 CAPSULE ORAL EVERY 4 HOURS PRN
Status: DISCONTINUED | OUTPATIENT
Start: 2025-06-21 | End: 2025-06-22 | Stop reason: HOSPADM

## 2025-06-21 RX ORDER — HYDRALAZINE HYDROCHLORIDE 25 MG/1
25 TABLET, FILM COATED ORAL 3 TIMES DAILY
Status: DISCONTINUED | OUTPATIENT
Start: 2025-06-21 | End: 2025-06-22 | Stop reason: HOSPADM

## 2025-06-21 RX ORDER — METFORMIN HYDROCHLORIDE 500 MG/1
1000 TABLET, EXTENDED RELEASE ORAL
Status: DISCONTINUED | OUTPATIENT
Start: 2025-06-21 | End: 2025-06-22 | Stop reason: HOSPADM

## 2025-06-21 RX ORDER — PANTOPRAZOLE SODIUM 40 MG/1
40 TABLET, DELAYED RELEASE ORAL DAILY
Status: DISCONTINUED | OUTPATIENT
Start: 2025-06-21 | End: 2025-06-21

## 2025-06-21 RX ORDER — LIDOCAINE 40 MG/G
CREAM TOPICAL
Status: DISCONTINUED | OUTPATIENT
Start: 2025-06-21 | End: 2025-06-22 | Stop reason: HOSPADM

## 2025-06-21 RX ORDER — TAMSULOSIN HYDROCHLORIDE 0.4 MG/1
0.4 CAPSULE ORAL DAILY
Status: DISCONTINUED | OUTPATIENT
Start: 2025-06-21 | End: 2025-06-22 | Stop reason: HOSPADM

## 2025-06-21 RX ORDER — ONDANSETRON 2 MG/ML
4 INJECTION INTRAMUSCULAR; INTRAVENOUS ONCE
Status: COMPLETED | OUTPATIENT
Start: 2025-06-21 | End: 2025-06-21

## 2025-06-21 RX ORDER — CLONIDINE HYDROCHLORIDE 0.1 MG/1
0.1 TABLET ORAL 2 TIMES DAILY
Status: DISCONTINUED | OUTPATIENT
Start: 2025-06-21 | End: 2025-06-22 | Stop reason: HOSPADM

## 2025-06-21 RX ORDER — ACETAMINOPHEN 650 MG/1
650 SUPPOSITORY RECTAL EVERY 4 HOURS PRN
Status: DISCONTINUED | OUTPATIENT
Start: 2025-06-21 | End: 2025-06-22 | Stop reason: HOSPADM

## 2025-06-21 RX ORDER — PROCHLORPERAZINE MALEATE 5 MG/1
10 TABLET ORAL EVERY 6 HOURS PRN
Status: DISCONTINUED | OUTPATIENT
Start: 2025-06-21 | End: 2025-06-22 | Stop reason: HOSPADM

## 2025-06-21 RX ADMIN — INSULIN GLARGINE 15 UNITS: 100 INJECTION, SOLUTION SUBCUTANEOUS at 21:53

## 2025-06-21 RX ADMIN — HYDRALAZINE HYDROCHLORIDE 25 MG: 25 TABLET ORAL at 20:08

## 2025-06-21 RX ADMIN — CARVEDILOL 12.5 MG: 12.5 TABLET, FILM COATED ORAL at 17:50

## 2025-06-21 RX ADMIN — HYDRALAZINE HYDROCHLORIDE 25 MG: 25 TABLET ORAL at 14:05

## 2025-06-21 RX ADMIN — SODIUM CHLORIDE, SODIUM LACTATE, POTASSIUM CHLORIDE, AND CALCIUM CHLORIDE: .6; .31; .03; .02 INJECTION, SOLUTION INTRAVENOUS at 14:05

## 2025-06-21 RX ADMIN — HALOPERIDOL LACTATE 2 MG: 5 INJECTION, SOLUTION INTRAMUSCULAR at 06:40

## 2025-06-21 RX ADMIN — INSULIN ASPART 2 UNITS: 100 INJECTION, SOLUTION INTRAVENOUS; SUBCUTANEOUS at 04:11

## 2025-06-21 RX ADMIN — SODIUM CHLORIDE 1000 ML: 0.9 INJECTION, SOLUTION INTRAVENOUS at 03:02

## 2025-06-21 RX ADMIN — HYDRALAZINE HYDROCHLORIDE 25 MG: 25 TABLET ORAL at 09:17

## 2025-06-21 RX ADMIN — ONDANSETRON 4 MG: 2 INJECTION, SOLUTION INTRAMUSCULAR; INTRAVENOUS at 03:37

## 2025-06-21 RX ADMIN — Medication 400 MG: at 09:18

## 2025-06-21 RX ADMIN — DIPHENHYDRAMINE HYDROCHLORIDE 50 MG: 25 CAPSULE ORAL at 21:52

## 2025-06-21 RX ADMIN — TAMSULOSIN HYDROCHLORIDE 0.4 MG: 0.4 CAPSULE ORAL at 09:18

## 2025-06-21 RX ADMIN — SODIUM CHLORIDE 1000 ML: 0.9 INJECTION, SOLUTION INTRAVENOUS at 01:30

## 2025-06-21 RX ADMIN — CLONIDINE HYDROCHLORIDE 0.1 MG: 0.1 TABLET ORAL at 20:08

## 2025-06-21 RX ADMIN — INSULIN ASPART 2 UNITS: 100 INJECTION, SOLUTION INTRAVENOUS; SUBCUTANEOUS at 21:53

## 2025-06-21 RX ADMIN — PANTOPRAZOLE SODIUM 40 MG: 40 INJECTION, POWDER, FOR SOLUTION INTRAVENOUS at 20:08

## 2025-06-21 RX ADMIN — DIPHENHYDRAMINE HYDROCHLORIDE 25 MG: 50 INJECTION, SOLUTION INTRAMUSCULAR; INTRAVENOUS at 06:41

## 2025-06-21 RX ADMIN — PROCHLORPERAZINE EDISYLATE 10 MG: 5 INJECTION INTRAMUSCULAR; INTRAVENOUS at 04:11

## 2025-06-21 RX ADMIN — PANTOPRAZOLE SODIUM 40 MG: 40 INJECTION, POWDER, FOR SOLUTION INTRAVENOUS at 09:19

## 2025-06-21 RX ADMIN — ONDANSETRON 4 MG: 2 INJECTION, SOLUTION INTRAMUSCULAR; INTRAVENOUS at 13:02

## 2025-06-21 RX ADMIN — CARVEDILOL 12.5 MG: 12.5 TABLET, FILM COATED ORAL at 09:18

## 2025-06-21 RX ADMIN — ONDANSETRON 4 MG: 2 INJECTION, SOLUTION INTRAMUSCULAR; INTRAVENOUS at 01:30

## 2025-06-21 RX ADMIN — CLONIDINE HYDROCHLORIDE 0.1 MG: 0.1 TABLET ORAL at 09:18

## 2025-06-21 ASSESSMENT — ACTIVITIES OF DAILY LIVING (ADL)
ADLS_ACUITY_SCORE: 52
CHANGE_IN_FUNCTIONAL_STATUS_SINCE_ONSET_OF_CURRENT_ILLNESS/INJURY: NO
ADLS_ACUITY_SCORE: 52
ADLS_ACUITY_SCORE: 52
ADLS_ACUITY_SCORE: 53
DEPENDENT_IADLS:: CLEANING;COOKING;LAUNDRY;SHOPPING;MEAL PREPARATION;MEDICATION MANAGEMENT;MONEY MANAGEMENT;TRANSPORTATION
ADLS_ACUITY_SCORE: 58
ADLS_ACUITY_SCORE: 57
ADLS_ACUITY_SCORE: 40
ADLS_ACUITY_SCORE: 57
ADLS_ACUITY_SCORE: 40
ADLS_ACUITY_SCORE: 52
ADLS_ACUITY_SCORE: 58
ADLS_ACUITY_SCORE: 57
TOILETING_MANAGEMENT: A2
ADLS_ACUITY_SCORE: 52
TOILETING_ASSISTANCE: TOILETING DIFFICULTY, ASSISTANCE 1 PERSON
FALL_HISTORY_WITHIN_LAST_SIX_MONTHS: NO
ADLS_ACUITY_SCORE: 58
TOILETING_ISSUES: YES
ADLS_ACUITY_SCORE: 52
ADLS_ACUITY_SCORE: 52
ADLS_ACUITY_SCORE: 40
ADLS_ACUITY_SCORE: 52
ADLS_ACUITY_SCORE: 52
ADLS_ACUITY_SCORE: 40
ADLS_ACUITY_SCORE: 52

## 2025-06-21 NOTE — PHARMACY-ADMISSION MEDICATION HISTORY
Pharmacy Intern Admission Medication History    Admission medication history is complete. The information provided in this note is only as accurate as the sources available at the time of the update.    Information Source(s): Facility (U/NH/) medication list/MAR via N/A    Pertinent Information: Patient comes from [facility] ([phone])    Changes made to PTA medication list:  Added: Metoclopramide  Deleted: Bydureon   Changed:   Iron: MWF-> daily   Lantus 25 units-> 18 units     Allergies reviewed with patient and updates made in EHR: no    Medication History Completed By: Kaitlin Meyers RPH 6/21/2025 12:02 PM    PTA Med List   Medication Sig Note Last Dose/Taking    acetaminophen (TYLENOL) 325 MG tablet Take 650 mg by mouth every 4 hours as needed for mild pain  Taking As Needed    atorvastatin (LIPITOR) 20 MG tablet Take 20 mg by mouth At Bedtime  6/20/2025 Bedtime    carvedilol (COREG) 12.5 MG tablet Take 12.5 mg by mouth 2 times daily (with meals).  6/20/2025 Evening    cholecalciferol (VITAMIN D-1000 MAX ST) 1000 units TABS Take 1,000 Units by mouth daily  6/20/2025 Morning    cloNIDine (CATAPRES) 0.1 MG tablet Take 1 tablet (0.1 mg) by mouth 2 times daily.  6/20/2025 Evening    diphenhydrAMINE (BENADRYL) 50 MG capsule Take 50 mg by mouth every 4 hours as needed for itching or allergies  Taking As Needed    diphenhydrAMINE (BENADRYL) 50 MG capsule Take 50 mg by mouth At Bedtime  6/19/2025 Bedtime    ferrous sulfate (FEROSUL) 325 (65 Fe) MG tablet Take 325 mg by mouth daily (with breakfast).  6/20/2025 Morning    glucose (BD GLUCOSE) 4 g chewable tablet Take 4 tablets by mouth as needed for low blood sugar  Taking As Needed    hydrALAZINE (APRESOLINE) 25 MG tablet Take 25 mg by mouth 3 times daily Hold for SBP <120  6/20/2025 Bedtime    insulin glargine (LANTUS PEN) 100 UNIT/ML pen Inject 18 Units subcutaneously at bedtime.  6/20/2025    lisinopril (ZESTRIL) 40 MG tablet Take 40 mg by mouth daily   6/20/2025 Morning    magnesium oxide (MAG-OX) 400 MG tablet Take 400 mg by mouth daily  6/20/2025 Morning    melatonin 5 MG tablet Take 5 mg by mouth daily as needed for sleep  Taking As Needed    metFORMIN (GLUCOPHAGE XR) 500 MG 24 hr tablet Take 1,000 mg by mouth daily (with breakfast).  6/20/2025 Morning    metoclopramide (REGLAN) 5 MG tablet Take 5 mg by mouth 2 times daily as needed for vomiting.  Taking As Needed    ondansetron (ZOFRAN ODT) 4 MG ODT tab Take 4 mg by mouth every 8 hours as needed for nausea  Taking As Needed    OZEMPIC, 0.25 OR 0.5 MG/DOSE, 2 MG/3ML pen Inject 0.25 mg subcutaneously every 7 days.  6/19/2025    paliperidone (INVEGA SUSTENNA) 156 MG/ML PABLO injection Inject 156 mg into the muscle every 28 days 6/21/2025: Next dose due 6/23/25 Taking    pantoprazole (PROTONIX) 40 MG EC tablet Take 40 mg by mouth daily.  6/20/2025 Morning    polyethylene glycol-propylene glycol (SYSTANE ULTRA) 0.4-0.3 % SOLN ophthalmic solution Place 1 drop into both eyes 2 times daily as needed for dry eyes.  Taking As Needed    senna-docusate (SENOKOT-S/PERICOLACE) 8.6-50 MG tablet Take 1 tablet by mouth 2 times daily as needed  Taking As Needed    tamsulosin (FLOMAX) 0.4 MG capsule Take 1 capsule (0.4 mg) by mouth daily  6/20/2025 Morning    traZODone (DESYREL) 50 MG tablet Take 50 mg by mouth nightly as needed for sleep  Taking As Needed

## 2025-06-21 NOTE — H&P
[facility]  Hospitalist Admission Note  Name: Benja Duong    MRN: [id number]  YOB: 1965    Age: 60 year old  Date of admission: 6/21/2025  Primary care provider: Clinic, [facility]    Chief Complaint: Vomiting    Assessment and Plan:   Intractable nausea and vomiting  Possible acute infectious gastroenteritis: He is a poor historian.  Mostly grunting yes and no to my questions, but was able to tell me he has been vomiting constantly for 4 days and has also had diarrhea.  He denied abdominal pain for me, but then told the nurse he had 8 out of 10 diffuse abdominal pain later.  Some of his emesis has been darker, but his hemoglobin is 13.3 so a GI bleed is less likely unless it is from a Michelle-Hutson tear.  I do believe he takes pantoprazole daily.  Has had previous iron deficiency anemia and takes oral iron 3 times per week.  He lives in a group home and he said no one asked him about other people living there with similar vomiting and diarrhea symptoms.  He was hospitalized overnight 4 months ago for similar symptoms that resolved.  COVID-19, influenza A/B, RSV PCR negative.  Minimal elevation in ketones and bicarb 27 so I doubt DKA as the etiology for his nausea and vomiting.  LFTs and lipase not elevated.  -Still vomiting after IV Zofran and Compazine which will be continued as needed.  Has a alprazolam allergy and we have a lorazepam shortage.  Ordered 2 mg IV Haldol and 25 mg IV diphenhydramine for refractory nausea and vomiting  -Start IV pantoprazole 40 mg twice daily  -Repeat CBC in the morning  -Send enteric panel if he has diarrhea  -Acetaminophen as needed for pain  - Resume oral iron 3 times weekly    Hyponatremia: Sodium low at 130 as is chloride 86 in setting of glucose of 329.  Suspect acute hypovolemic hyponatremia from vomiting and diarrhea.  -Receiving 2 L NS in the ER  -BMP in the morning    Elevated troponin  Sinus tachycardia  HTN  HLD:  Troponin T 31 with repeat 26.  Tachycardic in the 120s despite initial 1 L NS bolus.  EKG shows sinus tachycardia with no ST elevation or depression.  Reports some burning chest pain.  BP now elevated up to the 180-190 systolic when he is actively vomiting.  Suspect troponin elevation is demand ischemia from hypertension and tachycardia.  PTA he is on carvedilol 12.5 mg twice daily, clonidine 0.1 mg twice daily, lisinopril 40 mg daily, hydralazine 25 mg 3 times daily, and atorvastatin 20 mg at bedtime.  -Resume PTA carvedilol, clonidine, hydralazine  -Hold lisinopril initially  -Resume atorvastatin after discharge    Type II DM: I believe he is on Ozempic, metformin XR 1000 mg daily in the morning, and glargine possibly 25 units at night.  Glucose 329 in the ER with repeat 271 after IV fluids.  Slight elevation in ketones, but he is not in DKA.  -Hold metformin and Ozempic  -Medium dose sliding scale aspart    Schizophrenia  Intellectual disability  History polysubstance abuse: Lives in a group home.  I believe he is on paliperidone monthly, trazodone 50 mg at bedtime, diphenhydramine 50 mg at bedtime  -Resume diphenhydramine at bedtime and trazodone as needed    BPH: Resume tamsulosin 0.4 mg daily.    DVT Prophylaxis: Low Risk/Ambulatory with no VTE prophylaxis indicated  Code Status: Full Code  FEN: Advance diet as tolerated  Discharge Dispo: Back to group home  Estimated Disch Date / # of Days until Disch: Admit observation until GI symptoms improved.  Anticipate discharge within 24-48 hours.      History of Present Illness:  Benja Duong is a 60 year old male with PMH including schizophrenia, intellectual disability, polysubstance abuse, legally blind, anemia, and latent TB, HTN, HLD, hyponatremia, and IDDM type II who presents from his group home for vomiting.  He is laying in bed on his side and does not seem to want to answer my questions.  He is mostly grunting in a yes or normal fashion.  He was able  to say that he has had vomiting for 4 days.  He does report diarrhea.  He tells me he is not having any abdominal pain.  He did have some burning type chest pain.  He said no when I asked him about any other people at the group home with vomiting or diarrhea.  He does not think he has had any fevers.    History obtained from patient, medical record, and from Dr. Lopez in the emergency department.  Blood pressure 132/88, tachycardic at 127, oxygen 100% on room air.  WBC 11, hemoglobin 13.3, platelet count 240.  Sodium 130 and chloride 86, creatinine 1.15 and BUN 25.  Glucose 329.  LFTs and lipase within normal limits.  Troponin T is 31 with repeat 26.  COVID-19, influenza A/B, RSV PCR negative.  Ketones 0.35.  VBG shows pH 7.38 with pCO2 53.  UA shows 1 WBC and negative LE.  EKG shows sinus tachycardia.  He received 2 L NS and IV Zofran.  Admit to observation until GI symptoms improved.  Becoming more hypertensive so we are giving him IV Haldol and IV diphenhydramine for persistent nausea vomiting despite Zofran and Compazine.  Reportedly some darker emesis so rechecking hemoglobin this morning.       Clinically Significant Risk Factors Present on Admission         # Hyponatremia: Lowest Na = 130 mmol/L in last 2 days, will monitor as appropriate  # Hypochloremia: Lowest Cl = 86 mmol/L in last 2 days, will monitor as appropriate          # Hypertension: Home medication list includes antihypertensive(s)          # DMII: A1C = N/A within past 6 months        # Financial/Environmental Concerns:                     Past Medical History reviewed:  Past Medical History:   Diagnosis Date    Depression     Diabetes mellitus     h/o Cocaine abuse     Homeless     Hypertension     Latent tuberculosis     Legally blind     Proliferative diabetic retinopathy     Schizophrenia      Past Surgical History reviewed:  No past surgical history on file.  Social History reviewed:  Social History     Tobacco Use    Smoking status:  "Unknown    Smokeless tobacco: Not on file   Substance Use Topics    Alcohol use: Yes     Comment: \"few beers\"     Social History     Social History Narrative    Not on file   Lives in group home    Family History reviewed:  Patient is providing very limited history for me    Allergies:  Allergies   Allergen Reactions    Alprazolam      Other reaction(s): *Unknown States \"I break out\"    Oxycodone-Acetaminophen      Other reaction(s): *Unknown, States \"I break out\"    Oxycodone-Acetaminophen Other (See Comments)     Other reaction(s): *Unknown, States \"I break out\"     Medications:  Prior to Admission medications    Medication Sig Last Dose Taking? Auth Provider Long Term End Date   acetaminophen (TYLENOL) 325 MG tablet Take 650 mg by mouth every 4 hours as needed for mild pain   Unknown, Entered By History     atorvastatin (LIPITOR) 20 MG tablet Take 20 mg by mouth At Bedtime   Unknown, Entered By History Yes    carvedilol (COREG) 12.5 MG tablet Take 12.5 mg by mouth 2 times daily (with meals).   Unknown, Entered By History No    cholecalciferol (VITAMIN D-1000 MAX ST) 1000 units TABS Take 1,000 Units by mouth daily   Paul Andrews MD     cloNIDine (CATAPRES) 0.1 MG tablet Take 1 tablet (0.1 mg) by mouth 2 times daily.   Michael Paez MD Yes    diphenhydrAMINE (BENADRYL) 50 MG capsule Take 50 mg by mouth every 4 hours as needed for itching or allergies   Unknown, Entered By History     diphenhydrAMINE (BENADRYL) 50 MG capsule Take 50 mg by mouth At Bedtime   Unknown, Entered By History     exenatide ER (BYDUREON) 2 MG pen Inject 2 mg subcutaneously every 7 days.   Unknown, Entered By History No    ferrous sulfate (FE TABS) 325 (65 Fe) MG EC tablet Take 325 mg by mouth Every Mon, Wed, Fri Morning.   Unknown, Entered By History     glucose (BD GLUCOSE) 4 g chewable tablet Take 4 tablets by mouth as needed for low blood sugar   Unknown, Entered By History     hydrALAZINE (APRESOLINE) 25 MG tablet Take 25 mg by " mouth 3 times daily Hold for SBP <120   Unknown, Entered By History No    insulin glargine (LANTUS PEN) 100 UNIT/ML pen Inject 25 Units subcutaneously at bedtime.   Unknown, Entered By History No    lisinopril (ZESTRIL) 40 MG tablet Take 40 mg by mouth daily   Unknown, Entered By History No    magnesium oxide (MAG-OX) 400 MG tablet Take 400 mg by mouth daily   Unknown, Entered By History     melatonin 5 MG tablet Take 5 mg by mouth daily as needed for sleep   Unknown, Entered By History     metFORMIN (GLUCOPHAGE XR) 500 MG 24 hr tablet Take 1,000 mg by mouth daily (with breakfast).   Unknown, Entered By History No    ondansetron (ZOFRAN ODT) 4 MG ODT tab Take 4 mg by mouth every 8 hours as needed for nausea   Unknown, Entered By History     paliperidone (INVEGA SUSTENNA) 156 MG/ML PABLO injection Inject 156 mg into the muscle every 28 days   Unknown, Entered By History Yes    pantoprazole (PROTONIX) 40 MG EC tablet Take 40 mg by mouth daily.   Unknown, Entered By History     polyethylene glycol-propylene glycol (SYSTANE ULTRA) 0.4-0.3 % SOLN ophthalmic solution Place 1 drop into both eyes 2 times daily as needed for dry eyes.   Unknown, Entered By History     senna-docusate (SENOKOT-S/PERICOLACE) 8.6-50 MG tablet Take 1 tablet by mouth 2 times daily as needed   Unknown, Entered By History     tamsulosin (FLOMAX) 0.4 MG capsule Take 1 capsule (0.4 mg) by mouth daily   Garth Gutierrez MD     traZODone (DESYREL) 50 MG tablet Take 50 mg by mouth nightly as needed for sleep   Unknown, Entered By History     hydrochlorothiazide (HYDRODIURIL) 25 MG tablet Take 1 tablet (25 mg) by mouth daily DO not resume until Na is back to normal or PCP orders   Benton Whitman MD Yes 4/20/22     Review of Systems:  Very limited ROS due to patient not wanting to answer my questions     Physical Exam:  Blood pressure 138/80, pulse (!) 122, resp. rate 16, SpO2 98%.  Wt Readings from Last 1 Encounters:   02/18/25 72.1 kg (159 lb)      Exam:  Constitutional: Awake, lying in bed, appears comfortable  Eyes: sclera white   HEENT: Dry mucous membranes  Respiratory: no respiratory distress, lungs cta bilaterally, no crackles or wheeze  Cardiovascular: Regular tachycardia without murmur  GI: Soft, non-tender, not distended, bowel sounds present  Skin: no rash   Musculoskeletal/extremities: No edema  Neurologic: Alert  Psychiatric: calm, minimally intentionally interactive with me    Lab and imaging data personally reviewed:  Labs:  Recent Labs   Lab 06/21/25  0224   PHV 7.38   PO2V 27   PCO2V 53*   HCO3V 32*     Recent Labs   Lab 06/21/25  0124   WBC 11.0   HGB 13.3   HCT 40.0   MCV 83        Recent Labs   Lab 06/21/25  0334 06/21/25  0124 06/21/25  0055   NA  --  130*  --    POTASSIUM  --  3.7  --    CHLORIDE  --  86*  --    CO2  --  27  --    ANIONGAP  --  17*  --    * 329* 315*   BUN  --  25.9*  --    CR  --  1.15  --    GFRESTIMATED  --  72  --    MITCHEL  --  10.2  --    PROTTOTAL  --  7.2  --    ALBUMIN  --  4.5  --    BILITOTAL  --  0.5  --    ALKPHOS  --  81  --    AST  --  13  --    ALT  --  11  --      Recent Labs   Lab 06/21/25  0232   COLOR Yellow   APPEARANCE Clear   URINEGLC 500*   URINEBILI Negative   URINEKETONE 40*   SG 1.020   UBLD Negative   URINEPH 7.0   PROTEIN 50*   NITRITE Negative   LEUKEST Negative   RBCU 3*   WBCU 1     Ketones 0.35  Lipase 34  Troponin T 31 with repeat 26  COVID-19, influenza A/B, RSV PCR negative    EKG: Sinus tachycardia, no ST elevation or depression    Imaging:  No results found for this or any previous visit (from the past 24 hours).    Hawk Bueno MD  Hospitalist  [facility]

## 2025-06-21 NOTE — PLAN OF CARE
"Goal Outcome Evaluation:      Plan of Care Reviewed With: patient    Overall Patient Progress: improvingOverall Patient Progress: improving    Outcome Evaluation: More awake and alert this PM. Mostly sleeping. Able to make needs known. Using call light. Blood pressure improving with PTA meds. Urinal at bedside. Legally blind, needs tray set up. Tolerating clears but dry heaving and hiccups with oral intake. Will continue to monitor.    Problem: Adult Inpatient Plan of Care  Goal: Plan of Care Review  Description: The Plan of Care Review/Shift note should be completed every shift.  The Outcome Evaluation is a brief statement about your assessment that the patient is improving, declining, or no change.  This information will be displayed automatically on your shift  note.  6/21/2025 1843 by Jesusita Johnson RN  Outcome: Progressing  Flowsheets (Taken 6/21/2025 1843)  Outcome Evaluation: More awake and alert this PM. Mostly sleeping. Able to make needs known. Using call light. Blood pressure improving with PTA meds. Urinal at bedside  Plan of Care Reviewed With: patient  Overall Patient Progress: improving  6/21/2025 0953 by Jesusita Johnson RN  Outcome: Progressing  Flowsheets (Taken 6/21/2025 0953)  Plan of Care Reviewed With: patient  Overall Patient Progress: no change  Goal: Patient-Specific Goal (Individualized)  Description: You can add care plan individualizations to a care plan. Examples of Individualization might be:  \"Parent requests to be called daily at 9am for status\", \"I have a hard time hearing out of my right ear\", or \"Do not touch me to wake me up as it startles  me\".  6/21/2025 1843 by Jesusita Johnson, RN  Outcome: Progressing  6/21/2025 0953 by Jesusita Johnson, RN  Outcome: Progressing  Goal: Absence of Hospital-Acquired Illness or Injury  6/21/2025 1843 by Jesusita Johnson RN  Outcome: Progressing  6/21/2025 0953 by Jesusita Johnson, RN  Outcome: Progressing  Intervention: Identify and Manage Fall Risk  Recent " Flowsheet Documentation  Taken 6/21/2025 0918 by Jesusita Johnson RN  Safety Promotion/Fall Prevention:   activity supervised   assistive device/personal items within reach   clutter free environment maintained   increased rounding and observation   increase visualization of patient   nonskid shoes/slippers when out of bed   patient and family education   room near nurse's station   safety round/check completed   room organization consistent   supervised activity   treat reversible contributory factors   treat underlying cause  Intervention: Prevent Skin Injury  Recent Flowsheet Documentation  Taken 6/21/2025 0918 by Jesusita Johnson RN  Body Position: position changed independently  Intervention: Prevent Infection  Recent Flowsheet Documentation  Taken 6/21/2025 0918 by Jesusita Johnson RN  Infection Prevention:   single patient room provided   cohorting utilized  Goal: Optimal Comfort and Wellbeing  6/21/2025 1843 by Jesusita Johnson RN  Outcome: Progressing  6/21/2025 0953 by Jesusita Johnson RN  Outcome: Progressing  Goal: Readiness for Transition of Care  6/21/2025 1843 by Jesusita Johnson RN  Outcome: Progressing  6/21/2025 0953 by Jesusita Johnson RN  Outcome: Progressing     Problem: Skin Injury Risk Increased  Goal: Skin Health and Integrity  6/21/2025 1843 by Jesusita Johnson RN  Outcome: Progressing  6/21/2025 0953 by Jesusita Johnson RN  Outcome: Progressing  Intervention: Plan: Nurse Driven Intervention: Moisture Management  Recent Flowsheet Documentation  Taken 6/21/2025 0918 by Jesusita Johnson RN  Moisture Interventions: Incontinence pad  Intervention: Plan: Nurse Driven Intervention: Friction and Shear  Recent Flowsheet Documentation  Taken 6/21/2025 0918 by Jesusita Johnson RN  Friction/Shear Interventions: HOB 30 degrees or less  Intervention: Optimize Skin Protection  Recent Flowsheet Documentation  Taken 6/21/2025 0918 by Jesusita Johnson RN  Activity Management: activity adjusted per tolerance  Head of Bed (HOB)  Positioning: HOB at 20-30 degrees

## 2025-06-21 NOTE — PLAN OF CARE
Goal Outcome Evaluation:    PRIMARY DIAGNOSIS: ACUTE GASTROENTERITIS    OUTPATIENT/OBSERVATION GOALS TO BE MET BEFORE DISCHARGE  1. Orthostatic performed: N/A    2. Tolerating PO fluid and/or antibiotics (if applicable): Yes    3. Nausea/Vomiting/Diarrhea symptoms improved: No    4. Pain status: Pain free.    5. Return to near baseline physical activity: No    Discharge Planner Nurse   Safe discharge environment identified: Yes  Barriers to discharge: Yes       Entered by: Jesusita Johnson RN 06/21/2025 9:54 AM     Disoriented to time and place. Legally blind. More awake and alert as day progresses. C/o Nausea gingerale given with pills. Zofran for nausea. No emesis. SBP 200s and  tachy 120s but improving with PTA meds. Urinal at bedside. Tolerating clear liquids but poor appetite. Will continue to monitor

## 2025-06-21 NOTE — CONSULTS
Care Management Initial Consult    General Information  Assessment completed with: Care Team Member,    Type of CM/SW Visit: Initial Assessment    Primary Care Provider verified and updated as needed:     Readmission within the last 30 days: no previous admission in last 30 days      Reason for Consult: discharge planning  Advance Care Planning:            Communication Assessment  Patient's communication style: spoken language (English or Bilingual)    Hearing Difficulty or Deaf: no   Wear Glasses or Blind: yes    Cognitive  Cognitive/Neuro/Behavioral: WDL, .WDL except, level of consciousness, orientation  Level of Consciousness: lethargic, confused  Arousal Level: arouses to touch/gentle shaking  Orientation: disoriented to, place, time, situation  Mood/Behavior: flat affect  Best Language: 0 - No aphasia  Speech: incoherent, other (see comments) (not responding to questions)    Living Environment:   People in home: facility resident     Current living Arrangements: group home ([facility], [phone])      Able to return to prior arrangements: yes       Family/Social Support:  Care provided by: other (see comments) (self and facility staff)  Provides care for:    Marital Status: Single  Support system:            Description of Support System:           Current Resources:   Patient receiving home care services: No        Community Resources: None  Equipment currently used at home: none  Supplies currently used at home: Diabetic Supplies    Employment/Financial:  Employment Status:          Financial Concerns:             Does the patient's insurance plan have a 3 day qualifying hospital stay waiver?  Yes     Which insurance plan 3 day waiver is available? Alternative insurance waiver    Will the waiver be used for post-acute placement? No    Lifestyle & Psychosocial Needs:  Social Drivers of Health     Food Insecurity: Unknown (2/18/2025)    Food Insecurity     Within the past 12 months, did you worry that  your food would run out before you got money to buy more?: Patient declined     Within the past 12 months, did the food you bought just not last and you didn t have money to get more?: Patient declined   Depression: Not at risk (9/23/2024)    Received from [facility]    PHQ-2     PHQ-2 Subtotal: 0   Housing Stability: Unknown (2/18/2025)    Housing Stability     Do you have housing? : Patient declined     Are you worried about losing your housing?: Patient declined   Tobacco Use: High Risk (1/14/2025)    Received from [facility]    Patient History     Smoking Tobacco Use: Every Day     Smokeless Tobacco Use: Never     Passive Exposure: Not on file   Financial Resource Strain: Unknown (2/18/2025)    Financial Resource Strain     Within the past 12 months, have you or your family members you live with been unable to get utilities (heat, electricity) when it was really needed?: Patient declined   Alcohol Use: Not At Risk (10/7/2020)    Received from [facility]    AUDIT-C     Frequency of Alcohol Consumption: Monthly or less     Average Number of Drinks: 3 or 4     Frequency of Binge Drinking: Never   Transportation Needs: Unknown (2/18/2025)    Transportation Needs     Within the past 12 months, has lack of transportation kept you from medical appointments, getting your medicines, non-medical meetings or appointments, work, or from getting things that you need?: Patient declined   Physical Activity: Not on file   Interpersonal Safety: Not on file   Stress: Not on file   Social Connections: Not on file   Health Literacy: Not on file       Functional Status:  Prior to admission patient needed assistance:   Dependent ADLs:: Bathing, Dressing, Ambulation-no assistive device  Dependent IADLs:: Cleaning, Cooking, Laundry, Shopping, Meal Preparation, Medication Management, Money Management, Transportation  Assesssment of Functional Status: Not at  functional baseline    Mental Health Status:  Mental  Health Status: No Current Concerns       Chemical Dependency Status:  Chemical Dependency Status: No Current Concerns             Values/Beliefs:  Spiritual, Cultural Beliefs, Sabianism Practices, Values that affect care:                 Discussed  Partnership in Safe Discharge Planning  document with patient/family: Yes:     Additional Information:  Spoke with caregiver at [facility] where patient lives, at baseline patient is able to walk without an assistive device, he is legally blind but knows the home and is able to navigate getting around.  Patient gets assistance with showers, meals, medications, laundry, cleaning and transportation.  Per  their transportation person is not working this weekend and will need transport arranged via w/c if able to return.    Next Steps: Follow for discharge planning    JAZMINE Hernandez  [phone]

## 2025-06-21 NOTE — ED TRIAGE NOTES
"Pt arrives from , EMS called for vomiting, pt reports this began yesterday. Endorses feeling \"woozy,\" is diabetic,  per EMS. Pt's roommate reported to EMS that patient's emesis appeared black. Pt is visually impaired. Tachycardic, A&OX4.         "

## 2025-06-21 NOTE — PROGRESS NOTES
Brief hospital medicine progress     Benja Duong is a 61 y/o male with PMH significant for schizophrenia, intellectual disability, polysubstance abuse, legally blind, anemia, latent TB, hypertension, hyperlipidemia, hyponatremia, and DM2 who was admitted earlier on 6/21/2025 from his group home for vomiting.  Please refer to initial H&P from earlier today for complete details.  Agree with initial assessment and plan as outlined by previous admitting provider.    Since admission the patient has become more alert and is able to answer simple questions but is mostly wanting to sleep.  Continues to have bouts of nausea with dry heaving but no actual emesis and no episodes of diarrhea.  When patient presented previously 4 months ago he received Narcan x 2 which seemed to help with improvement in his symptoms, which raise question of polysubstance use contributing to patient's presenting altered at that time.  Unclear if substance abuse contributing this time as well.  Patient is extremely hypertensive but also will not lay flat to check blood pressure adequately.  Resumed PTA clonidine, carvedilol, and hydralazine.    Labs the morning 6/21 show sodium of 133, chloride of 97, troponin slightly detectable at 24 but downward trending, blood glucose stable at 97, leukocytosis of 13.0, and hemoglobin of 10.6.  Patient is still tachycardic, suspect in relation to dehydration.  Currently afebrile and suspect leukocytosis in relation to stress response from ongoing vomiting.  Hemoglobin did drop from admission but that is likely dilutional as well from IV fluids.    Will plan to keep patient admitted today for ongoing IVFs, antiemetics, blood pressure monitoring and management, and PO trial since patient has only had sips of clears up to this point.  Hopeful patient will be able to discharge in 1-2 days if labs improve and tolerating oral intake.    Iris Torres PA-C

## 2025-06-21 NOTE — ED NOTES
Bed: [facility]  Expected date:   Expected time:   Means of arrival:   Comments:   2 62 M

## 2025-06-21 NOTE — PROGRESS NOTES
Pt is from group home, alert and oriented x 2-3. Pt oriented to the room and welcomed to observation unit , call light given. Did not want to do admission profile. On enteric precautions , BP elevated more to nausea and vomiting. MD notified , Orders for benadryl and Haldol . clear liquid .

## 2025-06-21 NOTE — PROVIDER NOTIFICATION
0858: Page: Patient's SBP is in the 200s this morning. Just contacted pharmacy to verify his BP meds. Thanks.    -Give prior to admission meds    1145: Verbal: Patient still hypertensive.

## 2025-06-21 NOTE — ED PROVIDER NOTES
"    History     Chief Complaint:  Vomiting       HPI   Benja Duong is a 60 year old male lives in a group home intermittent nausea vomiting since yesterday he is a diabetic blood glucose prior to arrival 321 states there has been some darkness to the vomitus no wolf abdominal pain no diarrhea no bruising or bleeding no fever no chills no cough no chest pain no shortness of breath.      Independent Historian:        Review of External Notes:  Discharge summary February 19, 2025     [facility]  Hospitalist Discharge Summary       Date of Admission:  2/18/2025  Date of Discharge:  2/19/2025  Discharging Provider: Marine Bustamante PA-C  Discharge Service: Hospitalist Service     Discharge Diagnoses  Nausea, vomiting and diarrhea-resolved  Altered mental status-resolved  Hypokalemia     Clinically Significant Risk Factors     # DMII: A1C = 8.2 % (Ref range: <5.7 %) within past 6 months  # Overweight: Estimated body mass index is 27.29 kg/m  as calculated from the following:    Height as of this encounter: 1.626 m (5' 4\").    Weight as of this encounter: 72.1 kg (159 lb).       Follow-ups Needed After Discharge  Follow-up Appointments         Follow-up and recommended labs and tests        Follow up with primary care in 1 week for repeat lab work including BMP (sodium and potassium slightly low)                     Unresulted Labs Ordered in the Past 30 Days of this Admission         Date and Time Order Name Status Description     2/18/2025  2:26 PM Blood Culture Hand, Left Preliminary       2/18/2025  2:26 PM Blood Culture Peripheral Blood Preliminary            These results will be followed up by primary care provider     Discharge Disposition  Discharged to home  Condition at discharge: Stable     Hospital Course  Benja Duong is a 59 year old male who for poor historian at baseline.  He has a past medical history of type 2 diabetes, hyperlipidemia, " hypertension, polysubstance abuse, schizophrenia, and intellectual disability who resides in a group home who presents to [facility] on the morning of 2/18/2025 for multiple symptoms to include nausea vomiting and diarrhea.       EMS reports, he presents from the group home where he has had nausea and vomiting for the past few hours.  He denies any pain or fevers.  In the field his blood sugar was 334.  He was given 4 mg of Zofran.  Given his past history suspicion was raised of possible polysubstance use contributing as he was unable to provide any history given his altered state.  Did receive 2 doses of Narcan with improvement.  My colleague was able to speak to the [facility] staff around 730 the morning of presentation after he had been in the emergency department for several hours.  They state that he could have gotten access to substances from other individuals in the group home.  However, they do add that he had been complaining of nausea for the past couple of days.       In the ED he was vitally stable. Lab remarkable for LFTs within normal limits. BMP shows a chloride of 91 and a CO2 of 27 BUN and creatinine are elevated at 37 and 1.48 and glucose 260. CBC with leukocytosis with a white count of 16.7 K, hemoglobin 11, platelets 182K. Lactic acid 2.6.,  Magnesium 2.4, beta hydroxybutyrate negative, EtOH negative, UA unremarkable. CT of the head is negative. CT of the abdomen pelvis with contrast demonstrates large/cholelithiasis within the gallbladder with gallbladder distention.  Gallbladder ultrasound shows sludge.  No bowel obstruction or abscess.  Appendix is normal.  Tiny nonobstructing renal calculi.  Esophageal wall thickening. ECG shows sinus tachycardia.  Urine drug screen is positive for cannabinoids but otherwise negative.  He received 2 L of lactated Ringer's, Narcan x 2, clonidine, lisinopril and Protonix with observation admission.     Since admission he has not had any further  nausea, vomiting or diarrhea suspect viral gastroenteritis.  He became more alert and oriented in the evening and has been fine since then.  He was ambulated with nursing and is at his physical baseline.  I called the group home who are comfortable taking him back today.              Medications:    acetaminophen (TYLENOL) 325 MG tablet  atorvastatin (LIPITOR) 20 MG tablet  carvedilol (COREG) 12.5 MG tablet  cholecalciferol (VITAMIN D-1000 MAX ST) 1000 units TABS  cloNIDine (CATAPRES) 0.1 MG tablet  diphenhydrAMINE (BENADRYL) 50 MG capsule  diphenhydrAMINE (BENADRYL) 50 MG capsule  exenatide ER (BYDUREON) 2 MG pen  ferrous sulfate (FE TABS) 325 (65 Fe) MG EC tablet  glucose (BD GLUCOSE) 4 g chewable tablet  hydrALAZINE (APRESOLINE) 25 MG tablet  insulin glargine (LANTUS PEN) 100 UNIT/ML pen  lisinopril (ZESTRIL) 40 MG tablet  magnesium oxide (MAG-OX) 400 MG tablet  melatonin 5 MG tablet  metFORMIN (GLUCOPHAGE XR) 500 MG 24 hr tablet  ondansetron (ZOFRAN ODT) 4 MG ODT tab  paliperidone (INVEGA SUSTENNA) 156 MG/ML PABLO injection  pantoprazole (PROTONIX) 40 MG EC tablet  polyethylene glycol-propylene glycol (SYSTANE ULTRA) 0.4-0.3 % SOLN ophthalmic solution  senna-docusate (SENOKOT-S/PERICOLACE) 8.6-50 MG tablet  tamsulosin (FLOMAX) 0.4 MG capsule  traZODone (DESYREL) 50 MG tablet        Past Medical History:    Past Medical History:   Diagnosis Date    Depression     Diabetes mellitus     h/o Cocaine abuse     Homeless     Hypertension     Latent tuberculosis     Legally blind     Proliferative diabetic retinopathy     Schizophrenia        Past Surgical History:    No past surgical history on file.       Physical Exam   Patient Vitals for the past 24 hrs:   BP Pulse Resp SpO2   06/21/25 0252 (!) 151/80 -- -- 99 %   06/21/25 0237 (!) 114/102 -- -- 99 %   06/21/25 0214 138/80 (!) 122 -- 98 %   06/21/25 0052 132/88 (!) 127 16 100 %        Physical Exam  General: Patient is well appearing. No distress.  Head:  Atraumatic.  Eyes: Conjunctivae and EOM are normal. No scleral icterus.  Neck: Normal range of motion. Neck supple.   Cardiovascular: Normal rate, regular rhythm, normal heart sounds and intact distal pulses.   Pulmonary/Chest: Breath sounds normal. No respiratory distress.  Abdominal: Soft. Bowel sounds are normal. No distension. No tenderness. No rebound or guarding.   Musculoskeletal: Normal range of motion.  Skin: Warm and dry. No rash noted. Not diaphoretic.      Emergency Department Course   ECG  Sinus tachycardia 124 no acute injury pattern or other abnormality    Imaging:  No orders to display       Laboratory:  Labs Ordered and Resulted from Time of ED Arrival to Time of ED Departure   GLUCOSE BY METER - Abnormal       Result Value    GLUCOSE BY METER POCT 315 (*)    BASIC METABOLIC PANEL - Abnormal    Sodium 130 (*)     Potassium 3.7      Chloride 86 (*)     Carbon Dioxide (CO2) 27      Anion Gap 17 (*)     Urea Nitrogen 25.9 (*)     Creatinine 1.15      GFR Estimate 72      Calcium 10.2      Glucose 329 (*)    CBC WITH PLATELETS AND DIFFERENTIAL - Abnormal    WBC Count 11.0      RBC Count 4.81      Hemoglobin 13.3      Hematocrit 40.0      MCV 83      MCH 27.7      MCHC 33.3      RDW 15.1 (*)     Platelet Count 240      % Neutrophils 76      % Lymphocytes 15      % Monocytes 9      % Eosinophils 0      % Basophils 0      % Immature Granulocytes 1      NRBCs per 100 WBC 0      Absolute Neutrophils 8.4 (*)     Absolute Lymphocytes 1.6      Absolute Monocytes 0.9      Absolute Eosinophils 0.0      Absolute Basophils 0.0      Absolute Immature Granulocytes 0.1      Absolute NRBCs 0.0     TROPONIN T, HIGH SENSITIVITY - Abnormal    Troponin T, High Sensitivity 31 (*)    KETONE BETA-HYDROXYBUTYRATE QUANTITATIVE, RAPID - Abnormal    Ketone (Beta-Hydroxybutyrate) Quantitative 0.35 (*)    BLOOD GAS VENOUS - Abnormal    pH Venous 7.38      pCO2 Venous 53 (*)     pO2 Venous 27      Bicarbonate Venous 32 (*)     Base  Excess/Deficit Venous 5.1 (*)     FIO2 21      Oxyhemoglobin Venous 37 (*)     O2 Sat, Venous 37.4 (*)    HEPATIC FUNCTION PANEL - Normal    Protein Total 7.2      Albumin 4.5      Bilirubin Total 0.5      Alkaline Phosphatase 81      AST 13      ALT 11      Bilirubin Direct 0.14     LIPASE - Normal    Lipase 34     INFLUENZA A/B, RSV AND SARS-COV2 PCR - Normal    Influenza A PCR Negative      Influenza B PCR Negative      RSV PCR Negative      SARS CoV2 PCR Negative     ROUTINE UA WITH MICROSCOPIC REFLEX TO CULTURE   TROPONIN T, HIGH SENSITIVITY        Procedures       Emergency Department Course & Assessments:    Interventions:  Medications   sodium chloride 0.9% BOLUS 1,000 mL (has no administration in time range)   sodium chloride 0.9% BOLUS 1,000 mL (1,000 mLs Intravenous $New Bag 25)   ondansetron (ZOFRAN) injection 4 mg (4 mg Intravenous $Given 25)        Assessments:      Independent Interpretation (X-rays, CTs, rhythm strip):      Consultations/Discussion of Management or Tests:  Hospitalist       Social Drivers of Health affecting care:       Disposition:  Obs    Impression & Plan           Medical Decision Makin-year-old male with diabetes with blood glucose elevated comes in with 2 days of intolerance to p.o. nausea vomiting with an elevated BUN hyponatremia hypochloremia dehydration and previous gastroenteritis there is a slight anion gap bicarb is normal pH is normal he does not appear appear DKA will discuss with hospitalist who agrees not to tracy the DKA protocol at this time about further management for this also with the tachycardia he had a slight troponin elevation no acute injury pattern on EKG will need admit for further workup and management  Diagnosis:    ICD-10-CM    1. Nausea and vomiting, unspecified vomiting type  R11.2       2. Dehydration  E86.0       3. Elevated troponin  R79.89       4. Metabolic acidemia  E87.20       5. Hyponatremia  E87.1       6.  Hypochloremia  E87.8       7. Hyperglycemia  R73.9            Discharge Medications:  New Prescriptions    No medications on file                 Lj Lopez MD  06/21/25 9843

## 2025-06-21 NOTE — ED NOTES
"[facility]  ED Nurse Handoff Report    ED Chief complaint: Vomiting  . ED Diagnosis:   Final diagnoses:   Nausea and vomiting, unspecified vomiting type   Dehydration   Elevated troponin   Metabolic acidemia   Hyponatremia   Hypochloremia   Hyperglycemia       Allergies:   Allergies   Allergen Reactions    Alprazolam      Other reaction(s): *Unknown States \"I break out\"    Oxycodone-Acetaminophen      Other reaction(s): *Unknown, States \"I break out\"    Oxycodone-Acetaminophen Other (See Comments)     Other reaction(s): *Unknown, States \"I break out\"       Code Status: Full Code    Activity level - Baseline/Home:  standby.  Activity Level - Current:   standby.   Lift room needed: No.   Bariatric: No   Needed: No   Isolation: No.   Infection: Not Applicable.     Respiratory status: Room air    Vital Signs (within 30 minutes):   Vitals:    06/21/25 0052 06/21/25 0214 06/21/25 0237 06/21/25 0252   BP: 132/88 138/80 (!) 114/102 (!) 151/80   Pulse: (!) 127 (!) 122     Resp: 16      SpO2: 100% 98% 99% 99%       Cardiac Rhythm:  ,      Pain level:    Patient confused: No.   Patient Falls Risk: patient and family education.   Elimination Status: Has voided     Patient Report - Initial Complaint: Pt arrives from , EMS called for vomiting, pt reports this began yesterday. Endorses feeling \"woozy,\" is diabetic,  per EMS. Pt's roommate reported to EMS that patient's emesis appeared black. Pt is visually impaired. Tachycardic, A&OX4. .   Focused Assessment: Withdrawn, flat.      Abnormal Results:   Labs Ordered and Resulted from Time of ED Arrival to Time of ED Departure   GLUCOSE BY METER - Abnormal       Result Value    GLUCOSE BY METER POCT 315 (*)    BASIC METABOLIC PANEL - Abnormal    Sodium 130 (*)     Potassium 3.7      Chloride 86 (*)     Carbon Dioxide (CO2) 27      Anion Gap 17 (*)     Urea Nitrogen 25.9 (*)     Creatinine 1.15      GFR Estimate 72      Calcium 10.2      " Glucose 329 (*)    ROUTINE UA WITH MICROSCOPIC REFLEX TO CULTURE - Abnormal    Color Urine Yellow      Appearance Urine Clear      Glucose Urine 500 (*)     Bilirubin Urine Negative      Ketones Urine 40 (*)     Specific Gravity Urine 1.020      Blood Urine Negative      pH Urine 7.0      Protein Albumin Urine 50 (*)     Urobilinogen Urine Normal      Nitrite Urine Negative      Leukocyte Esterase Urine Negative      Mucus Urine Present (*)     RBC Urine 3 (*)     WBC Urine 1      Squamous Epithelials Urine <1      Hyaline Casts Urine 86 (*)    CBC WITH PLATELETS AND DIFFERENTIAL - Abnormal    WBC Count 11.0      RBC Count 4.81      Hemoglobin 13.3      Hematocrit 40.0      MCV 83      MCH 27.7      MCHC 33.3      RDW 15.1 (*)     Platelet Count 240      % Neutrophils 76      % Lymphocytes 15      % Monocytes 9      % Eosinophils 0      % Basophils 0      % Immature Granulocytes 1      NRBCs per 100 WBC 0      Absolute Neutrophils 8.4 (*)     Absolute Lymphocytes 1.6      Absolute Monocytes 0.9      Absolute Eosinophils 0.0      Absolute Basophils 0.0      Absolute Immature Granulocytes 0.1      Absolute NRBCs 0.0     TROPONIN T, HIGH SENSITIVITY - Abnormal    Troponin T, High Sensitivity 31 (*)    KETONE BETA-HYDROXYBUTYRATE QUANTITATIVE, RAPID - Abnormal    Ketone (Beta-Hydroxybutyrate) Quantitative 0.35 (*)    BLOOD GAS VENOUS - Abnormal    pH Venous 7.38      pCO2 Venous 53 (*)     pO2 Venous 27      Bicarbonate Venous 32 (*)     Base Excess/Deficit Venous 5.1 (*)     FIO2 21      Oxyhemoglobin Venous 37 (*)     O2 Sat, Venous 37.4 (*)    HEPATIC FUNCTION PANEL - Normal    Protein Total 7.2      Albumin 4.5      Bilirubin Total 0.5      Alkaline Phosphatase 81      AST 13      ALT 11      Bilirubin Direct 0.14     LIPASE - Normal    Lipase 34     INFLUENZA A/B, RSV AND SARS-COV2 PCR - Normal    Influenza A PCR Negative      Influenza B PCR Negative      RSV PCR Negative      SARS CoV2 PCR Negative     TROPONIN  T, HIGH SENSITIVITY        No orders to display       Treatments provided: Fluids  Family Comments: NA  OBS brochure/video discussed/provided to patient:  Yes  ED Medications:   Medications   sodium chloride 0.9% BOLUS 1,000 mL (has no administration in time range)   sodium chloride 0.9% BOLUS 1,000 mL (1,000 mLs Intravenous $New Bag 6/21/25 0130)   ondansetron (ZOFRAN) injection 4 mg (4 mg Intravenous $Given 6/21/25 0130)       Drips infusing:  No  For the majority of the shift this patient was Green.   Interventions performed were NA.    Sepsis treatment initiated: No    Cares/treatment/interventions/medications to be completed following ED care: See admission orders.     ED Nurse Name: Lorrie Orona RN  3:01 AM     .RECEIVING UNIT ED HANDOFF REVIEW    Above ED Nurse Handoff Report was reviewed: Yes  Reviewed by: Jesusita Vazquez RN on June 21, 2025 at 4:10 AM   I Pamela called the ED to inform them the note was read: Yes

## 2025-06-22 VITALS
TEMPERATURE: 98.4 F | DIASTOLIC BLOOD PRESSURE: 67 MMHG | OXYGEN SATURATION: 98 % | SYSTOLIC BLOOD PRESSURE: 123 MMHG | WEIGHT: 144.84 LBS | RESPIRATION RATE: 18 BRPM | BODY MASS INDEX: 24.86 KG/M2 | HEART RATE: 104 BPM

## 2025-06-22 LAB
ANION GAP SERPL CALCULATED.3IONS-SCNC: 8 MMOL/L (ref 7–15)
BASOPHILS # BLD AUTO: 0 10E3/UL (ref 0–0.2)
BASOPHILS NFR BLD AUTO: 1 %
BUN SERPL-MCNC: 12.6 MG/DL (ref 8–23)
CALCIUM SERPL-MCNC: 8.9 MG/DL (ref 8.8–10.4)
CHLORIDE SERPL-SCNC: 98 MMOL/L (ref 98–107)
CREAT SERPL-MCNC: 0.88 MG/DL (ref 0.67–1.17)
EGFRCR SERPLBLD CKD-EPI 2021: >90 ML/MIN/1.73M2
EOSINOPHIL # BLD AUTO: 0 10E3/UL (ref 0–0.7)
EOSINOPHIL NFR BLD AUTO: 0 %
ERYTHROCYTE [DISTWIDTH] IN BLOOD BY AUTOMATED COUNT: 15.7 % (ref 10–15)
GLUCOSE BLDC GLUCOMTR-MCNC: 120 MG/DL (ref 70–99)
GLUCOSE BLDC GLUCOMTR-MCNC: 121 MG/DL (ref 70–99)
GLUCOSE BLDC GLUCOMTR-MCNC: 125 MG/DL (ref 70–99)
GLUCOSE BLDC GLUCOMTR-MCNC: 74 MG/DL (ref 70–99)
GLUCOSE SERPL-MCNC: 127 MG/DL (ref 70–99)
HCO3 SERPL-SCNC: 28 MMOL/L (ref 22–29)
HCT VFR BLD AUTO: 28.8 % (ref 40–53)
HGB BLD-MCNC: 9.6 G/DL (ref 13.3–17.7)
IMM GRANULOCYTES # BLD: 0 10E3/UL
IMM GRANULOCYTES NFR BLD: 0 %
LYMPHOCYTES # BLD AUTO: 2.5 10E3/UL (ref 0.8–5.3)
LYMPHOCYTES NFR BLD AUTO: 29 %
MCH RBC QN AUTO: 28.1 PG (ref 26.5–33)
MCHC RBC AUTO-ENTMCNC: 33.3 G/DL (ref 31.5–36.5)
MCV RBC AUTO: 84 FL (ref 78–100)
MONOCYTES # BLD AUTO: 0.8 10E3/UL (ref 0–1.3)
MONOCYTES NFR BLD AUTO: 9 %
NEUTROPHILS # BLD AUTO: 5.4 10E3/UL (ref 1.6–8.3)
NEUTROPHILS NFR BLD AUTO: 62 %
NRBC # BLD AUTO: 0 10E3/UL
NRBC BLD AUTO-RTO: 0 /100
PLATELET # BLD AUTO: 152 10E3/UL (ref 150–450)
POTASSIUM SERPL-SCNC: 3.6 MMOL/L (ref 3.4–5.3)
RBC # BLD AUTO: 3.42 10E6/UL (ref 4.4–5.9)
SODIUM SERPL-SCNC: 134 MMOL/L (ref 135–145)
WBC # BLD AUTO: 8.7 10E3/UL (ref 4–11)

## 2025-06-22 PROCEDURE — 85004 AUTOMATED DIFF WBC COUNT: CPT | Performed by: PHYSICIAN ASSISTANT

## 2025-06-22 PROCEDURE — 85018 HEMOGLOBIN: CPT | Performed by: PHYSICIAN ASSISTANT

## 2025-06-22 PROCEDURE — 250N000013 HC RX MED GY IP 250 OP 250 PS 637: Performed by: INTERNAL MEDICINE

## 2025-06-22 PROCEDURE — 99239 HOSP IP/OBS DSCHRG MGMT >30: CPT | Performed by: PHYSICIAN ASSISTANT

## 2025-06-22 PROCEDURE — 250N000011 HC RX IP 250 OP 636: Performed by: INTERNAL MEDICINE

## 2025-06-22 PROCEDURE — 80048 BASIC METABOLIC PNL TOTAL CA: CPT | Performed by: PHYSICIAN ASSISTANT

## 2025-06-22 PROCEDURE — 36415 COLL VENOUS BLD VENIPUNCTURE: CPT | Performed by: PHYSICIAN ASSISTANT

## 2025-06-22 RX ADMIN — Medication 400 MG: at 09:26

## 2025-06-22 RX ADMIN — CARVEDILOL 12.5 MG: 12.5 TABLET, FILM COATED ORAL at 09:26

## 2025-06-22 RX ADMIN — PANTOPRAZOLE SODIUM 40 MG: 40 INJECTION, POWDER, FOR SOLUTION INTRAVENOUS at 10:16

## 2025-06-22 RX ADMIN — CLONIDINE HYDROCHLORIDE 0.1 MG: 0.1 TABLET ORAL at 09:26

## 2025-06-22 RX ADMIN — HYDRALAZINE HYDROCHLORIDE 25 MG: 25 TABLET ORAL at 09:26

## 2025-06-22 RX ADMIN — HYDRALAZINE HYDROCHLORIDE 25 MG: 25 TABLET ORAL at 14:32

## 2025-06-22 RX ADMIN — TAMSULOSIN HYDROCHLORIDE 0.4 MG: 0.4 CAPSULE ORAL at 09:26

## 2025-06-22 ASSESSMENT — ACTIVITIES OF DAILY LIVING (ADL)
ADLS_ACUITY_SCORE: 61
ADLS_ACUITY_SCORE: 61
ADLS_ACUITY_SCORE: 57
ADLS_ACUITY_SCORE: 60
ADLS_ACUITY_SCORE: 57
ADLS_ACUITY_SCORE: 61
ADLS_ACUITY_SCORE: 57
ADLS_ACUITY_SCORE: 61
ADLS_ACUITY_SCORE: 57
ADLS_ACUITY_SCORE: 60
ADLS_ACUITY_SCORE: 60
ADLS_ACUITY_SCORE: 57
ADLS_ACUITY_SCORE: 60

## 2025-06-22 NOTE — PLAN OF CARE
"Pt A&O x3. On RA. Up with SBA. Flat affect. PIV saline locked. Denies pain. Pt was Up for a walk, steady gait and only requiring minimal assistance. Diet was advanced to a regular diet. Pt tolerated Lunch well. Safety check completed. Bed alarm on. Possible discharge today.     Goal Outcome Evaluation:      Plan of Care Reviewed With: patient    Overall Patient Progress: improvingOverall Patient Progress: improving    Outcome Evaluation: Possible D/c today.      Problem: Adult Inpatient Plan of Care  Goal: Plan of Care Review  Description: The Plan of Care Review/Shift note should be completed every shift.  The Outcome Evaluation is a brief statement about your assessment that the patient is improving, declining, or no change.  This information will be displayed automatically on your shift  note.  Outcome: Progressing  Flowsheets (Taken 6/22/2025 1424)  Outcome Evaluation: Possible D/c today.  Plan of Care Reviewed With: patient  Overall Patient Progress: improving  Goal: Patient-Specific Goal (Individualized)  Description: You can add care plan individualizations to a care plan. Examples of Individualization might be:  \"Parent requests to be called daily at 9am for status\", \"I have a hard time hearing out of my right ear\", or \"Do not touch me to wake me up as it startles  me\".  Outcome: Progressing  Goal: Absence of Hospital-Acquired Illness or Injury  Outcome: Progressing  Intervention: Identify and Manage Fall Risk  Recent Flowsheet Documentation  Taken 6/22/2025 0917 by Guero White RN  Safety Promotion/Fall Prevention:   activity supervised   supervised activity   safety round/check completed   room near nurse's station   nonskid shoes/slippers when out of bed  Intervention: Prevent Skin Injury  Recent Flowsheet Documentation  Taken 6/22/2025 0917 by Guero White RN  Body Position: position changed independently  Intervention: Prevent Infection  Recent Flowsheet Documentation  Taken 6/22/2025 0917 " by uGero White RN  Infection Prevention:   rest/sleep promoted   single patient room provided  Goal: Optimal Comfort and Wellbeing  Outcome: Progressing  Goal: Readiness for Transition of Care  Outcome: Progressing     Problem: Skin Injury Risk Increased  Goal: Skin Health and Integrity  Outcome: Progressing  Intervention: Plan: Nurse Driven Intervention: Moisture Management  Recent Flowsheet Documentation  Taken 6/22/2025 0917 by Guero White RN  Moisture Interventions:   Encourage regular toileting   No brief in bed   Incontinence pad  Intervention: Plan: Nurse Driven Intervention: Friction and Shear  Recent Flowsheet Documentation  Taken 6/22/2025 0917 by Guero White RN  Friction/Shear Interventions: HOB 30 degrees or less

## 2025-06-22 NOTE — DISCHARGE SUMMARY
"[facility]    Discharge Summary  Hospitalist    Date of Admission:  6/21/2025  Date of Discharge:  6/22/2025  Provider:  Anika Torres PA-C  Date of Service (when I last saw the patient): 06/22/25    Discharge Diagnoses   ***    Other medical issues:  Past Medical History:   Diagnosis Date    Depression     Diabetes mellitus     h/o Cocaine abuse     Homeless     Hypertension     Latent tuberculosis     Legally blind     Proliferative diabetic retinopathy     Schizophrenia        History of Present Illness   Benja Duong is an 60 year old male who presented with ***.  Please see the admission history and physical for full details.    Hospital Course   Benja Duong was admitted on 6/21/2025.  The following problems were addressed during his hospitalization:    ***    Pain plan: # Discharge Pain Plan: {*** New regulation from [employer] - click delete to remove this announcement ***}  {Pain Plan:959126}    Significant Results and Procedures   ***    Pending Results   ***  Unresulted Labs Ordered in the Past 30 Days of this Admission       No orders found for last 31 day(s).            Code Status   {CODE STATUS      :798464}       Primary Care Physician   [facility]    Exam:  ***    Discharge Disposition   {                 :5954546::\"Discharged to home\"}    Consultations This Hospital Stay   CARE MANAGEMENT / SOCIAL WORK IP CONSULT  CARE MANAGEMENT / SOCIAL WORK IP CONSULT  CARE MANAGEMENT / SOCIAL WORK IP CONSULT    Time Spent on this Encounter   {How much time did you spend on discharge?:50001640}***    Discharge Orders      Reason for your hospital stay    You were admitted for concerns of nausea, vomiting and diarrhea. Lab work showed a low sodium and chloride along with elevated white blood cell count on recheck. Your repeat labs improved but did show a lower hemoglobin which should be followed by your primary care provider for monitoring. We " suspect this was a viral infection that does not require antibiotics. You were treated supportively on observation with fluids and nausea meds. You were able to tolerated oral intake prior to discharge. You were moving at your baseline and were allowed to discharge home. We recommend you stay well hydrated and advance your diet as tolerated.     Activity    Your activity upon discharge: activity as tolerated     Discharge Instructions    Would decrease your Lantus to 14 units for the next 2 days until tolerating regular intake then can be resumed up to previous 18 units. Hold your Metformin on 6/22 then resume on 6/23 as long as tolerating adequate intake.     Monitor and record    Blood glucose: 4 times a day, before meals and at bedtime, if blood glucose <150 then hold Lantus     Diet    Follow this diet upon discharge: Current Diet:Orders Placed This Encounter      Advance Diet as Tolerated: Regular Diet Adult; Regular Diet Adult     Hospital Follow-up with Existing Primary Care Provider (PCP)          Discharge Medications   Current Discharge Medication List        CONTINUE these medications which have CHANGED    Details   insulin glargine (LANTUS PEN) 100 UNIT/ML pen Inject 14 Units subcutaneously at bedtime.    Comments: If Lantus is not covered by insurance, may substitute Basaglar or Semglee or other insulin glargine product per insurance preference at same dose and frequency.             CONTINUE these medications which have NOT CHANGED    Details   acetaminophen (TYLENOL) 325 MG tablet Take 650 mg by mouth every 4 hours as needed for mild pain      atorvastatin (LIPITOR) 20 MG tablet Take 20 mg by mouth At Bedtime      carvedilol (COREG) 12.5 MG tablet Take 12.5 mg by mouth 2 times daily (with meals).      cholecalciferol (VITAMIN D-1000 MAX ST) 1000 units TABS Take 1,000 Units by mouth daily  Qty: 30 tablet, Refills: 1      cloNIDine (CATAPRES) 0.1 MG tablet Take 1 tablet (0.1 mg) by mouth 2 times  daily.  Qty: 60 tablet, Refills: 1    Associated Diagnoses: Benign essential hypertension      !! diphenhydrAMINE (BENADRYL) 50 MG capsule Take 50 mg by mouth every 4 hours as needed for itching or allergies      !! diphenhydrAMINE (BENADRYL) 50 MG capsule Take 50 mg by mouth At Bedtime      ferrous sulfate (FEROSUL) 325 (65 Fe) MG tablet Take 325 mg by mouth daily (with breakfast).      glucose (BD GLUCOSE) 4 g chewable tablet Take 4 tablets by mouth daily as needed for low blood sugar.      hydrALAZINE (APRESOLINE) 25 MG tablet Take 25 mg by mouth 3 times daily Hold for SBP <120      lisinopril (ZESTRIL) 40 MG tablet Take 40 mg by mouth daily      magnesium oxide (MAG-OX) 400 MG tablet Take 400 mg by mouth daily      melatonin 5 MG tablet Take 5 mg by mouth daily as needed for sleep      metFORMIN (GLUCOPHAGE XR) 500 MG 24 hr tablet Take 1,000 mg by mouth daily (with breakfast).      metoclopramide (REGLAN) 5 MG tablet Take 5 mg by mouth 2 times daily as needed for vomiting.      ondansetron (ZOFRAN ODT) 4 MG ODT tab Take 4 mg by mouth every 8 hours as needed for nausea      OZEMPIC, 0.25 OR 0.5 MG/DOSE, 2 MG/3ML pen Inject 0.25 mg subcutaneously every 7 days.      paliperidone (INVEGA SUSTENNA) 156 MG/ML PABLO injection Inject 156 mg into the muscle every 28 days      pantoprazole (PROTONIX) 40 MG EC tablet Take 40 mg by mouth daily.      polyethylene glycol-propylene glycol (SYSTANE ULTRA) 0.4-0.3 % SOLN ophthalmic solution Place 1 drop into both eyes 2 times daily as needed for dry eyes.      senna-docusate (SENOKOT-S/PERICOLACE) 8.6-50 MG tablet Take 1 tablet by mouth 2 times daily as needed      tamsulosin (FLOMAX) 0.4 MG capsule Take 1 capsule (0.4 mg) by mouth daily  Qty: 30 capsule, Refills: 3    Associated Diagnoses: Urinary retention      traZODone (DESYREL) 50 MG tablet Take 50 mg by mouth nightly as needed for sleep       !! - Potential duplicate medications found. Please discuss with provider.     "    Allergies   Allergies   Allergen Reactions    Alprazolam      Other reaction(s): *Unknown States \"I break out\"    Oxycodone-Acetaminophen      Other reaction(s): *Unknown, States \"I break out\"    Oxycodone-Acetaminophen Other (See Comments)     Other reaction(s): *Unknown, States \"I break out\"     Data   Results for orders placed or performed during the hospital encounter of 06/21/25   Glucose by meter     Status: Abnormal   Result Value Ref Range    GLUCOSE BY METER POCT 315 (H) 70 - 99 mg/dL   Basic metabolic panel     Status: Abnormal   Result Value Ref Range    Sodium 130 (L) 135 - 145 mmol/L    Potassium 3.7 3.4 - 5.3 mmol/L    Chloride 86 (L) 98 - 107 mmol/L    Carbon Dioxide (CO2) 27 22 - 29 mmol/L    Anion Gap 17 (H) 7 - 15 mmol/L    Urea Nitrogen 25.9 (H) 8.0 - 23.0 mg/dL    Creatinine 1.15 0.67 - 1.17 mg/dL    GFR Estimate 72 >60 mL/min/1.73m2    Calcium 10.2 8.8 - 10.4 mg/dL    Glucose 329 (H) 70 - 99 mg/dL   UA with Microscopic reflex to Culture     Status: Abnormal    Specimen: Urine, Clean Catch   Result Value Ref Range    Color Urine Yellow Colorless, Straw, Light Yellow, Yellow    Appearance Urine Clear Clear    Glucose Urine 500 (A) Negative mg/dL    Bilirubin Urine Negative Negative    Ketones Urine 40 (A) Negative mg/dL    Specific Gravity Urine 1.020 1.003 - 1.035    Blood Urine Negative Negative    pH Urine 7.0 5.0 - 7.0    Protein Albumin Urine 50 (A) Negative mg/dL    Urobilinogen Urine Normal Normal mg/dL    Nitrite Urine Negative Negative    Leukocyte Esterase Urine Negative Negative    Mucus Urine Present (A) None Seen /LPF    RBC Urine 3 (H) <=2 /HPF    WBC Urine 1 <=5 /HPF    Squamous Epithelials Urine <1 <=1 /HPF    Hyaline Casts Urine 86 (H) <=2 /LPF    Narrative    Urine Culture not indicated   Hepatic function panel     Status: Normal   Result Value Ref Range    Protein Total 7.2 6.4 - 8.3 g/dL    Albumin 4.5 3.5 - 5.2 g/dL    Bilirubin Total 0.5 <=1.2 mg/dL    Alkaline " Phosphatase 81 40 - 150 U/L    AST 13 0 - 45 U/L    ALT 11 0 - 70 U/L    Bilirubin Direct 0.14 0.00 - 0.30 mg/dL   Lipase     Status: Normal   Result Value Ref Range    Lipase 34 13 - 60 U/L   Influenza A/B, RSV and SARS-CoV2 PCR (COVID-19) Nasopharyngeal     Status: Normal    Specimen: Nasopharyngeal; Swab   Result Value Ref Range    Influenza A PCR Negative Negative    Influenza B PCR Negative Negative    RSV PCR Negative Negative    SARS CoV2 PCR Negative Negative    Narrative    Testing was performed using the Xpert Xpress CoV2/Flu/RSV Assay on the [employer] Instrument. This test should be ordered for the detection of SARS-CoV2, influenza, and RSV viruses in individuals with signs and symptoms of respiratory tract infection. This test is for in vitro diagnostic use under the [place] FDA for laboratories certified under CLIA to perform high or moderate complexity testing. This test has been [place] FDA cleared. A negative result does not rule out the presence of PCR inhibitors in the specimen or target RNA in concentration below the limit of detection for the assay. If only one viral target is positive but coinfection with multiple targets is suspected, the sample should be re-tested with another FDA cleared, approved, or authorized test, if coninfection would change clinical management. This test was validated by the [facility]. These laboratories are certified under the Clinical Laboratory Improvement Amendments of 1988 (CLIA-88) as qualified to perfom high complexity laboratory testing.   Extra Tube ([place] Draw)     Status: None    Narrative    The following orders were created for panel order Extra Tube ([place] Draw).  Procedure                               Abnormality         Status                     ---------                               -----------         ------                     Extra Blue Top Tube[[id number]]                             Final result               Extra Red Top  Tube[[id number]]                              Final result                 Please view results for these tests on the individual orders.   CBC with platelets and differential     Status: Abnormal   Result Value Ref Range    WBC Count 11.0 4.0 - 11.0 10e3/uL    RBC Count 4.81 4.40 - 5.90 10e6/uL    Hemoglobin 13.3 13.3 - 17.7 g/dL    Hematocrit 40.0 40.0 - 53.0 %    MCV 83 78 - 100 fL    MCH 27.7 26.5 - 33.0 pg    MCHC 33.3 31.5 - 36.5 g/dL    RDW 15.1 (H) 10.0 - 15.0 %    Platelet Count 240 150 - 450 10e3/uL    % Neutrophils 76 %    % Lymphocytes 15 %    % Monocytes 9 %    % Eosinophils 0 %    % Basophils 0 %    % Immature Granulocytes 1 %    NRBCs per 100 WBC 0 <1 /100    Absolute Neutrophils 8.4 (H) 1.6 - 8.3 10e3/uL    Absolute Lymphocytes 1.6 0.8 - 5.3 10e3/uL    Absolute Monocytes 0.9 0.0 - 1.3 10e3/uL    Absolute Eosinophils 0.0 0.0 - 0.7 10e3/uL    Absolute Basophils 0.0 0.0 - 0.2 10e3/uL    Absolute Immature Granulocytes 0.1 <=0.4 10e3/uL    Absolute NRBCs 0.0 10e3/uL   Extra Blue Top Tube     Status: None   Result Value Ref Range    Hold Specimen JIC    Extra Red Top Tube     Status: None   Result Value Ref Range    Hold Specimen JI    Troponin T, High Sensitivity     Status: Abnormal   Result Value Ref Range    Troponin T, High Sensitivity 31 (H) <=22 ng/L   Ketone Beta-Hydroxybutyrate Quantitative     Status: Abnormal   Result Value Ref Range    Ketone (Beta-Hydroxybutyrate) Quantitative 0.35 (H) <=0.30 mmol/L   Blood gas venous     Status: Abnormal   Result Value Ref Range    pH Venous 7.38 7.32 - 7.43    pCO2 Venous 53 (H) 40 - 50 mm Hg    pO2 Venous 27 25 - 47 mm Hg    Bicarbonate Venous 32 (H) 21 - 28 mmol/L    Base Excess/Deficit Venous 5.1 (H) -3.0 - 3.0 mmol/L    FIO2 21     Oxyhemoglobin Venous 37 (L) 70 - 75 %    O2 Sat, Venous 37.4 (L) 70.0 - 75.0 %    Narrative    In healthy individuals, oxyhemoglobin (O2Hb) and oxygen saturation (SO2) are approximately equal. In the presence of  dyshemoglobins, oxyhemoglobin can be considerably lower than oxygen saturation.   Troponin T, High Sensitivity     Status: Abnormal   Result Value Ref Range    Troponin T, High Sensitivity 26 (H) <=22 ng/L   Glucose by meter     Status: Abnormal   Result Value Ref Range    GLUCOSE BY METER POCT 271 (H) 70 - 99 mg/dL   CBC with platelets     Status: Abnormal   Result Value Ref Range    WBC Count 13.0 (H) 4.0 - 11.0 10e3/uL    RBC Count 3.84 (L) 4.40 - 5.90 10e6/uL    Hemoglobin 10.6 (L) 13.3 - 17.7 g/dL    Hematocrit 31.6 (L) 40.0 - 53.0 %    MCV 82 78 - 100 fL    MCH 27.6 26.5 - 33.0 pg    MCHC 33.5 31.5 - 36.5 g/dL    RDW 15.3 (H) 10.0 - 15.0 %    Platelet Count 177 150 - 450 10e3/uL   Basic metabolic panel     Status: Abnormal   Result Value Ref Range    Sodium 133 (L) 135 - 145 mmol/L    Potassium 3.5 3.4 - 5.3 mmol/L    Chloride 97 (L) 98 - 107 mmol/L    Carbon Dioxide (CO2) 29 22 - 29 mmol/L    Anion Gap 7 7 - 15 mmol/L    Urea Nitrogen 20.7 8.0 - 23.0 mg/dL    Creatinine 0.79 0.67 - 1.17 mg/dL    GFR Estimate >90 >60 mL/min/1.73m2    Calcium 9.3 8.8 - 10.4 mg/dL    Glucose 197 (H) 70 - 99 mg/dL   Magnesium     Status: Normal   Result Value Ref Range    Magnesium 2.0 1.7 - 2.3 mg/dL   Troponin T, High Sensitivity     Status: Abnormal   Result Value Ref Range    Troponin T, High Sensitivity 24 (H) <=22 ng/L   Phosphorus     Status: Normal   Result Value Ref Range    Phosphorus 2.6 2.5 - 4.5 mg/dL   Glucose by meter     Status: Abnormal   Result Value Ref Range    GLUCOSE BY METER POCT 259 (H) 70 - 99 mg/dL   Glucose by meter     Status: Abnormal   Result Value Ref Range    GLUCOSE BY METER POCT 185 (H) 70 - 99 mg/dL   Glucose by meter     Status: Abnormal   Result Value Ref Range    GLUCOSE BY METER POCT 202 (H) 70 - 99 mg/dL   Glucose by meter     Status: Abnormal   Result Value Ref Range    GLUCOSE BY METER POCT 257 (H) 70 - 99 mg/dL   Basic metabolic panel     Status: Abnormal   Result Value Ref Range     Sodium 134 (L) 135 - 145 mmol/L    Potassium 3.6 3.4 - 5.3 mmol/L    Chloride 98 98 - 107 mmol/L    Carbon Dioxide (CO2) 28 22 - 29 mmol/L    Anion Gap 8 7 - 15 mmol/L    Urea Nitrogen 12.6 8.0 - 23.0 mg/dL    Creatinine 0.88 0.67 - 1.17 mg/dL    GFR Estimate >90 >60 mL/min/1.73m2    Calcium 8.9 8.8 - 10.4 mg/dL    Glucose 127 (H) 70 - 99 mg/dL   Glucose by meter     Status: Abnormal   Result Value Ref Range    GLUCOSE BY METER POCT 121 (H) 70 - 99 mg/dL   Glucose by meter     Status: Normal   Result Value Ref Range    GLUCOSE BY METER POCT 74 70 - 99 mg/dL   Glucose by meter     Status: Abnormal   Result Value Ref Range    GLUCOSE BY METER POCT 120 (H) 70 - 99 mg/dL   CBC with platelets and differential     Status: Abnormal   Result Value Ref Range    WBC Count 8.7 4.0 - 11.0 10e3/uL    RBC Count 3.42 (L) 4.40 - 5.90 10e6/uL    Hemoglobin 9.6 (L) 13.3 - 17.7 g/dL    Hematocrit 28.8 (L) 40.0 - 53.0 %    MCV 84 78 - 100 fL    MCH 28.1 26.5 - 33.0 pg    MCHC 33.3 31.5 - 36.5 g/dL    RDW 15.7 (H) 10.0 - 15.0 %    Platelet Count 152 150 - 450 10e3/uL    % Neutrophils 62 %    % Lymphocytes 29 %    % Monocytes 9 %    % Eosinophils 0 %    % Basophils 1 %    % Immature Granulocytes 0 %    NRBCs per 100 WBC 0 <1 /100    Absolute Neutrophils 5.4 1.6 - 8.3 10e3/uL    Absolute Lymphocytes 2.5 0.8 - 5.3 10e3/uL    Absolute Monocytes 0.8 0.0 - 1.3 10e3/uL    Absolute Eosinophils 0.0 0.0 - 0.7 10e3/uL    Absolute Basophils 0.0 0.0 - 0.2 10e3/uL    Absolute Immature Granulocytes 0.0 <=0.4 10e3/uL    Absolute NRBCs 0.0 10e3/uL   Glucose by meter     Status: Abnormal   Result Value Ref Range    GLUCOSE BY METER POCT 125 (H) 70 - 99 mg/dL   EKG 12-lead, tracing only     Status: None (Preliminary result)   Result Value Ref Range    Systolic Blood Pressure  mmHg    Diastolic Blood Pressure  mmHg    Ventricular Rate 124 BPM    Atrial Rate 124 BPM    [place] Interval 128 ms    QRS Duration 82 ms     ms    QTc 485 ms    P Axis 53 degrees     R AXIS 37 degrees    T Axis 62 degrees    Interpretation ECG       Sinus tachycardia  Otherwise normal ECG  When compared with ECG of 18-Feb-2025 03:53,  No significant change was found     CBC with Platelets & Differential     Status: Abnormal    Narrative    The following orders were created for panel order CBC with Platelets & Differential.  Procedure                               Abnormality         Status                     ---------                               -----------         ------                     CBC with platelets and ...[[id number]]  Abnormal            Final result                 Please view results for these tests on the individual orders.   CBC with Platelets & Differential     Status: Abnormal    Narrative    The following orders were created for panel order CBC with Platelets & Differential.  Procedure                               Abnormality         Status                     ---------                               -----------         ------                     CBC with platelets and ...[[id number]]  Abnormal            Final result                 Please view results for these tests on the individual orders.       Iris Torres PA-C   Abnormal   Result Value Ref Range    Sodium 133 (L) 135 - 145 mmol/L    Potassium 3.5 3.4 - 5.3 mmol/L    Chloride 97 (L) 98 - 107 mmol/L    Carbon Dioxide (CO2) 29 22 - 29 mmol/L    Anion Gap 7 7 - 15 mmol/L    Urea Nitrogen 20.7 8.0 - 23.0 mg/dL    Creatinine 0.79 0.67 - 1.17 mg/dL    GFR Estimate >90 >60 mL/min/1.73m2    Calcium 9.3 8.8 - 10.4 mg/dL    Glucose 197 (H) 70 - 99 mg/dL   Magnesium     Status: Normal   Result Value Ref Range    Magnesium 2.0 1.7 - 2.3 mg/dL   Troponin T, High Sensitivity     Status: Abnormal   Result Value Ref Range    Troponin T, High Sensitivity 24 (H) <=22 ng/L   Phosphorus     Status: Normal   Result Value Ref Range    Phosphorus 2.6 2.5 - 4.5 mg/dL   Glucose by meter     Status: Abnormal   Result Value Ref Range    GLUCOSE BY METER POCT 259 (H) 70 - 99 mg/dL   Glucose by meter     Status: Abnormal   Result Value Ref Range    GLUCOSE BY METER POCT 185 (H) 70 - 99 mg/dL   Glucose by meter     Status: Abnormal   Result Value Ref Range    GLUCOSE BY METER POCT 202 (H) 70 - 99 mg/dL   Glucose by meter     Status: Abnormal   Result Value Ref Range    GLUCOSE BY METER POCT 257 (H) 70 - 99 mg/dL   Basic metabolic panel     Status: Abnormal   Result Value Ref Range    Sodium 134 (L) 135 - 145 mmol/L    Potassium 3.6 3.4 - 5.3 mmol/L    Chloride 98 98 - 107 mmol/L    Carbon Dioxide (CO2) 28 22 - 29 mmol/L    Anion Gap 8 7 - 15 mmol/L    Urea Nitrogen 12.6 8.0 - 23.0 mg/dL    Creatinine 0.88 0.67 - 1.17 mg/dL    GFR Estimate >90 >60 mL/min/1.73m2    Calcium 8.9 8.8 - 10.4 mg/dL    Glucose 127 (H) 70 - 99 mg/dL   Glucose by meter     Status: Abnormal   Result Value Ref Range    GLUCOSE BY METER POCT 121 (H) 70 - 99 mg/dL   Glucose by meter     Status: Normal   Result Value Ref Range    GLUCOSE BY METER POCT 74 70 - 99 mg/dL   Glucose by meter     Status: Abnormal   Result Value Ref Range    GLUCOSE BY METER POCT 120 (H) 70 - 99 mg/dL   CBC with platelets and differential     Status: Abnormal    Result Value Ref Range    WBC Count 8.7 4.0 - 11.0 10e3/uL    RBC Count 3.42 (L) 4.40 - 5.90 10e6/uL    Hemoglobin 9.6 (L) 13.3 - 17.7 g/dL    Hematocrit 28.8 (L) 40.0 - 53.0 %    MCV 84 78 - 100 fL    MCH 28.1 26.5 - 33.0 pg    MCHC 33.3 31.5 - 36.5 g/dL    RDW 15.7 (H) 10.0 - 15.0 %    Platelet Count 152 150 - 450 10e3/uL    % Neutrophils 62 %    % Lymphocytes 29 %    % Monocytes 9 %    % Eosinophils 0 %    % Basophils 1 %    % Immature Granulocytes 0 %    NRBCs per 100 WBC 0 <1 /100    Absolute Neutrophils 5.4 1.6 - 8.3 10e3/uL    Absolute Lymphocytes 2.5 0.8 - 5.3 10e3/uL    Absolute Monocytes 0.8 0.0 - 1.3 10e3/uL    Absolute Eosinophils 0.0 0.0 - 0.7 10e3/uL    Absolute Basophils 0.0 0.0 - 0.2 10e3/uL    Absolute Immature Granulocytes 0.0 <=0.4 10e3/uL    Absolute NRBCs 0.0 10e3/uL   Glucose by meter     Status: Abnormal   Result Value Ref Range    GLUCOSE BY METER POCT 125 (H) 70 - 99 mg/dL   EKG 12-lead, tracing only     Status: None (Preliminary result)   Result Value Ref Range    Systolic Blood Pressure  mmHg    Diastolic Blood Pressure  mmHg    Ventricular Rate 124 BPM    Atrial Rate 124 BPM    CO Interval 128 ms    QRS Duration 82 ms     ms    QTc 485 ms    P Axis 53 degrees    R AXIS 37 degrees    T Axis 62 degrees    Interpretation ECG       Sinus tachycardia  Otherwise normal ECG  When compared with ECG of 18-Feb-2025 03:53,  No significant change was found     CBC with Platelets & Differential     Status: Abnormal    Narrative    The following orders were created for panel order CBC with Platelets & Differential.  Procedure                               Abnormality         Status                     ---------                               -----------         ------                     CBC with platelets and ...[[id number]]  Abnormal            Final result                 Please view results for these tests on the individual orders.   CBC with Platelets & Differential     Status: Abnormal     Narrative    The following orders were created for panel order CBC with Platelets & Differential.  Procedure                               Abnormality         Status                     ---------                               -----------         ------                     CBC with platelets and ...[[id number]]  Abnormal            Final result                 Please view results for these tests on the individual orders.       Iris Torres PA-C

## 2025-06-22 NOTE — PLAN OF CARE
"    Goal Outcome Evaluation:      Plan of Care Reviewed With: patient    Overall Patient Progress: improvingOverall Patient Progress: improving    Outcome Evaluation: A&Ox3. Pt denies pain, SBA and went to bathroom with support staff. IV removed x2. Gave a popsicle for dry throat. Pt dressed and ready to go. Discharge instructions given to EMS.  Vital signs:  Temp: 98.4  F (36.9  C) Temp src: Axillary BP: 123/67 Pulse: 104   Resp: 18 SpO2: 98 % O2 Device: None (Room air)     Patient's After Visit Summary was reviewed with patient and/or EMS.   Patient verbalized understanding of After Visit Summary, recommended follow up and was given an opportunity to ask questions.   Discharge medications sent home with patient/family: No   Discharged with other:EMS      Problem: Adult Inpatient Plan of Care  Goal: Plan of Care Review  Description: The Plan of Care Review/Shift note should be completed every shift.  The Outcome Evaluation is a brief statement about your assessment that the patient is improving, declining, or no change.  This information will be displayed automatically on your shift  note.  Outcome: Met  Flowsheets (Taken 6/22/2025 1709)  Outcome Evaluation: A&Ox3. Pt denies pain, SBA and went to bathroom with support staff. IV removed x2. Gave a popsicle for dry throat. Pt dressed and ready to go. Discharge instructions given to EMS.  Plan of Care Reviewed With: patient  Overall Patient Progress: improving  Goal: Patient-Specific Goal (Individualized)  Description: You can add care plan individualizations to a care plan. Examples of Individualization might be:  \"Parent requests to be called daily at 9am for status\", \"I have a hard time hearing out of my right ear\", or \"Do not touch me to wake me up as it startles  me\".  Outcome: Met  Goal: Absence of Hospital-Acquired Illness or Injury  Outcome: Met  Goal: Optimal Comfort and Wellbeing  Outcome: Met  Intervention: Monitor Pain and Promote Comfort  Recent Flowsheet " Documentation  Taken 6/22/2025 1613 by Yanely Villafana, RN  Pain Management Interventions: declines  Goal: Readiness for Transition of Care  Outcome: Met

## 2025-06-22 NOTE — PLAN OF CARE
"Cares Provided [id number]    Pt is AOX3. LR stopped. RA and clear liquid diet. AX2 for cares. Enteric precaution.Repositoned. Flat affect mood.        Blood pressure 134/70, pulse 105, temperature 99.5  F (37.5  C), temperature source Axillary, resp. rate 18, SpO2 97%.       Goal Outcome Evaluation:      Plan of Care Reviewed With: patient    Overall Patient Progress: improvingOverall Patient Progress: improving    Outcome Evaluation: Intermittent confusion otherwise oriented x3. Stopped LR. LegallyBlind.          Problem: Adult Inpatient Plan of Care  Goal: Plan of Care Review  Description: The Plan of Care Review/Shift note should be completed every shift.  The Outcome Evaluation is a brief statement about your assessment that the patient is improving, declining, or no change.  This information will be displayed automatically on your shift  note.  6/22/2025 0235 by Brian Og RN  Outcome: Progressing  Flowsheets (Taken 6/22/2025 0235)  Outcome Evaluation: Intermittent confusion otherwise oriented x3. Stopped LR. LegallyBlind.  Plan of Care Reviewed With: patient  Overall Patient Progress: improving  6/22/2025 0233 by Brian Og, RN  Outcome: Progressing  Flowsheets (Taken 6/22/2025 0233)  Outcome Evaluation: Intermittent confusion otherwise oriented x3. Stopped LR. LegallyBlind.  Goal: Patient-Specific Goal (Individualized)  Description: You can add care plan individualizations to a care plan. Examples of Individualization might be:  \"Parent requests to be called daily at 9am for status\", \"I have a hard time hearing out of my right ear\", or \"Do not touch me to wake me up as it startles  me\".  6/22/2025 0235 by Brian Og, RN  Outcome: Progressing  6/22/2025 0233 by Brian Og, RN  Outcome: Progressing  Goal: Absence of Hospital-Acquired Illness or Injury  6/22/2025 0235 by Brian Og RN  Outcome: Progressing  6/22/2025 0233 by Brian Og, RN  Outcome: Progressing  Intervention: " Identify and Manage Fall Risk  Recent Flowsheet Documentation  Taken 6/22/2025 0130 by Brian Og RN  Safety Promotion/Fall Prevention:   safety round/check completed   clutter free environment maintained  Intervention: Prevent Skin Injury  Recent Flowsheet Documentation  Taken 6/22/2025 0130 by Brian Og RN  Body Position:   right   turned   position changed independently  Intervention: Prevent and Manage VTE (Venous Thromboembolism) Risk  Recent Flowsheet Documentation  Taken 6/22/2025 0130 by Brian Og RN  VTE Prevention/Management: SCDs off (sequential compression devices)  Intervention: Prevent Infection  Recent Flowsheet Documentation  Taken 6/22/2025 0130 by Brian Og RN  Infection Prevention:   single patient room provided   cohorting utilized  Goal: Optimal Comfort and Wellbeing  6/22/2025 0235 by Brian Og RN  Outcome: Progressing  6/22/2025 0233 by Brian Og RN  Outcome: Progressing  Goal: Readiness for Transition of Care  6/22/2025 0235 by Brian Og RN  Outcome: Progressing  6/22/2025 0233 by Brian Og RN  Outcome: Progressing     Problem: Skin Injury Risk Increased  Goal: Skin Health and Integrity  6/22/2025 0235 by Brian Og RN  Outcome: Progressing  6/22/2025 0233 by Brian Og RN  Outcome: Progressing  Intervention: Plan: Nurse Driven Intervention: Moisture Management  Recent Flowsheet Documentation  Taken 6/22/2025 0130 by Brian Og RN  Moisture Interventions: Encourage regular toileting  Intervention: Plan: Nurse Driven Intervention: Friction and Shear  Recent Flowsheet Documentation  Taken 6/22/2025 0130 by Brian Og RN  Friction/Shear Interventions: HOB 30 degrees or less  Intervention: Optimize Skin Protection  Recent Flowsheet Documentation  Taken 6/22/2025 0130 by Brian Og RN  Activity Management: activity adjusted per tolerance  Head of Bed (HOB) Positioning: HOB at 20-30 degrees

## 2025-06-22 NOTE — PLAN OF CARE
"[id number]    Inpatient Progress Note:  A & O X 2. Patient noted to be lethargic. No S/SX of respiratory distress. Denies pain, chills, shortness of breath, nausea, or vomit. On clear liquid diet. Peripheral IV infusing LR at 100 mL. On enteric precaution. On blood glucose monitoring, scheduled insulin glargine, and sliding scale insulin for DM management. On scheduled clonidine, and hydralazine. Afebrile. SW following.   BP (!) 150/79 (BP Location: Left arm)   Pulse 118   Temp 98.7  F (37.1  C) (Oral)   Resp 18   SpO2 97%    Problem: Adult Inpatient Plan of Care  Goal: Plan of Care Review  Description: The Plan of Care Review/Shift note should be completed every shift.  The Outcome Evaluation is a brief statement about your assessment that the patient is improving, declining, or no change.  This information will be displayed automatically on your shift  note.  Outcome: Progressing  Flowsheets (Taken 6/21/2025 2240)  Plan of Care Reviewed With: patient  Goal: Patient-Specific Goal (Individualized)  Description: You can add care plan individualizations to a care plan. Examples of Individualization might be:  \"Parent requests to be called daily at 9am for status\", \"I have a hard time hearing out of my right ear\", or \"Do not touch me to wake me up as it startles  me\".  Outcome: Progressing  Goal: Absence of Hospital-Acquired Illness or Injury  Outcome: Progressing  Intervention: Identify and Manage Fall Risk  Recent Flowsheet Documentation  Taken 6/21/2025 1958 by Prosper Peralta RN  Safety Promotion/Fall Prevention:   activity supervised   assistive device/personal items within reach   clutter free environment maintained   increased rounding and observation   increase visualization of patient   nonskid shoes/slippers when out of bed   patient and family education   room near nurse's station   safety round/check completed   room organization consistent   supervised activity   treat reversible contributory " factors   treat underlying cause  Intervention: Prevent Skin Injury  Recent Flowsheet Documentation  Taken 6/21/2025 1958 by Prosper Peralta RN  Body Position: position changed independently  Intervention: Prevent and Manage VTE (Venous Thromboembolism) Risk  Recent Flowsheet Documentation  Taken 6/21/2025 1958 by Prosper Peralta RN  VTE Prevention/Management: SCDs off (sequential compression devices)  Intervention: Prevent Infection  Recent Flowsheet Documentation  Taken 6/21/2025 1958 by Prosper Peralta RN  Infection Prevention:   single patient room provided   cohorting utilized  Goal: Optimal Comfort and Wellbeing  Outcome: Progressing  Goal: Readiness for Transition of Care  Outcome: Progressing     Problem: Skin Injury Risk Increased  Goal: Skin Health and Integrity  Outcome: Progressing  Intervention: Plan: Nurse Driven Intervention: Moisture Management  Recent Flowsheet Documentation  Taken 6/21/2025 2000 by Prosper Peralta RN  Moisture Interventions: Encourage regular toileting  Bathing/Skin Care: patient refused  Taken 6/21/2025 1958 by Prosper Peralta RN  Moisture Interventions: Encourage regular toileting  Intervention: Plan: Nurse Driven Intervention: Friction and Shear  Recent Flowsheet Documentation  Taken 6/21/2025 1958 by Prosper Peralta RN  Friction/Shear Interventions: HOB 30 degrees or less  Intervention: Optimize Skin Protection  Recent Flowsheet Documentation  Taken 6/21/2025 1958 by Prosper Peralta RN  Activity Management: activity adjusted per tolerance  Head of Bed (HOB) Positioning: HOB at 20-30 degrees     Will continue to provide supportive cares.     Prosper Peralta RN         Plan of Care Reviewed With: patient

## 2025-06-22 NOTE — PROGRESS NOTES
Care Management Discharge Note    Discharge Date: 06/23/2025       Discharge Disposition: Group Home    Discharge Services:      Discharge DME:      Discharge Transportation:  [facility] [employer]    Private pay costs discussed: transportation costs    Does the patient's insurance plan have a 3 day qualifying hospital stay waiver?  No    PAS Confirmation Code:    Patient/family educated on Medicare website which has current facility and service quality ratings:      Education Provided on the Discharge Plan:    Persons Notified of Discharge Plans: Mariam Dow  [phone]  Patient/Family in Agreement with the Plan:      Handoff Referral Completed: No, handoff not indicated or clinically appropriate    Additional Information:  Spoke with , patient can come back anytime, any new meds should be filled here.   [facility] transport has availability between [phone].   just needs printed discharge paperwork, no need to fax.    AJZMINE Hernandez  [phone]

## 2025-06-23 LAB
ATRIAL RATE - MUSE: 124 BPM
DIASTOLIC BLOOD PRESSURE - MUSE: NORMAL MMHG
INTERPRETATION ECG - MUSE: NORMAL
P AXIS - MUSE: 53 DEGREES
PR INTERVAL - MUSE: 128 MS
QRS DURATION - MUSE: 82 MS
QT - MUSE: 338 MS
QTC - MUSE: 485 MS
R AXIS - MUSE: 37 DEGREES
SYSTOLIC BLOOD PRESSURE - MUSE: NORMAL MMHG
T AXIS - MUSE: 62 DEGREES
VENTRICULAR RATE- MUSE: 124 BPM

## 2025-08-23 ENCOUNTER — APPOINTMENT (OUTPATIENT)
Dept: CT IMAGING | Facility: CLINIC | Age: 60
End: 2025-08-23
Attending: EMERGENCY MEDICINE
Payer: COMMERCIAL

## 2025-08-23 ENCOUNTER — APPOINTMENT (OUTPATIENT)
Dept: GENERAL RADIOLOGY | Facility: CLINIC | Age: 60
End: 2025-08-23
Attending: EMERGENCY MEDICINE
Payer: COMMERCIAL

## 2025-08-23 ENCOUNTER — HOSPITAL ENCOUNTER (INPATIENT)
Facility: CLINIC | Age: 60
LOS: 1 days | Discharge: NURSING, REHAB OR LONG-TERM CARE | End: 2025-08-25
Attending: EMERGENCY MEDICINE | Admitting: INTERNAL MEDICINE
Payer: COMMERCIAL

## 2025-08-23 ENCOUNTER — APPOINTMENT (OUTPATIENT)
Dept: ULTRASOUND IMAGING | Facility: CLINIC | Age: 60
End: 2025-08-23
Attending: EMERGENCY MEDICINE
Payer: COMMERCIAL

## 2025-08-23 PROBLEM — R11.2 INTRACTABLE NAUSEA AND VOMITING: Status: ACTIVE | Noted: 2025-08-23

## 2025-08-23 ASSESSMENT — ACTIVITIES OF DAILY LIVING (ADL)
ADLS_ACUITY_SCORE: 60
ADLS_ACUITY_SCORE: 66
ADLS_ACUITY_SCORE: 66
ADLS_ACUITY_SCORE: 60
ADLS_ACUITY_SCORE: 66
ADLS_ACUITY_SCORE: 66
ADLS_ACUITY_SCORE: 60
ADLS_ACUITY_SCORE: 66
ADLS_ACUITY_SCORE: 60
ADLS_ACUITY_SCORE: 60

## 2025-08-24 PROBLEM — I16.0 HYPERTENSIVE URGENCY: Status: ACTIVE | Noted: 2025-08-24

## 2025-08-24 ASSESSMENT — ACTIVITIES OF DAILY LIVING (ADL)
ADLS_ACUITY_SCORE: 69
ADLS_ACUITY_SCORE: 51
ADLS_ACUITY_SCORE: 66
DEPENDENT_IADLS:: CLEANING;COOKING;LAUNDRY;SHOPPING;MEAL PREPARATION;MEDICATION MANAGEMENT;MONEY MANAGEMENT;TRANSPORTATION
ADLS_ACUITY_SCORE: 51
ADLS_ACUITY_SCORE: 66
ADLS_ACUITY_SCORE: 51
ADLS_ACUITY_SCORE: 68
ADLS_ACUITY_SCORE: 69
ADLS_ACUITY_SCORE: 66
ADLS_ACUITY_SCORE: 66
ADLS_ACUITY_SCORE: 51
ADLS_ACUITY_SCORE: 68
ADLS_ACUITY_SCORE: 51
ADLS_ACUITY_SCORE: 66
ADLS_ACUITY_SCORE: 66
ADLS_ACUITY_SCORE: 51
ADLS_ACUITY_SCORE: 69
ADLS_ACUITY_SCORE: 51
ADLS_ACUITY_SCORE: 68
ADLS_ACUITY_SCORE: 51

## 2025-08-25 ASSESSMENT — ACTIVITIES OF DAILY LIVING (ADL)
ADLS_ACUITY_SCORE: 57
ADLS_ACUITY_SCORE: 51
ADLS_ACUITY_SCORE: 57
ADLS_ACUITY_SCORE: 51